# Patient Record
Sex: MALE | Race: WHITE | NOT HISPANIC OR LATINO | URBAN - METROPOLITAN AREA
[De-identification: names, ages, dates, MRNs, and addresses within clinical notes are randomized per-mention and may not be internally consistent; named-entity substitution may affect disease eponyms.]

---

## 2017-08-22 ENCOUNTER — OFFICE (OUTPATIENT)
Dept: URBAN - METROPOLITAN AREA CLINIC 14 | Facility: CLINIC | Age: 67
End: 2017-08-22

## 2017-08-22 VITALS
DIASTOLIC BLOOD PRESSURE: 72 MMHG | SYSTOLIC BLOOD PRESSURE: 116 MMHG | HEIGHT: 75 IN | HEART RATE: 100 BPM | WEIGHT: 204 LBS

## 2017-08-22 DIAGNOSIS — Z86.010 PERSONAL HISTORY OF COLONIC POLYPS: ICD-10-CM

## 2017-08-22 DIAGNOSIS — K21.9 GASTRO-ESOPHAGEAL REFLUX DISEASE WITHOUT ESOPHAGITIS: ICD-10-CM

## 2017-08-22 DIAGNOSIS — K57.30 DIVERTICULOSIS OF LARGE INTESTINE WITHOUT PERFORATION OR ABS: ICD-10-CM

## 2017-08-22 DIAGNOSIS — K22.70 BARRETT'S ESOPHAGUS WITHOUT DYSPLASIA: ICD-10-CM

## 2017-08-22 PROCEDURE — 99213 OFFICE O/P EST LOW 20 MIN: CPT

## 2017-08-22 RX ORDER — PANTOPRAZOLE SODIUM 40 MG/1
40 TABLET, DELAYED RELEASE ORAL
Qty: 90 | Refills: 3 | Status: ACTIVE
Start: 2015-09-11

## 2018-06-26 ENCOUNTER — OFFICE (OUTPATIENT)
Dept: URBAN - METROPOLITAN AREA CLINIC 14 | Facility: CLINIC | Age: 68
End: 2018-06-26

## 2018-06-26 VITALS
DIASTOLIC BLOOD PRESSURE: 72 MMHG | HEART RATE: 93 BPM | SYSTOLIC BLOOD PRESSURE: 108 MMHG | WEIGHT: 194 LBS | HEIGHT: 75 IN

## 2018-06-26 DIAGNOSIS — K22.70 BARRETT'S ESOPHAGUS WITHOUT DYSPLASIA: ICD-10-CM

## 2018-06-26 DIAGNOSIS — Z86.010 PERSONAL HISTORY OF COLONIC POLYPS: ICD-10-CM

## 2018-06-26 DIAGNOSIS — K21.9 GASTRO-ESOPHAGEAL REFLUX DISEASE WITHOUT ESOPHAGITIS: ICD-10-CM

## 2018-06-26 DIAGNOSIS — K57.30 DIVERTICULOSIS OF LARGE INTESTINE WITHOUT PERFORATION OR ABS: ICD-10-CM

## 2018-06-26 PROCEDURE — 99214 OFFICE O/P EST MOD 30 MIN: CPT

## 2018-06-26 RX ORDER — PANTOPRAZOLE SODIUM 40 MG/1
40 TABLET, DELAYED RELEASE ORAL
Qty: 90 | Refills: 3 | Status: ACTIVE
Start: 2015-09-11

## 2018-06-26 RX ORDER — SODIUM SULFATE, POTASSIUM SULFATE, MAGNESIUM SULFATE 17.5; 3.13; 1.6 G/ML; G/ML; G/ML
SOLUTION, CONCENTRATE ORAL
Status: COMPLETED
Start: 2018-06-26 | End: 2018-07-26

## 2018-07-26 ENCOUNTER — AMBULATORY SURGICAL CENTER (OUTPATIENT)
Dept: URBAN - METROPOLITAN AREA SURGERY 2 | Facility: SURGERY | Age: 68
End: 2018-07-26

## 2018-07-26 ENCOUNTER — AMBULATORY SURGICAL CENTER (OUTPATIENT)
Dept: URBAN - METROPOLITAN AREA SURGERY 2 | Facility: SURGERY | Age: 68
End: 2018-07-26
Payer: COMMERCIAL

## 2018-07-26 ENCOUNTER — OFFICE (OUTPATIENT)
Dept: URBAN - METROPOLITAN AREA PATHOLOGY 22 | Facility: PATHOLOGY | Age: 68
End: 2018-07-26
Payer: COMMERCIAL

## 2018-07-26 VITALS
RESPIRATION RATE: 16 BRPM | SYSTOLIC BLOOD PRESSURE: 108 MMHG | SYSTOLIC BLOOD PRESSURE: 93 MMHG | SYSTOLIC BLOOD PRESSURE: 108 MMHG | RESPIRATION RATE: 18 BRPM | OXYGEN SATURATION: 93 % | DIASTOLIC BLOOD PRESSURE: 64 MMHG | HEART RATE: 81 BPM | DIASTOLIC BLOOD PRESSURE: 58 MMHG | TEMPERATURE: 98.6 F | TEMPERATURE: 98.1 F | RESPIRATION RATE: 18 BRPM | HEIGHT: 75 IN | HEIGHT: 75 IN | SYSTOLIC BLOOD PRESSURE: 93 MMHG | DIASTOLIC BLOOD PRESSURE: 58 MMHG | DIASTOLIC BLOOD PRESSURE: 57 MMHG | SYSTOLIC BLOOD PRESSURE: 116 MMHG | HEART RATE: 87 BPM | HEART RATE: 78 BPM | HEART RATE: 85 BPM | OXYGEN SATURATION: 91 % | WEIGHT: 190 LBS | DIASTOLIC BLOOD PRESSURE: 49 MMHG | HEART RATE: 87 BPM | DIASTOLIC BLOOD PRESSURE: 64 MMHG | OXYGEN SATURATION: 100 % | HEART RATE: 81 BPM | TEMPERATURE: 98.1 F | WEIGHT: 190 LBS | RESPIRATION RATE: 16 BRPM | TEMPERATURE: 98.6 F | OXYGEN SATURATION: 100 % | OXYGEN SATURATION: 93 % | HEART RATE: 78 BPM | OXYGEN SATURATION: 91 % | DIASTOLIC BLOOD PRESSURE: 49 MMHG | SYSTOLIC BLOOD PRESSURE: 116 MMHG | HEART RATE: 85 BPM | DIASTOLIC BLOOD PRESSURE: 57 MMHG

## 2018-07-26 DIAGNOSIS — Z86.010 PERSONAL HISTORY OF COLONIC POLYPS: ICD-10-CM

## 2018-07-26 DIAGNOSIS — K31.7 POLYP OF STOMACH AND DUODENUM: ICD-10-CM

## 2018-07-26 DIAGNOSIS — K21.9 GASTRO-ESOPHAGEAL REFLUX DISEASE WITHOUT ESOPHAGITIS: ICD-10-CM

## 2018-07-26 DIAGNOSIS — R93.3 ABNORMAL FINDINGS ON DIAGNOSTIC IMAGING OF OTHER PARTS OF DI: ICD-10-CM

## 2018-07-26 DIAGNOSIS — K44.9 DIAPHRAGMATIC HERNIA WITHOUT OBSTRUCTION OR GANGRENE: ICD-10-CM

## 2018-07-26 DIAGNOSIS — K31.89 OTHER DISEASES OF STOMACH AND DUODENUM: ICD-10-CM

## 2018-07-26 DIAGNOSIS — K57.30 DIVERTICULOSIS OF LARGE INTESTINE WITHOUT PERFORATION OR ABS: ICD-10-CM

## 2018-07-26 PROCEDURE — 88305 TISSUE EXAM BY PATHOLOGIST: CPT

## 2018-07-26 PROCEDURE — 43239 EGD BIOPSY SINGLE/MULTIPLE: CPT | Mod: 51

## 2018-07-26 PROCEDURE — G8907 PT DOC NO EVENTS ON DISCHARG: HCPCS

## 2018-07-26 PROCEDURE — G8918 PT W/O PREOP ORDER IV AB PRO: HCPCS

## 2018-07-26 PROCEDURE — 88342 IMHCHEM/IMCYTCHM 1ST ANTB: CPT

## 2018-07-26 PROCEDURE — 88313 SPECIAL STAINS GROUP 2: CPT

## 2018-07-26 PROCEDURE — 45378 DIAGNOSTIC COLONOSCOPY: CPT

## 2018-07-26 PROCEDURE — G0105 COLORECTAL SCRN; HI RISK IND: HCPCS

## 2023-02-28 ENCOUNTER — OFFICE VISIT (OUTPATIENT)
Dept: FAMILY MEDICINE CLINIC | Facility: CLINIC | Age: 73
End: 2023-02-28
Payer: MEDICARE

## 2023-02-28 VITALS
HEIGHT: 76 IN | DIASTOLIC BLOOD PRESSURE: 69 MMHG | HEART RATE: 93 BPM | WEIGHT: 173 LBS | BODY MASS INDEX: 21.07 KG/M2 | OXYGEN SATURATION: 95 % | SYSTOLIC BLOOD PRESSURE: 116 MMHG | RESPIRATION RATE: 20 BRPM

## 2023-02-28 DIAGNOSIS — Z87.19 HISTORY OF BARRETT'S ESOPHAGUS: ICD-10-CM

## 2023-02-28 DIAGNOSIS — K21.00 GASTROESOPHAGEAL REFLUX DISEASE WITH ESOPHAGITIS, UNSPECIFIED WHETHER HEMORRHAGE: ICD-10-CM

## 2023-02-28 DIAGNOSIS — J84.112 IPF (IDIOPATHIC PULMONARY FIBROSIS): Primary | ICD-10-CM

## 2023-02-28 DIAGNOSIS — Z99.81 CHRONIC RESPIRATORY FAILURE WITH HYPOXIA, ON HOME O2 THERAPY: ICD-10-CM

## 2023-02-28 DIAGNOSIS — R05.3 CHRONIC COUGH: ICD-10-CM

## 2023-02-28 DIAGNOSIS — R06.02 SHORTNESS OF BREATH: ICD-10-CM

## 2023-02-28 DIAGNOSIS — J96.11 CHRONIC RESPIRATORY FAILURE WITH HYPOXIA, ON HOME O2 THERAPY: ICD-10-CM

## 2023-02-28 DIAGNOSIS — J96.11 CHRONIC RESPIRATORY FAILURE WITH HYPOXIA: ICD-10-CM

## 2023-02-28 PROBLEM — F10.10 ALCOHOL ABUSE: Status: ACTIVE | Noted: 2022-03-13

## 2023-02-28 PROCEDURE — 99203 OFFICE O/P NEW LOW 30 MIN: CPT | Performed by: NURSE PRACTITIONER

## 2023-02-28 RX ORDER — PANTOPRAZOLE SODIUM 20 MG/1
20 TABLET, DELAYED RELEASE ORAL DAILY
COMMUNITY

## 2023-02-28 RX ORDER — BENZONATATE 100 MG/1
100 CAPSULE ORAL 3 TIMES DAILY PRN
Qty: 30 CAPSULE | Refills: 0 | Status: SHIPPED | OUTPATIENT
Start: 2023-02-28

## 2023-02-28 RX ORDER — ESCITALOPRAM OXALATE 10 MG/1
10 TABLET ORAL DAILY
COMMUNITY

## 2023-02-28 NOTE — PROGRESS NOTES
"Chief Complaint  Shortness of Breath and Establish Care    Subjective    History of Present Illness      Patient presents to [facility] PRIMARY [facility]ARE for   History of Present Illness  Patient recently moved to [place]. He has diagnosis of pulmonary fibrosis. Wanting referral to Pulm. Is currently on home oxygen        Review of Systems    I have reviewed and agree with the HPI and ROS information as above.  Telma Garcia Tanna, APRN     Objective   Vital Signs:   /69   Pulse 93   Resp 20   Ht 193 cm (76\")   Wt 78.5 kg (173 lb)   SpO2 95%   BMI 21.06 kg/m²     BMI cannot be calculated due to outdated height or weight values.  Please input a current height/weight in Vitals and re-renter BMIFOLLOWUP in Note to pull in correct documentation based on BMI range.      Physical Exam  Constitutional:       Appearance: Normal appearance. He is well-developed.   HENT:      Head: Normocephalic and atraumatic.      Right Ear: External ear normal.      Left Ear: External ear normal.      Nose: Nose normal. No nasal tenderness or congestion.      Mouth/Throat:      Lips: Pink. No lesions.      Mouth: Mucous membranes are moist. No oral lesions.      Dentition: Normal dentition.      Pharynx: Oropharynx is clear. No pharyngeal swelling, oropharyngeal exudate or posterior oropharyngeal erythema.   Eyes:      General: Lids are normal. Vision grossly intact. No scleral icterus.        Right eye: No discharge.         Left eye: No discharge.      Extraocular Movements: Extraocular movements intact.      Conjunctiva/sclera: Conjunctivae normal.      Right eye: Right conjunctiva is not injected.      Left eye: Left conjunctiva is not injected.      Pupils: Pupils are equal, round, and reactive to light.   Cardiovascular:      Rate and Rhythm: Normal rate and regular rhythm.      Heart sounds: Normal heart sounds. No murmur heard.    No gallop.   Pulmonary:      Effort: Pulmonary effort is normal.      " Breath sounds: Decreased air movement present. Rales present. No wheezing or rhonchi.   Musculoskeletal:         General: No tenderness or deformity. Normal range of motion.      Cervical back: Full passive range of motion without pain, normal range of motion and neck supple.      Right lower leg: No edema.      Left lower leg: No edema.   Skin:     General: Skin is warm and dry.      Coloration: Skin is not jaundiced.      Findings: No rash.   Neurological:      Mental Status: He is alert and oriented to person, place, and time.      Sensory: Sensation is intact.      Motor: Motor function is intact.      Coordination: Coordination is intact.      Gait: Gait is intact.   Psychiatric:         Attention and Perception: Attention normal.         Mood and Affect: Mood and affect normal.         Behavior: Behavior is not hyperactive. Behavior is cooperative.         Thought Content: Thought content normal.         Judgment: Judgment normal.          JOSE RAUL-7:      PHQ-2 Depression Screening  Little interest or pleasure in doing things? 0-->not at all   Feeling down, depressed, or hopeless? 0-->not at all   PHQ-2 Total Score 0     PHQ-9 Depression Screening  Little interest or pleasure in doing things? 0-->not at all   Feeling down, depressed, or hopeless? 0-->not at all   Trouble falling or staying asleep, or sleeping too much?     Feeling tired or having little energy?     Poor appetite or overeating?     Feeling bad about yourself - or that you are a failure or have let yourself or your family down?     Trouble concentrating on things, such as reading the newspaper or watching television?     Moving or speaking so slowly that other people could have noticed? Or the opposite - being so fidgety or restless that you have been moving around a lot more than usual?     Thoughts that you would be better off dead, or of hurting yourself in some way?     PHQ-9 Total Score 0   If you checked off any problems, how difficult have  these problems made it for you to do your work, take care of things at home, or get along with other people?        Result Review  Data Reviewed:                   Assessment and Plan      Diagnoses and all orders for this visit:    1. IPF (idiopathic pulmonary fibrosis) (HCC) (Primary)  -     benzonatate (Tessalon Perles) 100 MG capsule; Take 1 capsule by mouth 3 (Three) Times a Day As Needed for Cough.  Dispense: 30 capsule; Refill: 0  -     Ambulatory Referral to Pulmonology    2. Chronic respiratory failure with hypoxia (HCC)  -     benzonatate (Tessalon Perles) 100 MG capsule; Take 1 capsule by mouth 3 (Three) Times a Day As Needed for Cough.  Dispense: 30 capsule; Refill: 0  -     Ambulatory Referral to Pulmonology    3. History of Lucas's esophagus  -     Ambulatory Referral to Gastroenterology    4. Chronic cough    5. Gastroesophageal reflux disease with esophagitis, unspecified whether hemorrhage    6. Chronic respiratory failure with hypoxia, on home O2 therapy ([facility])    7. Shortness of breath    Patient comes in today as a new patient requesting referral to pulmonology.  Patient has a history of idiopathic pulmonary fibrosis in which she has been following with a pulmonologist in [facility].  He moved to [place] about 6 months ago as he has family here and is needing a pulmonologist here to take over his care.  He is currently on 3 L of oxygen that he is supposed to be on constantly but of note he is not wearing it in the office.  His O2 sat was stable and I did question where he was and wife stated that it was in the car.  Wife is also requesting a refill on the Tessalon Perles in which the pulmonologist has given him previously so I will go ahead and refill this until he gets into pul.    Wife also states that patient is needing to establish a gastroenterologist here.  He has a history of Lucas's esophagus and wife states that it is probably time to get a recheck on that.  We will proceed with  this referral as well.  Patient is currently on a PPI.  We will continue the same until he sees them.    I did question the need for a Lexapro refill.  Wife denies needing a refill on this currently.    I question when the last Medicare wellness exam was.  Patient does feel like that it is probably been at least a year.  They we will get that scheduled when they leave today.        Follow Up   Return for Medicare Wellness.  Patient was given instructions and counseling regarding his condition or for health maintenance advice. Please see specific information pulled into the AVS if appropriate.

## 2023-03-01 ENCOUNTER — PATIENT ROUNDING (BHMG ONLY) (OUTPATIENT)
Dept: FAMILY MEDICINE CLINIC | Facility: CLINIC | Age: 73
End: 2023-03-01
Payer: MEDICARE

## 2023-03-01 NOTE — PROGRESS NOTES
March 1, 2023    Hello, may I speak with Roc Hawthorne?    My name is Sarah    Pt did not answer phone, left vm welcoming them to our practice and left our phone number for him to call back.    I am  with [facility]  [facility] PRIMARY [facility]  [address] [place]  [facility] [address]  [phone].    Before we get started may I verify your date of birth? 1950    I am calling to officially welcome you to our practice and ask about your recent visit. Is this a good time to talk? yes    Tell me about your visit with us. What things went well?         We're always looking for ways to make our patients' experiences even better. Do you have recommendations on ways we may improve?      Overall were you satisfied with your first visit to our practice?        I appreciate you taking the time to speak with me today. Is there anything else I can do for you?       Thank you, and have a great day.

## 2023-03-13 ENCOUNTER — OFFICE VISIT (OUTPATIENT)
Dept: FAMILY MEDICINE CLINIC | Facility: CLINIC | Age: 73
End: 2023-03-13
Payer: MEDICARE

## 2023-03-13 VITALS
WEIGHT: 169 LBS | HEART RATE: 96 BPM | HEIGHT: 76 IN | DIASTOLIC BLOOD PRESSURE: 69 MMHG | RESPIRATION RATE: 20 BRPM | BODY MASS INDEX: 20.58 KG/M2 | SYSTOLIC BLOOD PRESSURE: 138 MMHG

## 2023-03-13 DIAGNOSIS — M79.671 RIGHT FOOT PAIN: Primary | ICD-10-CM

## 2023-03-13 PROCEDURE — 99214 OFFICE O/P EST MOD 30 MIN: CPT | Performed by: NURSE PRACTITIONER

## 2023-03-13 NOTE — PROGRESS NOTES
"Chief Complaint  foot pain    Subjective    History of Present Illness      Patient presents to [facility] for   History of Present Illness  Pain on bottom of R foot x 2-3 weeks. Wanting referral to Podiatry. Weight bearing and painful        Review of Systems    I have reviewed and agree with the HPI and ROS information as above.  Telma Garcia ARIANNA Cisse     Objective   Vital Signs:   /69   Pulse 96   Resp 20   Ht 193 cm (76\")   Wt 76.7 kg (169 lb)   BMI 20.57 kg/m²     BMI is within normal parameters. No other follow-up for BMI required.      Physical Exam  Constitutional:       Appearance: Normal appearance. He is well-developed.   HENT:      Head: Normocephalic and atraumatic.      Right Ear: External ear normal.      Left Ear: External ear normal.      Nose: Nose normal. No nasal tenderness or congestion.      Mouth/Throat:      Lips: Pink. No lesions.      Mouth: Mucous membranes are moist. No oral lesions.      Dentition: Normal dentition.      Pharynx: Oropharynx is clear. No pharyngeal swelling, oropharyngeal exudate or posterior oropharyngeal erythema.   Eyes:      General: Lids are normal. Vision grossly intact. No scleral icterus.        Right eye: No discharge.         Left eye: No discharge.      Extraocular Movements: Extraocular movements intact.      Conjunctiva/sclera: Conjunctivae normal.      Right eye: Right conjunctiva is not injected.      Left eye: Left conjunctiva is not injected.      Pupils: Pupils are equal, round, and reactive to light.   Cardiovascular:      Rate and Rhythm: Normal rate and regular rhythm.      Heart sounds: Normal heart sounds. No murmur heard.    No gallop.   Pulmonary:      Effort: Pulmonary effort is normal.      Breath sounds: Normal breath sounds and air entry. No wheezing, rhonchi or rales.   Musculoskeletal:         General: No tenderness or deformity. Normal range of motion.      Cervical back: Full passive " range of motion without pain, normal range of motion and neck supple.      Right lower leg: No edema.      Left lower leg: No edema.   Skin:     General: Skin is warm and dry.      Coloration: Skin is not jaundiced.      Findings: No rash.      Comments: Bottom of right foot possible wart noted   Neurological:      Mental Status: He is alert and oriented to person, place, and time.      Sensory: Sensation is intact.      Motor: Motor function is intact.      Coordination: Coordination is intact.      Gait: Gait is intact.   Psychiatric:         Attention and Perception: Attention normal.         Mood and Affect: Mood and affect normal.         Behavior: Behavior is not hyperactive. Behavior is cooperative.         Thought Content: Thought content normal.         Judgment: Judgment normal.          JOSE RAUL-7:      PHQ-2 Depression Screening  Little interest or pleasure in doing things? 0-->not at all   Feeling down, depressed, or hopeless? 0-->not at all   PHQ-2 Total Score 0     PHQ-9 Depression Screening  Little interest or pleasure in doing things? 0-->not at all   Feeling down, depressed, or hopeless? 0-->not at all   Trouble falling or staying asleep, or sleeping too much?     Feeling tired or having little energy?     Poor appetite or overeating?     Feeling bad about yourself - or that you are a failure or have let yourself or your family down?     Trouble concentrating on things, such as reading the newspaper or watching television?     Moving or speaking so slowly that other people could have noticed? Or the opposite - being so fidgety or restless that you have been moving around a lot more than usual?     Thoughts that you would be better off dead, or of hurting yourself in some way?     PHQ-9 Total Score 0   If you checked off any problems, how difficult have these problems made it for you to do your work, take care of things at home, or get along with other people?        Result Review  Data Reviewed:                    Assessment and Plan      Diagnoses and all orders for this visit:    1. Right foot pain (Primary)  -     Ambulatory Referral to Podiatry    Comes in today requesting a referral to podiatry.  They tried to call make an appointment themselves but were told they would need the referral.  Patient complains of pain to the bottom of his right foot.  Wife states that there is a corn or something on the bottom of his foot.  I do see this area and with palpation it is very tender to patient.  We will proceed with referral.  I do not see any obvious foreign body.        Follow Up   Return if symptoms worsen or fail to improve.  Patient was given instructions and counseling regarding his condition or for health maintenance advice. Please see specific information pulled into the AVS if appropriate.

## 2023-03-15 PROBLEM — J84.112 IPF (IDIOPATHIC PULMONARY FIBROSIS): Chronic | Status: ACTIVE | Noted: 2020-01-23

## 2023-03-15 NOTE — PROGRESS NOTES
ARIANNA Howell  [facility]   Pulmonary and Critical Care  [address] [place] [address]  Phone: [phone]  Fax: [phone]           Chief Complaint  Idiopathic pulmonary fibrosis    Subjective    History of Present Illness     Roc Hawthorne presents to [facility] PULMONARY & CRITICAL CARE MEDICINE   History of Present Illness  Mr. Hawthorne is a 72 year old male patient who was referred by his new PCP for a history of IPF. He is a former patient of Dr. Zo Stevens at [facility] last seen in March of last year. He has known IPF biopsy proven. HRCT 2/2021 with minimal progression since 2019. This was when he was started on Ofev 100 mg BID (interolarnt to [facility]). Dr. Stevens did note she was tolerant of Ofev but had had issues with compliance. He has been off of it for over a year. He has had memory issues. He has chronic hypoxic respiratory failure on 2-3 L NC with exertion and sleep. He uses [facility] in [place] [facility]. He does not have a local DME. He also noted an Echo in Oct 2019 showed no PH. He has known history of Lucas's esophagus with GERD. Chronic cough noted to be multifactoral secondary to reflux, Etoh use, post nasal drip (unresponsive to Singulair) and PF. His cough is some better but wife states same. This is a dry cough. He has known small chronic right pleural effusion with VATS biopsy. Has not been amenable to drainage. He has ted HFpEF. He was noted to have memory decline and on Naltrexone due to alcohol abuse having completed 2 weeks of inpatient rehab in 2022. He has had relapses on the alcohol. He consumes about 10 beers a day per his wife. Since moving here and retiring this has worsened. He had a flutter valve that helped in the past but the dog ate it.  He has not been a candidate for lung transplant in the past secondary to alcohol abuse and lack of interest. He has used Tessalon perles in the past  "for his cough. He had a PFT in March 2022 noting FEV1 of 35%, ratio 75%, FVC 34% and he was unable to do the DLCO. He is a former smoker of .25 ppd x 19 years quitting in 1988. He was diagnosed in 2014 with IPF. He has shortness of breath with over exertion. He is able to climb the stairs to his bedroom. He is able to do his ADLs. He is unable to work in the yard. He denies fever, chills, night sweats. He denies hemoptysis or weight changes. He denies any swelling. He was diagnosed with sleep apnea in the past but did not wear a mask. He had an overnight study done and his nightly oxygen went up from 2L to 3L nightly.          Objective   Vital Signs:   /60   Pulse 100   Ht 193 cm (76\")   Wt 77.6 kg (171 lb)   SpO2 96% Comment: ra  BMI 20.81 kg/m²     Physical Exam  Vitals reviewed.   Constitutional:       Appearance: Normal appearance.   Cardiovascular:      Rate and Rhythm: Normal rate and regular rhythm.   Pulmonary:      Effort: Pulmonary effort is normal.      Breath sounds: Normal breath sounds.   Neurological:      General: No focal deficit present.      Mental Status: He is alert and oriented to person, place, and time.   Psychiatric:         Mood and Affect: Mood normal.         Behavior: Behavior normal.          Result Review :  The following data was reviewed by: ARIANNA Howell on 03/16/2023:    Data reviewed: Radiologic studies Report on CT from Feb 2021   My interpretation of imaging:  As in HPI  My interpretation of labs: none   CT Chest Without Contrast Diagnostic (02/09/2021 11:27 EST)      My interpretation of the PFT: none     No results found for this or any previous visit.            Assessment and Plan   Diagnoses and all orders for this visit:    1. IPF (idiopathic pulmonary fibrosis) (HCC) (Primary)    2. Chronic respiratory failure with hypoxia (HCC)    3. History of Lucas's esophagus    4. Alcohol abuse    5. Dependence on supplemental oxygen        He was here to " establish care locally. His wife does most of the history as with his memory issues he is a poor historian. She would like to see him back on Ofev. We will plan to bring him back in 4 weeks with a FVL and DLCO. We will readdress the Ofev when he returns. We discussed concerns about alcohol use and being on Ofev. He and his wife will discuss this. I have recommend he get affiliated with a local chapter of AA. He has been involved with them in the past. He consumes about 10 beers a day. We discussed about the risks of quitting cold turkey and the risk of DTs. I have concerns with his current alcohol use and beginning Ofev back which increases his risk of GI Bleed. If they wish to pursue Ofev when he returns we will draw labs and fill out paperwork. Order given for flutter valve. List of DME provided to help facilitate getting established with a local DME company.           Follow Up   No follow-ups on file.  Patient was given instructions and counseling regarding his condition or for health maintenance advice. Please see specific information pulled into the AVS if appropriate.     Nathalie Mar, APRN  3/16/2023  14:42 CDT

## 2023-03-16 ENCOUNTER — OFFICE VISIT (OUTPATIENT)
Dept: PULMONOLOGY | Facility: CLINIC | Age: 73
End: 2023-03-16
Payer: MEDICARE

## 2023-03-16 VITALS
HEIGHT: 76 IN | HEART RATE: 100 BPM | WEIGHT: 171 LBS | BODY MASS INDEX: 20.82 KG/M2 | SYSTOLIC BLOOD PRESSURE: 138 MMHG | DIASTOLIC BLOOD PRESSURE: 60 MMHG | OXYGEN SATURATION: 96 %

## 2023-03-16 DIAGNOSIS — F10.10 ALCOHOL ABUSE: ICD-10-CM

## 2023-03-16 DIAGNOSIS — J96.11 CHRONIC RESPIRATORY FAILURE WITH HYPOXIA: ICD-10-CM

## 2023-03-16 DIAGNOSIS — J84.112 IPF (IDIOPATHIC PULMONARY FIBROSIS): Primary | ICD-10-CM

## 2023-03-16 DIAGNOSIS — Z87.19 HISTORY OF BARRETT'S ESOPHAGUS: ICD-10-CM

## 2023-03-16 DIAGNOSIS — Z99.81 DEPENDENCE ON SUPPLEMENTAL OXYGEN: ICD-10-CM

## 2023-03-16 PROCEDURE — 1160F RVW MEDS BY RX/DR IN RCRD: CPT | Performed by: NURSE PRACTITIONER

## 2023-03-16 PROCEDURE — 1159F MED LIST DOCD IN RCRD: CPT | Performed by: NURSE PRACTITIONER

## 2023-03-16 PROCEDURE — 99213 OFFICE O/P EST LOW 20 MIN: CPT | Performed by: NURSE PRACTITIONER

## 2023-04-11 ENCOUNTER — OFFICE VISIT (OUTPATIENT)
Dept: GASTROENTEROLOGY | Facility: CLINIC | Age: 73
End: 2023-04-11
Payer: MEDICARE

## 2023-04-11 VITALS
HEIGHT: 76 IN | HEART RATE: 93 BPM | SYSTOLIC BLOOD PRESSURE: 130 MMHG | OXYGEN SATURATION: 100 % | BODY MASS INDEX: 20.58 KG/M2 | TEMPERATURE: 97.3 F | DIASTOLIC BLOOD PRESSURE: 68 MMHG | WEIGHT: 169 LBS

## 2023-04-11 DIAGNOSIS — K21.9 GASTROESOPHAGEAL REFLUX DISEASE, UNSPECIFIED WHETHER ESOPHAGITIS PRESENT: ICD-10-CM

## 2023-04-11 DIAGNOSIS — K22.70 BARRETT'S ESOPHAGUS DETERMINED BY BIOPSY: Primary | ICD-10-CM

## 2023-04-11 PROCEDURE — 99213 OFFICE O/P EST LOW 20 MIN: CPT | Performed by: NURSE PRACTITIONER

## 2023-04-11 PROCEDURE — 1159F MED LIST DOCD IN RCRD: CPT | Performed by: NURSE PRACTITIONER

## 2023-04-11 PROCEDURE — 1160F RVW MEDS BY RX/DR IN RCRD: CPT | Performed by: NURSE PRACTITIONER

## 2023-04-11 NOTE — PATIENT INSTRUCTIONS
Gastroesophageal Reflux Disease, Adult    Gastroesophageal reflux disease (GERD) happens when acid from your stomach flows up into the esophagus. When acid comes in contact with the esophagus, the acid causes soreness (inflammation) in the esophagus. Over time, GERD may create small holes (ulcers) in the lining of the esophagus.  CAUSES    Increased body weight. This puts pressure on the stomach, making acid rise from the stomach into the esophagus.  Smoking. This increases acid production in the stomach.  Drinking alcohol. This causes decreased pressure in the lower esophageal sphincter (valve or ring of muscle between the esophagus and stomach), allowing acid from the stomach into the esophagus.  Late evening meals and a full stomach. This increases pressure and acid production in the stomach.  A malformed lower esophageal sphincter.  Sometimes, no cause is found.  SYMPTOMS    Burning pain in the lower part of the mid-chest behind the breastbone and in the mid-stomach area. This may occur twice a week or more often.  Trouble swallowing.  Sore throat.  Dry cough.  Asthma-like symptoms including chest tightness, shortness of breath, or wheezing.  DIAGNOSIS    Your caregiver may be able to diagnose GERD based on your symptoms. In some cases, X-rays and other tests may be done to check for complications or to check the condition of your stomach and esophagus.  TREATMENT    Your caregiver may recommend over-the-counter or prescription medicines to help decrease acid production. Ask your caregiver before starting or adding any new medicines.    HOME CARE INSTRUCTIONS    Change the factors that you can control. Ask your caregiver for guidance concerning weight loss, quitting smoking, and alcohol consumption.  Avoid foods and drinks that make your symptoms worse, such as:  Caffeine or alcoholic drinks.  Chocolate.  Peppermint or mint flavorings.  Garlic and onions.  Spicy foods.  Citrus fruits, such as oranges, bienvenido, or  limes.  Tomato-based foods such as sauce, chili, salsa, and pizza.  Fried and fatty foods.  Avoid lying down for the 3 hours prior to your bedtime or prior to taking a nap.  Eat small, frequent meals instead of large meals.  Wear loose-fitting clothing. Do not wear anything tight around your waist that causes pressure on your stomach.  Raise the head of your bed 6 to 8 inches with wood blocks to help you sleep. Extra pillows will not help.  Only take over-the-counter or prescription medicines for pain, discomfort, or fever as directed by your caregiver.  Do not take aspirin, ibuprofen, or other nonsteroidal anti-inflammatory drugs (NSAIDs).  SEEK IMMEDIATE MEDICAL CARE IF:    You have pain in your arms, neck, jaw, teeth, or back.  Your pain increases or changes in intensity or duration.  You develop nausea, vomiting, or sweating (diaphoresis).  You develop shortness of breath, or you faint.  Your vomit is green, yellow, black, or looks like coffee grounds or blood.  Your stool is red, bloody, or black.  These symptoms could be signs of other problems, such as heart disease, gastric bleeding, or esophageal bleeding.  MAKE SURE YOU:    Understand these instructions.  Will watch your condition.  Will get help right away if you are not doing well or get worse.     This information is not intended to replace advice given to you by your health care provider. Make sure you discuss any questions you have with your health care provider.     Document Released: 09/27/2006 Document Revised: 01/08/2016 Document Reviewed: 04/13/2016  [employer] Interactive Patient Education ©2016 [employer]      Lucas's Esophagus    Lucas's esophagus occurs when the lining of the esophagus is damaged. The esophagus is the tube that carries food from the mouth to the stomach. With Lucas's esophagus, the lining of the esophagus gets replaced by material that is similar to the lining in the intestines. This process is called intestinal  metaplasia. A small number of people with Lucas's esophagus develop esophageal cancer.  CAUSES    The exact cause of Lucas's esophagus is unknown.  SYMPTOMS    Most people with Lucas's esophagus do not have symptoms. However, many patients also have gastroesophageal reflux disease (GERD). GERD can cause heartburn, trouble swallowing, and a dry cough.  DIAGNOSIS  Lucas's esophagus is diagnosed by an exam called upper gastrointestinal endoscopy. A thin, flexible tube (endoscope) is passed down the esophagus. The endoscope has a light and camera on the end. Your caregiver uses the endoscope to view the inside of the esophagus. A tissue sample may also be taken and examined under a microscope (biopsy). If cancer cells are found during the biopsy, this condition is called dysplasia.  TREATMENT    If you have no dysplasia or low-grade dysplasia, your caregiver may recommend no treatment or only taking medicines to treat GERD. Sometimes, taking acid-blocking drugs to treat GERD helps improve the tissue affected by Lucas's esophagus. Your caregiver may also recommend periodic esophageal exams.  If you have high-grade dysplasia, treatment may include removing the damaged parts of the esophagus. This can be done by heating, freezing, or surgically removing the tissue. In some cases, surgery may be done to remove most of the esophagus. The stomach is then attached to the remaining portion of the esophagus.  HOME CARE INSTRUCTIONS  Take acid-blocking drugs for GERD if recommended by your caregiver.  Keep all follow-up appointments as directed by your caregiver. You may need periodic esophageal exams.  SEEK IMMEDIATE MEDICAL CARE IF:  You have chest pain.  You have trouble swallowing.  You vomit blood or material that looks like coffee grounds.  Your stools are bright red or dark.  This information is not intended to replace advice given to you by your health care provider. Make sure you discuss any questions you have  with your health care provider.  Document Released: 03/09/2005 Document Revised: 06/18/2013 Document Reviewed: 02/26/2013  [employer] Interactive Patient Education ©2016 [employer]

## 2023-04-11 NOTE — PROGRESS NOTES
Chief Complaint   Patient presents with   • Endoscopy     Has garcía's had endo in [place] about 3 years       PCP: Telma Cisse APRN  REFER: Telma Cisse *    Subjective     HPI    Roc Hawthorne is a 72 y.o. male who presents with known history of  García's Esophagus  and GERD.  He wishes to establish care at [facility].    Heartburn and indigestion described as stable.   He is maintained on Protonix/Pantoprazole daily.  Last EGD from 3 years ago in [place] .  He does not have complaints of :N/V, no changes in bowels, no wt loss, no BRBPR.  No melena.  No abdominal pain.   No dysphagia.      Colonoscopy apx 3 years ago in [place] (no records to review)          Past Medical History:   Diagnosis Date   • Chronic idiopathic fibrosing alveolitis    • Dependence on supplemental oxygen 2023   • GERD (gastroesophageal reflux disease)    • Primary central sleep apnea    • Pulmonary fibrosis      Past Surgical History:   Procedure Laterality Date   • APPENDECTOMY     • BRONCHOSCOPY     • COLON RESECTION     • LUNG BIOPSY  2014     Outpatient Medications Marked as Taking for the 23 encounter (Office Visit) with Jamir Jimenez APRN   Medication Sig Dispense Refill   • benzonatate (Tessalon Perles) 100 MG capsule Take 1 capsule by mouth 3 (Three) Times a Day As Needed for Cough. 30 capsule 0   • escitalopram (LEXAPRO) 10 MG tablet Take 1 tablet by mouth Daily.     • pantoprazole (PROTONIX) 20 MG EC tablet Take 1 tablet by mouth Daily.       Allergies   Allergen Reactions   • Sulfa Antibiotics Rash     Social History     Socioeconomic History   • Marital status:    Tobacco Use   • Smoking status: Former     Packs/day: 0.50     Years: 19.00     Pack years: 9.50     Types: Cigarettes     Quit date: 1988     Years since quittin.3   • Smokeless tobacco: Never   Vaping Use   • Vaping Use: Never used   Substance and Sexual Activity   •  "Alcohol use: Yes     Alcohol/week: 30.0 standard drinks     Types: 30 Cans of beer per week     Comment: daily beere   • Drug use: Never   • Sexual activity: Not Currently     Partners: Female     Review of Systems   Constitutional: Negative for unexpected weight change.   Respiratory: Negative for shortness of breath.    Cardiovascular: Negative for chest pain.   Gastrointestinal: Negative for abdominal pain and anal bleeding.     Objective   Vitals:    04/11/23 1409   BP: 130/68   Pulse: 93   Temp: 97.3 °F (36.3 °C)   SpO2: 100%   Weight: 76.7 kg (169 lb)   Height: 193 cm (76\")     Physical Exam  Constitutional:       Appearance: Normal appearance. He is well-developed.   Eyes:      General: No scleral icterus.  Cardiovascular:      Rate and Rhythm: Regular rhythm.      Heart sounds: Normal heart sounds. No murmur heard.  Pulmonary:      Effort: Pulmonary effort is normal. No accessory muscle usage.      Breath sounds: Normal breath sounds.   Abdominal:      General: Bowel sounds are normal. There is no distension.      Palpations: Abdomen is soft. There is no mass.      Tenderness: There is no abdominal tenderness. There is no guarding or rebound.   Skin:     General: Skin is warm and dry.      Coloration: Skin is not jaundiced.   Neurological:      Mental Status: He is alert.   Psychiatric:         Behavior: Behavior is cooperative.       Imaging Results (Most Recent)     None        Body mass index is 20.57 kg/m².  Assessment & Plan   Diagnoses and all orders for this visit:    1. Lucas's esophagus determined by biopsy (Primary)    2. Gastroesophageal reflux disease, unspecified whether esophagitis present        * Surgery not found *    Take PPI 30 min prior to breakfast   Decrease caffeine, nicotine, etoh- all contribute to acid reflux  Do not eat 2-3 hours within lying down, elevated HOB 4-6 inches      Roc Hawthorne is tolerating PPI therapy without difficulty.  Symptoms are controlled with current PPI " medication and dose.  I recommend Roc Hawthorne continue  Protonix/Pantoprazole  at current dose and frequency.      Needs EGD, will wait for result of PFT testing prior to scheduling procedure in light of PF unsure if benefit outweigh risk of surveillance EGD.  Roc Hawthorne and wife verbalized understanding.      Jamir Jimenez, APRN  04/13/23      Gastroesophageal Reflux Disease, Adult    Gastroesophageal reflux disease (GERD) happens when acid from your stomach flows up into the esophagus. When acid comes in contact with the esophagus, the acid causes soreness (inflammation) in the esophagus. Over time, GERD may create small holes (ulcers) in the lining of the esophagus.  CAUSES    · Increased body weight. This puts pressure on the stomach, making acid rise from the stomach into the esophagus.  · Smoking. This increases acid production in the stomach.  · Drinking alcohol. This causes decreased pressure in the lower esophageal sphincter (valve or ring of muscle between the esophagus and stomach), allowing acid from the stomach into the esophagus.  · Late evening meals and a full stomach. This increases pressure and acid production in the stomach.  · A malformed lower esophageal sphincter.  Sometimes, no cause is found.  SYMPTOMS    · Burning pain in the lower part of the mid-chest behind the breastbone and in the mid-stomach area. This may occur twice a week or more often.  · Trouble swallowing.  · Sore throat.  · Dry cough.  · Asthma-like symptoms including chest tightness, shortness of breath, or wheezing.  DIAGNOSIS    Your caregiver may be able to diagnose GERD based on your symptoms. In some cases, X-rays and other tests may be done to check for complications or to check the condition of your stomach and esophagus.  TREATMENT    Your caregiver may recommend over-the-counter or prescription medicines to help decrease acid production. Ask your caregiver before starting or adding any new medicines.    HOME  CARE INSTRUCTIONS    · Change the factors that you can control. Ask your caregiver for guidance concerning weight loss, quitting smoking, and alcohol consumption.  · Avoid foods and drinks that make your symptoms worse, such as:  ¨ Caffeine or alcoholic drinks.  ¨ Chocolate.  ¨ Peppermint or mint flavorings.  ¨ Garlic and onions.  ¨ Spicy foods.  ¨ Citrus fruits, such as oranges, bienvenido, or limes.  ¨ Tomato-based foods such as sauce, chili, salsa, and pizza.  ¨ Fried and fatty foods.  · Avoid lying down for the 3 hours prior to your bedtime or prior to taking a nap.  · Eat small, frequent meals instead of large meals.  · Wear loose-fitting clothing. Do not wear anything tight around your waist that causes pressure on your stomach.  · Raise the head of your bed 6 to 8 inches with wood blocks to help you sleep. Extra pillows will not help.  · Only take over-the-counter or prescription medicines for pain, discomfort, or fever as directed by your caregiver.  · Do not take aspirin, ibuprofen, or other nonsteroidal anti-inflammatory drugs (NSAIDs).  SEEK IMMEDIATE MEDICAL CARE IF:    · You have pain in your arms, neck, jaw, teeth, or back.  · Your pain increases or changes in intensity or duration.  · You develop nausea, vomiting, or sweating (diaphoresis).  · You develop shortness of breath, or you faint.  · Your vomit is green, yellow, black, or looks like coffee grounds or blood.  · Your stool is red, bloody, or black.  These symptoms could be signs of other problems, such as heart disease, gastric bleeding, or esophageal bleeding.  MAKE SURE YOU:    · Understand these instructions.  · Will watch your condition.  · Will get help right away if you are not doing well or get worse.     This information is not intended to replace advice given to you by your health care provider. Make sure you discuss any questions you have with your health care provider.     Document Released: 09/27/2006 Document Revised: 01/08/2016  Document Reviewed: 04/13/2016  [employer] Interactive Patient Education ©2016 [employer]

## 2023-04-12 NOTE — PROGRESS NOTES
ARIANNA Howell  [facility]   Pulmonary and Critical Care  [address] [place] [address]  Phone: [phone]  Fax: [phone]           Chief Complaint  Follow-up (Patient is here for 4 week follow-up on idiopathic pulmonary fibrosis)    Subjective    History of Present Illness     Roc Hawthorne presents to [facility] PULMONARY & CRITICAL CARE MEDICINE   History of Present Illness  Mr. Hawthorne is a 72 year old male patient seen at last visit as a new referral for known IPF. He was started on Ofev 100 mg BID (interolarnt to [facility]) in 2021. He had issues with compliance and noted he had off of it for over a year. He has had memory issues. He has chronic hypoxic respiratory failure on 2-3 L NC with exertion and sleep. He has known history of Lucas's esophagus with GERD. Chronic cough noted to be multifactoral secondary to reflux, Etoh use, post nasal drip (unresponsive to Singulair) and PF. His cough has not changed and is a dry cough. He has known small chronic right pleural effusion with VATS biopsy. Has not been amenable to drainage. He has known HFpEF. He was noted to have memory decline and on Naltrexone due to alcohol abuse having completed 2 weeks of inpatient rehab in 2022. He has had relapses on the alcohol. He consumes about 10 beers a day per his wife. worse since moving/ retiring. He has not been a candidate for lung transplant in the past secondary to alcohol abuse and lack of interest. PFT in March 2022 noting FEV1 of 35%, ratio 75%, FVC 34% and he was unable to do the DLCO. PFT today shows FEV1 of 25% predicted. He is a former smoker. He has shortness of breath with over exertion.  He is able to do his ADLs.  He denies fever, chills, night sweats.  He denies hemoptysis . He has lost 3 lbs since last seen a month ago.  He denies any swelling.  Known untreated sleep apnea. He wears 3L nightly oxygen.  He is using the flutter valve and finds benefit.  "His new DME is [employer].                 Objective   Vital Signs:   /74 (BP Location: Left arm, Patient Position: Sitting, Cuff Size: Adult)   Pulse 92   Resp 14   Ht 182.9 cm (72\")   Wt 76.2 kg (168 lb)   SpO2 94%   BMI 22.78 kg/m²     Physical Exam  Vitals reviewed.   Constitutional:       Appearance: Normal appearance.   Cardiovascular:      Rate and Rhythm: Normal rate and regular rhythm.   Pulmonary:      Effort: Pulmonary effort is normal.      Breath sounds: Decreased breath sounds present.   Neurological:      General: No focal deficit present.      Mental Status: He is alert and oriented to person, place, and time.   Psychiatric:         Mood and Affect: Mood normal.         Behavior: Behavior normal.          Result Review :  The following data was reviewed by: ARIANNA Howell on 04/13/2023:    My interpretation of imaging:  No new   My interpretation of labs: No new     PFT Values        4/13/2023    15:30   Pre Drug PFT Results   FVC 26   FEV1 25   FEF 25-75% 24   FEV1/FVC 73   Other Tests PFT Results   DLCO 83   D/VAsb 114     My interpretation of the PFT: no new     Results for orders placed in visit on 04/13/23    Pulmonary Function Test    Narrative  Pulmonary Function Test  Performed by: Barbra Lee, RRT  Authorized by: Nathalie Mar APRN    Pre Drug % Predicted  FVC: 26%  FEV1: 25%  FEF 25-75%: 24%  FEV1/FVC: 73%  DLCO: 83%  D/VAsb: 114%    Interpretation  Spirometry  Spirometry shows very severe restriction.  Diffusion Capacity  The patient's diffusion capacity is normal.  Diffusion capacity is normal when corrected for alveolar volume.  Overall comments: Compared to March 2022 PFT from [place] his FEV1 has dropped from 35% to 25% predicted.          Assessment and Plan   Diagnoses and all orders for this visit:    1. IPF (idiopathic pulmonary fibrosis) (Primary)  -     Pulmonary Function Test    2. Chronic respiratory failure with hypoxia    3. Alcohol " abuse    4. History of Lucas's esophagus    Other orders  -     cetirizine (zyrTEC) 5 MG tablet; Take 1 tablet by mouth Daily.  Dispense: 30 tablet; Refill: 3  -     fluticasone (FLONASE) 50 MCG/ACT nasal spray; 2 sprays into the nostril(s) as directed by provider Daily.  Dispense: 16 g; Refill: 3      We discussed his PFT results today with he and his wife. He has dropped his FEV1 from 35% predicted to 25% predicted. We had a very david discussion about his alcohol use, weight loss, loss of appetite and need for Rehab. He tells me today he is ready to go. His wife has researched options in this area and will see about moving up his follow up with his PCP for assistance with referral to rehab for alcohol abuse. We discussed that I would not want to place him on Ofev at this time secondary to his alcohol abuse and the affects Ofev can have on the liver as well as the significant side effect of diarrhea raising his risk of GI bleed. He has a known history of Lucas's esophagus and has recently been seen by his GI MD for possible EGD and colonoscopy. Given his FEV1 of 25% this places him at cornelius risk for any anesthesia/ surgical or procedure. I will forward my note to GI. I have asked he return in 2 months and hopefully he will have been referred to rehab. We can consider Ofev and pulmonary rehab after that. He and his wife are agreeable to the plan. They will call if we need to make any adjustments to the follow up visit or with any questions or concerns in the meantime.     Follow Up   Return in about 3 months (around 7/13/2023).  Patient was given instructions and counseling regarding his condition or for health maintenance advice. Please see specific information pulled into the AVS if appropriate.     Nathalie Mar, APRN  4/13/2023  18:53 CDT

## 2023-04-13 ENCOUNTER — OFFICE VISIT (OUTPATIENT)
Dept: PULMONOLOGY | Facility: CLINIC | Age: 73
End: 2023-04-13
Payer: MEDICARE

## 2023-04-13 ENCOUNTER — PROCEDURE VISIT (OUTPATIENT)
Dept: PULMONOLOGY | Facility: CLINIC | Age: 73
End: 2023-04-13
Payer: MEDICARE

## 2023-04-13 VITALS
HEIGHT: 72 IN | WEIGHT: 168 LBS | RESPIRATION RATE: 14 BRPM | BODY MASS INDEX: 22.75 KG/M2 | DIASTOLIC BLOOD PRESSURE: 74 MMHG | HEART RATE: 92 BPM | OXYGEN SATURATION: 94 % | SYSTOLIC BLOOD PRESSURE: 122 MMHG

## 2023-04-13 DIAGNOSIS — Z87.19 HISTORY OF BARRETT'S ESOPHAGUS: ICD-10-CM

## 2023-04-13 DIAGNOSIS — F10.10 ALCOHOL ABUSE: ICD-10-CM

## 2023-04-13 DIAGNOSIS — J84.112 IPF (IDIOPATHIC PULMONARY FIBROSIS): Primary | Chronic | ICD-10-CM

## 2023-04-13 DIAGNOSIS — J84.112 IPF (IDIOPATHIC PULMONARY FIBROSIS): Primary | ICD-10-CM

## 2023-04-13 DIAGNOSIS — J96.11 CHRONIC RESPIRATORY FAILURE WITH HYPOXIA: ICD-10-CM

## 2023-04-13 PROBLEM — Z99.81 DEPENDENCE ON SUPPLEMENTAL OXYGEN: Status: ACTIVE | Noted: 2023-04-13

## 2023-04-13 PROCEDURE — 94375 RESPIRATORY FLOW VOLUME LOOP: CPT | Performed by: NURSE PRACTITIONER

## 2023-04-13 PROCEDURE — 94729 DIFFUSING CAPACITY: CPT | Performed by: NURSE PRACTITIONER

## 2023-04-13 PROCEDURE — 99214 OFFICE O/P EST MOD 30 MIN: CPT | Performed by: NURSE PRACTITIONER

## 2023-04-13 RX ORDER — FLUTICASONE PROPIONATE 50 MCG
2 SPRAY, SUSPENSION (ML) NASAL DAILY
Qty: 16 G | Refills: 3 | Status: SHIPPED | OUTPATIENT
Start: 2023-04-13

## 2023-04-13 RX ORDER — CETIRIZINE HYDROCHLORIDE 5 MG/1
5 TABLET ORAL DAILY
Qty: 30 TABLET | Refills: 3 | Status: SHIPPED | OUTPATIENT
Start: 2023-04-13

## 2023-04-13 NOTE — LETTER
April 13, 2023       No Recipients    Patient: Roc Hawthorne   YOB: 1950   Date of Visit: 4/13/2023     Dear Dr. Murdock Recipients:    Thank you for referring Roc Hawthorne to me for evaluation. Below are the relevant portions of my assessment and plan of care.    If you have questions, please do not hesitate to call me. I look forward to following Roc along with you.         Sincerely,        ARIANNA Howell        CC:   No Recipients      Progress Notes:   ARIANNA Howell  [facility]   Pulmonary and Critical Care  [address] [place] [place] [address]  Phone: [phone]  Fax: [phone]           Chief Complaint  Follow-up (Patient is here for 4 week follow-up on idiopathic pulmonary fibrosis)    Subjective     History of Present Illness     Roc Hawthorne presents to [facility] PULMONARY & CRITICAL CARE MEDICINE   History of Present Illness  Mr. Hawthorne is a 72 year old male patient seen at last visit as a new referral for known IPF. He was started on Ofev 100 mg BID ([facility]arnt to [facility]) in 2021. He had issues with compliance and noted he had off of it for over a year. He has had memory issues. He has chronic hypoxic respiratory failure on 2-3 L NC with exertion and sleep. He has known history of Lucas's esophagus with GERD. Chronic cough noted to be multifactoral secondary to reflux, Etoh use, post nasal drip (unresponsive to Singulair) and PF. His cough has not changed and is a dry cough. He has known small chronic right pleural effusion with VATS biopsy. Has not been amenable to drainage. He has known HFpEF. He was noted to have memory decline and on Naltrexone due to alcohol abuse having completed 2 weeks of inpatient rehab in 2022. He has had relapses on the alcohol. He consumes about 10 beers a day per his wife. worse since moving/ retiring. He has not been a candidate for lung transplant in the past secondary to alcohol abuse and lack  "of interest. PFT in March 2022 noting FEV1 of 35%, ratio 75%, FVC 34% and he was unable to do the DLCO. PFT today shows FEV1 of 25% predicted. He is a former smoker. He has shortness of breath with over exertion.  He is able to do his ADLs.  He denies fever, chills, night sweats.  He denies hemoptysis . He has lost 3 lbs since last seen a month ago.  He denies any swelling.  Known untreated sleep apnea. He wears 3L nightly oxygen.  He is using the flutter valve and finds benefit. His new DME is [employer].                 Objective    Vital Signs:   /74 (BP Location: Left arm, Patient Position: Sitting, Cuff Size: Adult)   Pulse 92   Resp 14   Ht 182.9 cm (72\")   Wt 76.2 kg (168 lb)   SpO2 94%   BMI 22.78 kg/m²     Physical Exam  Vitals reviewed.   Constitutional:       Appearance: Normal appearance.   Cardiovascular:      Rate and Rhythm: Normal rate and regular rhythm.   Pulmonary:      Effort: Pulmonary effort is normal.      Breath sounds: Decreased breath sounds present.   Neurological:      General: No focal deficit present.      Mental Status: He is alert and oriented to person, place, and time.   Psychiatric:         Mood and Affect: Mood normal.         Behavior: Behavior normal.          Result Review :  The following data was reviewed by: ARIANNA Howell on 04/13/2023:    My interpretation of imaging:  No new   My interpretation of labs: No new     PFT Values          4/13/2023    15:30   Pre Drug PFT Results   FVC 26   FEV1 25   FEF 25-75% 24   FEV1/FVC 73   Other Tests PFT Results   DLCO 83   D/VAsb 114     My interpretation of the PFT: no new     Results for orders placed in visit on 04/13/23    Pulmonary Function Test    Narrative  Pulmonary Function Test  Performed by: Barbra Lee, RRT  Authorized by: Nathalie Mar APRN    Pre Drug % Predicted  FVC: 26%  FEV1: 25%  FEF 25-75%: 24%  FEV1/FVC: 73%  DLCO: 83%  D/VAsb: 114%    Interpretation  Spirometry  Spirometry " shows very severe restriction.  Diffusion Capacity  The patient's diffusion capacity is normal.  Diffusion capacity is normal when corrected for alveolar volume.  Overall comments: Compared to March 2022 PFT from [place] his FEV1 has dropped from 35% to 25% predicted.          Assessment and Plan   Diagnoses and all orders for this visit:    1. IPF (idiopathic pulmonary fibrosis) (Primary)  -     Pulmonary Function Test    2. Chronic respiratory failure with hypoxia    3. Alcohol abuse    4. History of Lucas's esophagus    Other orders  -     cetirizine (zyrTEC) 5 MG tablet; Take 1 tablet by mouth Daily.  Dispense: 30 tablet; Refill: 3  -     fluticasone (FLONASE) 50 MCG/ACT nasal spray; 2 sprays into the nostril(s) as directed by provider Daily.  Dispense: 16 g; Refill: 3      We discussed his PFT results today with he and his wife. He has dropped his FEV1 from 35% predicted to 25% predicted. We had a very david discussion about his alcohol use, weight loss, loss of appetite and need for Rehab. He tells me today he is ready to go. His wife has researched options in this area and will see about moving up his follow up with his PCP for assistance with referral to rehab for alcohol abuse. We discussed that I would not want to place him on Ofev at this time secondary to his alcohol abuse and the affects Ofev can have on the liver as well as the significant side effect of diarrhea raising his risk of GI bleed. He has a known history of Lucas's esophagus and has recently been seen by his GI MD for possible EGD and colonoscopy. Given his FEV1 of 25% this places him at cornelius risk for any anesthesia/ surgical or procedure. I will forward my note to GI. I have asked he return in 2 months and hopefully he will have been referred to rehab. We can consider Ofev and pulmonary rehab after that. He and his wife are agreeable to the plan. They will call if we need to make any adjustments to the follow up visit or with any  questions or concerns in the meantime.     Follow Up   Return in about 3 months (around 7/13/2023).  Patient was given instructions and counseling regarding his condition or for health maintenance advice. Please see specific information pulled into the AVS if appropriate.     Nathalie Mar, APRN  4/13/2023  18:53 CDT    Patient had a negative COVID screening prior to PFT.  Performed PFT. See scanned results for additional information.

## 2023-04-13 NOTE — PROCEDURES
Pulmonary Function Test  Performed by: Barbra Lee, RRT  Authorized by: Nathalie Mar APRN      Pre Drug % Predicted    FVC: 26%   FEV1: 25%   FEF 25-75%: 24%   FEV1/FVC: 73%   DLCO: 83%   D/VAsb: 114%    Interpretation   Spirometry   Spirometry shows very severe restriction.   Diffusion Capacity  The patient's diffusion capacity is normal.  Diffusion capacity is normal when corrected for alveolar volume.   Overall comments: Compared to March 2022 PFT from [place] his FEV1 has dropped from 35% to 25% predicted.

## 2023-04-14 ENCOUNTER — TELEPHONE (OUTPATIENT)
Dept: GASTROENTEROLOGY | Facility: CLINIC | Age: 73
End: 2023-04-14
Payer: MEDICARE

## 2023-04-14 NOTE — TELEPHONE ENCOUNTER
Roc Hawthorne came into office to est care - no complaints    History Lucas's Esophagus, history polyps    Below is pulmonary note for you to review  I included their thoughts    Didn't know if you would want to discussed with Roc Hawthorne screening colonoscopy/EGD    Office Visit with Nathalie Mar APRN (04/13/2023)    FEV1 of 25% this places him at cornelius risk for any anesthesia/ surgical or procedure

## 2023-05-11 ENCOUNTER — OFFICE VISIT (OUTPATIENT)
Dept: FAMILY MEDICINE CLINIC | Facility: CLINIC | Age: 73
End: 2023-05-11
Payer: MEDICARE

## 2023-05-11 ENCOUNTER — LAB (OUTPATIENT)
Dept: LAB | Facility: HOSPITAL | Age: 73
End: 2023-05-11
Payer: MEDICARE

## 2023-05-11 VITALS — WEIGHT: 166 LBS | BODY MASS INDEX: 22.48 KG/M2 | RESPIRATION RATE: 20 BRPM | HEIGHT: 72 IN

## 2023-05-11 DIAGNOSIS — N40.0 BENIGN PROSTATIC HYPERPLASIA, UNSPECIFIED WHETHER LOWER URINARY TRACT SYMPTOMS PRESENT: ICD-10-CM

## 2023-05-11 DIAGNOSIS — R30.0 DYSURIA: ICD-10-CM

## 2023-05-11 DIAGNOSIS — F10.10 ALCOHOL ABUSE: ICD-10-CM

## 2023-05-11 DIAGNOSIS — J84.112 IPF (IDIOPATHIC PULMONARY FIBROSIS): Chronic | ICD-10-CM

## 2023-05-11 DIAGNOSIS — R30.0 DYSURIA: Primary | ICD-10-CM

## 2023-05-11 LAB
BILIRUB UR QL STRIP: NEGATIVE
CLARITY UR: CLEAR
COLOR UR: YELLOW
GLUCOSE UR STRIP-MCNC: NEGATIVE MG/DL
HGB UR QL STRIP.AUTO: NEGATIVE
KETONES UR QL STRIP: NEGATIVE
LEUKOCYTE ESTERASE UR QL STRIP.AUTO: NEGATIVE
NITRITE UR QL STRIP: NEGATIVE
PH UR STRIP.AUTO: 6.5 [PH] (ref 5–8)
PROT UR QL STRIP: NEGATIVE
SP GR UR STRIP: <=1.005 (ref 1–1.03)
UROBILINOGEN UR QL STRIP: NORMAL

## 2023-05-11 PROCEDURE — 1159F MED LIST DOCD IN RCRD: CPT | Performed by: NURSE PRACTITIONER

## 2023-05-11 PROCEDURE — 1160F RVW MEDS BY RX/DR IN RCRD: CPT | Performed by: NURSE PRACTITIONER

## 2023-05-11 PROCEDURE — 81003 URINALYSIS AUTO W/O SCOPE: CPT

## 2023-05-11 PROCEDURE — 99214 OFFICE O/P EST MOD 30 MIN: CPT | Performed by: NURSE PRACTITIONER

## 2023-05-11 RX ORDER — TAMSULOSIN HYDROCHLORIDE 0.4 MG/1
1 CAPSULE ORAL DAILY
Qty: 30 CAPSULE | Refills: 1 | Status: SHIPPED | OUTPATIENT
Start: 2023-05-11

## 2023-05-11 RX ORDER — DEXLANSOPRAZOLE 30 MG/1
30 CAPSULE, DELAYED RELEASE ORAL DAILY
COMMUNITY

## 2023-05-11 NOTE — PROGRESS NOTES
"Chief Complaint  frequent urination     Subjective    History of Present Illness      Patient presents to [facility] for   History of Present Illness  Patient c/o frequent uriantion for a whole        Review of Systems    I have reviewed and agree with the HPI and ROS information as above.  Telma Garcia ARIANNA Cisse     Objective   Vital Signs:   Resp 20   Ht 182.9 cm (72\")   Wt 75.3 kg (166 lb)   BMI 22.51 kg/m²     BMI is within normal parameters. No other follow-up for BMI required.      Physical Exam  Constitutional:       Appearance: Normal appearance. He is well-developed.   HENT:      Head: Normocephalic and atraumatic.      Right Ear: External ear normal.      Left Ear: External ear normal.      Nose: Nose normal. No nasal tenderness or congestion.      Mouth/Throat:      Lips: Pink. No lesions.      Mouth: Mucous membranes are moist. No oral lesions.      Dentition: Normal dentition.      Pharynx: Oropharynx is clear. No pharyngeal swelling, oropharyngeal exudate or posterior oropharyngeal erythema.   Eyes:      General: Lids are normal. Vision grossly intact. No scleral icterus.        Right eye: No discharge.         Left eye: No discharge.      Extraocular Movements: Extraocular movements intact.      Conjunctiva/sclera: Conjunctivae normal.      Right eye: Right conjunctiva is not injected.      Left eye: Left conjunctiva is not injected.      Pupils: Pupils are equal, round, and reactive to light.   Cardiovascular:      Rate and Rhythm: Normal rate and regular rhythm.      Heart sounds: Normal heart sounds. No murmur heard.    No gallop.   Pulmonary:      Effort: Pulmonary effort is normal.      Breath sounds: Normal breath sounds and air entry. No wheezing, rhonchi or rales.   Musculoskeletal:         General: No tenderness or deformity. Normal range of motion.      Cervical back: Full passive range of motion without pain, normal range of motion and neck " supple.      Right lower leg: No edema.      Left lower leg: No edema.   Skin:     General: Skin is warm and dry.      Coloration: Skin is not jaundiced.      Findings: No rash.   Neurological:      Mental Status: He is alert and oriented to person, place, and time.      Sensory: Sensation is intact.      Motor: Motor function is intact.      Coordination: Coordination is intact.      Gait: Gait is intact.   Psychiatric:         Attention and Perception: Attention normal.         Mood and Affect: Mood and affect normal.         Behavior: Behavior is not hyperactive. Behavior is cooperative.         Thought Content: Thought content normal.         Judgment: Judgment normal.          JOSE RAUL-7:      PHQ-2 Depression Screening  Little interest or pleasure in doing things? 0-->not at all   Feeling down, depressed, or hopeless? 0-->not at all   PHQ-2 Total Score 0     PHQ-9 Depression Screening  Little interest or pleasure in doing things? 0-->not at all   Feeling down, depressed, or hopeless? 0-->not at all   Trouble falling or staying asleep, or sleeping too much?     Feeling tired or having little energy?     Poor appetite or overeating?     Feeling bad about yourself - or that you are a failure or have let yourself or your family down?     Trouble concentrating on things, such as reading the newspaper or watching television?     Moving or speaking so slowly that other people could have noticed? Or the opposite - being so fidgety or restless that you have been moving around a lot more than usual?     Thoughts that you would be better off dead, or of hurting yourself in some way?     PHQ-9 Total Score 0   If you checked off any problems, how difficult have these problems made it for you to do your work, take care of things at home, or get along with other people?        Result Review  Data Reviewed:                   Assessment and Plan      Diagnoses and all orders for this visit:    1. Dysuria (Primary)  -     Urinalysis  With Culture If Indicated -; Future    2. Benign prostatic hyperplasia, unspecified whether lower urinary tract symptoms present  -     tamsulosin (FLOMAX) 0.4 MG capsule 24 hr capsule; Take 1 capsule by mouth Daily.  Dispense: 30 capsule; Refill: 1    3. IPF (idiopathic pulmonary fibrosis)    4. Alcohol abuse    Comes in today with complaints of urinary frequency.  When I questioned how long this has been going on the wife and patient both state that is been going on for months and may be even a year.  They deny any other symptoms in relation to this.  The wife feels like that is equally happening during the day as it is at night.  Patient denies any pain associated with it.    I did question if patient recalls ever having a history of an abnormal PSA.  He denies this.  I discussed needing this checked and he wishes to wait to have his labs done until he is here in a week or 2 for his wellness exam.  I agree with this.    Urinalysis done today was completely normal.    I discussed case with Dr. Perez.  He agrees that he feels like that this is BPH related and recommend trialing Flomax until he comes back in for his Medicare wellness appointment.  I did discuss a referral potential in the future with continuing or ongoing symptoms to a urologist.    Patient does bring up the fact that the patient is an alcoholic.  He unfortunately continues to refuse help in this manner.  It has been discussed and previously recommended for him to have inpatient rehab but he continues to refuse.  I did have a lengthy discussion with him today on this as well as this is not helping with any of his chronic health issues either.  He does voice understanding and does understand what he is doing to himself.  He declines wanting any counseling as well.  He declines any further help from us.        Follow Up   Return for Next scheduled follow up.  Patient was given instructions and counseling regarding his condition or for health  maintenance advice. Please see specific information pulled into the AVS if appropriate.

## 2023-08-25 ENCOUNTER — TELEPHONE (OUTPATIENT)
Dept: FAMILY MEDICINE CLINIC | Facility: CLINIC | Age: 73
End: 2023-08-25
Payer: MEDICARE

## 2023-08-25 NOTE — TELEPHONE ENCOUNTER
Called to schedule a Medicare Annual Wellness exam. No answer. Left a voicemail and mailed a letter.    Hub may read ans schedule. Transfer to office staff if needed.

## 2023-09-03 ENCOUNTER — HOSPITAL ENCOUNTER (INPATIENT)
Age: 73
LOS: 5 days | Discharge: NURSING, REHAB OR LONG-TERM CARE | End: 2023-09-08
Attending: STUDENT IN AN ORGANIZED HEALTH CARE EDUCATION/TRAINING PROGRAM
Payer: MEDICARE

## 2023-09-03 DIAGNOSIS — E87.8 HYPOCHLOREMIA: ICD-10-CM

## 2023-09-03 DIAGNOSIS — R45.851 SUICIDAL IDEATION: ICD-10-CM

## 2023-09-03 DIAGNOSIS — E87.1 HYPONATREMIA: Primary | ICD-10-CM

## 2023-09-03 DIAGNOSIS — F10.90 ALCOHOL USE DISORDER: ICD-10-CM

## 2023-09-03 PROBLEM — F10.10 ALCOHOL ABUSE: Status: ACTIVE | Noted: 2023-09-03

## 2023-09-03 PROBLEM — J84.112 IDIOPATHIC PULMONARY FIBROSIS (HCC): Status: ACTIVE | Noted: 2023-09-03

## 2023-09-03 PROBLEM — J84.10 PULMONARY FIBROSIS (HCC): Status: ACTIVE | Noted: 2023-09-03

## 2023-09-03 PROBLEM — N40.0 BPH (BENIGN PROSTATIC HYPERPLASIA): Status: ACTIVE | Noted: 2023-09-03

## 2023-09-03 LAB
ALBUMIN SERPL-MCNC: 3.6 G/DL (ref 3.5–5.2)
ALP SERPL-CCNC: 113 U/L (ref 40–130)
ALT SERPL-CCNC: 25 U/L (ref 5–41)
AMPHET UR QL SCN: NEGATIVE
ANION GAP SERPL CALCULATED.3IONS-SCNC: 13 MMOL/L (ref 7–19)
AST SERPL-CCNC: 34 U/L (ref 5–40)
BACTERIA URNS QL MICRO: NEGATIVE /HPF
BARBITURATES UR QL SCN: NEGATIVE
BASOPHILS # BLD: 0.1 K/UL (ref 0–0.2)
BASOPHILS NFR BLD: 0.6 % (ref 0–1)
BENZODIAZ UR QL SCN: NEGATIVE
BILIRUB SERPL-MCNC: 1.1 MG/DL (ref 0.2–1.2)
BILIRUB UR QL STRIP: NEGATIVE
BUN SERPL-MCNC: 6 MG/DL (ref 8–23)
BUPRENORPHINE URINE: NEGATIVE
CALCIUM SERPL-MCNC: 9.4 MG/DL (ref 8.8–10.2)
CANNABINOIDS UR QL SCN: NEGATIVE
CHLORIDE SERPL-SCNC: 79 MMOL/L (ref 98–111)
CLARITY UR: CLEAR
CO2 SERPL-SCNC: 28 MMOL/L (ref 22–29)
COCAINE UR QL SCN: NEGATIVE
COLOR UR: YELLOW
CREAT SERPL-MCNC: 0.5 MG/DL (ref 0.5–1.2)
CRYSTALS URNS MICRO: ABNORMAL /HPF
DRUG SCREEN COMMENT UR-IMP: NORMAL
EOSINOPHIL # BLD: 0.2 K/UL (ref 0–0.6)
EOSINOPHIL NFR BLD: 1.7 % (ref 0–5)
EPI CELLS #/AREA URNS AUTO: 2 /HPF (ref 0–5)
ERYTHROCYTE [DISTWIDTH] IN BLOOD BY AUTOMATED COUNT: 11.2 % (ref 11.5–14.5)
ETHANOLAMINE SERPL-MCNC: <10 MG/DL (ref 0–0.08)
GLUCOSE SERPL-MCNC: 113 MG/DL (ref 74–109)
GLUCOSE UR STRIP.AUTO-MCNC: NEGATIVE MG/DL
HCT VFR BLD AUTO: 45.6 % (ref 42–52)
HGB BLD-MCNC: 17 G/DL (ref 14–18)
HGB UR STRIP.AUTO-MCNC: ABNORMAL MG/L
HYALINE CASTS #/AREA URNS AUTO: 2 /HPF (ref 0–8)
IMM GRANULOCYTES # BLD: 0 K/UL
KETONES UR STRIP.AUTO-MCNC: 15 MG/DL
LEUKOCYTE ESTERASE UR QL STRIP.AUTO: ABNORMAL
LIPASE SERPL-CCNC: 39 U/L (ref 13–60)
LYMPHOCYTES # BLD: 0.7 K/UL (ref 1.1–4.5)
LYMPHOCYTES NFR BLD: 7.1 % (ref 20–40)
MCH RBC QN AUTO: 34.8 PG (ref 27–31)
MCHC RBC AUTO-ENTMCNC: 37.3 G/DL (ref 33–37)
MCV RBC AUTO: 93.4 FL (ref 80–94)
METHADONE UR QL SCN: NEGATIVE
METHAMPHETAMINE, URINE: NEGATIVE
MONOCYTES # BLD: 1.3 K/UL (ref 0–0.9)
MONOCYTES NFR BLD: 13.3 % (ref 0–10)
NEUTROPHILS # BLD: 7.4 K/UL (ref 1.5–7.5)
NEUTS SEG NFR BLD: 76.9 % (ref 50–65)
NITRITE UR QL STRIP.AUTO: NEGATIVE
OPIATES UR QL SCN: NEGATIVE
OSMOLALITY SERPL CALC.SUM OF ELEC: 252 MOSM/KG (ref 275–300)
OSMOLALITY URINE: 226 MOSM/KG (ref 250–1200)
OXYCODONE UR QL SCN: NEGATIVE
PCP UR QL SCN: NEGATIVE
PH UR STRIP.AUTO: 6 [PH] (ref 5–8)
PLATELET # BLD AUTO: 318 K/UL (ref 130–400)
PMV BLD AUTO: 9.3 FL (ref 9.4–12.4)
POTASSIUM SERPL-SCNC: 4.1 MMOL/L (ref 3.5–5)
PROPOXYPH UR QL SCN: NEGATIVE
PROT SERPL-MCNC: 6.7 G/DL (ref 6.6–8.7)
PROT UR STRIP.AUTO-MCNC: ABNORMAL MG/DL
RBC # BLD AUTO: 4.88 M/UL (ref 4.7–6.1)
RBC #/AREA URNS AUTO: 372 /HPF (ref 0–4)
SARS-COV-2 RDRP RESP QL NAA+PROBE: NOT DETECTED
SODIUM SERPL-SCNC: 120 MMOL/L (ref 136–145)
SODIUM SERPL-SCNC: 120 MMOL/L (ref 136–145)
SODIUM SERPL-SCNC: 123 MMOL/L (ref 136–145)
SODIUM UR-SCNC: <20 MMOL/L
SP GR UR STRIP.AUTO: 1.01 (ref 1–1.03)
TRICYCLIC, URINE: NEGATIVE
TSH SERPL DL<=0.005 MIU/L-ACNC: 3.17 UIU/ML (ref 0.35–5.5)
UROBILINOGEN UR STRIP.AUTO-MCNC: 1 E.U./DL
WBC # BLD AUTO: 9.6 K/UL (ref 4.8–10.8)
WBC #/AREA URNS AUTO: 5 /HPF (ref 0–5)

## 2023-09-03 PROCEDURE — 84443 ASSAY THYROID STIM HORMONE: CPT

## 2023-09-03 PROCEDURE — 81001 URINALYSIS AUTO W/SCOPE: CPT

## 2023-09-03 PROCEDURE — 84300 ASSAY OF URINE SODIUM: CPT

## 2023-09-03 PROCEDURE — 83930 ASSAY OF BLOOD OSMOLALITY: CPT

## 2023-09-03 PROCEDURE — 6360000002 HC RX W HCPCS: Performed by: NURSE PRACTITIONER

## 2023-09-03 PROCEDURE — 85025 COMPLETE CBC W/AUTO DIFF WBC: CPT

## 2023-09-03 PROCEDURE — 80053 COMPREHEN METABOLIC PANEL: CPT

## 2023-09-03 PROCEDURE — 83935 ASSAY OF URINE OSMOLALITY: CPT

## 2023-09-03 PROCEDURE — 83690 ASSAY OF LIPASE: CPT

## 2023-09-03 PROCEDURE — 2700000000 HC OXYGEN THERAPY PER DAY

## 2023-09-03 PROCEDURE — 94760 N-INVAS EAR/PLS OXIMETRY 1: CPT

## 2023-09-03 PROCEDURE — 82077 ASSAY SPEC XCP UR&BREATH IA: CPT

## 2023-09-03 PROCEDURE — 87635 SARS-COV-2 COVID-19 AMP PRB: CPT

## 2023-09-03 PROCEDURE — 36415 COLL VENOUS BLD VENIPUNCTURE: CPT

## 2023-09-03 PROCEDURE — 6370000000 HC RX 637 (ALT 250 FOR IP): Performed by: NURSE PRACTITIONER

## 2023-09-03 PROCEDURE — 80306 DRUG TEST PRSMV INSTRMNT: CPT

## 2023-09-03 PROCEDURE — 99285 EMERGENCY DEPT VISIT HI MDM: CPT

## 2023-09-03 PROCEDURE — 1210000000 HC MED SURG R&B

## 2023-09-03 PROCEDURE — 2580000003 HC RX 258: Performed by: STUDENT IN AN ORGANIZED HEALTH CARE EDUCATION/TRAINING PROGRAM

## 2023-09-03 PROCEDURE — 84295 ASSAY OF SERUM SODIUM: CPT

## 2023-09-03 RX ORDER — TAMSULOSIN HYDROCHLORIDE 0.4 MG/1
0.4 CAPSULE ORAL DAILY
Status: ON HOLD | COMMUNITY
End: 2023-09-08 | Stop reason: SDUPTHER

## 2023-09-03 RX ORDER — SODIUM CHLORIDE 0.9 % (FLUSH) 0.9 %
5-40 SYRINGE (ML) INJECTION PRN
Status: DISCONTINUED | OUTPATIENT
Start: 2023-09-03 | End: 2023-09-08 | Stop reason: HOSPADM

## 2023-09-03 RX ORDER — LIDOCAINE HYDROCHLORIDE 20 MG/ML
JELLY TOPICAL
Status: DISPENSED
Start: 2023-09-03 | End: 2023-09-04

## 2023-09-03 RX ORDER — ACETAMINOPHEN 325 MG/1
650 TABLET ORAL EVERY 6 HOURS PRN
Status: DISCONTINUED | OUTPATIENT
Start: 2023-09-03 | End: 2023-09-08 | Stop reason: HOSPADM

## 2023-09-03 RX ORDER — GAUZE BANDAGE 2" X 2"
100 BANDAGE TOPICAL DAILY
Status: DISCONTINUED | OUTPATIENT
Start: 2023-09-03 | End: 2023-09-08 | Stop reason: HOSPADM

## 2023-09-03 RX ORDER — SODIUM CHLORIDE 0.9 % (FLUSH) 0.9 %
5-40 SYRINGE (ML) INJECTION EVERY 12 HOURS SCHEDULED
Status: DISCONTINUED | OUTPATIENT
Start: 2023-09-03 | End: 2023-09-08 | Stop reason: HOSPADM

## 2023-09-03 RX ORDER — SODIUM CHLORIDE 9 MG/ML
INJECTION, SOLUTION INTRAVENOUS PRN
Status: DISCONTINUED | OUTPATIENT
Start: 2023-09-03 | End: 2023-09-08 | Stop reason: HOSPADM

## 2023-09-03 RX ORDER — ACETAMINOPHEN 650 MG/1
650 SUPPOSITORY RECTAL EVERY 6 HOURS PRN
Status: DISCONTINUED | OUTPATIENT
Start: 2023-09-03 | End: 2023-09-08 | Stop reason: HOSPADM

## 2023-09-03 RX ORDER — DEXLANSOPRAZOLE 60 MG/1
60 CAPSULE, DELAYED RELEASE ORAL DAILY
Status: ON HOLD | COMMUNITY
End: 2023-09-08 | Stop reason: SDUPTHER

## 2023-09-03 RX ORDER — PANTOPRAZOLE SODIUM 40 MG/1
40 TABLET, DELAYED RELEASE ORAL
Status: DISCONTINUED | OUTPATIENT
Start: 2023-09-04 | End: 2023-09-08 | Stop reason: HOSPADM

## 2023-09-03 RX ORDER — ENOXAPARIN SODIUM 100 MG/ML
40 INJECTION SUBCUTANEOUS DAILY
Status: DISCONTINUED | OUTPATIENT
Start: 2023-09-03 | End: 2023-09-08 | Stop reason: HOSPADM

## 2023-09-03 RX ORDER — ESCITALOPRAM OXALATE 20 MG/1
20 TABLET ORAL DAILY
Status: ON HOLD | COMMUNITY
End: 2023-09-08 | Stop reason: SDUPTHER

## 2023-09-03 RX ORDER — MAGNESIUM SULFATE IN WATER 40 MG/ML
2000 INJECTION, SOLUTION INTRAVENOUS PRN
Status: DISCONTINUED | OUTPATIENT
Start: 2023-09-03 | End: 2023-09-08 | Stop reason: HOSPADM

## 2023-09-03 RX ORDER — POLYETHYLENE GLYCOL 3350 17 G/17G
17 POWDER, FOR SOLUTION ORAL DAILY PRN
Status: DISCONTINUED | OUTPATIENT
Start: 2023-09-03 | End: 2023-09-08 | Stop reason: HOSPADM

## 2023-09-03 RX ORDER — ESCITALOPRAM OXALATE 10 MG/1
20 TABLET ORAL DAILY
Status: DISCONTINUED | OUTPATIENT
Start: 2023-09-03 | End: 2023-09-08 | Stop reason: HOSPADM

## 2023-09-03 RX ORDER — POTASSIUM CHLORIDE 7.45 MG/ML
10 INJECTION INTRAVENOUS PRN
Status: DISCONTINUED | OUTPATIENT
Start: 2023-09-03 | End: 2023-09-08 | Stop reason: HOSPADM

## 2023-09-03 RX ORDER — 0.9 % SODIUM CHLORIDE 0.9 %
1000 INTRAVENOUS SOLUTION INTRAVENOUS ONCE
Status: DISCONTINUED | OUTPATIENT
Start: 2023-09-03 | End: 2023-09-03

## 2023-09-03 RX ADMIN — ENOXAPARIN SODIUM 40 MG: 100 INJECTION SUBCUTANEOUS at 17:40

## 2023-09-03 RX ADMIN — SODIUM CHLORIDE 1000 ML: 9 INJECTION, SOLUTION INTRAVENOUS at 12:37

## 2023-09-03 RX ADMIN — Medication 100 MG: at 17:40

## 2023-09-03 SDOH — ECONOMIC STABILITY: HOUSING INSECURITY
IN THE LAST 12 MONTHS, WAS THERE A TIME WHEN YOU DID NOT HAVE A STEADY PLACE TO SLEEP OR SLEPT IN A SHELTER (INCLUDING NOW)?: NO

## 2023-09-03 SDOH — ECONOMIC STABILITY: HOUSING INSECURITY: IN THE LAST 12 MONTHS, HOW MANY PLACES HAVE YOU LIVED?: 1

## 2023-09-03 SDOH — HEALTH STABILITY: PHYSICAL HEALTH: ON AVERAGE, HOW MANY DAYS PER WEEK DO YOU ENGAGE IN MODERATE TO STRENUOUS EXERCISE (LIKE A BRISK WALK)?: 0 DAYS

## 2023-09-03 SDOH — ECONOMIC STABILITY: INCOME INSECURITY: IN THE LAST 12 MONTHS, WAS THERE A TIME WHEN YOU WERE NOT ABLE TO PAY THE MORTGAGE OR RENT ON TIME?: NO

## 2023-09-03 SDOH — ECONOMIC STABILITY: TRANSPORTATION INSECURITY
IN THE PAST 12 MONTHS, HAS LACK OF TRANSPORTATION KEPT YOU FROM MEETINGS, WORK, OR FROM GETTING THINGS NEEDED FOR DAILY LIVING?: NO

## 2023-09-03 SDOH — ECONOMIC STABILITY: INCOME INSECURITY: HOW HARD IS IT FOR YOU TO PAY FOR THE VERY BASICS LIKE FOOD, HOUSING, MEDICAL CARE, AND HEATING?: NOT HARD AT ALL

## 2023-09-03 SDOH — HEALTH STABILITY: PHYSICAL HEALTH: ON AVERAGE, HOW MANY MINUTES DO YOU ENGAGE IN EXERCISE AT THIS LEVEL?: 0 MIN

## 2023-09-03 SDOH — ECONOMIC STABILITY: TRANSPORTATION INSECURITY
IN THE PAST 12 MONTHS, HAS THE LACK OF TRANSPORTATION KEPT YOU FROM MEDICAL APPOINTMENTS OR FROM GETTING MEDICATIONS?: NO

## 2023-09-03 ASSESSMENT — ENCOUNTER SYMPTOMS
SORE THROAT: 0
EYE REDNESS: 0
NAUSEA: 0
SHORTNESS OF BREATH: 0
BLOOD IN STOOL: 0
DIARRHEA: 0
COUGH: 0
EYE PAIN: 0
ABDOMINAL PAIN: 0
VOMITING: 0
CHEST TIGHTNESS: 0

## 2023-09-03 NOTE — H&P
[address] [facility], [address] [facility]    DEPARTMENT OF HOSPITALIST MEDICINE        HISTORY & PHYSICAL:          REASON FOR ADMISSION:  Chief Complaint   Patient presents with    Alcohol Problem     \"I need help with my drinking\", last drink around 0945 today        HISTORY OF PRESENT ILLNESS:  Billie Renae is an 81 y.o. male. He recently moved here from [place]. He has a PMH of idiopathic pulm fibrosis , BPH and alcohol abuse. He tells me he drinks approx 30 beer a day for the last 17 years. He states he has never tried to get help before. When questioned what prompted his desire to stop he states he needs to for his family. He has a daughter and some grandchildren. Pt became tearful talking about this. He has not verbalized suicidal ideation but his spouse reported it and showed the nurse some text messages. Pt poor historian. In ED found to have significant hypnatremia. In addition he has been on dexilent and an SSRI which could have contributed. Na 120, potassium 4.1 glucose 113, wbc 9.6, hgb 17, hct 45.6. Pt admitted to hospitalist.        PAST MEDICAL HISTORY:  No past medical history on file. PAST SURGICAL HISTORY:  No past surgical history on file. SOCIAL HISTORY:  Social History     Socioeconomic History    Marital status:         FAMILY HISTORY:  No family history on file. ALLERGIES:  Allergies   Allergen Reactions    Sulfa Antibiotics         PRIOR TO ADMISSION MEDS:  Not in a hospital admission.      REVIEW OF SYSTEMS:  Constitutional:  No fevers, chills, nausea, vomiting, + tiredness & fatigue   Head:  No head injury, facial trauma   Eyes:  No acute visual changes, exudate, trauma   Ears:  No acute hearing loss, earaches   Nose: No nasal discharge, epistaxis   Neck: No new hoarseness, voice change, or new masses   Lungs:   No hemoptysis, pleurisy   Heart:  No chest pressure with exertion, palpitations,    Abdomen:   No new masses, no bright red blood per rectum

## 2023-09-03 NOTE — ED NOTES
Pt denies SI/HI/AVH at this time but wife reports that he has made comments over the last few days about wanting to die and has a text message from yesterday confirmingKarina Blackwell at bedside     [place], [place]  09/03/23 [id number]

## 2023-09-03 NOTE — ED PROVIDER NOTES
[facility] 3 BERNARDA/VAS/MED  eMERGENCY dEPARTMENT eNCOUnter      Pt Name: Jocelyn Artis  MRN: [id number]  [address] [facility] 1950  Date of evaluation: 9/3/2023  Provider: Karely Hopkins MD    CHIEF COMPLAINT       Chief Complaint   Patient presents with    Alcohol Problem     \"I need help with my drinking\", last drink around 0945 today         HISTORY OF PRESENT ILLNESS   (Location/Symptom, Timing/Onset,Context/Setting, Quality, Duration, Modifying Factors, Severity)  Note limiting factors. HPI    Jocelyn Artis is a 81 y.o. male with PMH of idiopathic pulmonary fibrosis on 2 to 3 L of oxygen at baseline, and history of alcohol use disorder who presents to the emergency department with CC of generalized weakness and desire to stop using alcohol. Patient reports that he typically drinks 15-30 beers daily, last drink this morning at 9:45 AM 1 hour prior to arrival.  He reports he has been drinking for several decades, has previously been outpatient treatment and been unsuccessful, and comes to the ED today for assistance with quitting alcohol. His wife at bedside states that he has seemed generally weak, has stopped eating and has only been drinking alcohol instead. She reports he seems very fatigued with difficulty concentrating and memory problems. No reported trauma. No recent fevers, chills, or other infectious symptoms. He denies drug use. Patient told his wife that he wanted to die, but has no specific plan. No HI or AVH. NursingNotes were reviewed. REVIEW OF SYSTEMS    (2-9 systems for level 4, 10 or more for level 5)     Review of Systems   Constitutional:  Positive for appetite change and fatigue. Negative for chills and fever. HENT:  Negative for congestion and sore throat. Eyes:  Negative for pain and redness. Respiratory:  Negative for cough, chest tightness and shortness of breath. Cardiovascular:  Negative for chest pain and leg swelling.    Gastrointestinal:  Negative for abdominal pain, blood in stool, diarrhea, nausea and vomiting. Genitourinary:  Negative for decreased urine volume, dysuria and frequency. Musculoskeletal:  Negative for neck pain and neck stiffness. Skin:  Negative for rash and wound. Neurological:  Negative for dizziness, seizures, light-headedness and headaches. Psychiatric/Behavioral:  Positive for behavioral problems, confusion, decreased concentration and suicidal ideas. Negative for hallucinations. The patient is nervous/anxious. PAST MEDICALHISTORY     Past Medical History:   Diagnosis Date    GERD (gastroesophageal reflux disease)     Pneumonia     Psychiatric problem          SURGICAL HISTORY       Past Surgical History:   Procedure Laterality Date    ABDOMEN SURGERY      COLONOSCOPY           CURRENT MEDICATIONS     Current Discharge Medication List        CONTINUE these medications which have NOT CHANGED    Details   dexlansoprazole (DEXILANT) 60 MG CPDR delayed release capsule Take 1 capsule by mouth daily      escitalopram (LEXAPRO) 20 MG tablet Take 1 tablet by mouth daily      tamsulosin (FLOMAX) 0.4 MG capsule Take 1 capsule by mouth daily             ALLERGIES     Sulfa antibiotics    FAMILY HISTORY       Family History   Problem Relation Age of Onset    Cancer Mother     Heart Disease Father           SOCIAL HISTORY       Social History     Socioeconomic History    Marital status:      Spouse name:     Number of children: 1    Years of education: 14    Highest education level: None   Occupational History    Occupation: retired   Tobacco Use    Smoking status: Former     Packs/day: 0.25     Years: 2.00     Pack years: 0.50     Types: Cigarettes     Start date:      Quit date:      Years since quittin.7    Smokeless tobacco: Never   Substance and Sexual Activity    Alcohol use:  Yes     Alcohol/week: 70.0 standard drinks     Types: 70 Cans of beer per week    Drug use: Never    Sexual activity: Not Currently     Partners: Female

## 2023-09-03 NOTE — PLAN OF CARE
Problem: Discharge Planning  Goal: Discharge to home or other facility with appropriate resources  Outcome: Not Progressing  Flowsheets  Taken 9/3/2023 1450  Discharge to home or other facility with appropriate resources:   Identify barriers to discharge with patient and caregiver   Identify discharge learning needs (meds, wound care, etc)   Refer to discharge planning if patient needs post-hospital services based on physician order or complex needs related to functional status, cognitive ability or social support system   Arrange for needed discharge resources and transportation as appropriate  Taken 9/3/2023 1433  Discharge to home or other facility with appropriate resources:   Identify barriers to discharge with patient and caregiver   Arrange for needed discharge resources and transportation as appropriate   Identify discharge learning needs (meds, wound care, etc)     Problem: Safety - Adult  Goal: Free from fall injury  Outcome: Not Progressing

## 2023-09-03 NOTE — PROGRESS NOTES
4 Eyes Skin Assessment    Rony Diaz is being assessed upon: Admission    I agree that I, Minoo Jacobsen RN, along with Marcie Marshall RN (either 2 RN's or 1 LPN and 1 RN) have performed a thorough Head to Toe Skin Assessment on the patient. ALL assessment sites listed below have been assessed. Areas assessed by both nurses:     [x]   Head, Face, and Ears   [x]   Shoulders, Back, and Chest  [x]   Arms, Elbows, and Hands   [x]   Coccyx, Sacrum, and Ischium  [x]   Legs, Feet, and Heels    Does the Patient have Skin Breakdown?  No    Drew Prevention initiated: Yes  Wound Care Orders initiated: NA    [facility] nurse consulted for Pressure Injury (Stage 3,4, Unstageable, DTI, NWPT, and Complex wounds) and New or Established Ostomies: NA        Primary Nurse eSignature: Minoo Jacobsen RN on 9/3/2023 at 2:20 PM      Co-Signer eSignature: Electronically signed by Girish Flores RN on 9/3/23 at 3:27 PM CDT

## 2023-09-04 PROBLEM — R33.9 URINARY RETENTION: Status: ACTIVE | Noted: 2023-09-04

## 2023-09-04 PROBLEM — R33.8 BENIGN PROSTATIC HYPERPLASIA WITH URINARY RETENTION: Status: ACTIVE | Noted: 2023-09-03

## 2023-09-04 PROBLEM — Z46.6 ENCOUNTER FOR FOLEY CATHETER REPLACEMENT: Status: ACTIVE | Noted: 2023-09-04

## 2023-09-04 PROBLEM — N40.1 BENIGN PROSTATIC HYPERPLASIA WITH URINARY RETENTION: Status: ACTIVE | Noted: 2023-09-03

## 2023-09-04 LAB
ALBUMIN SERPL-MCNC: 3.7 G/DL (ref 3.5–5.2)
ALP SERPL-CCNC: 114 U/L (ref 40–130)
ALT SERPL-CCNC: 22 U/L (ref 5–41)
ANION GAP SERPL CALCULATED.3IONS-SCNC: 11 MMOL/L (ref 7–19)
AST SERPL-CCNC: 27 U/L (ref 5–40)
BASOPHILS # BLD: 0.1 K/UL (ref 0–0.2)
BASOPHILS NFR BLD: 0.5 % (ref 0–1)
BILIRUB SERPL-MCNC: 0.8 MG/DL (ref 0.2–1.2)
BUN SERPL-MCNC: 7 MG/DL (ref 8–23)
CALCIUM SERPL-MCNC: 9 MG/DL (ref 8.8–10.2)
CHLORIDE SERPL-SCNC: 83 MMOL/L (ref 98–111)
CO2 SERPL-SCNC: 27 MMOL/L (ref 22–29)
CREAT SERPL-MCNC: 0.5 MG/DL (ref 0.5–1.2)
EOSINOPHIL # BLD: 0.1 K/UL (ref 0–0.6)
EOSINOPHIL NFR BLD: 1.1 % (ref 0–5)
ERYTHROCYTE [DISTWIDTH] IN BLOOD BY AUTOMATED COUNT: 11.3 % (ref 11.5–14.5)
GLUCOSE SERPL-MCNC: 105 MG/DL (ref 74–109)
HCT VFR BLD AUTO: 44 % (ref 42–52)
HGB BLD-MCNC: 16.3 G/DL (ref 14–18)
IMM GRANULOCYTES # BLD: 0 K/UL
LYMPHOCYTES # BLD: 0.6 K/UL (ref 1.1–4.5)
LYMPHOCYTES NFR BLD: 6.3 % (ref 20–40)
MCH RBC QN AUTO: 34.8 PG (ref 27–31)
MCHC RBC AUTO-ENTMCNC: 37 G/DL (ref 33–37)
MCV RBC AUTO: 94 FL (ref 80–94)
MONOCYTES # BLD: 1.2 K/UL (ref 0–0.9)
MONOCYTES NFR BLD: 12.8 % (ref 0–10)
NEUTROPHILS # BLD: 7.4 K/UL (ref 1.5–7.5)
NEUTS SEG NFR BLD: 79 % (ref 50–65)
PLATELET # BLD AUTO: 274 K/UL (ref 130–400)
PMV BLD AUTO: 8.9 FL (ref 9.4–12.4)
POTASSIUM SERPL-SCNC: 3.6 MMOL/L (ref 3.5–5)
PROT SERPL-MCNC: 6.1 G/DL (ref 6.6–8.7)
RBC # BLD AUTO: 4.68 M/UL (ref 4.7–6.1)
REASON FOR REJECTION: NORMAL
REJECTED TEST: NORMAL
SODIUM SERPL-SCNC: 120 MMOL/L (ref 136–145)
SODIUM SERPL-SCNC: 121 MMOL/L (ref 136–145)
SODIUM SERPL-SCNC: 121 MMOL/L (ref 136–145)
WBC # BLD AUTO: 9.4 K/UL (ref 4.8–10.8)

## 2023-09-04 PROCEDURE — 2700000000 HC OXYGEN THERAPY PER DAY

## 2023-09-04 PROCEDURE — 84295 ASSAY OF SERUM SODIUM: CPT

## 2023-09-04 PROCEDURE — 6360000002 HC RX W HCPCS: Performed by: NURSE PRACTITIONER

## 2023-09-04 PROCEDURE — 2580000003 HC RX 258: Performed by: NURSE PRACTITIONER

## 2023-09-04 PROCEDURE — 51702 INSERT TEMP BLADDER CATH: CPT

## 2023-09-04 PROCEDURE — 51798 US URINE CAPACITY MEASURE: CPT

## 2023-09-04 PROCEDURE — 99222 1ST HOSP IP/OBS MODERATE 55: CPT | Performed by: UROLOGY

## 2023-09-04 PROCEDURE — 36415 COLL VENOUS BLD VENIPUNCTURE: CPT

## 2023-09-04 PROCEDURE — 93005 ELECTROCARDIOGRAM TRACING: CPT

## 2023-09-04 PROCEDURE — 94760 N-INVAS EAR/PLS OXIMETRY 1: CPT

## 2023-09-04 PROCEDURE — 51702 INSERT TEMP BLADDER CATH: CPT | Performed by: UROLOGY

## 2023-09-04 PROCEDURE — 1210000000 HC MED SURG R&B

## 2023-09-04 PROCEDURE — 6370000000 HC RX 637 (ALT 250 FOR IP): Performed by: NURSE PRACTITIONER

## 2023-09-04 PROCEDURE — 6360000002 HC RX W HCPCS

## 2023-09-04 PROCEDURE — 85025 COMPLETE CBC W/AUTO DIFF WBC: CPT

## 2023-09-04 PROCEDURE — 80053 COMPREHEN METABOLIC PANEL: CPT

## 2023-09-04 PROCEDURE — 6370000000 HC RX 637 (ALT 250 FOR IP)

## 2023-09-04 RX ORDER — MORPHINE SULFATE 2 MG/ML
2 INJECTION, SOLUTION INTRAMUSCULAR; INTRAVENOUS ONCE
Status: COMPLETED | OUTPATIENT
Start: 2023-09-04 | End: 2023-09-04

## 2023-09-04 RX ORDER — LIDOCAINE HYDROCHLORIDE 20 MG/ML
JELLY TOPICAL PRN
Status: DISCONTINUED | OUTPATIENT
Start: 2023-09-04 | End: 2023-09-08 | Stop reason: HOSPADM

## 2023-09-04 RX ORDER — LORAZEPAM 2 MG/ML
3 INJECTION INTRAMUSCULAR
Status: DISCONTINUED | OUTPATIENT
Start: 2023-09-04 | End: 2023-09-08 | Stop reason: HOSPADM

## 2023-09-04 RX ORDER — LORAZEPAM 2 MG/ML
2 INJECTION INTRAMUSCULAR
Status: DISCONTINUED | OUTPATIENT
Start: 2023-09-04 | End: 2023-09-08 | Stop reason: HOSPADM

## 2023-09-04 RX ORDER — SODIUM CHLORIDE 0.9 % (FLUSH) 0.9 %
5-40 SYRINGE (ML) INJECTION EVERY 12 HOURS SCHEDULED
Status: DISCONTINUED | OUTPATIENT
Start: 2023-09-04 | End: 2023-09-08 | Stop reason: HOSPADM

## 2023-09-04 RX ORDER — LORAZEPAM 2 MG/ML
4 INJECTION INTRAMUSCULAR
Status: DISCONTINUED | OUTPATIENT
Start: 2023-09-04 | End: 2023-09-08 | Stop reason: HOSPADM

## 2023-09-04 RX ORDER — LORAZEPAM 2 MG/ML
1 INJECTION INTRAMUSCULAR
Status: DISCONTINUED | OUTPATIENT
Start: 2023-09-04 | End: 2023-09-08 | Stop reason: HOSPADM

## 2023-09-04 RX ORDER — LORAZEPAM 2 MG/1
4 TABLET ORAL
Status: DISCONTINUED | OUTPATIENT
Start: 2023-09-04 | End: 2023-09-08 | Stop reason: HOSPADM

## 2023-09-04 RX ORDER — TAMSULOSIN HYDROCHLORIDE 0.4 MG/1
0.4 CAPSULE ORAL DAILY
Status: DISCONTINUED | OUTPATIENT
Start: 2023-09-04 | End: 2023-09-08 | Stop reason: HOSPADM

## 2023-09-04 RX ORDER — SODIUM CHLORIDE 9 MG/ML
INJECTION, SOLUTION INTRAVENOUS PRN
Status: DISCONTINUED | OUTPATIENT
Start: 2023-09-04 | End: 2023-09-08 | Stop reason: HOSPADM

## 2023-09-04 RX ORDER — SODIUM CHLORIDE 0.9 % (FLUSH) 0.9 %
5-40 SYRINGE (ML) INJECTION PRN
Status: DISCONTINUED | OUTPATIENT
Start: 2023-09-04 | End: 2023-09-08 | Stop reason: HOSPADM

## 2023-09-04 RX ORDER — LORAZEPAM 2 MG/1
2 TABLET ORAL
Status: DISCONTINUED | OUTPATIENT
Start: 2023-09-04 | End: 2023-09-08 | Stop reason: HOSPADM

## 2023-09-04 RX ORDER — LORAZEPAM 1 MG/1
1 TABLET ORAL
Status: DISCONTINUED | OUTPATIENT
Start: 2023-09-04 | End: 2023-09-08 | Stop reason: HOSPADM

## 2023-09-04 RX ADMIN — MORPHINE SULFATE 2 MG: 2 INJECTION, SOLUTION INTRAMUSCULAR; INTRAVENOUS at 12:49

## 2023-09-04 RX ADMIN — SODIUM CHLORIDE, PRESERVATIVE FREE 10 ML: 5 INJECTION INTRAVENOUS at 08:30

## 2023-09-04 RX ADMIN — LORAZEPAM 1 MG: 2 INJECTION INTRAMUSCULAR; INTRAVENOUS at 17:29

## 2023-09-04 RX ADMIN — Medication 100 MG: at 08:30

## 2023-09-04 RX ADMIN — LORAZEPAM 1 MG: 2 INJECTION INTRAMUSCULAR; INTRAVENOUS at 14:31

## 2023-09-04 RX ADMIN — ENOXAPARIN SODIUM 40 MG: 100 INJECTION SUBCUTANEOUS at 08:30

## 2023-09-04 RX ADMIN — Medication 30 G: at 08:30

## 2023-09-04 RX ADMIN — TAMSULOSIN HYDROCHLORIDE 0.4 MG: 0.4 CAPSULE ORAL at 08:30

## 2023-09-04 RX ADMIN — PANTOPRAZOLE SODIUM 40 MG: 40 TABLET, DELAYED RELEASE ORAL at 05:58

## 2023-09-04 ASSESSMENT — PAIN DESCRIPTION - LOCATION: LOCATION: GROIN

## 2023-09-04 ASSESSMENT — PAIN SCALES - GENERAL: PAINLEVEL_OUTOF10: 8

## 2023-09-04 ASSESSMENT — PAIN DESCRIPTION - DESCRIPTORS: DESCRIPTORS: ACHING

## 2023-09-04 ASSESSMENT — ENCOUNTER SYMPTOMS
EYES NEGATIVE: 1
RESPIRATORY NEGATIVE: 1
ABDOMINAL DISTENTION: 1

## 2023-09-04 NOTE — CONSULTS
[facility]  Psychiatry Consult    Reason for Consult: Concern   Suicidal, alcohol abuse    The primary source(s) of information include(s):  patient    The patient is a 81 y.o. male with no reported previous psychiatric history, who has been admitted to medical services secondary to generalized weakness and desire to stop drinking alcohol. During the initial evaluation in emergency department, patient's blood alcohol level was less than 10, his UDS was negative, sodium level was 120. Patient denied suicidal ideations in the emergency department, however, patient's spouse stated that patient previously made suicidal statements. Patient has been seen in his room with presence of one-to-one sitter. Patient was oriented in personal information and current situation only, stated that he knows that he is in the hospital, however, was thinking that he is in the hospital in [place], [place]. Patient reported a long history of drinking alcohol, stated that he was drinking alcohol \"almost every day\" for the last 17 years and was drinking \"20-24 beers a day\". He reported that last time he was drinking alcohol last night, however, patient was admitted to the hospital yesterday in the morning. Patient endorses history of withdrawal seizures from alcohol \"many years ago\". He denies any history of DTs when he stopped drinking alcohol. During the interview patient reported withdrawal symptoms from alcohol-general muscle weakness, feeling of fatigue, tiredness, body shakes, hands tremor. In regards to affective symptomatology patient reported feeling of depression, anxiety, decreased appetite, decreased quality of sleep. He denies feeling of hopelessness and helplessness. Patient denies current active suicidal and homicidal ideations, denies any plans. Also, he denies auditory and visual hallucinations.     Patient reported that he was compliant with prescribed medications at home, however, he did not remember names of medications. When I mentioned that he has been prescribed Lexapro, patient did not know indication for being prescribed this medication, stated \"my doctor said I needed\". I discussed with patient and recommended to consider transfer to rehab facility for alcohol use disorder and provide him with referral to rehab facility, however, patient declined this option, stated that he would like to return back home and stay with his wife. PSYCHIATRIC HISTORY:  Diagnoses: Denies  Suicide attempts/gestures: Denies   Prior hospitalizations: Denies   Medication trials: Lexapro  Mental health contact: Primary care provider manages patient's psychotropic medications  Head trauma: Denies    Allergies:  Sulfa antibiotics    Mental Status  Appearance: Poorly groomed, wearing hospital attire. Made intermittent eye contact. Behavior: Anxious, partially cooperative with interview. Mild psychomotor retardation appreciated. Gait was not evaluated, as patient was lying down on the bed. Speech: Decreased in tone, normal in volume and quality. Mood: \"Tired\"   Affect: Mood congruent. Range is mildly restricted  Thought Process: Mostly circumstantial, sometimes tangential  Thought Content: Patient does not have any current active suicidal and homicidal ideations. No overt delusions or paranoia appreciated. Perceptions: Seems patient does not have any auditory or visual hallucinations at present time. Patient did not appear to be responding to internal stimuli. Executive Functions: Appear impaired. Concentration: Decreased  Reasoning: Appears impaired based on interaction from interview   Orientation: To personal information current situation  Alert. Language: Intact. Fund of information: Intact.    Memory: recent and remote appear mildly impaired  Impulsivity: Limited  Neurovegitative: Decreased appetite, decreased sleep  Insight: Limited  Judgment: Limited    Assessment    DSM 5

## 2023-09-04 NOTE — PLAN OF CARE
Problem: Discharge Planning  Goal: Discharge to home or other facility with appropriate resources  9/4/2023 1003 by Liss Bingham RN  Outcome: Progressing  Flowsheets (Taken 9/4/2023 2251)  Discharge to home or other facility with appropriate resources: Identify barriers to discharge with patient and caregiver  9/4/2023 0215 by Emilia Calderon  Outcome: Progressing     Problem: Safety - Adult  Goal: Free from fall injury  9/4/2023 1003 by Liss Bingham RN  Outcome: Progressing  9/4/2023 0215 by Emilia Calderon  Outcome: Progressing     Problem: ABCDS Injury Assessment  Goal: Absence of physical injury  9/4/2023 1003 by Liss Bingham RN  Outcome: Progressing  9/4/2023 0215 by Emilia Calderon  Outcome: Progressing     Problem: Skin/Tissue Integrity  Goal: Absence of new skin breakdown  Description: 1. Monitor for areas of redness and/or skin breakdown  2. Assess vascular access sites hourly  3. Every 4-6 hours minimum:  Change oxygen saturation probe site  4. Every 4-6 hours:  If on nasal continuous positive airway pressure, respiratory therapy assess nares and determine need for appliance change or resting period. 9/4/2023 1003 by Liss Bingham RN  Outcome: Progressing  9/4/2023 0215 by Emilia Calderon  Outcome: Progressing     Problem: Self Harm/Suicidality  Goal: Will have no self-injury during hospital stay  Description: INTERVENTIONS:  1. Ensure constant observer at bedside with Q15M safety checks  2. Maintain a safe environment  3. Secure patient belongings  4. Ensure family/visitors adhere to safety recommendations  5. Ensure safety tray has been added to patient's diet order  6.   Every shift and PRN: Re-assess suicidal risk via Frequent Screener    9/4/2023 1003 by Liss Bingham RN  Outcome: Progressing  9/4/2023 0215 by Emilia Calderon  Outcome: Progressing

## 2023-09-04 NOTE — CARE COORDINATION
Sw spoke to Pt and at bedside Pt spouse Chelo about IP/OP rehab options. Pt spouse states Pt would like to go to rehab. Sw provided Pt choice list of local facilities in the Pt area. Sw informed Pt and Pt spouse they would have to call and initiate with the phone call assessment. [facility] who takes Pt Medicare. No other questions or concerns at this time.

## 2023-09-04 NOTE — PROGRESS NOTES
Pt has had difficult voiding all noc. He has been voiding unmeasurable small amounts of urine with hematuria.

## 2023-09-04 NOTE — PROGRESS NOTES
[facility] Hospitalists    Progress Note    Patient:  Jojo Brownlee  YOB: 1950  Date of Service: 9/4/2023  MRN: [id number]   Acct: [de-identified]   Primary Care Physician: No primary care provider on file. Advance Directive: Full Code  Admit Date: 9/3/2023       Hospital Day: 1    Portions of this note have been copied forward, however, updated to reflect the most current clinical status of this patient. CHIEF COMPLAINT: alcohol problem    CUMULATIVE HOSPITAL COURSE:     This patient is a 79-year-old male with PMH IPF, BPH, and current alcohol abuse (approx 30 beers/day x17 years) who presents to [facility] ED for assistance with management of alcohol withdrawal.  Additionally, patient had made passive statements and text messages to spouse concerning SI. In ER, patient was found to be hyponatremic with sodium of 120. He was admitted to hospitalist with psych consult for further evaluation and management. Hyponatremia  Sodium on admission 120. Improved slightly to 123 however is currently 120 again. Urea packet given x1. Will consider adding lasix and sodium chloride tablets if sodium does not improve after urea administration. 1200ml fluid restriction continues. Continue q6h sodium. Urine and serum osmol low. Presentation consistent with beer potomania and/or low solute diet. Will continue to hold PPI and Lexapro as this could be contributing as well. Urinary Retention  [place] catheter was placed while in ER. This was discontinued on 9/3 at approximately 1900. Patient has only urinated small amounts since catheter was discontinued. Bladder scan revealed >600mL retained urine. Several attempts were made to replace [place] catheter, however RN staff unable to do so. Urology was consulted on 9/4 and was able to place a 12 [place] successfully. Recommending voiding trial in a few days when clinically appropriate. Flomax continues and PSA pending.     SI  Psych evaluated the patient and recommended to DC monitor    Alcohol abuse  -CIWA   -Seizure precautions  -fall precautions  -Thiamine/Folic Acid/Multivitamin daily  -I&O    Benign prostatic hyperplasia with urinary retention  -Urology following  -F/c placed 9/4  -Continue flomax  -PSA pending    SI  -Cleared by psych    DVT Prophylaxis: Lovenox     Discharge planning:  TBD     Advance Directive: Full Code    Diet: ADULT DIET; Regular; 4 carb choices (60 gm/meal); 1200 ml  ADULT ORAL NUTRITION SUPPLEMENT; Breakfast, Lunch, Dinner; Standard High Calorie/High Protein Oral Supplement     Consults Made:   IP CONSULT TO PSYCHIATRY  IP CONSULT TO SOCIAL WORK  IP CONSULT TO UROLOGY    Further Orders per Clinical course/attending. EMR Dragon/Transcription disclaimer:   Much of this encounter note is an electronic transcription/translation of spoken language to printed text.  The electronic translation of spoken language may permit erroneous, or at times, nonsensical words or phrases to be inadvertently transcribed; although attempts have made to review the note for such errors, some may still exist.

## 2023-09-04 NOTE — PLAN OF CARE
Problem: Discharge Planning  Goal: Discharge to home or other facility with appropriate resources  9/4/2023 0215 by Manual Gain  Outcome: Progressing  9/3/2023 1539 by Mars Andujar RN  Outcome: Not Progressing  Flowsheets  Taken 9/3/2023 1450  Discharge to home or other facility with appropriate resources:   Identify barriers to discharge with patient and caregiver   Identify discharge learning needs (meds, wound care, etc)   Refer to discharge planning if patient needs post-hospital services based on physician order or complex needs related to functional status, cognitive ability or social support system   Arrange for needed discharge resources and transportation as appropriate  Taken 9/3/2023 1433  Discharge to home or other facility with appropriate resources:   Identify barriers to discharge with patient and caregiver   Arrange for needed discharge resources and transportation as appropriate   Identify discharge learning needs (meds, wound care, etc)     Problem: Safety - Adult  Goal: Free from fall injury  9/4/2023 0215 by OSWALDO Castrejon  Outcome: Progressing  9/3/2023 1539 by Mars Andujar RN  Outcome: Not Progressing     Problem: ABCDS Injury Assessment  Goal: Absence of physical injury  9/4/2023 0215 by OSWALDO Castrejon  Outcome: Progressing  9/3/2023 1539 by Mars Andujar RN  Outcome: Progressing     Problem: Skin/Tissue Integrity  Goal: Absence of new skin breakdown  Description: 1. Monitor for areas of redness and/or skin breakdown  2. Assess vascular access sites hourly  3. Every 4-6 hours minimum:  Change oxygen saturation probe site  4. Every 4-6 hours:  If on nasal continuous positive airway pressure, respiratory therapy assess nares and determine need for appliance change or resting period.   9/4/2023 0215 by Manual Gain  Outcome: Progressing  9/3/2023 1539 by Mars Andujar RN  Outcome: Progressing     Problem: Self Harm/Suicidality  Goal: Will have no self-injury during hospital

## 2023-09-05 ENCOUNTER — APPOINTMENT (OUTPATIENT)
Dept: GENERAL RADIOLOGY | Age: 73
End: 2023-09-05
Payer: MEDICARE

## 2023-09-05 PROBLEM — E44.0 MODERATE MALNUTRITION (HCC): Status: ACTIVE | Noted: 2023-09-05

## 2023-09-05 LAB
ALBUMIN SERPL-MCNC: 3.4 G/DL (ref 3.5–5.2)
ALLENS TEST: ABNORMAL
ALP SERPL-CCNC: 114 U/L (ref 40–130)
ALT SERPL-CCNC: 21 U/L (ref 5–41)
ANION GAP SERPL CALCULATED.3IONS-SCNC: 8 MMOL/L (ref 7–19)
AST SERPL-CCNC: 23 U/L (ref 5–40)
BASE EXCESS ARTERIAL: 7.9 MMOL/L (ref -2–2)
BASOPHILS # BLD: 0.1 K/UL (ref 0–0.2)
BASOPHILS NFR BLD: 0.8 % (ref 0–1)
BILIRUB SERPL-MCNC: 0.5 MG/DL (ref 0.2–1.2)
BUN SERPL-MCNC: 15 MG/DL (ref 8–23)
CALCIUM SERPL-MCNC: 9.3 MG/DL (ref 8.8–10.2)
CARBOXYHEMOGLOBIN ARTERIAL: 1.6 % (ref 0–5)
CHLORIDE SERPL-SCNC: 88 MMOL/L (ref 98–111)
CO2 SERPL-SCNC: 31 MMOL/L (ref 22–29)
CREAT SERPL-MCNC: 0.6 MG/DL (ref 0.5–1.2)
EOSINOPHIL # BLD: 0.2 K/UL (ref 0–0.6)
EOSINOPHIL NFR BLD: 2.5 % (ref 0–5)
ERYTHROCYTE [DISTWIDTH] IN BLOOD BY AUTOMATED COUNT: 11.3 % (ref 11.5–14.5)
GLUCOSE SERPL-MCNC: 108 MG/DL (ref 74–109)
HCO3 ARTERIAL: 32.8 MMOL/L (ref 22–26)
HCT VFR BLD AUTO: 43 % (ref 42–52)
HEMOGLOBIN, ART, EXTENDED: 15.7 G/DL (ref 14–18)
HGB BLD-MCNC: 15.3 G/DL (ref 14–18)
IMM GRANULOCYTES # BLD: 0 K/UL
LYMPHOCYTES # BLD: 0.8 K/UL (ref 1.1–4.5)
LYMPHOCYTES NFR BLD: 10.3 % (ref 20–40)
MCH RBC QN AUTO: 34.6 PG (ref 27–31)
MCHC RBC AUTO-ENTMCNC: 35.6 G/DL (ref 33–37)
MCV RBC AUTO: 97.3 FL (ref 80–94)
METHEMOGLOBIN ARTERIAL: 1.4 %
MONOCYTES # BLD: 1 K/UL (ref 0–0.9)
MONOCYTES NFR BLD: 13.8 % (ref 0–10)
NEUTROPHILS # BLD: 5.4 K/UL (ref 1.5–7.5)
NEUTS SEG NFR BLD: 72.2 % (ref 50–65)
O2 CONTENT ARTERIAL: 21.5 ML/DL
O2 DELIVERY DEVICE: ABNORMAL
O2 SAT, ARTERIAL: 96.6 %
O2 THERAPY: ABNORMAL
OXYGEN FLOW: 3
PCO2 ARTERIAL: 45 MMHG (ref 35–45)
PH ARTERIAL: 7.47 (ref 7.35–7.45)
PLATELET # BLD AUTO: 240 K/UL (ref 130–400)
PMV BLD AUTO: 9.2 FL (ref 9.4–12.4)
PO2 ARTERIAL: 145 MMHG (ref 80–100)
POTASSIUM BLD-SCNC: 3.2 MMOL/L
POTASSIUM SERPL-SCNC: 3.9 MMOL/L (ref 3.5–5)
PROT SERPL-MCNC: 5.8 G/DL (ref 6.6–8.7)
RBC # BLD AUTO: 4.42 M/UL (ref 4.7–6.1)
SAMPLE SOURCE: ABNORMAL
SODIUM SERPL-SCNC: 124 MMOL/L (ref 136–145)
SODIUM SERPL-SCNC: 126 MMOL/L (ref 136–145)
SODIUM SERPL-SCNC: 127 MMOL/L (ref 136–145)
SODIUM SERPL-SCNC: 129 MMOL/L (ref 136–145)
SODIUM SERPL-SCNC: 130 MMOL/L (ref 136–145)
SPONT RATE(BPM): 16
WBC # BLD AUTO: 7.5 K/UL (ref 4.8–10.8)

## 2023-09-05 PROCEDURE — 2580000003 HC RX 258

## 2023-09-05 PROCEDURE — 71045 X-RAY EXAM CHEST 1 VIEW: CPT

## 2023-09-05 PROCEDURE — 82803 BLOOD GASES ANY COMBINATION: CPT

## 2023-09-05 PROCEDURE — 6370000000 HC RX 637 (ALT 250 FOR IP): Performed by: NURSE PRACTITIONER

## 2023-09-05 PROCEDURE — 84295 ASSAY OF SERUM SODIUM: CPT

## 2023-09-05 PROCEDURE — 92522 EVALUATE SPEECH PRODUCTION: CPT

## 2023-09-05 PROCEDURE — 6360000002 HC RX W HCPCS: Performed by: NURSE PRACTITIONER

## 2023-09-05 PROCEDURE — 94760 N-INVAS EAR/PLS OXIMETRY 1: CPT

## 2023-09-05 PROCEDURE — 80053 COMPREHEN METABOLIC PANEL: CPT

## 2023-09-05 PROCEDURE — 2700000000 HC OXYGEN THERAPY PER DAY

## 2023-09-05 PROCEDURE — 36600 WITHDRAWAL OF ARTERIAL BLOOD: CPT

## 2023-09-05 PROCEDURE — 85025 COMPLETE CBC W/AUTO DIFF WBC: CPT

## 2023-09-05 PROCEDURE — 1210000000 HC MED SURG R&B

## 2023-09-05 PROCEDURE — 99231 SBSQ HOSP IP/OBS SF/LOW 25: CPT | Performed by: UROLOGY

## 2023-09-05 PROCEDURE — 92610 EVALUATE SWALLOWING FUNCTION: CPT

## 2023-09-05 PROCEDURE — 2580000003 HC RX 258: Performed by: NURSE PRACTITIONER

## 2023-09-05 PROCEDURE — 36415 COLL VENOUS BLD VENIPUNCTURE: CPT

## 2023-09-05 RX ADMIN — TAMSULOSIN HYDROCHLORIDE 0.4 MG: 0.4 CAPSULE ORAL at 09:10

## 2023-09-05 RX ADMIN — Medication 100 MG: at 09:10

## 2023-09-05 RX ADMIN — SODIUM CHLORIDE, PRESERVATIVE FREE 10 ML: 5 INJECTION INTRAVENOUS at 21:11

## 2023-09-05 RX ADMIN — ENOXAPARIN SODIUM 40 MG: 100 INJECTION SUBCUTANEOUS at 09:10

## 2023-09-05 RX ADMIN — SODIUM CHLORIDE, PRESERVATIVE FREE 10 ML: 5 INJECTION INTRAVENOUS at 21:10

## 2023-09-05 RX ADMIN — SODIUM CHLORIDE, PRESERVATIVE FREE 10 ML: 5 INJECTION INTRAVENOUS at 21:12

## 2023-09-05 NOTE — PROGRESS NOTES
Comprehensive Nutrition Assessment    Type and Reason for Visit:  Initial, Positive Nutrition Screen    Nutrition Recommendations/Plan:   Modify current diet order. Continue current ONS     Malnutrition Assessment:  Malnutrition Status: Moderate malnutrition (09/05/23 1022)    Context:  Acute Illness     Findings of the 6 clinical characteristics of malnutrition:  Energy Intake:  No significant decrease in energy intake  Weight Loss:  1% to 2% over 1 week     Body Fat Loss:  Mild body fat loss Buccal region   Muscle Mass Loss:  No significant muscle mass loss    Fluid Accumulation:  No significant fluid accumulation     Strength:  Not Performed    Nutrition Assessment:    +NS for decreased weight and po intake. Modifying current diet to Regular. No hx of DM, glucose labs WNL. Nutrition Related Findings:    thin appearing Wound Type: None       Current Nutrition Intake & Therapies:    Average Meal Intake: %  Average Supplements Intake: Unable to assess  ADULT ORAL NUTRITION SUPPLEMENT; Breakfast, Lunch, Dinner; Standard High Calorie/High Protein Oral Supplement  ADULT DIET; Regular; 1500 ml    Anthropometric Measures:  Height: 5' 10\" (177.8 cm)  Ideal Body Weight (IBW): 166 lbs (75 kg)    Admission Body Weight: 148 lb (67.1 kg) (stated)  Current Body Weight: 145 lb 2 oz (65.8 kg), 87.4 % IBW.  Weight Source: Bed Scale  Current BMI (kg/m2): 20.8  BMI Categories: Underweight (BMI less than 22) age over 72    Estimated Daily Nutrient Needs:  Energy Requirements Based On: Kcal/kg  Weight Used for Energy Requirements: Current  Energy (kcal/day): 7714-4271 kcals (25-30 kcals/kg)  Weight Used for Protein Requirements: Current  Protein (g/day):   g     Fluid (ml/day): 4783-1068 ml    Nutrition Diagnosis:   Inadequate oral intake, Altered nutrition-related lab values related to psychological cause or life stress, cardiac dysfunction as evidenced by weight loss, lab values    Nutrition Interventions:

## 2023-09-05 NOTE — PLAN OF CARE
Problem: Discharge Planning  Goal: Discharge to home or other facility with appropriate resources  Outcome: Progressing  Flowsheets (Taken 9/4/2023 2000)  Discharge to home or other facility with appropriate resources: Identify barriers to discharge with patient and caregiver     Problem: Safety - Adult  Goal: Free from fall injury  Outcome: Progressing     Problem: ABCDS Injury Assessment  Goal: Absence of physical injury  Outcome: Progressing     Problem: Skin/Tissue Integrity  Goal: Absence of new skin breakdown  Description: 1. Monitor for areas of redness and/or skin breakdown  2. Assess vascular access sites hourly  3. Every 4-6 hours minimum:  Change oxygen saturation probe site  4. Every 4-6 hours:  If on nasal continuous positive airway pressure, respiratory therapy assess nares and determine need for appliance change or resting period. Outcome: Progressing     Problem: Self Harm/Suicidality  Goal: Will have no self-injury during hospital stay  Description: INTERVENTIONS:  1. Ensure constant observer at bedside with Q15M safety checks  2. Maintain a safe environment  3. Secure patient belongings  4. Ensure family/visitors adhere to safety recommendations  5. Ensure safety tray has been added to patient's diet order  6.   Every shift and PRN: Re-assess suicidal risk via Frequent Screener    Outcome: Progressing

## 2023-09-05 NOTE — PROGRESS NOTES
Facility/Department: [facility]/VAS/MED   CLINICAL BEDSIDE SWALLOW EVALUATION  INITIAL EVALUATION OF SPEECH PRODUCTION    NAME: Mariella Damon  : 1950  MRN: [id number]    ADMISSION DATE: 9/3/2023  ADMITTING DIAGNOSIS: has Idiopathic pulmonary fibrosis ([address]); Alcohol abuse; Benign prostatic hyperplasia with urinary retention; Hyponatremia; Encounter for Morataya catheter replacement; Urinary retention; and Moderate malnutrition ([address]) on their problem list.    Date of Eval: 2023  Evaluating Therapist: PAULA Nam    Clinical Impression  Speech assessment completed on this date. Dysarthria is present as characterized by decreased vocal intensity, hoarseness, and decreased lingual and labial ROM, strength, and coordination. Clinical Bedside Swallow Evaluation completed on this date. Oral prep reveals decreased rotary jaw movement observed with ice chips and regular solids. Prolonged mastication observed. Oral transit timing ranges from 1-2 seconds with puree consistencies and moderately (honey) thick liquids. Oral transit timing is suspected to be 1 second or faster with mildly (nectar) thick liquids, ice chips, and thin liquids. Oral transit timing ranges from 1-3 seconds with regular solids. Minimum residue noted with regular solids and cleared with cued dry swallows. Additional dry swallows were effective in clearing residue. Multiple swallows observed with mildly (nectar) thick liquids, thin liquids, and regular solids. Laryngeal movement observed to be consistently sluggish and mild-moderately decreased for swallow airway protection. Even so, no overt s/s of aspiration/penetration observed with ice chips, puree consistencies, regular solids and moderately (honey) thick liquids via cup and spoon. Immediate cough response noted with small sip of thin liquids. Immediate cough response noted with small sip of mildly (nectar) thick liquids. Coughing episode lasted approximately 2-3 minutes. Weak coughs noted. the Right    Treatment/Goals  LTG  Goal 1: Patient will tolerate least restrictive diet with minimal overt s/s of aspiration/penetration during hospitalization. STG  Goal 1: Patient will tolerate soft and bite sized consistencies with moderately (honey) thick liquids with minimal overt s/s of aspiration/penetration during hospitalization. Goal 2: Patient will demonstrate awareness of general aspiration precautions. Goal 3: Patient will participate in swallowing reassessments to ensure safest diet consistencies. Goal 4: Patient will allow to assist with daily oral hygiene protocol. Goal 5: Patient will complete breath support, oral motor, lingual, and pharyngeal exercises with provision of mod cues/prompts. Goal 6: Cognitive-Linguistic Evaluation. Goal 7: Patient will utilize tools/strategies to promote increased clarity of speech at word, phrase, and sentence level with provision of mod cues/prompts. Goal 8: Diet upgrade. General  Chart Reviewed: Yes  Behavior/Cognition: Confused; Cooperative  Temperature Spikes Noted: No  Respiratory Status: O2 via nasual cannula  O2 Device: Nasal cannula  Liters of Oxygen: 3 L  Communication Observation: Dysarthria  Follows Directions: Simple  Dentition: Adequate  Patient Positioning: Upright in bed  Baseline Vocal Quality: Weak  Volitional Cough: Weak  Volitional Swallow: Delayed  Prior Dysphagia History: Denies hx dysphagia. States he is on a regular diet with thin liquids. Reports he takes his medications whole with thin liquids. Education  Patient Education Response: Verbalizes understanding    Communication Observation  Speech assessment completed on this date. Dysarthria is present as characterized by decreased vocal intensity, hoarseness, and decreased lingual and labial ROM, strength, and coordination. Even so, clinician ranks speech intelligibility approximately 80-90% intelligible with context known and background noise present.     Oral Mechanism

## 2023-09-05 NOTE — PROGRESS NOTES
[facility]    Progress Note    Patient:  Dominique Quezada  YOB: 1950  Date of Service: 9/5/2023  MRN: [id number]   Acct: [de-identified]   Primary Care Physician: No primary care provider on file. Advance Directive: Full Code  Admit Date: 9/3/2023       Hospital Day: 2    Portions of this note have been copied forward, however, updated to reflect the most current clinical status of this patient. CHIEF COMPLAINT: alcohol problem    CUMULATIVE HOSPITAL COURSE:     This patient is a 79-year-old male with PMH IPF, BPH, and current alcohol abuse (approx 30 beers/day x17 years) who [facility] for assistance with management of alcohol withdrawal.  Additionally, patient had made passive statements and text messages to spouse concerning SI. In ER, patient was found to be hyponatremic with sodium of 120. He was admitted to hospitalist with psych consult for further evaluation and management. Hyponatremia  Sodium on admission 120. Improving appropriately and is currently 126. Urea packet given x1 on 9/4. 1500ml fluid restriction continues. Continue q6h sodium. Urine and serum osmol low. Presentation consistent with beer potomania and/or low solute diet. Will continue to hold PPI and Lexapro as this could be contributing as well. Urinary Retention  Morataya catheter was placed while in ER. This was discontinued on 9/3 at approximately 1900. Patient had only urinated small amounts after catheter was discontinued. Bladder scan revealed >600mL retained urine. Several attempts were made to replace Morataya catheter, however RN staff unable to do so. Urology was consulted on 9/4 and was able to place a 12 [place] successfully. Recommending voiding trial in a few days when clinically appropriate. Flomax continues. SI  Psych evaluated the patient and recommended to DC one-on-one sitter, no recommendations with adjustment of psychotropic medications at this time.   Recommended referral to rehab Dragon/Transcription disclaimer:   Much of this encounter note is an electronic transcription/translation of spoken language to printed text.  The electronic translation of spoken language may permit erroneous, or at times, nonsensical words or phrases to be inadvertently transcribed; although attempts have made to review the note for such errors, some may still exist.

## 2023-09-05 NOTE — PLAN OF CARE
Nutrition Problem #1: Inadequate oral intake, Altered nutrition-related lab values  Intervention: Food and/or Nutrient Delivery: Modify Current Diet, Continue Oral Nutrition Supplement  Nutritional

## 2023-09-06 LAB
ALBUMIN SERPL-MCNC: 3 G/DL (ref 3.5–5.2)
ALP SERPL-CCNC: 99 U/L (ref 40–130)
ALT SERPL-CCNC: 22 U/L (ref 5–41)
ANION GAP SERPL CALCULATED.3IONS-SCNC: 8 MMOL/L (ref 7–19)
AST SERPL-CCNC: 24 U/L (ref 5–40)
BASOPHILS # BLD: 0.1 K/UL (ref 0–0.2)
BASOPHILS NFR BLD: 0.8 % (ref 0–1)
BILIRUB SERPL-MCNC: 0.5 MG/DL (ref 0.2–1.2)
BUN SERPL-MCNC: 8 MG/DL (ref 8–23)
CALCIUM SERPL-MCNC: 8.9 MG/DL (ref 8.8–10.2)
CHLORIDE SERPL-SCNC: 90 MMOL/L (ref 98–111)
CO2 SERPL-SCNC: 30 MMOL/L (ref 22–29)
CREAT SERPL-MCNC: 0.5 MG/DL (ref 0.5–1.2)
EOSINOPHIL # BLD: 0.4 K/UL (ref 0–0.6)
EOSINOPHIL NFR BLD: 4.9 % (ref 0–5)
ERYTHROCYTE [DISTWIDTH] IN BLOOD BY AUTOMATED COUNT: 11.4 % (ref 11.5–14.5)
GLUCOSE SERPL-MCNC: 99 MG/DL (ref 74–109)
HCT VFR BLD AUTO: 43.2 % (ref 42–52)
HGB BLD-MCNC: 15.4 G/DL (ref 14–18)
IMM GRANULOCYTES # BLD: 0 K/UL
LYMPHOCYTES # BLD: 0.7 K/UL (ref 1.1–4.5)
LYMPHOCYTES NFR BLD: 8.8 % (ref 20–40)
MAGNESIUM SERPL-MCNC: 1.4 MG/DL (ref 1.6–2.4)
MCH RBC QN AUTO: 35.2 PG (ref 27–31)
MCHC RBC AUTO-ENTMCNC: 35.6 G/DL (ref 33–37)
MCV RBC AUTO: 98.6 FL (ref 80–94)
MONOCYTES # BLD: 1 K/UL (ref 0–0.9)
MONOCYTES NFR BLD: 13.7 % (ref 0–10)
NEUTROPHILS # BLD: 5.4 K/UL (ref 1.5–7.5)
NEUTS SEG NFR BLD: 71.5 % (ref 50–65)
PLATELET # BLD AUTO: 222 K/UL (ref 130–400)
PMV BLD AUTO: 9.5 FL (ref 9.4–12.4)
POTASSIUM SERPL-SCNC: 3.5 MMOL/L (ref 3.5–5)
PROT SERPL-MCNC: 6 G/DL (ref 6.6–8.7)
RBC # BLD AUTO: 4.38 M/UL (ref 4.7–6.1)
SODIUM SERPL-SCNC: 128 MMOL/L (ref 136–145)
SODIUM SERPL-SCNC: 129 MMOL/L (ref 136–145)
WBC # BLD AUTO: 7.5 K/UL (ref 4.8–10.8)

## 2023-09-06 PROCEDURE — 97530 THERAPEUTIC ACTIVITIES: CPT

## 2023-09-06 PROCEDURE — 36415 COLL VENOUS BLD VENIPUNCTURE: CPT

## 2023-09-06 PROCEDURE — 92526 ORAL FUNCTION THERAPY: CPT

## 2023-09-06 PROCEDURE — 92523 SPEECH SOUND LANG COMPREHEN: CPT

## 2023-09-06 PROCEDURE — 97535 SELF CARE MNGMENT TRAINING: CPT

## 2023-09-06 PROCEDURE — 83735 ASSAY OF MAGNESIUM: CPT

## 2023-09-06 PROCEDURE — 97162 PT EVAL MOD COMPLEX 30 MIN: CPT

## 2023-09-06 PROCEDURE — 85025 COMPLETE CBC W/AUTO DIFF WBC: CPT

## 2023-09-06 PROCEDURE — 6370000000 HC RX 637 (ALT 250 FOR IP)

## 2023-09-06 PROCEDURE — 6370000000 HC RX 637 (ALT 250 FOR IP): Performed by: NURSE PRACTITIONER

## 2023-09-06 PROCEDURE — 97166 OT EVAL MOD COMPLEX 45 MIN: CPT

## 2023-09-06 PROCEDURE — 94760 N-INVAS EAR/PLS OXIMETRY 1: CPT

## 2023-09-06 PROCEDURE — 84153 ASSAY OF PSA TOTAL: CPT

## 2023-09-06 PROCEDURE — 2700000000 HC OXYGEN THERAPY PER DAY

## 2023-09-06 PROCEDURE — 1210000000 HC MED SURG R&B

## 2023-09-06 PROCEDURE — 6360000002 HC RX W HCPCS: Performed by: NURSE PRACTITIONER

## 2023-09-06 PROCEDURE — 97116 GAIT TRAINING THERAPY: CPT

## 2023-09-06 PROCEDURE — 92507 TX SP LANG VOICE COMM INDIV: CPT

## 2023-09-06 PROCEDURE — 99231 SBSQ HOSP IP/OBS SF/LOW 25: CPT | Performed by: NURSE PRACTITIONER

## 2023-09-06 PROCEDURE — 84295 ASSAY OF SERUM SODIUM: CPT

## 2023-09-06 PROCEDURE — 80053 COMPREHEN METABOLIC PANEL: CPT

## 2023-09-06 RX ADMIN — TAMSULOSIN HYDROCHLORIDE 0.4 MG: 0.4 CAPSULE ORAL at 09:02

## 2023-09-06 RX ADMIN — Medication 15 G: at 11:37

## 2023-09-06 RX ADMIN — MAGNESIUM SULFATE HEPTAHYDRATE 2000 MG: 2 INJECTION, SOLUTION INTRAVENOUS at 09:05

## 2023-09-06 RX ADMIN — Medication 100 MG: at 09:02

## 2023-09-06 RX ADMIN — ENOXAPARIN SODIUM 40 MG: 100 INJECTION SUBCUTANEOUS at 09:02

## 2023-09-06 NOTE — CARE COORDINATION
Case Management Assessment  Initial Evaluation    Date/Time of Evaluation: 9/6/2023 3:15 PM  Assessment Completed by: EMILY Santos    If patient is discharged prior to next notation, then this note serves as note for discharge by case management. Patient Name: Kaleb Bear                   YOB: 1950  Diagnosis: Suicidal ideation [R45.851]  Hypochloremia [E87.8]  Hyponatremia [E87.1]  Alcohol use disorder [F10.90]                   Date / Time: 9/3/2023 10:19 AM    Patient Admission Status: Inpatient   Readmission Risk (Low < 19, Mod (19-27), High > 27): Readmission Risk Score: 11.3    Current PCP: No primary care provider on file. PCP verified by CM? No (Pt agreeable to seeing a [facility] PCP)    Chart Reviewed: Yes      History Provided by: Patient, Spouse  Patient Orientation: Alert and Oriented, Person, Place    Patient Cognition: Alert (seemed forgetful at times)    Hospitalization in the last 30 days (Readmission):  No    If yes, Readmission Assessment in CM Navigator will be completed. Advance Directives:      Code Status: Full Code   Patient's Primary Decision Maker is: Legal Next of Kin      Discharge Planning:    Patient lives with: Spouse/Significant Other Type of Home: House  Primary Care Giver: Self  Patient Support Systems include: Spouse/Significant Other, Children, Family Members   Current Financial resources: Medicare  Current community resources: None  Current services prior to admission: Oxygen Therapy            Current DME:              Type of Home Care services:  None    ADLS  Prior functional level: Independent in ADLs/IADLs  Current functional level: Assistance with the following:, Bathing, Cooking, Housework, Shopping    PT AM-PAC:   /24  OT AM-PAC:   /24    Family can provide assistance at DC: Yes (not available 24\7)  Would you like Case Management to discuss the discharge plan with any other family members/significant others, and if so, who?  Yes (ok to speak with Representative Agree with the Discharge Plan?  Yes    Geovanna Zhong  Case Management Department  Ph: [phone] Fax: [phone]  Electronically signed by EMILY Zhong on 9/6/2023 at 3:21 PM

## 2023-09-06 NOTE — PROGRESS NOTES
Facility/Department: [facility]/VAS/MED  Dysphagia Treatment Note and Speech Language Cognitive Evaluation    NAME: Juanis Bravo  : 1950  MRN: [id number]    Patient Diagnosis(es):   Patient Active Problem List    Diagnosis Date Noted    Moderate malnutrition ([address]) 2023    Encounter for Morataya catheter replacement 2023    Urinary retention 2023    Idiopathic pulmonary fibrosis ([address]) 2023    Alcohol abuse 2023    Benign prostatic hyperplasia with urinary retention 2023    Hyponatremia 2023     Allergies: Allergies   Allergen Reactions    Sulfa Antibiotics Rash         Current Diet Level:    Dysphagia soft and moderately honey thick liquids       HISTORY OF PRESENT ILLNESS:  Per APRN Notes: Juanis Bravo is an 81 y.o. male. He recently moved here from [place]. He has a PMH of idiopathic pulm fibrosis , BPH and alcohol abuse. He tells me he drinks approx 30 beer a day for the last 17 years. He states he has never tried to get help before. When questioned what prompted his desire to stop he states he needs to for his family. He has a daughter and some grandchildren. Pt became tearful talking about this. He has not verbalized suicidal ideation but his spouse reported it and showed the nurse some text messages. Pt poor historian. In ED found to have significant hypnatremia. In addition he has been on dexilent and an SSRI which could have contributed. Na 120, potassium 4.1 glucose 113, wbc 9.6, hgb 17, hct 45.6. Pt admitted to hospitalist.          Pain:  Pain Assessment  Pain Assessment: None - Denies Pain  Pain Level: 8  Pain Location: Groin  Pain Descriptors: Aching    Diet Tolerance:    Patient tolerating current diet level without   signs/symptoms of penetration/aspiration. General  Chart Reviewed: Yes  Behavior/Cognition: Confused; Cooperative  Temperature Spikes Noted: No  Respiratory Status: O2 via nasual cannula  O2 Device: Nasal cannula  Liters of Oxygen:

## 2023-09-06 NOTE — PROGRESS NOTES
[facility]    Progress Note    Patient:  Yves Silverio  YOB: 1950  Date of Service: 9/6/2023  MRN: [id number]   Acct: [de-identified]   Primary Care Physician: No primary care provider on file. Advance Directive: Full Code  Admit Date: 9/3/2023       Hospital Day: 3    Portions of this note have been copied forward, however, updated to reflect the most current clinical status of this patient. CHIEF COMPLAINT: alcohol problem    CUMULATIVE HOSPITAL COURSE:     This patient is a 82-year-old male with PMH IPF, BPH, and current alcohol abuse (approx 30 beers/day x17 years) who presents to [facility] ED for assistance with management of alcohol withdrawal.  Additionally, patient had made passive statements and text messages to spouse concerning SI. In ER, patient was found to be hyponatremic with sodium of 120. He was admitted to hospitalist with psych consult for further evaluation and management. Hyponatremia  Sodium on admission 120. Improving appropriately and is currently 129. Urea packet given x1 on 9/4 and again on 9/6. 1500ml fluid restriction continues. Continue q6h sodium. Urine and serum osmol low. Presentation consistent with beer potomania and/or low solute diet. Will continue to hold PPI and Lexapro as this could be contributing as well. He remains tachycardic. Will continue to monitor and consider possible rehydration as he may now be dry. Urinary Retention  Morataya catheter was placed while in ER. This was discontinued on 9/3 at approximately 1900. Patient had only urinated small amounts after catheter was discontinued. Bladder scan revealed >600mL retained urine. Several attempts were made to replace Morataya catheter, however RN staff unable to do so. Urology was consulted on 9/4 and was able to place a 12 [place] successfully. Recommending voiding trial in a few days when clinically appropriate. Flomax continues.     SI/CIWA  Psych evaluated the patient and recommended to DC

## 2023-09-06 NOTE — PROGRESS NOTES
Occupational Therapy Initial Assessment  Date: 2023   Patient Name: Bari Muñoz  MRN: [id number]     : 1950    Date of Service: 2023    Discharge Recommendations:  Patient would benefit from continued therapy after discharge       Assessment   Assessment: Evaluation completed and tx initiated. The patient is not safe to perform standing or ambulatory ADL on his own and would benefit from further therapy to upgrade functional status. Treatment Diagnosis: Hyponatremia  REQUIRES OT FOLLOW-UP: Yes  Activity Tolerance  Activity Tolerance Comments: States he just doesn't feel well           Patient Diagnosis(es): The primary encounter diagnosis was Hyponatremia. Diagnoses of Suicidal ideation, Alcohol use disorder, and Hypochloremia were also pertinent to this visit. has a past medical history of GERD (gastroesophageal reflux disease), Pneumonia, and Psychiatric problem. has a past surgical history that includes Abdomen surgery and Colonoscopy. Treatment Diagnosis: Hyponatremia      Restrictions  Restrictions/Precautions  Restrictions/Precautions: Fall Risk, Seizure  Required Braces or Orthoses?: No    Subjective   General  Chart Reviewed: Yes  Patient assessed for rehabilitation services?: Yes  Family / Caregiver Present: No  Pre Treatment Pain Screening  Pain at present: 0  Scale Used: Numeric Score  Intervention List: Patient able to continue with treatment  Comments / Details: no pain with activity  Oxygen Therapy  O2 Device: Nasal cannula    Social/Functional History  Social/Functional History  Lives With: Spouse  Type of Home: House  Has the patient had two or more falls in the past year or any fall with injury in the past year?: Yes  ADL Assistance: Independent  Ambulation Assistance: Independent  Transfer Assistance: Independent  Additional Comments: pt reports indep prior. Decreased cognition, asking the same question.        Objective   Hearing: Within functional limits signed by Domonique Brothers OT/L on 9/6/2023 at 1:53 PM.

## 2023-09-06 NOTE — PROGRESS NOTES
Physical Therapy  Facility/Department: [facility]/CHIN/MED  Physical Therapy Initial Assessment    Name: Ross Winn  : 1950  MRN: [id number]  Date of Service: 2023    Discharge Recommendations:  Continue to assess pending progress, 24 hour supervision or assist, Patient would benefit from continued therapy after discharge          Patient Diagnosis(es): The primary encounter diagnosis was Hyponatremia. Diagnoses of Suicidal ideation, Alcohol use disorder, and Hypochloremia were also pertinent to this visit. Past Medical History:  has a past medical history of GERD (gastroesophageal reflux disease), Pneumonia, and Psychiatric problem. Past Surgical History:  has a past surgical history that includes Abdomen surgery and Colonoscopy. Assessment   Body Structures, Functions, Activity Limitations Requiring Skilled Therapeutic Intervention: Decreased functional mobility ; Decreased endurance;Decreased cognition;Decreased safe awareness;Decreased strength;Decreased balance;Decreased coordination  Assessment: Pt. will benefit from cont. PT to decrease impairments. Pt. a fall risk and should not attempt mobility on his own at this time. Pt. needs 24 hr care due to his deconditiong and decreased cognitive state. He wants to get into an alcohol rehab program. He was able to amb. a very short distance with 1A and gait belt. Pt. encouraged to get up to the chair. Will work on progressive mobility.   Treatment Diagnosis: impaired gait and mobility  Therapy Prognosis: Good  Decision Making: Medium Complexity  Barriers to Learning: cognition  Requires PT Follow-Up: Yes  Activity Tolerance  Activity Tolerance: Treatment limited secondary to decreased cognition;Patient limited by fatigue     Plan   Physcial Therapy Plan  General Plan: 6-7 times per week  Therapy Duration: 2 Weeks  Current Treatment Recommendations: Strengthening, Balance training, Functional mobility training, Transfer training, Gait training, Endurance Education  Patient Education  Education Given To: Patient  Education Provided: Role of Therapy;Plan of Care;Transfer Training  Education Provided Comments: use of call light, staff A  Education Method: Verbal  Barriers to Learning: Cognition  Education Outcome: Verbalized understanding;Demonstrated understanding;Continued education needed      Therapy Time   Individual Concurrent Group Co-treatment   Time In           Time Out           Minutes                   Don Fenton PT     Electronically signed by Don Fenton PT on 9/6/2023 at 1:28 PM

## 2023-09-07 LAB
ALBUMIN SERPL-MCNC: 3.2 G/DL (ref 3.5–5.2)
ALP SERPL-CCNC: 96 U/L (ref 40–130)
ALT SERPL-CCNC: 22 U/L (ref 5–41)
ANION GAP SERPL CALCULATED.3IONS-SCNC: 11 MMOL/L (ref 7–19)
AST SERPL-CCNC: 23 U/L (ref 5–40)
BASOPHILS # BLD: 0.1 K/UL (ref 0–0.2)
BASOPHILS NFR BLD: 1.2 % (ref 0–1)
BILIRUB SERPL-MCNC: 0.5 MG/DL (ref 0.2–1.2)
BUN SERPL-MCNC: 12 MG/DL (ref 8–23)
CALCIUM SERPL-MCNC: 8.9 MG/DL (ref 8.8–10.2)
CHLORIDE SERPL-SCNC: 91 MMOL/L (ref 98–111)
CO2 SERPL-SCNC: 29 MMOL/L (ref 22–29)
CREAT SERPL-MCNC: 0.4 MG/DL (ref 0.5–1.2)
EKG P AXIS: 26 DEGREES
EKG P AXIS: 47 DEGREES
EKG P-R INTERVAL: 146 MS
EKG P-R INTERVAL: 152 MS
EKG Q-T INTERVAL: 334 MS
EKG Q-T INTERVAL: 344 MS
EKG QRS DURATION: 78 MS
EKG QRS DURATION: 84 MS
EKG QTC CALCULATION (BAZETT): 414 MS
EKG QTC CALCULATION (BAZETT): 449 MS
EKG T AXIS: -2 DEGREES
EKG T AXIS: -6 DEGREES
EOSINOPHIL # BLD: 0.4 K/UL (ref 0–0.6)
EOSINOPHIL NFR BLD: 5.1 % (ref 0–5)
ERYTHROCYTE [DISTWIDTH] IN BLOOD BY AUTOMATED COUNT: 11.4 % (ref 11.5–14.5)
GLUCOSE SERPL-MCNC: 104 MG/DL (ref 74–109)
HCT VFR BLD AUTO: 42.1 % (ref 42–52)
HGB BLD-MCNC: 15.2 G/DL (ref 14–18)
IMM GRANULOCYTES # BLD: 0 K/UL
INR PPP: 1.02 (ref 0.88–1.18)
LYMPHOCYTES # BLD: 0.9 K/UL (ref 1.1–4.5)
LYMPHOCYTES NFR BLD: 10.9 % (ref 20–40)
MCH RBC QN AUTO: 34.9 PG (ref 27–31)
MCHC RBC AUTO-ENTMCNC: 36.1 G/DL (ref 33–37)
MCV RBC AUTO: 96.8 FL (ref 80–94)
MONOCYTES # BLD: 1 K/UL (ref 0–0.9)
MONOCYTES NFR BLD: 13.3 % (ref 0–10)
NEUTROPHILS # BLD: 5.4 K/UL (ref 1.5–7.5)
NEUTS SEG NFR BLD: 69.2 % (ref 50–65)
PLATELET # BLD AUTO: 258 K/UL (ref 130–400)
PMV BLD AUTO: 9.5 FL (ref 9.4–12.4)
POTASSIUM SERPL-SCNC: 4 MMOL/L (ref 3.5–5)
PROT SERPL-MCNC: 5.7 G/DL (ref 6.6–8.7)
PROTHROMBIN TIME: 13.1 SEC (ref 12–14.6)
RBC # BLD AUTO: 4.35 M/UL (ref 4.7–6.1)
SODIUM SERPL-SCNC: 131 MMOL/L (ref 136–145)
WBC # BLD AUTO: 7.8 K/UL (ref 4.8–10.8)

## 2023-09-07 PROCEDURE — 6370000000 HC RX 637 (ALT 250 FOR IP): Performed by: NURSE PRACTITIONER

## 2023-09-07 PROCEDURE — 2580000003 HC RX 258

## 2023-09-07 PROCEDURE — 51702 INSERT TEMP BLADDER CATH: CPT

## 2023-09-07 PROCEDURE — 92507 TX SP LANG VOICE COMM INDIV: CPT

## 2023-09-07 PROCEDURE — 97530 THERAPEUTIC ACTIVITIES: CPT

## 2023-09-07 PROCEDURE — 92526 ORAL FUNCTION THERAPY: CPT

## 2023-09-07 PROCEDURE — 80053 COMPREHEN METABOLIC PANEL: CPT

## 2023-09-07 PROCEDURE — 36415 COLL VENOUS BLD VENIPUNCTURE: CPT

## 2023-09-07 PROCEDURE — 97116 GAIT TRAINING THERAPY: CPT

## 2023-09-07 PROCEDURE — 85610 PROTHROMBIN TIME: CPT

## 2023-09-07 PROCEDURE — 6370000000 HC RX 637 (ALT 250 FOR IP)

## 2023-09-07 PROCEDURE — 1210000000 HC MED SURG R&B

## 2023-09-07 PROCEDURE — 6360000002 HC RX W HCPCS: Performed by: NURSE PRACTITIONER

## 2023-09-07 PROCEDURE — 94760 N-INVAS EAR/PLS OXIMETRY 1: CPT

## 2023-09-07 PROCEDURE — 85025 COMPLETE CBC W/AUTO DIFF WBC: CPT

## 2023-09-07 PROCEDURE — 2700000000 HC OXYGEN THERAPY PER DAY

## 2023-09-07 PROCEDURE — 99231 SBSQ HOSP IP/OBS SF/LOW 25: CPT | Performed by: NURSE PRACTITIONER

## 2023-09-07 RX ADMIN — TAMSULOSIN HYDROCHLORIDE 0.4 MG: 0.4 CAPSULE ORAL at 08:59

## 2023-09-07 RX ADMIN — SODIUM CHLORIDE, PRESERVATIVE FREE 10 ML: 5 INJECTION INTRAVENOUS at 20:33

## 2023-09-07 RX ADMIN — SODIUM CHLORIDE, PRESERVATIVE FREE 10 ML: 5 INJECTION INTRAVENOUS at 09:00

## 2023-09-07 RX ADMIN — Medication 100 MG: at 08:59

## 2023-09-07 RX ADMIN — LORAZEPAM 2 MG: 2 TABLET ORAL at 20:32

## 2023-09-07 RX ADMIN — Medication 15 G: at 08:59

## 2023-09-07 RX ADMIN — ENOXAPARIN SODIUM 40 MG: 100 INJECTION SUBCUTANEOUS at 08:59

## 2023-09-07 NOTE — PROGRESS NOTES
[facility]    [facility] Note    Patient:  Charles Estrada  YOB: 1950  Date of Service: 9/7/2023  MRN: [id number]   Acct: [de-identified]   Primary Care Physician: No primary care provider on file. Advance Directive: Full Code  Admit Date: 9/3/2023       Hospital Day: 4    Portions of this note have been copied forward, however, updated to reflect the most current clinical status of this patient. CHIEF COMPLAINT: alcohol problem    CUMULATIVE HOSPITAL COURSE:     This patient is a 61-year-old male with PMH IPF, BPH, and current alcohol abuse (approx 30 beers/day x17 years) who presents to [facility] ED for assistance with management of alcohol withdrawal.  Additionally, patient had made passive statements and text messages to spouse concerning SI. In ER, patient was found to be hyponatremic with sodium of 120. He was admitted to hospitalist with psych consult for further evaluation and management. Hyponatremia  Sodium on admission 120. Improving appropriately and is currently 131. Urea packet given x1 on 9/4, 9/6, and 9/7. 1500ml fluid restriction continues. Continue q6h sodium. Urine and serum osmol low. Presentation consistent with beer potomania and/or low solute diet. Will continue to hold PPI and Lexapro as this could be contributing as well. Urinary Retention  Morataya catheter was placed while in ER. This was discontinued on 9/3 at approximately 1900. Patient had only urinated small amounts after catheter was discontinued. Bladder scan revealed >600mL retained urine. Several attempts were made to replace Morataya catheter, however RN staff unable to do so. Urology was consulted on 9/4 and was able to place a 12 [place] successfully. Recommending voiding trial in a few days when clinically appropriate. Flomax continues. SI/CIWA  Psych evaluated the patient and recommended to DC one-on-one sitter, no recommendations with adjustment of psychotropic medications at this time.   Recommended Adriano Gearing in the last 72 hours. Invalid input(s): Brandon Paredes      A1C: No results for input(s): LABA1C in the last 72 hours. ABG:  Recent Labs     09/05/23  1130   PHART 7.470*   LXY3VBK 45.0   PO2ART 145.0*   CIP1QAE 32.8*   BEART 7.9*   HGBAE 15.7   E6DYXOIG 96.6   CARBOXHGBART 1.6         Rad:   No results found. Culture Results:    No results for input(s): CXSURG in the last 720 hours. Blood Culture Recent: No results for input(s): BC in the last 720 hours. No results for input(s): BC, BLOODCULT2, ORG in the last 72 hours. Assessment/Plan:   Hyponatremia secondary to low solute intake/beer potomania  -Na improving appropriately, currently 131  -Serum and urine osmol low  -Q6h sodium  -1500ml fluid restriction  -Urea x1 given 9/4, 9/6, 9/7  -Hold PPI/Lexapro  -Strict I/O's    Shortness of breath- chemical pneumonitis vs aspiration pneumonia  -CXR shows right midlung opacities, infectious versus inflammatory  -Speech eval: Recommending honey thick liquids due to aspirating with thin liquids  -Monitor for leukocytosis, fever, chills, etc  -Supplemental O2, currently at baseline 3L    Idiopathic pulmonary fibrosis (HCC)  -Continue to monitor  -Baseline 3L NC    Alcohol abuse  -CIWA   -Seizure precautions  -fall precautions  -Thiamine/Folic Acid/Multivitamin daily  - provided list of local rehabs to patient and wife    Benign prostatic hyperplasia with urinary retention  -Urology following  -F/c placed 9/4  -Voiding trial when clinically appropriate  -Continue flomax    SI  -Cleared by psych    DVT Prophylaxis: Lovenox     Discharge planning:  TBD pending SNF placement, referrals sent to [facility]    Advance Directive: Full Code    Diet: ADULT ORAL NUTRITION SUPPLEMENT; Breakfast, Lunch, Dinner; Standard High Calorie/High Protein Oral Supplement  ADULT DIET; Dysphagia - Soft and Bite Sized;  Moderately Thick (Honey); 1500 ml     Consults Made:   IP CONSULT TO PSYCHIATRY  IP CONSULT TO

## 2023-09-07 NOTE — PROGRESS NOTES
Facility/Department: [facility] 3 [facility]ERNAR[facility]/VAS/MED  Dysphagia Daily Treatment Note  Speech Therapy Daily Treatment Note    NAME: Jh Garcia  : 1950  MRN: [id number]    Patient Diagnosis(es):   Patient Active Problem List    Diagnosis Date Noted    Moderate malnutrition ([address]) 2023    Encounter for Morataya catheter replacement 2023    Urinary retention 2023    Idiopathic pulmonary fibrosis ([address]) 2023    Alcohol abuse 2023    Benign prostatic hyperplasia with urinary retention 2023    Hyponatremia 2023     Clinical Impression    Patient participated in speech therapy services this AM. Dysarthria is still present as characterized by decreased vocal intensity, hoarseness, and decreased lingual and labial ROM, strength, and coordination. Patient completed oral motor exercises given modeling and verbal cues. Patient did require redirection during these tasks, as he would frequently get distracted. Did educate patient on speech intelligibility strategies for dysarthria. Patient verbalizes understanding; however, question carryover. Patient reports he does not like honey thick liquids; however, he still continues to exhibit outward signs of laryngeal penetration/aspiration at bedside with thin liquids and mildly (nectar) thick liquids. Swallow reassessment completed on this date. Oral prep reveals decreased rotary jaw movement observed with ice chips and regular solids. Prolonged mastication observed. Oral transit timing ranges from 1-2 seconds with puree consistencies and moderately (honey) thick liquids. Oral transit timing is suspected to be 1 second or faster with mildly (nectar) thick liquids, ice chips, and thin liquids. Oral transit timing ranges from 1-3 seconds with regular solids. Minimum residue noted with regular solids and cleared with cued dry swallows. Additional dry swallows were effective in clearing residue. Audible swallows noted with ALL consistencies.  Laryngeal

## 2023-09-07 NOTE — PROGRESS NOTES
Physical Therapy     09/07/23 1400   Restrictions/Precautions   Restrictions/Precautions Fall Risk;Seizure   Required Braces or Orthoses? No   Subjective   Subjective Pt. willing to work with therapy. Initially stated he needed to go to the bathroom. Subjective   Pain no c/o pain   Bed mobility   Supine to Sit Stand by assistance   Scooting Stand by assistance   Bed Mobility Comments pt able to perform task with use of bed functions. Transfers   Sit to Stand Contact guard assistance   Stand to Sit Contact guard assistance   Bed to Chair Contact guard assistance  (HHA)   Ambulation   Device Hand-Held Assist   Assistance Minimal assistance   Quality of Gait unsteady   Gait Deviations [facility]   Distance 10' 25'   Short Term Goals   Time Frame for Short Term Goals 2 wks   Short Term Goal 1 supine to sit indep   Short Term Goal 2 sit to stand indep   Short Term Goal 3 amb. 200' with RW indep   Short Term Goal 4 bed to chair indep   Activity Tolerance   Activity Tolerance Treatment limited secondary to decreased cognition   Assessment   Assessment pt able to AMB up 25' with HHA but unsteady and frequently reaching out to steady self on nearby objects. Able to improve bed mobility to SBA with HOB elevated. Required increased time secondary to cognition with pt repeating/perseverating on need to urinate despite attached lieberman. Left in chair with alarm on and nurse notified. PT Plan of Care   Thursday X   Safety Devices   Type of Devices Left in chair;Gait belt; Chair alarm in place;Call light within reach;Nurse notified     Electronically signed by Darrel Perez PTA on 9/7/2023 at 2:30 PM

## 2023-09-07 NOTE — PLAN OF CARE
Problem: Discharge Planning  Goal: Discharge to home or other facility with appropriate resources  Outcome: Progressing     Problem: Safety - Adult  Goal: Free from fall injury  Outcome: Progressing     Problem: ABCDS Injury Assessment  Goal: Absence of physical injury  Outcome: Progressing     Problem: Skin/Tissue Integrity  Goal: Absence of new skin breakdown  Description: 1. Monitor for areas of redness and/or skin breakdown  2. Assess vascular access sites hourly  3. Every 4-6 hours minimum:  Change oxygen saturation probe site  4. Every 4-6 hours:  If on nasal continuous positive airway pressure, respiratory therapy assess nares and determine need for appliance change or resting period. Outcome: Progressing     Problem: Self Harm/Suicidality  Goal: Will have no self-injury during hospital stay  Description: INTERVENTIONS:  1. Ensure constant observer at bedside with Q15M safety checks  2. Maintain a safe environment  3. Secure patient belongings  4. Ensure family/visitors adhere to safety recommendations  5. Ensure safety tray has been added to patient's diet order  6.   Every shift and PRN: Re-assess suicidal risk via Frequent Screener    Outcome: Progressing     Problem: Nutrition Deficit:  Goal: Optimize nutritional status  Outcome: Progressing   Electronically signed by Clementina Barnhart LPN on 6/4/7160 at 9:11 PM

## 2023-09-08 ENCOUNTER — APPOINTMENT (OUTPATIENT)
Dept: GENERAL RADIOLOGY | Age: 73
End: 2023-09-08
Payer: MEDICARE

## 2023-09-08 VITALS
HEIGHT: 70 IN | RESPIRATION RATE: 18 BRPM | HEART RATE: 98 BPM | DIASTOLIC BLOOD PRESSURE: 57 MMHG | TEMPERATURE: 97.9 F | WEIGHT: 145.13 LBS | OXYGEN SATURATION: 95 % | SYSTOLIC BLOOD PRESSURE: 105 MMHG | BODY MASS INDEX: 20.78 KG/M2

## 2023-09-08 LAB
ALBUMIN SERPL-MCNC: 3.5 G/DL (ref 3.5–5.2)
ALP SERPL-CCNC: 105 U/L (ref 40–130)
ALT SERPL-CCNC: 22 U/L (ref 5–41)
ANION GAP SERPL CALCULATED.3IONS-SCNC: 11 MMOL/L (ref 7–19)
AST SERPL-CCNC: 23 U/L (ref 5–40)
BASOPHILS # BLD: 0 K/UL (ref 0–0.2)
BASOPHILS NFR BLD: 0.5 % (ref 0–1)
BILIRUB SERPL-MCNC: 0.5 MG/DL (ref 0.2–1.2)
BUN SERPL-MCNC: 19 MG/DL (ref 8–23)
CALCIUM SERPL-MCNC: 9.6 MG/DL (ref 8.8–10.2)
CHLORIDE SERPL-SCNC: 91 MMOL/L (ref 98–111)
CO2 SERPL-SCNC: 30 MMOL/L (ref 22–29)
CREAT SERPL-MCNC: 0.5 MG/DL (ref 0.5–1.2)
EOSINOPHIL # BLD: 0.4 K/UL (ref 0–0.6)
EOSINOPHIL NFR BLD: 5.2 % (ref 0–5)
ERYTHROCYTE [DISTWIDTH] IN BLOOD BY AUTOMATED COUNT: 11.5 % (ref 11.5–14.5)
GLUCOSE SERPL-MCNC: 106 MG/DL (ref 74–109)
HCT VFR BLD AUTO: 44.8 % (ref 42–52)
HGB BLD-MCNC: 15.8 G/DL (ref 14–18)
IMM GRANULOCYTES # BLD: 0 K/UL
LYMPHOCYTES # BLD: 0.7 K/UL (ref 1.1–4.5)
LYMPHOCYTES NFR BLD: 9.3 % (ref 20–40)
MCH RBC QN AUTO: 34.8 PG (ref 27–31)
MCHC RBC AUTO-ENTMCNC: 35.3 G/DL (ref 33–37)
MCV RBC AUTO: 98.7 FL (ref 80–94)
MONOCYTES # BLD: 0.9 K/UL (ref 0–0.9)
MONOCYTES NFR BLD: 12.8 % (ref 0–10)
NEUTROPHILS # BLD: 5.3 K/UL (ref 1.5–7.5)
NEUTS SEG NFR BLD: 71.8 % (ref 50–65)
PLATELET # BLD AUTO: 281 K/UL (ref 130–400)
PMV BLD AUTO: 9.4 FL (ref 9.4–12.4)
POTASSIUM SERPL-SCNC: 4.5 MMOL/L (ref 3.5–5)
PROT SERPL-MCNC: 6 G/DL (ref 6.6–8.7)
PSA SERPL-MCNC: 4.38 NG/ML (ref 0–4)
RBC # BLD AUTO: 4.54 M/UL (ref 4.7–6.1)
SODIUM SERPL-SCNC: 132 MMOL/L (ref 136–145)
WBC # BLD AUTO: 7.3 K/UL (ref 4.8–10.8)

## 2023-09-08 PROCEDURE — 2580000003 HC RX 258: Performed by: NURSE PRACTITIONER

## 2023-09-08 PROCEDURE — 85025 COMPLETE CBC W/AUTO DIFF WBC: CPT

## 2023-09-08 PROCEDURE — 74018 RADEX ABDOMEN 1 VIEW: CPT

## 2023-09-08 PROCEDURE — 6360000002 HC RX W HCPCS: Performed by: NURSE PRACTITIONER

## 2023-09-08 PROCEDURE — 97530 THERAPEUTIC ACTIVITIES: CPT

## 2023-09-08 PROCEDURE — 94760 N-INVAS EAR/PLS OXIMETRY 1: CPT

## 2023-09-08 PROCEDURE — 80053 COMPREHEN METABOLIC PANEL: CPT

## 2023-09-08 PROCEDURE — 2700000000 HC OXYGEN THERAPY PER DAY

## 2023-09-08 PROCEDURE — 2580000003 HC RX 258

## 2023-09-08 PROCEDURE — 99232 SBSQ HOSP IP/OBS MODERATE 35: CPT | Performed by: UROLOGY

## 2023-09-08 PROCEDURE — 92526 ORAL FUNCTION THERAPY: CPT

## 2023-09-08 PROCEDURE — 36415 COLL VENOUS BLD VENIPUNCTURE: CPT

## 2023-09-08 PROCEDURE — 97116 GAIT TRAINING THERAPY: CPT

## 2023-09-08 PROCEDURE — 0T9B70Z DRAINAGE OF BLADDER WITH DRAINAGE DEVICE, VIA NATURAL OR ARTIFICIAL OPENING: ICD-10-PCS | Performed by: UROLOGY

## 2023-09-08 PROCEDURE — 97129 THER IVNTJ 1ST 15 MIN: CPT

## 2023-09-08 PROCEDURE — 6370000000 HC RX 637 (ALT 250 FOR IP)

## 2023-09-08 PROCEDURE — 6370000000 HC RX 637 (ALT 250 FOR IP): Performed by: NURSE PRACTITIONER

## 2023-09-08 RX ORDER — POLYETHYLENE GLYCOL 3350 17 G/17G
17 POWDER, FOR SOLUTION ORAL DAILY PRN
Qty: 527 G | Refills: 0 | DISCHARGE
Start: 2023-09-08 | End: 2023-09-08 | Stop reason: SDUPTHER

## 2023-09-08 RX ORDER — THIAMINE MONONITRATE (VIT B1) 100 MG
100 TABLET ORAL DAILY
Refills: 0 | DISCHARGE
Start: 2023-09-09 | End: 2023-09-08 | Stop reason: SDUPTHER

## 2023-09-08 RX ORDER — THIAMINE MONONITRATE (VIT B1) 100 MG
100 TABLET ORAL DAILY
Qty: 30 TABLET | Refills: 0 | DISCHARGE
Start: 2023-09-09

## 2023-09-08 RX ORDER — METOPROLOL SUCCINATE 25 MG/1
25 TABLET, EXTENDED RELEASE ORAL DAILY
Qty: 30 TABLET | Refills: 0 | DISCHARGE
Start: 2023-09-09 | End: 2023-09-08 | Stop reason: SDUPTHER

## 2023-09-08 RX ORDER — DEXLANSOPRAZOLE 60 MG/1
60 CAPSULE, DELAYED RELEASE ORAL DAILY
Qty: 30 CAPSULE | Refills: 0 | DISCHARGE
Start: 2023-09-08 | End: 2023-09-08 | Stop reason: HOSPADM

## 2023-09-08 RX ORDER — POLYETHYLENE GLYCOL 3350 17 G/17G
17 POWDER, FOR SOLUTION ORAL DAILY PRN
Qty: 527 G | Refills: 0 | DISCHARGE
Start: 2023-09-08 | End: 2023-10-08

## 2023-09-08 RX ORDER — ESCITALOPRAM OXALATE 20 MG/1
20 TABLET ORAL DAILY
Qty: 30 TABLET | Refills: 0 | DISCHARGE
Start: 2023-09-08 | End: 2023-09-08 | Stop reason: HOSPADM

## 2023-09-08 RX ORDER — MULTIVIT-MIN/IRON FUM/FOLIC AC 7.5 MG-4
1 TABLET ORAL DAILY
Qty: 30 TABLET | Refills: 0 | DISCHARGE
Start: 2023-09-08

## 2023-09-08 RX ORDER — METOPROLOL SUCCINATE 25 MG/1
25 TABLET, EXTENDED RELEASE ORAL DAILY
Status: DISCONTINUED | OUTPATIENT
Start: 2023-09-08 | End: 2023-09-08 | Stop reason: HOSPADM

## 2023-09-08 RX ORDER — MULTIVIT-MIN/IRON FUM/FOLIC AC 7.5 MG-4
1 TABLET ORAL DAILY
Qty: 30 TABLET | Refills: 0 | DISCHARGE
Start: 2023-09-08 | End: 2023-09-08 | Stop reason: SDUPTHER

## 2023-09-08 RX ORDER — METOPROLOL SUCCINATE 25 MG/1
25 TABLET, EXTENDED RELEASE ORAL DAILY
Qty: 30 TABLET | Refills: 0 | DISCHARGE
Start: 2023-09-09

## 2023-09-08 RX ORDER — TAMSULOSIN HYDROCHLORIDE 0.4 MG/1
0.4 CAPSULE ORAL DAILY
Qty: 30 CAPSULE | Refills: 0 | DISCHARGE
Start: 2023-09-08

## 2023-09-08 RX ADMIN — Medication 100 MG: at 08:54

## 2023-09-08 RX ADMIN — SODIUM CHLORIDE, PRESERVATIVE FREE 10 ML: 5 INJECTION INTRAVENOUS at 08:54

## 2023-09-08 RX ADMIN — Medication 15 G: at 08:55

## 2023-09-08 RX ADMIN — METOPROLOL SUCCINATE 25 MG: 25 TABLET, EXTENDED RELEASE ORAL at 09:26

## 2023-09-08 RX ADMIN — ENOXAPARIN SODIUM 40 MG: 100 INJECTION SUBCUTANEOUS at 08:54

## 2023-09-08 RX ADMIN — TAMSULOSIN HYDROCHLORIDE 0.4 MG: 0.4 CAPSULE ORAL at 08:54

## 2023-09-08 RX ADMIN — POLYETHYLENE GLYCOL 3350 17 G: 17 POWDER, FOR SOLUTION ORAL at 09:26

## 2023-09-08 RX ADMIN — SODIUM CHLORIDE, PRESERVATIVE FREE 10 ML: 5 INJECTION INTRAVENOUS at 08:55

## 2023-09-08 NOTE — CARE COORDINATION
Important Message from [facility] letter given to pt by KATE Page. All questions answered, pt voiced understanding. Copy of IMM placed in pt's soft chart.       09/08/23 1244   IMM Letter   IMM Letter given to Patient/Family/Significant other/Guardian/POA/by: Manish Nance   IMM Letter date given: 09/08/23   IMM Letter time given: 3838

## 2023-09-08 NOTE — PLAN OF CARE
Problem: Discharge Planning  Goal: Discharge to home or other facility with appropriate resources  Outcome: Progressing  Flowsheets (Taken 9/8/2023 1126)  Discharge to home or other facility with appropriate resources: Identify barriers to discharge with patient and caregiver     Problem: Safety - Adult  Goal: Free from fall injury  Outcome: Progressing     Problem: ABCDS Injury Assessment  Goal: Absence of physical injury  Outcome: Progressing     Problem: Skin/Tissue Integrity  Goal: Absence of new skin breakdown  Description: 1. Monitor for areas of redness and/or skin breakdown  2. Assess vascular access sites hourly  3. Every 4-6 hours minimum:  Change oxygen saturation probe site  4. Every 4-6 hours:  If on nasal continuous positive airway pressure, respiratory therapy assess nares and determine need for appliance change or resting period. Outcome: Progressing     Problem: Self Harm/Suicidality  Goal: Will have no self-injury during hospital stay  Description: INTERVENTIONS:  1. Ensure constant observer at bedside with Q15M safety checks  2. Maintain a safe environment  3. Secure patient belongings  4. Ensure family/visitors adhere to safety recommendations  5. Ensure safety tray has been added to patient's diet order  6.   Every shift and PRN: Re-assess suicidal risk via Frequent Screener    Outcome: Progressing  Flowsheets (Taken 9/8/2023 1126)  Will have no self-injury during hospital stay: Maintain a safe environment     Problem: Nutrition Deficit:  Goal: Optimize nutritional status  Outcome: Progressing

## 2023-09-08 NOTE — PROGRESS NOTES
s/s of aspiration/penetration during hospitalization. Goal 2: Patient will demonstrate awareness of general aspiration precautions. Goal 3: Patient will participate in swallowing reassessments to ensure safest diet consistencies. Goal 4: Patient will allow to assist with daily oral hygiene protocol. Goal 5: Patient will complete breath support, oral motor, lingual, and pharyngeal exercises with provision of mod cues/prompts. Goal 6: Cognitive-Linguistic Evaluation. Goal 7: Patient will utilize tools/strategies to promote increased clarity of speech at word, phrase, and sentence level with provision of mod cues/prompts. Goal 8: Diet upgrade. Goal 9: Patient will complete recall (short term, long term, immediate) tasks with 90% accuracy and minimal cues. Goal 10: Patient will complete problem solving/safety awareness/executive functioning tasks with 90% accuracy and minimal cues. General  Chart Reviewed: Yes  Behavior/Cognition: Confused; Cooperative  Temperature Spikes Noted: No     Speech Therapy     Patient participated in speech therapy services this AM. Patient completed tasks within the following domains: recall, problem solving, and safety awareness. Patient did benefit from semantic cues during tx. Do note decreased recall skills and safety awareness. Did observe AM meal tray. Patient ate approximately 75% of meal. Poor intake of thickened liquids noted. Patient continues to report many complaints. Patient required to recall speech intelligibility strategies for dysarthria (over-articulation, slow rate of speech, and increased loudness). Strategies were reviewed on 9/7/23. Patient is unable to recall any strategies at this time. Did review strategies with patient. Patient required to recall swallowing compensatory strategies. Strategies were reviewed on 9/7/23. Patient is able to recall to \"go slow while eating\". Did review many additional swallowing compensatory strategies with patient. Patient verbalizes understanding. Poor safety awareness noted, as patient continuously reports he is able to get up and go to the bathroom independently. Problem solving task completed through call light education. Patient is unable to locate the call light on his remote or in his bed. Did show patient where both buttons were. Patient verbalizes understanding. Patient is able to elicit 1 x reason to utilize the call light. Moderate semantic cues required to elicit an additional 2 reasons to use the light. Patient verbalizes understanding; however, question carryover. Swallowing:   Swallow reassessment completed on this date. Pt upright in bed. No family present during evaluation. Patient observed finishing AM meal.    Consistencies Administered: ice chips, puree consistencies, regular solids, mildly (nectar) thick liquids via cup, moderately (honey) thick liquids via cup and spoon, and thin liquids via consecutive sips side of cup. Oral Prep: Decreased rotary jaw movement observed with ice chips and regular solids. Prolonged mastication observed. Oral Transit: Timing ranges from 1-2 seconds with puree consistencies and moderately (honey) thick liquids. Oral transit timing is suspected to be 1 second or faster with mildly (nectar) thick liquids, ice chips, and thin liquids. Oral transit timing ranges from 1-3 seconds with regular solids. Minimum residue noted with regular solids and cleared with cued dry swallows. Additional dry swallows were effective in clearing residue. Audible swallows noted with ALL consistencies. Laryngeal movement: consistently sluggish and mild-moderately decreased for swallow airway protection with ice chips, puree consistencies, regular solids, mildly (nectar) thick liquids, moderately (honey) thick liquids via cup and spoon, and thin liquids via consecutive sips side of cup.   S/S of aspiration: Even so, no overt s/s of aspiration/penetration observed with ice chips, puree

## 2023-09-08 NOTE — PROGRESS NOTES
Physical Therapy     09/08/23 0900   Restrictions/Precautions   Restrictions/Precautions Fall Risk;Seizure   Subjective   Subjective Pt. willing to work with therapy. Initially stated he needed to go to the bathroom. Subjective   Pain no c/o pain   Bed mobility   Supine to Sit Stand by assistance   Bed Mobility Comments pt able to perform with use of bed functions and rail. Transfers   Sit to Stand Stand by assistance   Stand to Sit Stand by assistance   Bed to Chair Contact guard assistance   Ambulation   Device Hand-Held Assist   Quality of Gait unsteady   Gait Deviations [facility]   Distance 10' 10' 25'   Comments pt with no true LOB but cued to correct significant forward flex posture and reached out to steady self frequently on gilberto by objects even with HHA. Required assistance in bathroom for clean up to include gown change and hygiene management   Short Term Goals   Time Frame for Short Term Goals 2 wks   Short Term Goal 1 supine to sit indep   Short Term Goal 2 sit to stand indep   Short Term Goal 3 amb. 200' with RW indep   Short Term Goal 4 bed to chair indep   Activity Tolerance   Activity Tolerance Treatment limited secondary to decreased cognition   Assessment   Assessment pt made multiple transfers from EOB to bathroom and back with HHA due to c/o needing a BM. Nurse notified of pt straining and c/o pain during task with slight increase of skin break down noted during assistance with clean up. Able to stand unsupported at sink to wash hands with no LOB for 20-30 seconds. Pt left in chair with alarm and all basic needs in reach. PT Plan of Care   Friday X   Safety Devices   Type of Devices Left in chair;Nurse notified; Chair alarm in place;Call light within reach     Electronically signed by Adi Jackson PTA on 9/8/2023 at 9:25 AM

## 2023-09-08 NOTE — PROGRESS NOTES
03:10 PM    UROBILINOGEN 1.0 09/03/2023 03:10 PM    BILIRUBINUR Negative 09/03/2023 03:10 PM    BLOODU LARGE 09/03/2023 03:10 PM    GLUCOSEU Negative 09/03/2023 03:10 PM       ASSESSMENT AND PLAN    Patient Active Problem List   Diagnosis    Idiopathic pulmonary fibrosis ([address])    Alcohol abuse    Benign prostatic hyperplasia with urinary retention    Hyponatremia    Encounter for Morataya catheter replacement    Urinary retention    Moderate malnutrition ([address])     1. BPH with urinary retention. Continue tamsulosin. Nonsuspicious but enlarged prostate on LATRELL. Possible voiding trial in the morning. 2.  Encounter for placement Morataya catheter. Temporary indwelling Morataya catheter placed without difficulty by Dr. Jake Romero. Leave Morataya catheter for now. Would recommend voiding trial prior to DC to rehab in case catheter needs to be replaced.        RASHAWN MURILLO, BRENDEN - CNP  9/7/2023 7:14 PM

## 2023-09-08 NOTE — DISCHARGE SUMMARY
[facility]    Discharge Summary      Billie Renae  :  1950  MRN:  [id number]    Admit date:  9/3/2023  Discharge date:    2023    Discharging Physician:  Dr. Armando Mcghee Directive: Full Code    Consults:  urology    Primary Care Physician:  No primary care provider on file. Discharge Diagnoses:  Principal Problem:    Hyponatremia  Active Problems:    Idiopathic pulmonary fibrosis (HCC)    Alcohol abuse    Benign prostatic hyperplasia with urinary retention    Encounter for Lieberman catheter replacement    Urinary retention    Moderate malnutrition ([address])  Resolved Problems:    * No resolved hospital problems. *      Portions of this note have been copied forward, however, changed to reflect the most current clinical status of this patient. Hospital Course: This patient is a 74-year-old male with PMH IPF, BPH, and current alcohol abuse (approx 30 beers/day x17 years) who presents to [facility] ED for assistance with management of alcohol withdrawal.  Additionally, patient had made passive statements and text messages to spouse concerning SI. In ER, patient was found to be hyponatremic with sodium of 120. He was admitted to hospitalist with psych consult for further evaluation and management. Hyponatremia  Presentation consistent with beer potomania and/or low solute diet. Sodium on admission 120. He was placed 1500ml fluid restriction and given urea packets with appropriate improvement and sodium currently 132. He will need repeat bloodwork drawn early next week at SNF to check electrolytes including sodium, potassium, and magnesium. Would recommend to continue fluid restriction on discharge and can give urea packets or sodium chloride tablets if sodium remains low. Will continue to hold PPI and Lexapro on discharge as this could be contributing to his hyponatremia. Urinary Retention  He experienced urinary retention requiring lieberman placement per Urology.  This was removed on day of

## 2023-09-08 NOTE — PLAN OF CARE
Problem: Discharge Planning  Goal: Discharge to home or other facility with appropriate resources  Recent Flowsheet Documentation  Taken 9/7/2023 2119 by Reinaldo Willingham LPN  Discharge to home or other facility with appropriate resources: Identify barriers to discharge with patient and caregiver  9/7/2023 1259 by Byron Waller LPN  Outcome: Progressing  Flowsheets (Taken 9/7/2023 0859)  Discharge to home or other facility with appropriate resources:   Identify barriers to discharge with patient and caregiver   Arrange for needed discharge resources and transportation as appropriate   Refer to discharge planning if patient needs post-hospital services based on physician order or complex needs related to functional status, cognitive ability or social support system   Identify discharge learning needs (meds, wound care, etc)     Problem: Safety - Adult  Goal: Free from fall injury  Recent Flowsheet Documentation  Taken 9/7/2023 2126 by Reinaldo Willingham LPN  Free From Fall Injury: Instruct family/caregiver on patient safety  9/7/2023 1259 by Byron Waller LPN  Outcome: Progressing     Problem: ABCDS Injury Assessment  Goal: Absence of physical injury  9/7/2023 1259 by Byron Waller LPN  Outcome: Progressing     Problem: Skin/Tissue Integrity  Goal: Absence of new skin breakdown  Description: 1. Monitor for areas of redness and/or skin breakdown  2. Assess vascular access sites hourly  3. Every 4-6 hours minimum:  Change oxygen saturation probe site  4. Every 4-6 hours:  If on nasal continuous positive airway pressure, respiratory therapy assess nares and determine need for appliance change or resting period. 9/7/2023 1259 by Byron Waller LPN  Outcome: Progressing     Problem: Self Harm/Suicidality  Goal: Will have no self-injury during hospital stay  Description: INTERVENTIONS:  1. Ensure constant observer at bedside with Q15M safety checks  2. Maintain a safe environment  3.   Secure patient belongings  4. Ensure family/visitors adhere to safety recommendations  5. Ensure safety tray has been added to patient's diet order  6.   Every shift and PRN: Re-assess suicidal risk via Frequent Screener    9/7/2023 1259 by Bala Ward LPN  Outcome: Progressing  Flowsheets (Taken 9/7/2023 0811)  Will have no self-injury during hospital stay:   Maintain a safe environment   Ensure family/visitors adhere to safety recommendations

## 2023-09-09 NOTE — PROCEDURES
[facility] [address], [address] [place] [address]                                 PROCEDURE NOTE    PATIENT NAME: Javed Yates                         :        1950  MED REC NO:   [id number]                              ROOM:       [facility]  ACCOUNT NO:   [de-identified]                           ADMIT DATE: 2023  PROVIDER:     Jenaro Lacy MD    DATE OF PROCEDURE:  2023    PREPROCEDURE DIAGNOSIS:  Urinary retention. POSTPROCEDURE DIAGNOSIS:  Urinary retention. PROCEDURE PERFORMED:  Insertion of Morataya catheter. SURGEON:  Zoltan Lacy MD    COMPLICATIONS:  None. CATHETER PLACED:  A 16-Jordanian Morataya catheter. ESTIMATED BLOOD LOSS:  0.    CLINICAL HISTORY:  This is a 68-year-old white male with multiple  medical problems including alcohol abuse and suicidal ideation who was  admitted with hyponatremia. While in-house, the patient required Morataya  catheter placement. The patient was given a voiding trial on the day of  discharge but was unable to void. Attempts to place a catheter by the  nursing staff were met with difficulty and as such, Urology was asked to  re-insert Morataya catheter. PROCEDURE FINDINGS:  The patient had a normal external male genitalia. Circumcised. Insertion of the catheter was done with minimal  difficulty. A 16-Jordanian Morataya catheter was inserted and left to gravity  drainage. With the insertion of the catheter, approximately 500 mL of  urine returned. Urine appeared to be mcneil yellow. PROCEDURE:  The patient was positioned in the supine position. His  groin area was prepped and draped in the usual sterile manner to expose  the penis. Then, 10 mL of K-Y was inserted transurethrally. Next, a #16-Jordanian Morataya catheter was inserted with minimal difficulty  into the bladder. Approximately, 10 mL was used to inflate the Morataya  balloon. The catheter was left to gravity drainage.     The patient

## 2023-09-25 ENCOUNTER — OFFICE VISIT (OUTPATIENT)
Dept: UROLOGY | Age: 73
End: 2023-09-25
Payer: MEDICARE

## 2023-09-25 VITALS — TEMPERATURE: 97.7 F | WEIGHT: 143.4 LBS | BODY MASS INDEX: 17.83 KG/M2 | HEIGHT: 75 IN

## 2023-09-25 DIAGNOSIS — R33.8 BENIGN PROSTATIC HYPERPLASIA WITH URINARY RETENTION: ICD-10-CM

## 2023-09-25 DIAGNOSIS — R33.9 URINARY RETENTION: Primary | ICD-10-CM

## 2023-09-25 DIAGNOSIS — N40.1 BENIGN PROSTATIC HYPERPLASIA WITH URINARY RETENTION: ICD-10-CM

## 2023-09-25 DIAGNOSIS — Z46.6 ENCOUNTER FOR FOLEY CATHETER REPLACEMENT: ICD-10-CM

## 2023-09-25 PROCEDURE — 1036F TOBACCO NON-USER: CPT | Performed by: NURSE PRACTITIONER

## 2023-09-25 PROCEDURE — 1111F DSCHRG MED/CURRENT MED MERGE: CPT | Performed by: NURSE PRACTITIONER

## 2023-09-25 PROCEDURE — 3017F COLORECTAL CA SCREEN DOC REV: CPT | Performed by: NURSE PRACTITIONER

## 2023-09-25 PROCEDURE — 1123F ACP DISCUSS/DSCN MKR DOCD: CPT | Performed by: NURSE PRACTITIONER

## 2023-09-25 PROCEDURE — 99214 OFFICE O/P EST MOD 30 MIN: CPT | Performed by: NURSE PRACTITIONER

## 2023-09-25 PROCEDURE — G8419 CALC BMI OUT NRM PARAM NOF/U: HCPCS | Performed by: NURSE PRACTITIONER

## 2023-09-25 PROCEDURE — G8427 DOCREV CUR MEDS BY ELIG CLIN: HCPCS | Performed by: NURSE PRACTITIONER

## 2023-09-25 ASSESSMENT — ENCOUNTER SYMPTOMS
VOMITING: 0
NAUSEA: 0
ABDOMINAL PAIN: 0
COUGH: 1
ABDOMINAL DISTENTION: 0

## 2023-09-25 NOTE — PROGRESS NOTES
Tristin Huntley is a 81 y.o., male, Established patient who presents today   Chief Complaint   Patient presents with    Follow-up     I am here today for a 2 week follow up. I do still have a cath. Patient was originally seen in the hospital setting at the beginning of the month after admission for hyponatremia and suicidal ideation and alcoholism. He had been experiencing an increase in lower urinary tract symptoms a few months prior to admission. He has been placed on Flomax by his primary care provider, but had never followed up. During his time in the emergency room, he was noted to have an elevated postvoid residual of around 600 mL and after several attempts from the nurses and placing the catheter were unsuccessful, urology was consulted for catheter placement. Voiding trial was initiated prior to his discharge to the rehab facility. Patient had initially been able to void several times, but eventually was unable to empty his bladder, so catheter had to be replaced at discharge. Apparently, there was some difficulty replacing the catheter at that time as well requiring Dr. Nicole Fernández to place the catheter at bedside. Prior LATRELL by Dr. Quentin Pandya revealed enlarged but nonsuspicious prostate. His wife accompanies him to the appointment today and assists in collecting information. She reports that he was evaluated by urology in the past for bladder wall thickening noted on CT imaging. He did not undergo cystoscopic evaluation at that time. This was about 8 years ago. In the hospital, we had discussed further evaluation for possible prostate intervention, but she raises concerns about his ability to undergo anesthesia secondary to his IPF. He is maintained on oxygen currently. REVIEW OF SYSTEMS:  Review of Systems   Constitutional:  Negative for chills and fever. Respiratory:  Positive for cough. Gastrointestinal:  Negative for abdominal distention, abdominal pain, nausea and vomiting.

## 2023-09-26 ENCOUNTER — TELEPHONE (OUTPATIENT)
Dept: UROLOGY | Age: 73
End: 2023-09-26

## 2023-09-26 NOTE — TELEPHONE ENCOUNTER
Venus Eaton with [facility] ([facility] requesting return call from nurse in regards to patient appt tomorrow morning and voiding trial. No answer at office or in Teams .       Please return her call as soon as possible [phone]      Thank you

## 2023-09-27 ENCOUNTER — OFFICE VISIT (OUTPATIENT)
Dept: UROLOGY | Age: 73
End: 2023-09-27
Payer: MEDICARE

## 2023-09-27 ENCOUNTER — TELEPHONE (OUTPATIENT)
Dept: PULMONOLOGY | Facility: CLINIC | Age: 73
End: 2023-09-27
Payer: MEDICARE

## 2023-09-27 VITALS — TEMPERATURE: 98.2 F | HEIGHT: 75 IN | WEIGHT: 143 LBS | BODY MASS INDEX: 17.78 KG/M2

## 2023-09-27 DIAGNOSIS — R33.9 URINARY RETENTION: Primary | ICD-10-CM

## 2023-09-27 DIAGNOSIS — R33.8 BENIGN PROSTATIC HYPERPLASIA WITH URINARY RETENTION: ICD-10-CM

## 2023-09-27 DIAGNOSIS — N40.1 BENIGN PROSTATIC HYPERPLASIA WITH URINARY RETENTION: ICD-10-CM

## 2023-09-27 LAB — POST VOID RESIDUAL (PVR): 166 ML

## 2023-09-27 PROCEDURE — G8427 DOCREV CUR MEDS BY ELIG CLIN: HCPCS | Performed by: NURSE PRACTITIONER

## 2023-09-27 PROCEDURE — 51798 US URINE CAPACITY MEASURE: CPT | Performed by: NURSE PRACTITIONER

## 2023-09-27 PROCEDURE — 1123F ACP DISCUSS/DSCN MKR DOCD: CPT | Performed by: NURSE PRACTITIONER

## 2023-09-27 PROCEDURE — 1036F TOBACCO NON-USER: CPT | Performed by: NURSE PRACTITIONER

## 2023-09-27 PROCEDURE — 99213 OFFICE O/P EST LOW 20 MIN: CPT | Performed by: NURSE PRACTITIONER

## 2023-09-27 PROCEDURE — 1111F DSCHRG MED/CURRENT MED MERGE: CPT | Performed by: NURSE PRACTITIONER

## 2023-09-27 PROCEDURE — G8419 CALC BMI OUT NRM PARAM NOF/U: HCPCS | Performed by: NURSE PRACTITIONER

## 2023-09-27 PROCEDURE — 3017F COLORECTAL CA SCREEN DOC REV: CPT | Performed by: NURSE PRACTITIONER

## 2023-09-27 RX ORDER — TAMSULOSIN HYDROCHLORIDE 0.4 MG/1
0.8 CAPSULE ORAL DAILY
Qty: 180 CAPSULE | Refills: 3 | Status: SHIPPED | OUTPATIENT
Start: 2023-09-27

## 2023-09-27 ASSESSMENT — ENCOUNTER SYMPTOMS
NAUSEA: 0
COUGH: 1
VOMITING: 0
ABDOMINAL PAIN: 0
ABDOMINAL DISTENTION: 0

## 2023-09-27 NOTE — TELEPHONE ENCOUNTER
Spoke to the patient's wife and she states he has been in [facility] for Rehab. Wife states that she will check with  and call back to schedule the office visit and DLCO.  Will fax note to Urology group stating patient needs an appointment prior to getting an assessment.

## 2023-09-27 NOTE — TELEPHONE ENCOUNTER
----- Message from ARIANNA Howell sent at 9/27/2023  8:29 AM CDT -----  She has IPF with FEV1 of just 25% but normal diffusion capacity. She is also on oxygen so she will be higher risk for surgery. I would have her reschedule. It looks like she canceled the July visit due to family emergency out of town. She probably just forgot to call to reschedule. I would reschedule with office FVL and DLCO.   ----- Message -----  From: Gregorio Juarez CMA  Sent: 9/26/2023   8:49 AM CDT  To: ARIANNA Howell    This patient has not been seen since April and does not have a return appointment.

## 2023-09-27 NOTE — TELEPHONE ENCOUNTER
I contacted the patient directly. He stated he is in the hospital at this time. I will cancel his appt today.

## 2023-09-27 NOTE — TELEPHONE ENCOUNTER
Nathalie Mar, Gregorio Willingham, OSMANI  She has IPF with FEV1 of just 25% but normal diffusion capacity. She is also on oxygen so she will be higher risk for surgery. I would have her reschedule. It looks like she canceled the July visit due to family emergency out of town. She probably just forgot to call to reschedule. I would reschedule with office FVL and DLCO.    Left message for patient to call back.

## 2023-10-04 ENCOUNTER — TELEPHONE (OUTPATIENT)
Dept: UROLOGY | Age: 73
End: 2023-10-04

## 2023-10-04 NOTE — TELEPHONE ENCOUNTER
I called the [facility] pulmonary department and had to leave a voicemail. I stated I was calling about a patient we shared and was wanting to see if that patient had been scheduled for an appointment with them as we are needing a surgical clearance. Just wanting to get an update. I left our office number, my name, and my direct extension for a call back.

## 2023-10-07 ENCOUNTER — LAB REQUISITION (OUTPATIENT)
Dept: LAB | Facility: HOSPITAL | Age: 73
End: 2023-10-07
Payer: MEDICARE

## 2023-10-07 LAB
ANION GAP SERPL CALCULATED.3IONS-SCNC: 8 MMOL/L (ref 5–15)
BUN SERPL-MCNC: 8 MG/DL (ref 8–23)
BUN/CREAT SERPL: 18.2 (ref 7–25)
CALCIUM SPEC-SCNC: 9.3 MG/DL (ref 8.6–10.5)
CHLORIDE SERPL-SCNC: 94 MMOL/L (ref 98–107)
CO2 SERPL-SCNC: 28 MMOL/L (ref 22–29)
CREAT SERPL-MCNC: 0.44 MG/DL (ref 0.76–1.27)
EGFRCR SERPLBLD CKD-EPI 2021: 114 ML/MIN/1.73
GLUCOSE SERPL-MCNC: 116 MG/DL (ref 65–99)
POTASSIUM SERPL-SCNC: 4.2 MMOL/L (ref 3.5–5.2)
SODIUM SERPL-SCNC: 130 MMOL/L (ref 136–145)

## 2023-10-07 PROCEDURE — 80048 BASIC METABOLIC PNL TOTAL CA: CPT | Performed by: NURSE PRACTITIONER

## 2023-10-09 ENCOUNTER — LAB REQUISITION (OUTPATIENT)
Dept: LAB | Facility: HOSPITAL | Age: 73
End: 2023-10-09
Payer: MEDICARE

## 2023-10-09 LAB
ANION GAP SERPL CALCULATED.3IONS-SCNC: 8 MMOL/L (ref 5–15)
BUN SERPL-MCNC: 10 MG/DL (ref 8–23)
BUN/CREAT SERPL: 16.7 (ref 7–25)
CALCIUM SPEC-SCNC: 9 MG/DL (ref 8.6–10.5)
CHLORIDE SERPL-SCNC: 95 MMOL/L (ref 98–107)
CO2 SERPL-SCNC: 30 MMOL/L (ref 22–29)
CREAT SERPL-MCNC: 0.6 MG/DL (ref 0.76–1.27)
EGFRCR SERPLBLD CKD-EPI 2021: 103.8 ML/MIN/1.73
GLUCOSE SERPL-MCNC: 89 MG/DL (ref 65–99)
MAGNESIUM SERPL-MCNC: 1.8 MG/DL (ref 1.6–2.4)
POTASSIUM SERPL-SCNC: 4.2 MMOL/L (ref 3.5–5.2)
SODIUM SERPL-SCNC: 133 MMOL/L (ref 136–145)

## 2023-10-09 PROCEDURE — 80048 BASIC METABOLIC PNL TOTAL CA: CPT | Performed by: NURSE PRACTITIONER

## 2023-10-09 PROCEDURE — 36415 COLL VENOUS BLD VENIPUNCTURE: CPT | Performed by: NURSE PRACTITIONER

## 2023-10-09 PROCEDURE — 83735 ASSAY OF MAGNESIUM: CPT | Performed by: NURSE PRACTITIONER

## 2023-10-16 NOTE — PROGRESS NOTES
ARIANNA Howell  [facility]   Pulmonary and Critical Care  [address]  [place] [address]  Phone: [phone]  Fax: [phone]           Chief Complaint  IPF (idiopathic pulmonary fibrosis)    Subjective    History of Present Illness     Roc Hawthorne presents to [facility] PULMONARY & CRITICAL CARE MEDICINE   History of Present Illness  Mr. Hawthorne is a 72 year old male patient with known IPF. He was started on Ofev 100 mg BID (interolarnt to [facility]) in 2021. He had issues with compliance in the past. He has known memory issues. He has chronic hypoxic respiratory failure on 2-3 L NC with exertion and sleep. He has known history of Lucas's esophagus with GERD. Chronic cough noted to be multifactoral secondary to reflux, Etoh use, post nasal drip (unresponsive to Singulair) and PF. He has known small chronic right pleural effusion with VATS biopsy. Has not been amenable to drainage. He has known HFpEF. He has known alcohol abuse with rehab in 2022.  Visit he noted a relapse consuming about 10 beers a day per his wife.  We discussed at that time that I would not want to place him on Ofev given his alcohol abuse and the effects of Ofev can have on the liver as well as the significant side effect of diarrhea raising his risk for GI bleed.  He also has had known Lucas's esophagus. At last visit his wife was going to see about moving his appointment up with his primary care physician for assistance with referral to rehab for alcohol abuse.  Today he reports he had continued to drink. Again as noted at last visit he has not been a candidate for lung transplant in the past secondary to alcohol abuse and lack of interest.  At last visit it was noted his FEV1 dropped from 35% predicted to 25% predicted. He denies fever, chills, night sweats. Hhe denies hemoptysis. His weight dropped with his alcohol use. His wife states he is eating better and gaining a little weight.  "He has known untreated sleep apnea.  He uses a flutter valve  but has not been able to at rehab. He has been getting nebulizer treatments at [facility] and this offers not much help.  His breathing he feels has gotten worse. His wife states he is doing well with PT. He had covid 10 days ago. He was treated with an antiviral. He was coughing more and a higher temp one day. He generally just did not feel well. His wife states he has been alcohol free with the hospitalization and rehab.     Given his FEV1 of 25% this places him at cornelius risk for any anesthesia/ surgical or procedure. I will forward my note to GI. I have asked he return in 2 months and hopefully he will have been referred to rehab. We can consider Ofev and pulmonary rehab after that. He and his wife are agreeable to the plan. They will call if we need to make any adjustments to the follow up visit or with any questions or concerns in the meantime.          Objective   Vital Signs:   /70   Pulse 93   Ht 182.9 cm (72\")   Wt 64.4 kg (142 lb)   SpO2 98% Comment: 3L continuous  BMI 19.26 kg/m²     Physical Exam  Vitals reviewed.   Constitutional:       Appearance: Normal appearance. He is underweight.      Interventions: Nasal cannula in place.   Cardiovascular:      Rate and Rhythm: Normal rate and regular rhythm.   Pulmonary:      Effort: Pulmonary effort is normal.      Breath sounds: Decreased breath sounds present.   Neurological:      General: No focal deficit present.      Mental Status: He is alert and oriented to person, place, and time.   Psychiatric:         Mood and Affect: Mood normal.         Behavior: Behavior normal.          Result Review :  The following data was reviewed by: ARIANNA Howell on 10/19/2023:    My interpretation of imaging: No new  My interpretation of labs: No new    PFT Values          4/13/2023    15:30   Pre Drug PFT Results   FVC 26   FEV1 25   FEF 25-75% 24   FEV1/FVC 73   Other Tests PFT Results "   DLCO 83   D/VAsb 114     My interpretation of the PFT : No new    Results for orders placed in visit on 04/13/23    Pulmonary Function Test    Narrative  Pulmonary Function Test  Performed by: Barbra Lee, RRT  Authorized by: Nathalie Mar APRN    Pre Drug % Predicted  FVC: 26%  FEV1: 25%  FEF 25-75%: 24%  FEV1/FVC: 73%  DLCO: 83%  D/VAsb: 114%    Interpretation  Spirometry  Spirometry shows very severe restriction.  Diffusion Capacity  The patient's diffusion capacity is normal.  Diffusion capacity is normal when corrected for alveolar volume.  Overall comments: Compared to March 2022 PFT from [place] his FEV1 has dropped from 35% to 25% predicted.          Assessment and Plan   Diagnoses and all orders for this visit:    1. IPF (idiopathic pulmonary fibrosis) (Primary)    2. Chronic respiratory failure with hypoxia  Overview:  2 to 3 L nasal cannula  DME [facility]      3. Dependence on supplemental oxygen    4. Alcohol abuse    5. History of Lucas's esophagus    6. Need for vaccination        He is currently in [facility] following a hospitalization for hyponatremia and alcoholism. He has not been able to drink in the last 6 weeks. His wife states he is going to be discharge sometime in the next few weeks. She is hoping he sees the psychiatrist first. We discussed again that I am reluctant to place him on Ofev given his alcohol use and side effects of weight loss and diarrhea. We discussed that statistically either the alcoholism or the pulmonary fibrosis one increases his mortality. He already has had a significant decrease in his overall lung function from the last PFT in April with a FEV1 of 25% down from 35%.  He will continue his nebulizer treatment he is receiving at rehab. I have provided below his risk assessment for possible cytoscope. He is high risk. Continue supplemental oxygen. Follow up in 6 weeks.       Follow Up   Return in about 6 weeks (around 11/30/2023).  Patient was given  "instructions and counseling regarding his condition or for health maintenance advice. Please see specific information pulled into the AVS if appropriate.     ARIANNA Howell  10/19/2023  15:38 CDT      Pre-procedure Pulmonary Evaluation for Surgery    Requesting Physician:  Nuvia Mccurdy APRN   Fax Number:     Date & Type of Procedure: Cystoscopy     Patient Name: Roc Hawthorne  : 1950     Today your patient was assessed by one of our providers and screened for potential complications from anesthesia.  The following tests were performed and interpreted by the provider and a risk assessment has been provided below:    CXR: No current imaging    ABG: No current ABG    PFTs: 2023 with an FEV1 of 25% predicted and normal diffusion capacity    Your patient is considered:  high risk secondary to very severe restriction on PFT, chronic respiratory failure on 2 to 3 L of oxygen and untreated sleep apnea     If you determine after the assessment that the patient will require the above stated procedure then below are the recommendations to optimize their pulmonary status and decrease risk of potential complications from anesthesia.    Pre-procedure recommendations: Chest x-ray and ABG.  Use minimal sedation and extubate as soon as possible.  Mobilize as soon as possible.    Post-procedure recommendations: Use minimal sedation and extubate as soon as possible.  Mobilize as soon as possible.  Incentive spirometry use.  Continue to monitor end-tidal CO2 and O2 saturations.      Evaluation performed by: ARIANNA Howell  Date: 10/19/23    *If any additional information is needed please call. Our practice does not provide pulmonary \"clearance\", but the information above should be sufficient.     "

## 2023-10-19 ENCOUNTER — OFFICE VISIT (OUTPATIENT)
Dept: PULMONOLOGY | Facility: CLINIC | Age: 73
End: 2023-10-19
Payer: MEDICARE

## 2023-10-19 VITALS
HEART RATE: 93 BPM | HEIGHT: 72 IN | BODY MASS INDEX: 19.23 KG/M2 | DIASTOLIC BLOOD PRESSURE: 70 MMHG | WEIGHT: 142 LBS | SYSTOLIC BLOOD PRESSURE: 110 MMHG | OXYGEN SATURATION: 98 %

## 2023-10-19 DIAGNOSIS — Z87.19 HISTORY OF BARRETT'S ESOPHAGUS: ICD-10-CM

## 2023-10-19 DIAGNOSIS — J84.112 IPF (IDIOPATHIC PULMONARY FIBROSIS): Primary | Chronic | ICD-10-CM

## 2023-10-19 DIAGNOSIS — Z99.81 DEPENDENCE ON SUPPLEMENTAL OXYGEN: ICD-10-CM

## 2023-10-19 DIAGNOSIS — Z23 NEED FOR VACCINATION: ICD-10-CM

## 2023-10-19 DIAGNOSIS — J96.11 CHRONIC RESPIRATORY FAILURE WITH HYPOXIA: ICD-10-CM

## 2023-10-19 DIAGNOSIS — F10.10 ALCOHOL ABUSE: ICD-10-CM

## 2023-10-19 PROCEDURE — 1160F RVW MEDS BY RX/DR IN RCRD: CPT | Performed by: NURSE PRACTITIONER

## 2023-10-19 PROCEDURE — 99214 OFFICE O/P EST MOD 30 MIN: CPT | Performed by: NURSE PRACTITIONER

## 2023-10-19 PROCEDURE — 1159F MED LIST DOCD IN RCRD: CPT | Performed by: NURSE PRACTITIONER

## 2023-10-19 RX ORDER — MULTIPLE VITAMINS W/ MINERALS TAB 9MG-400MCG
1 TAB ORAL DAILY
COMMUNITY
Start: 2023-09-08

## 2023-10-19 RX ORDER — METOPROLOL SUCCINATE 25 MG/1
1 TABLET, EXTENDED RELEASE ORAL DAILY
COMMUNITY
Start: 2023-09-09

## 2023-10-19 RX ORDER — THIAMINE MONONITRATE (VIT B1) 100 MG
1 TABLET ORAL DAILY
COMMUNITY
Start: 2023-09-09

## 2023-10-19 NOTE — LETTER
October 19, 2023       No Recipients    Patient: Roc Hawthorne   YOB: 1950   Date of Visit: 10/19/2023     Dear Dr. Murdock Recipients:    Thank you for referring Roc Hawthorne to me for evaluation. Below are the relevant portions of my assessment and plan of care.    If you have questions, please do not hesitate to call me. I look forward to following Roc along with you.         Sincerely,        ARIANNA Howell        CC:   No Recipients      Progress Notes:   ARIANNA Howell  [facility]   Pulmonary and Critical Care  [address] [place] [address]  Phone: [phone]  Fax: [phone]           Chief Complaint  IPF (idiopathic pulmonary fibrosis)    Subjective   History of Present Illness     Roc Hawthorne presents to [facility] PULMONARY & CRITICAL CARE MEDICINE   History of Present Illness  Mr. Hawthorne is a 72 year old male patient with known IPF. He was started on Ofev 100 mg BID (interolarnt to [facility]) in 2021. He had issues with compliance in the past. He has known memory issues. He has chronic hypoxic respiratory failure on 2-3 L NC with exertion and sleep. He has known history of Lucas's esophagus with GERD. Chronic cough noted to be multifactoral secondary to reflux, Etoh use, post nasal drip (unresponsive to Singulair) and PF. He has known small chronic right pleural effusion with VATS biopsy. Has not been amenable to drainage. He has known HFpEF. He has known alcohol abuse with rehab in 2022.  Visit he noted a relapse consuming about 10 beers a day per his wife.  We discussed at that time that I would not want to place him on Ofev given his alcohol abuse and the effects of Ofev can have on the liver as well as the significant side effect of diarrhea raising his risk for GI bleed.  He also has had known Lucas's esophagus. At last visit his wife was going to see about moving his appointment up with his primary care physician for  "assistance with referral to rehab for alcohol abuse.  Today he reports he had continued to drink. Again as noted at last visit he has not been a candidate for lung transplant in the past secondary to alcohol abuse and lack of interest.  At last visit it was noted his FEV1 dropped from 35% predicted to 25% predicted. He denies fever, chills, night sweats. Hhe denies hemoptysis. His weight dropped with his alcohol use. His wife states he is eating better and gaining a little weight. He has known untreated sleep apnea.  He uses a flutter valve  but has not been able to at rehab. He has been getting nebulizer treatments at [facility] and this offers not much help.  His breathing he feels has gotten worse. His wife states he is doing well with PT. He had covid 10 days ago. He was treated with an antiviral. He was coughing more and a higher temp one day. He generally just did not feel well. His wife states he has been alcohol free with the hospitalization and rehab.     Given his FEV1 of 25% this places him at cornelius risk for any anesthesia/ surgical or procedure. I will forward my note to GI. I have asked he return in 2 months and hopefully he will have been referred to rehab. We can consider Ofev and pulmonary rehab after that. He and his wife are agreeable to the plan. They will call if we need to make any adjustments to the follow up visit or with any questions or concerns in the meantime.          Objective  Vital Signs:   /70   Pulse 93   Ht 182.9 cm (72\")   Wt 64.4 kg (142 lb)   SpO2 98% Comment: 3L continuous  BMI 19.26 kg/m²     Physical Exam  Vitals reviewed.   Constitutional:       Appearance: Normal appearance. He is underweight.      Interventions: Nasal cannula in place.   Cardiovascular:      Rate and Rhythm: Normal rate and regular rhythm.   Pulmonary:      Effort: Pulmonary effort is normal.      Breath sounds: Decreased breath sounds present.   Neurological:      General: No focal deficit " present.      Mental Status: He is alert and oriented to person, place, and time.   Psychiatric:         Mood and Affect: Mood normal.         Behavior: Behavior normal.          Result Review:  The following data was reviewed by: ARIANNA Howell on 10/19/2023:    My interpretation of imaging: No new  My interpretation of labs: No new    PFT Values          4/13/2023    15:30   Pre Drug PFT Results   FVC 26   FEV1 25   FEF 25-75% 24   FEV1/FVC 73   Other Tests PFT Results   DLCO 83   D/VAsb 114     My interpretation of the PFT : No new    Results for orders placed in visit on 04/13/23    Pulmonary Function Test    Narrative  Pulmonary Function Test  Performed by: Barbra Lee, RRT  Authorized by: Nathalie Mar APRN    Pre Drug % Predicted  FVC: 26%  FEV1: 25%  FEF 25-75%: 24%  FEV1/FVC: 73%  DLCO: 83%  D/VAsb: 114%    Interpretation  Spirometry  Spirometry shows very severe restriction.  Diffusion Capacity  The patient's diffusion capacity is normal.  Diffusion capacity is normal when corrected for alveolar volume.  Overall comments: Compared to March 2022 PFT from [place] his FEV1 has dropped from 35% to 25% predicted.          Assessment and Plan   Diagnoses and all orders for this visit:    1. IPF (idiopathic pulmonary fibrosis) (Primary)    2. Chronic respiratory failure with hypoxia  Overview:  2 to 3 L nasal cannula  DME [facility]      3. Dependence on supplemental oxygen    4. Alcohol abuse    5. History of Lucas's esophagus    6. Need for vaccination        He is currently in [facility] following a hospitalization for hyponatremia and alcoholism. He has not been able to drink in the last 6 weeks. His wife states he is going to be discharge sometime in the next few weeks. She is hoping he sees the psychiatrist first. We discussed again that I am reluctant to place him on Ofev given his alcohol use and side effects of weight loss and diarrhea. We discussed that statistically either  the alcoholism or the pulmonary fibrosis one increases his mortality. He already has had a significant decrease in his overall lung function from the last PFT in April with a FEV1 of 25% down from 35%.  He will continue his nebulizer treatment he is receiving at rehab. I have provided below his risk assessment for possible cytoscope. He is high risk. Continue supplemental oxygen. Follow up in 6 weeks.       Follow Up   Return in about 6 weeks (around 2023).  Patient was given instructions and counseling regarding his condition or for health maintenance advice. Please see specific information pulled into the AVS if appropriate.     ARIANNA Howell  10/19/2023  15:38 CDT      Pre-procedure Pulmonary Evaluation for Surgery    Requesting Physician:  Nuvia Mccurdy APRN   Fax Number:     Date & Type of Procedure: Cystoscopy     Patient Name: Roc Hawthorne  : 1950     Today your patient was assessed by one of our providers and screened for potential complications from anesthesia.  The following tests were performed and interpreted by the provider and a risk assessment has been provided below:    CXR: No current imaging    ABG: No current ABG    PFTs: 2023 with an FEV1 of 25% predicted and normal diffusion capacity    Your patient is considered:  high risk secondary to very severe restriction on PFT, chronic respiratory failure on 2 to 3 L of oxygen and untreated sleep apnea     If you determine after the assessment that the patient will require the above stated procedure then below are the recommendations to optimize their pulmonary status and decrease risk of potential complications from anesthesia.    Pre-procedure recommendations: Chest x-ray and ABG.  Use minimal sedation and extubate as soon as possible.  Mobilize as soon as possible.    Post-procedure recommendations: Use minimal sedation and extubate as soon as possible.  Mobilize as soon as possible.  Incentive spirometry use.   "Continue to monitor end-tidal CO2 and O2 saturations.      Evaluation performed by: Nathalie Mar, APRN  Date: 10/19/23    *If any additional information is needed please call. Our practice does not provide pulmonary \"clearance\", but the information above should be sufficient.     "

## 2023-10-23 ENCOUNTER — TELEPHONE (OUTPATIENT)
Dept: UROLOGY | Age: 73
End: 2023-10-23

## 2023-10-23 NOTE — TELEPHONE ENCOUNTER
I left a voicemail with patient to let him know we did get a clearance from [facility] for his cysto/TRUS in the OR. I left our number for him to call back and schedule that.

## 2023-10-24 NOTE — TELEPHONE ENCOUNTER
I attempted to call the patient again today. I did not leave another voicemail, as I had already left one this week. I know the clearance we got for surgery is only good for so long, so I'm trying to get the patient scheduled in a timely manner.

## 2023-10-30 ENCOUNTER — OFFICE VISIT (OUTPATIENT)
Dept: UROLOGY | Age: 73
End: 2023-10-30
Payer: MEDICARE

## 2023-10-30 VITALS — TEMPERATURE: 97.4 F | HEIGHT: 76 IN | BODY MASS INDEX: 17.41 KG/M2

## 2023-10-30 DIAGNOSIS — R33.9 URINARY RETENTION: Primary | ICD-10-CM

## 2023-10-30 DIAGNOSIS — R33.8 BENIGN PROSTATIC HYPERPLASIA WITH URINARY RETENTION: ICD-10-CM

## 2023-10-30 DIAGNOSIS — N40.1 BENIGN PROSTATIC HYPERPLASIA WITH URINARY RETENTION: ICD-10-CM

## 2023-10-30 LAB
BACTERIA URINE, POC: ABNORMAL
BILIRUBIN URINE: 0 MG/DL
BLOOD, URINE: POSITIVE
CASTS URINE, POC: 0
CLARITY: ABNORMAL
COLOR: YELLOW
CRYSTALS URINE, POC: 0
EPI CELLS URINE, POC: 0
GLUCOSE URINE: ABNORMAL
KETONES, URINE: NEGATIVE
LEUKOCYTE EST, POC: ABNORMAL
NITRITE, URINE: POSITIVE
PH UA: 7 (ref 4.5–8)
POST VOID RESIDUAL (PVR): 0 ML
PROTEIN UA: NEGATIVE
RBC URINE, POC: 2
SPECIFIC GRAVITY UA: 1.01 (ref 1–1.03)
UROBILINOGEN, URINE: NORMAL
WBC URINE, POC: 57
YEAST URINE, POC: 0

## 2023-10-30 PROCEDURE — 51798 US URINE CAPACITY MEASURE: CPT | Performed by: NURSE PRACTITIONER

## 2023-10-30 PROCEDURE — G8427 DOCREV CUR MEDS BY ELIG CLIN: HCPCS | Performed by: NURSE PRACTITIONER

## 2023-10-30 PROCEDURE — 99213 OFFICE O/P EST LOW 20 MIN: CPT | Performed by: NURSE PRACTITIONER

## 2023-10-30 PROCEDURE — 3017F COLORECTAL CA SCREEN DOC REV: CPT | Performed by: NURSE PRACTITIONER

## 2023-10-30 PROCEDURE — 81001 URINALYSIS AUTO W/SCOPE: CPT | Performed by: NURSE PRACTITIONER

## 2023-10-30 PROCEDURE — G8484 FLU IMMUNIZE NO ADMIN: HCPCS | Performed by: NURSE PRACTITIONER

## 2023-10-30 PROCEDURE — 1123F ACP DISCUSS/DSCN MKR DOCD: CPT | Performed by: NURSE PRACTITIONER

## 2023-10-30 PROCEDURE — G8419 CALC BMI OUT NRM PARAM NOF/U: HCPCS | Performed by: NURSE PRACTITIONER

## 2023-10-30 PROCEDURE — 1036F TOBACCO NON-USER: CPT | Performed by: NURSE PRACTITIONER

## 2023-10-30 ASSESSMENT — ENCOUNTER SYMPTOMS
VOMITING: 0
NAUSEA: 0
ABDOMINAL PAIN: 0
BACK PAIN: 0
ABDOMINAL DISTENTION: 0

## 2023-10-30 NOTE — PROGRESS NOTES
[facility] - PODIATRY    Today's Date: 11/03/2023     Patient Name: Roc Hawthorne  MRN: [id number]  CSN: [id number]  PCP: Telma Cisse APRN  Referring Provider: Telma Cisse *    SUBJECTIVE     Chief Complaint   Patient presents with    Follow-up     Telma Cisse 07/07/2023 - Right foot pain feet still needing some work. Callus on bottom of right foot has been hurting- pt pain 3/10 at worst, enough to notice      HPI: Roc Hawthorne, a 73 y.o.male, comes to clinic as a(n) new patient complaining of toenail/callus issues. Patient has h/o chronic idiopathic fibrosing alveolitis, dependence on O2, GERD, sleep apnea, pulmonary fibrosis, generalized weakness . Patient is non-diabetic.  Denies significant numbness and tingling in his feet.  Denies open wounds or sores.  Relates that his toenails are long, thick, and discolored.  He also has a couple painful calluses to the plantar aspect of each foot.  Relates tenderness on palpation of calluses and when standing.  Due to respiratory issues, he requires O2 and utilizes a wheelchair on a frequent basis.  Admits pain at 3/10 level and described as aching, nagging, and sharp. Denies previous treatment. Denies any constitutional symptoms. No other pedal complaints at this time.    Past Medical History:   Diagnosis Date    Chronic idiopathic fibrosing alveolitis 2014    Dependence on supplemental oxygen 4/13/2023    GERD (gastroesophageal reflux disease) 2015    Primary central sleep apnea 2014    Pulmonary fibrosis      Past Surgical History:   Procedure Laterality Date    APPENDECTOMY      BRONCHOSCOPY  2014    COLON RESECTION      LUNG BIOPSY  2014     Family History   Problem Relation Age of Onset    Colon polyps Neg Hx     Colon cancer Neg Hx     Esophageal cancer Neg Hx      Social History     Socioeconomic History    Marital status:    Tobacco Use    Smoking status: Former     Packs/day: 0.50     Years: 19.00      Additional pack years: 0.00     Total pack years: 9.50     Types: Cigarettes     Quit date: 1988     Years since quittin.8     Passive exposure: Never    Smokeless tobacco: Never   Vaping Use    Vaping Use: Never used   Substance and Sexual Activity    Alcohol use: Yes     Alcohol/week: 30.0 standard drinks of alcohol     Types: 30 Cans of beer per week     Comment: daily beere    Drug use: Never    Sexual activity: Not Currently     Partners: Female     Allergies   Allergen Reactions    Sulfa Antibiotics Rash     Current Outpatient Medications   Medication Sig Dispense Refill    dexlansoprazole (DEXILANT) 30 MG capsule Take 1 capsule by mouth Daily.      metoprolol succinate XL (TOPROL-XL) 25 MG 24 hr tablet Take 1 tablet by mouth Daily.      multivitamin with minerals tablet tablet Take 1 tablet by mouth Daily.      tamsulosin (FLOMAX) 0.4 MG capsule 24 hr capsule Take 1 capsule by mouth Daily. 30 capsule 1    thiamine (VITAMIN B-1) 100 MG tablet Take 1 tablet by mouth Daily.       No current facility-administered medications for this visit.     Review of Systems   Constitutional:  Negative for chills and fever.   HENT:  Negative for congestion.    Respiratory:          O2   Cardiovascular:  Negative for chest pain and leg swelling.   Gastrointestinal:  Negative for constipation, diarrhea, nausea and vomiting.   Musculoskeletal:  Positive for gait problem.        Foot pain   Skin:  Negative for wound.   Neurological:  Positive for weakness. Negative for numbness.       OBJECTIVE     Vitals:    23 1531   Pulse: 92   SpO2: 97%       PHYSICAL EXAM  GEN:   Accompanied by wife.     Foot/Ankle Exam    GENERAL  Appearance:  appears stated age and chronically ill  Orientation:  AAOx3  Affect:  appropriate  Assistance:  wheelchair  Right shoe gear: casual shoe  Left shoe gear: casual shoe    VASCULAR     Right Foot Vascularity   Dorsalis pedis:  2+  Posterior tibial:  2+  Skin temperature:  warm  Edema  grading:  None  CFT:  3  Pedal hair growth:  Present  Varicosities:  mild varicosities     Left Foot Vascularity   Dorsalis pedis:  2+  Posterior tibial:  2+  Skin temperature:  warm  Edema grading:  None  CFT:  3  Pedal hair growth:  Present  Varicosities:  mild varicosities     NEUROLOGIC     Right Foot Neurologic   Normal sensation    Light touch sensation: normal  Vibratory sensation: normal  Hot/Cold sensation: normal     Left Foot Neurologic   Normal sensation    Light touch sensation: normal  Vibratory sensation: normal  Hot/Cold sensation:  normal    MUSCULOSKELETAL     Right Foot Musculoskeletal   Ecchymosis:  none  Tenderness:  callous right foot and toenail problem    Arch:  Normal  Hammertoe:  Second toe, third toe, fourth toe and fifth toe  Hallux valgus: Yes       Left Foot Musculoskeletal   Ecchymosis:  none  Tenderness:  callous left foot and toenail problem  Arch:  Normal  Hammertoe:  Second toe, third toe, fourth toe and fifth toe  Hallux valgus: Yes      MUSCLE STRENGTH     Right Foot Muscle Strength   Foot dorsiflexion:  4+  Foot plantar flexion:  4+  Foot inversion:  4+  Foot eversion:  4+     Left Foot Muscle Strength   Foot dorsiflexion:  4+  Foot plantar flexion:  4+  Foot inversion:  4+  Foot eversion:  4+    RANGE OF MOTION     Right Foot Range of Motion   Foot and ankle ROM within normal limits       Left Foot Range of Motion   Foot and ankle ROM within normal limits      DERMATOLOGIC      Right Foot Dermatologic   Skin  Positive for corn.   Nails  1.  Positive for elongated, abnormal thickness and subungual debris.  2.  Positive for elongated, abnormal thickness and subungual debris.  3.  Positive for elongated, abnormal thickness and subungual debris.  4.  Positive for elongated, abnormal thickness and subungual debris.  5.  Positive for elongated, abnormal thickness and subungual debris.     Left Foot Dermatologic   Skin  Positive for corn.   Nails  1.  Positive for elongated, abnormal  thickness and subungual debris.  2.  Positive for elongated, abnormal thickness and subungual debris.  3.  Positive for elongated, abnormal thickness and subungual debris.  4.  Positive for elongated, abnormally thick and subungual debris.  5.  Positive for elongated, abnormally thick and subungual debris.    Image:       RADIOLOGY/NUCLEAR:  No results found.    LABORATORY/CULTURE RESULTS:      PATHOLOGY RESULTS:       ASSESSMENT/PLAN     Diagnoses and all orders for this visit:    1. Thickened nails (Primary)    2. Foot callus    3. Foot pain, bilateral    4. Physical debility    5. Uses wheelchair      Comprehensive lower extremity examination and evaluation was performed.  Discussed findings and treatment plan including risks, benefits, and treatment options with patient in detail. Patient agreed with treatment plan.  After verbal consent obtained, nail(s) x10 debrided of length and thickness with nail nipper without incidence  After verbal consent obtained, calluses x2 pared utilizing dermal curette and/or scalpel without incidence  Patient may maintain nails and calluses at home utilizing emery board or pumice stone between visits as needed   Moisturize plantar foot lesions on regular basis.  An After Visit Summary was printed and given to the patient at discharge, including (if requested) any available informative/educational handouts regarding diagnosis, treatment, or medications. All questions were answered to patient/family satisfaction. Should symptoms fail to improve or worsen they agree to call or return to clinic or to go to the Emergency Department. Discussed the importance of following up with any needed screening tests/labs/specialist appointments and any requested follow-up recommended by me today. Importance of maintaining follow-up discussed and patient accepts that missed appointments can delay diagnosis and potentially lead to worsening of conditions.  Return in about 3 months (around 2/3/2024)  for Follow-up with Podiatry APRN, Schedule [facility]., or sooner if acute issues arise.        This document has been electronically signed by Remy Hemphill DPM on November 3, 2023 17:17 CDT

## 2023-10-30 NOTE — PROGRESS NOTES
Noah Ramirez is a 72 y.o., male, Established patient who presents today   Chief Complaint   Patient presents with    Follow-up     I am here today for a follow up. Patient was originally seen in the hospital setting at the beginning of the month after admission for hyponatremia and suicidal ideation and alcoholism. He had been experiencing an increase in lower urinary tract symptoms a few months prior to admission. He has been placed on Flomax by his primary care provider, but had never followed up. During his time in the emergency room, he was noted to have an elevated postvoid residual of around 600 mL and after several attempts from the nurses and placing the catheter were unsuccessful, urology was consulted for catheter placement. Patient was also having some difficulty with some blood in his urethra, but did not appear that it was necessarily coming from his bladder. Seemed as if it was coming from a more distal location, like his prostate. Patient was discharged with [place] catheter. Voiding trial at the rehab facility was successful, but at patient's follow-up visit, postvoid residual remains elevated. Flomax was then increased to 0.8 mg and patient is currently emptying his bladder well. Prior LATRELL by Dr. Wadell Lombard revealed enlarged but nonsuspicious prostate. His wife accompanies him to the appointment today and assists in collecting information. She reports that he was evaluated by urology in the past for bladder wall thickening noted on CT imaging. He did not undergo cystoscopic evaluation at that time. This was about 8 years ago. In the hospital, we had discussed further evaluation for possible prostate intervention, but she raises concerns about his ability to undergo anesthesia secondary to his IPF. He is maintained on oxygen currently.   We did reach out to his pulmonologist who sent us a clearance letter stating that patient could undergo anesthesia if necessary, but was considered

## 2023-11-02 ENCOUNTER — TELEPHONE (OUTPATIENT)
Dept: PODIATRY | Facility: CLINIC | Age: 73
End: 2023-11-02
Payer: MEDICARE

## 2023-11-03 ENCOUNTER — OFFICE VISIT (OUTPATIENT)
Dept: PODIATRY | Facility: CLINIC | Age: 73
End: 2023-11-03
Payer: MEDICARE

## 2023-11-03 VITALS — OXYGEN SATURATION: 97 % | HEART RATE: 92 BPM | BODY MASS INDEX: 19.23 KG/M2 | HEIGHT: 72 IN | WEIGHT: 142 LBS

## 2023-11-03 DIAGNOSIS — M79.671 FOOT PAIN, BILATERAL: ICD-10-CM

## 2023-11-03 DIAGNOSIS — L60.2 THICKENED NAILS: Primary | ICD-10-CM

## 2023-11-03 DIAGNOSIS — R53.81 PHYSICAL DEBILITY: ICD-10-CM

## 2023-11-03 DIAGNOSIS — M79.672 FOOT PAIN, BILATERAL: ICD-10-CM

## 2023-11-03 DIAGNOSIS — Z99.3 USES WHEELCHAIR: ICD-10-CM

## 2023-11-03 DIAGNOSIS — L84 FOOT CALLUS: ICD-10-CM

## 2023-12-07 NOTE — PROGRESS NOTES
" ARIANNA Howell  [facility]   Pulmonary and Critical Care  [address]  [place] [address]  Phone: [phone]  Fax: [phone]           Chief Complaint  IPF (idiopathic pulmonary fibrosis)    Subjective    History of Present Illness     Roc Hawthorne presents to [facility] PULMONARY & CRITICAL CARE MEDICINE   History of Present Illness  Mr. Hawthorne is a 73 year old male patient with known IPF. He was started on Ofev 100 mg BID (interolarnt to [facility]) in 2021. He had issues with compliance in the past. He has known memory issues. He has chronic hypoxic respiratory failure on 2-3 L NC continuous and with sleep. He has been taking it off to ambulate. He has known history of Lucas's esophagus with GERD. Chronic cough noted to be multifactoral secondary to reflux, Etoh use, post nasal drip (unresponsive to Singulair) and PF.Not a lung transplant candidate secondary to alcohol abuse. He has known small chronic right pleural effusion with VATS biopsy. Has not been amenable to drainage. He has known HFpEF. He has known alcohol abuse with rehab in 2022.  He has not had any alcohol since discharge from [facility] 3-4 weeks ago.  His memory is clearing per wife. He is not driving right now. FEV1 of 25% predicted. He denies fever, chills, night sweats. He denies hemoptysis. His weight is up since last seen. He has known untreated sleep apnea.  He has not used the flutter valve. His breathing he feels is worse. His cough is dry and daily.  He has a lot of sinus drainage.               Objective   Vital Signs:   BP (!) 82/60   Pulse 91   Ht 182.9 cm (72\")   Wt 71.2 kg (157 lb)   SpO2 93% Comment: 2.5L  BMI 21.29 kg/m²     Physical Exam  Vitals reviewed.   Constitutional:       Appearance: Normal appearance.      Interventions: Nasal cannula in place.   HENT:      Head: Normocephalic and atraumatic.   Cardiovascular:      Rate and Rhythm: Normal rate and regular rhythm. "   Pulmonary:      Effort: Pulmonary effort is normal.      Breath sounds: Decreased breath sounds and rhonchi present.   Neurological:      General: No focal deficit present.      Mental Status: He is alert and oriented to person, place, and time.   Psychiatric:         Mood and Affect: Mood normal.         Behavior: Behavior normal.          Result Review :  The following data was reviewed by: ARIANNA Howell on 12/08/2023:     My interpretation of imaging:  no new   My interpretation of labs: no new     PFT Values          4/13/2023    15:30   Pre Drug PFT Results   FVC 26   FEV1 25   FEF 25-75% 24   FEV1/FVC 73   Other Tests PFT Results   DLCO 83   D/VAsb 114     My interpretation of the PFT : no new     Results for orders placed in visit on 04/13/23    Pulmonary Function Test    Narrative  Pulmonary Function Test  Performed by: Barbra Lee, RRT  Authorized by: Nathalie Mar APRN    Pre Drug % Predicted  FVC: 26%  FEV1: 25%  FEF 25-75%: 24%  FEV1/FVC: 73%  DLCO: 83%  D/VAsb: 114%    Interpretation  Spirometry  Spirometry shows very severe restriction.  Diffusion Capacity  The patient's diffusion capacity is normal.  Diffusion capacity is normal when corrected for alveolar volume.  Overall comments: Compared to March 2022 PFT from [facility] his FEV1 has dropped from 35% to 25% predicted.        Assessment and Plan   Diagnoses and all orders for this visit:    1. Need for vaccination (Primary)    2. IPF (idiopathic pulmonary fibrosis)  Comments:  FEV1 25% predicted    3. Chronic respiratory failure with hypoxia  Overview:  2 to 3 L nasal cannula  DME [facility]      4. Dependence on supplemental oxygen    5. Alcohol abuse    6. History of Lucas's esophagus    7. Post-nasal drip    Other orders  -     RSVPreF3 Vac Recomb Adjuvanted (AREXVY) 120 MCG/0.5ML reconstituted suspension injection; Inject 0.5 mL into the appropriate muscle as directed by prescriber 1 (One) Time for 1 dose.   Dispense: 0.5 mL; Refill: 0  -     saline (AYR) gel nasal gel; Apply 1 application  topically to the appropriate area as directed As Needed (nasal dryness).  Dispense: 14.1 g; Refill: 3  -     fluticasone (FLONASE) 50 MCG/ACT nasal spray; 2 sprays into the nostril(s) as directed by provider Daily.  Dispense: 16 g; Refill: 3  -     budesonide-formoterol (SYMBICORT) 80-4.5 MCG/ACT inhaler; Inhale 2 puffs 2 (Two) Times a Day.  Dispense: 10.2 g; Refill: 3        He is commended from abstaining from alcohol use since discharge from [facility].  His weight is gradually improving as he is up 15 pounds since he last saw me in October.  We discussed that I would still like to hold off on Ofev at this time given is only been a few weeks abstaining from alcohol and his weight is just recently trending up.  We again discussed that the risk of Ofev are weight loss and significant diarrhea.  He will continue his supplemental oxygen.  The wife is working with possibly using [facility] locally as they have been using DME in [place].  She states with the start of the new year she should be able to change now.  He has nasal dryness from the oxygen a prescription is provided for AYR gel.  He is advised that given his oxygen supplementation he should avoid using Vaseline.  He is provided Flonase for his constant nasal drip.  He saw benefit from nebulizer use while at [facility].  They did not discharge him with any inhalers.  He is provided low-dose Symbicort to see if this makes any difference.  He is advised to rinse his mouth out after each use and a demonstration is provided.  Is provided a prescription for RSV vaccination.  He is advised to get the flu shot in a few weeks.  Have asked him to return in 2 months.  Systolic blood pressure in the 80s.  Wife states that he has been running this low.  They are going to recheck it when he returns home.  If this continues to persist being lower he is encouraged to contact his PCP.  He and wife  are encouraged to present to the emergency room if his BP gets any worse or his breathing should worsen.      Follow Up   Return in about 2 months (around 2/8/2024).  Patient was given instructions and counseling regarding his condition or for health maintenance advice. Please see specific information pulled into the AVS if appropriate.     Nathalie Mar, ARIANNA  12/8/2023  17:25 CST

## 2023-12-08 ENCOUNTER — OFFICE VISIT (OUTPATIENT)
Dept: PULMONOLOGY | Facility: CLINIC | Age: 73
End: 2023-12-08
Payer: MEDICARE

## 2023-12-08 VITALS
OXYGEN SATURATION: 93 % | BODY MASS INDEX: 21.26 KG/M2 | HEIGHT: 72 IN | SYSTOLIC BLOOD PRESSURE: 82 MMHG | HEART RATE: 91 BPM | WEIGHT: 157 LBS | DIASTOLIC BLOOD PRESSURE: 60 MMHG

## 2023-12-08 DIAGNOSIS — Z23 NEED FOR VACCINATION: Primary | ICD-10-CM

## 2023-12-08 DIAGNOSIS — Z87.19 HISTORY OF BARRETT'S ESOPHAGUS: ICD-10-CM

## 2023-12-08 DIAGNOSIS — J84.112 IPF (IDIOPATHIC PULMONARY FIBROSIS): Chronic | ICD-10-CM

## 2023-12-08 DIAGNOSIS — Z99.81 DEPENDENCE ON SUPPLEMENTAL OXYGEN: ICD-10-CM

## 2023-12-08 DIAGNOSIS — J96.11 CHRONIC RESPIRATORY FAILURE WITH HYPOXIA: ICD-10-CM

## 2023-12-08 DIAGNOSIS — F10.10 ALCOHOL ABUSE: ICD-10-CM

## 2023-12-08 DIAGNOSIS — R09.82 POST-NASAL DRIP: ICD-10-CM

## 2023-12-08 RX ORDER — BENZONATATE 200 MG/1
200 CAPSULE ORAL 3 TIMES DAILY PRN
COMMUNITY
Start: 2023-11-14

## 2023-12-08 RX ORDER — SODIUM CHLORIDE/ALOE VERA
1 GEL (GRAM) NASAL AS NEEDED
Qty: 14.1 G | Refills: 3 | Status: SHIPPED | OUTPATIENT
Start: 2023-12-08

## 2023-12-08 RX ORDER — BUDESONIDE AND FORMOTEROL FUMARATE DIHYDRATE 80; 4.5 UG/1; UG/1
2 AEROSOL RESPIRATORY (INHALATION) 2 TIMES DAILY
Qty: 10.2 G | Refills: 3 | Status: SHIPPED | OUTPATIENT
Start: 2023-12-08

## 2023-12-08 RX ORDER — PANTOPRAZOLE SODIUM 40 MG/1
40 TABLET, DELAYED RELEASE ORAL DAILY
COMMUNITY
Start: 2023-11-14

## 2023-12-08 RX ORDER — MIRTAZAPINE 7.5 MG/1
7.5 TABLET, FILM COATED ORAL NIGHTLY
COMMUNITY
Start: 2023-11-14

## 2023-12-08 RX ORDER — SODIUM CHLORIDE 1 G/1
1 TABLET ORAL EVERY 12 HOURS SCHEDULED
COMMUNITY
Start: 2023-11-14

## 2023-12-08 RX ORDER — FLUTICASONE PROPIONATE 50 MCG
2 SPRAY, SUSPENSION (ML) NASAL DAILY
Qty: 16 G | Refills: 3 | Status: SHIPPED | OUTPATIENT
Start: 2023-12-08

## 2023-12-08 NOTE — Clinical Note
They currently continue to get her oxygen supplies and DME from [facility].  Wife has been discussing about being able to change it the new year to a local DME company and they have been considering [facility].  She is reaching out to [facility] on Monday and then got a call us back with what they possibly need from us.  Likely just a new order.

## 2023-12-08 NOTE — PATIENT INSTRUCTIONS
Begin Symbicort 2 puffs twice daily, rinse mouth after use  Begin Flonase nasal spray   Use AYR gel as needed for nasal dryness   Script given for RSV vaccination  Hand out provided on [facility]  Call on Monday in regards to changing his oxygen to [facility]

## 2023-12-21 DIAGNOSIS — J84.112 IPF (IDIOPATHIC PULMONARY FIBROSIS): Primary | ICD-10-CM

## 2023-12-21 DIAGNOSIS — J96.11 CHRONIC RESPIRATORY FAILURE WITH HYPOXIA: ICD-10-CM

## 2023-12-29 ENCOUNTER — APPOINTMENT (OUTPATIENT)
Dept: CT IMAGING | Age: 73
DRG: 189 | End: 2023-12-29
Payer: MEDICARE

## 2023-12-29 ENCOUNTER — APPOINTMENT (OUTPATIENT)
Dept: GENERAL RADIOLOGY | Age: 73
DRG: 189 | End: 2023-12-29
Payer: MEDICARE

## 2023-12-29 ENCOUNTER — HOSPITAL ENCOUNTER (INPATIENT)
Age: 73
LOS: 13 days | Discharge: NURSING, REHAB OR LONG-TERM CARE | DRG: 189 | End: 2024-01-11
Attending: HOSPITALIST | Admitting: HOSPITALIST
Payer: MEDICARE

## 2023-12-29 DIAGNOSIS — F10.10 ALCOHOL ABUSE: ICD-10-CM

## 2023-12-29 DIAGNOSIS — R06.89 DYSPNEA AND RESPIRATORY ABNORMALITIES: Primary | ICD-10-CM

## 2023-12-29 DIAGNOSIS — J84.112 IDIOPATHIC PULMONARY FIBROSIS (HCC): ICD-10-CM

## 2023-12-29 DIAGNOSIS — R33.9 URINARY RETENTION: ICD-10-CM

## 2023-12-29 DIAGNOSIS — R06.00 DYSPNEA AND RESPIRATORY ABNORMALITIES: Primary | ICD-10-CM

## 2023-12-29 PROBLEM — J96.21 ACUTE ON CHRONIC HYPOXIC RESPIRATORY FAILURE (HCC): Status: ACTIVE | Noted: 2023-12-29

## 2023-12-29 PROBLEM — R06.82 TACHYPNEA: Status: ACTIVE | Noted: 2023-12-29

## 2023-12-29 PROBLEM — R06.03 ACUTE RESPIRATORY DISTRESS: Status: ACTIVE | Noted: 2023-12-29

## 2023-12-29 LAB
ABO/RH: NORMAL
ALLENS TEST: ABNORMAL
ANTIBODY SCREEN: NORMAL
APTT PPP: 29 SEC (ref 26–36.2)
BASE EXCESS ARTERIAL: 3.6 MMOL/L (ref -2–2)
BASOPHILS # BLD: 0.1 K/UL (ref 0–0.2)
BASOPHILS NFR BLD: 0.5 % (ref 0–1)
CARBOXYHEMOGLOBIN ARTERIAL: 2.6 % (ref 0–5)
EOSINOPHIL # BLD: 0 K/UL (ref 0–0.6)
EOSINOPHIL NFR BLD: 0.1 % (ref 0–5)
ERYTHROCYTE [DISTWIDTH] IN BLOOD BY AUTOMATED COUNT: 11.8 % (ref 11.5–14.5)
HCO3 ARTERIAL: 26.5 MMOL/L (ref 22–26)
HCT VFR BLD AUTO: 42.3 % (ref 42–52)
HEMOGLOBIN, ART, EXTENDED: 14.2 G/DL (ref 14–18)
HGB BLD-MCNC: 14 G/DL (ref 14–18)
IMM GRANULOCYTES # BLD: 0 K/UL
INR PPP: 1.19 (ref 0.88–1.18)
LACTATE BLDV-SCNC: 1.1 MMOL/L (ref 0.5–1.9)
LACTATE BLDV-SCNC: 1.9 MMOL/L (ref 0.5–1.9)
LYMPHOCYTES # BLD: 0.7 K/UL (ref 1.1–4.5)
LYMPHOCYTES NFR BLD: 6.8 % (ref 20–40)
MCH RBC QN AUTO: 31.3 PG (ref 27–31)
MCHC RBC AUTO-ENTMCNC: 33.1 G/DL (ref 33–37)
MCV RBC AUTO: 94.6 FL (ref 80–94)
METHEMOGLOBIN ARTERIAL: 1.2 %
MONOCYTES # BLD: 1.1 K/UL (ref 0–0.9)
MONOCYTES NFR BLD: 9.7 % (ref 0–10)
NEUTROPHILS # BLD: 8.9 K/UL (ref 1.5–7.5)
NEUTS SEG NFR BLD: 82.5 % (ref 50–65)
O2 CONTENT ARTERIAL: 18.5 ML/DL
O2 DELIVERY DEVICE: ABNORMAL
O2 SAT, ARTERIAL: 92.9 %
O2 THERAPY: ABNORMAL
OXYGEN FLOW: 2
PCO2 ARTERIAL: 34 MMHG (ref 35–45)
PH ARTERIAL: 7.5 (ref 7.35–7.45)
PLATELET # BLD AUTO: 235 K/UL (ref 130–400)
PMV BLD AUTO: 9.9 FL (ref 9.4–12.4)
PO2 ARTERIAL: 64 MMHG (ref 80–100)
POTASSIUM BLD-SCNC: 3.7 MMOL/L
PROTHROMBIN TIME: 14.8 SEC (ref 12–14.6)
RBC # BLD AUTO: 4.47 M/UL (ref 4.7–6.1)
REASON FOR REJECTION: NORMAL
REJECTED TEST: NORMAL
SAMPLE SOURCE: ABNORMAL
SARS-COV-2 RDRP RESP QL NAA+PROBE: NOT DETECTED
WBC # BLD AUTO: 10.8 K/UL (ref 4.8–10.8)

## 2023-12-29 PROCEDURE — 36415 COLL VENOUS BLD VENIPUNCTURE: CPT

## 2023-12-29 PROCEDURE — 74177 CT ABD & PELVIS W/CONTRAST: CPT

## 2023-12-29 PROCEDURE — 83605 ASSAY OF LACTIC ACID: CPT

## 2023-12-29 PROCEDURE — 71045 X-RAY EXAM CHEST 1 VIEW: CPT

## 2023-12-29 PROCEDURE — 87040 BLOOD CULTURE FOR BACTERIA: CPT

## 2023-12-29 PROCEDURE — 2580000003 HC RX 258: Performed by: HOSPITALIST

## 2023-12-29 PROCEDURE — 85610 PROTHROMBIN TIME: CPT

## 2023-12-29 PROCEDURE — 6360000004 HC RX CONTRAST MEDICATION: Performed by: HOSPITALIST

## 2023-12-29 PROCEDURE — 93005 ELECTROCARDIOGRAM TRACING: CPT

## 2023-12-29 PROCEDURE — 87635 SARS-COV-2 COVID-19 AMP PRB: CPT

## 2023-12-29 PROCEDURE — 6370000000 HC RX 637 (ALT 250 FOR IP): Performed by: HOSPITALIST

## 2023-12-29 PROCEDURE — 82803 BLOOD GASES ANY COMBINATION: CPT

## 2023-12-29 PROCEDURE — 2000000000 HC ICU R&B

## 2023-12-29 PROCEDURE — 86901 BLOOD TYPING SEROLOGIC RH(D): CPT

## 2023-12-29 PROCEDURE — 2500000003 HC RX 250 WO HCPCS: Performed by: HOSPITALIST

## 2023-12-29 PROCEDURE — 71275 CT ANGIOGRAPHY CHEST: CPT

## 2023-12-29 PROCEDURE — 99285 EMERGENCY DEPT VISIT HI MDM: CPT

## 2023-12-29 PROCEDURE — 85730 THROMBOPLASTIN TIME PARTIAL: CPT

## 2023-12-29 PROCEDURE — 86900 BLOOD TYPING SEROLOGIC ABO: CPT

## 2023-12-29 PROCEDURE — 36600 WITHDRAWAL OF ARTERIAL BLOOD: CPT

## 2023-12-29 PROCEDURE — 85025 COMPLETE CBC W/AUTO DIFF WBC: CPT

## 2023-12-29 PROCEDURE — 86850 RBC ANTIBODY SCREEN: CPT

## 2023-12-29 RX ORDER — ACETAMINOPHEN 650 MG/1
650 SUPPOSITORY RECTAL EVERY 4 HOURS PRN
Status: DISCONTINUED | OUTPATIENT
Start: 2023-12-29 | End: 2024-01-11 | Stop reason: HOSPADM

## 2023-12-29 RX ORDER — MECOBALAMIN 5000 MCG
5 TABLET,DISINTEGRATING ORAL NIGHTLY PRN
Status: DISCONTINUED | OUTPATIENT
Start: 2023-12-29 | End: 2024-01-11 | Stop reason: HOSPADM

## 2023-12-29 RX ORDER — AMOXICILLIN AND CLAVULANATE POTASSIUM 875; 125 MG/1; MG/1
1 TABLET, FILM COATED ORAL 2 TIMES DAILY
Status: COMPLETED | OUTPATIENT
Start: 2023-12-29 | End: 2023-12-30

## 2023-12-29 RX ORDER — BUDESONIDE 0.5 MG/2ML
0.5 INHALANT ORAL EVERY 12 HOURS
Status: DISCONTINUED | OUTPATIENT
Start: 2023-12-29 | End: 2024-01-11 | Stop reason: HOSPADM

## 2023-12-29 RX ORDER — DEXMEDETOMIDINE HYDROCHLORIDE 4 UG/ML
.1-1.5 INJECTION, SOLUTION INTRAVENOUS CONTINUOUS
Status: DISCONTINUED | OUTPATIENT
Start: 2023-12-29 | End: 2023-12-30

## 2023-12-29 RX ORDER — ACETAMINOPHEN 325 MG/1
650 TABLET ORAL EVERY 4 HOURS PRN
Status: DISCONTINUED | OUTPATIENT
Start: 2023-12-29 | End: 2024-01-11 | Stop reason: HOSPADM

## 2023-12-29 RX ORDER — DOXYCYCLINE HYCLATE 100 MG/1
100 CAPSULE ORAL 2 TIMES DAILY
Status: COMPLETED | OUTPATIENT
Start: 2023-12-29 | End: 2023-12-30

## 2023-12-29 RX ORDER — GAUZE BANDAGE 2" X 2"
100 BANDAGE TOPICAL DAILY
Status: DISCONTINUED | OUTPATIENT
Start: 2023-12-30 | End: 2024-01-11 | Stop reason: HOSPADM

## 2023-12-29 RX ORDER — ONDANSETRON 4 MG/1
4 TABLET, ORALLY DISINTEGRATING ORAL EVERY 8 HOURS PRN
Status: DISCONTINUED | OUTPATIENT
Start: 2023-12-29 | End: 2024-01-11 | Stop reason: HOSPADM

## 2023-12-29 RX ORDER — M-VIT,TX,IRON,MINS/CALC/FOLIC 27MG-0.4MG
1 TABLET ORAL DAILY
Status: DISCONTINUED | OUTPATIENT
Start: 2023-12-30 | End: 2024-01-11 | Stop reason: HOSPADM

## 2023-12-29 RX ORDER — POLYETHYLENE GLYCOL 3350 17 G/17G
17 POWDER, FOR SOLUTION ORAL DAILY PRN
Status: DISCONTINUED | OUTPATIENT
Start: 2023-12-29 | End: 2024-01-05

## 2023-12-29 RX ORDER — ONDANSETRON 2 MG/ML
4 INJECTION INTRAMUSCULAR; INTRAVENOUS EVERY 6 HOURS PRN
Status: DISCONTINUED | OUTPATIENT
Start: 2023-12-29 | End: 2024-01-11 | Stop reason: HOSPADM

## 2023-12-29 RX ORDER — MAGNESIUM SULFATE IN WATER 40 MG/ML
2000 INJECTION, SOLUTION INTRAVENOUS PRN
Status: DISCONTINUED | OUTPATIENT
Start: 2023-12-29 | End: 2024-01-11 | Stop reason: HOSPADM

## 2023-12-29 RX ORDER — TAMSULOSIN HYDROCHLORIDE 0.4 MG/1
0.8 CAPSULE ORAL DAILY
Status: DISCONTINUED | OUTPATIENT
Start: 2023-12-30 | End: 2024-01-11 | Stop reason: HOSPADM

## 2023-12-29 RX ORDER — POTASSIUM CHLORIDE 29.8 MG/ML
20 INJECTION INTRAVENOUS PRN
Status: DISCONTINUED | OUTPATIENT
Start: 2023-12-29 | End: 2023-12-29

## 2023-12-29 RX ORDER — POTASSIUM CHLORIDE 7.45 MG/ML
10 INJECTION INTRAVENOUS PRN
Status: DISCONTINUED | OUTPATIENT
Start: 2023-12-29 | End: 2024-01-11 | Stop reason: HOSPADM

## 2023-12-29 RX ORDER — FOLIC ACID 1 MG/1
1 TABLET ORAL DAILY
Status: DISCONTINUED | OUTPATIENT
Start: 2023-12-30 | End: 2024-01-11 | Stop reason: HOSPADM

## 2023-12-29 RX ORDER — ALBUTEROL SULFATE 2.5 MG/3ML
2.5 SOLUTION RESPIRATORY (INHALATION) EVERY 4 HOURS PRN
Status: DISCONTINUED | OUTPATIENT
Start: 2023-12-29 | End: 2024-01-11 | Stop reason: HOSPADM

## 2023-12-29 RX ORDER — CALCIUM CARBONATE 500 MG/1
500 TABLET, CHEWABLE ORAL 3 TIMES DAILY PRN
Status: DISCONTINUED | OUTPATIENT
Start: 2023-12-29 | End: 2024-01-11 | Stop reason: HOSPADM

## 2023-12-29 RX ORDER — ACETYLCYSTEINE 200 MG/ML
600 SOLUTION ORAL; RESPIRATORY (INHALATION) 2 TIMES DAILY PRN
Status: DISCONTINUED | OUTPATIENT
Start: 2023-12-29 | End: 2024-01-05

## 2023-12-29 RX ORDER — METOPROLOL SUCCINATE 25 MG/1
25 TABLET, EXTENDED RELEASE ORAL DAILY
Status: DISCONTINUED | OUTPATIENT
Start: 2023-12-30 | End: 2023-12-31

## 2023-12-29 RX ORDER — SODIUM CHLORIDE 0.9 % (FLUSH) 0.9 %
5-40 SYRINGE (ML) INJECTION PRN
Status: DISCONTINUED | OUTPATIENT
Start: 2023-12-29 | End: 2024-01-11 | Stop reason: HOSPADM

## 2023-12-29 RX ORDER — SODIUM CHLORIDE 9 MG/ML
INJECTION, SOLUTION INTRAVENOUS PRN
Status: DISCONTINUED | OUTPATIENT
Start: 2023-12-29 | End: 2024-01-11 | Stop reason: HOSPADM

## 2023-12-29 RX ORDER — ENOXAPARIN SODIUM 100 MG/ML
40 INJECTION SUBCUTANEOUS DAILY
Status: DISCONTINUED | OUTPATIENT
Start: 2023-12-30 | End: 2023-12-30

## 2023-12-29 RX ORDER — SODIUM CHLORIDE 0.9 % (FLUSH) 0.9 %
5-40 SYRINGE (ML) INJECTION EVERY 12 HOURS SCHEDULED
Status: DISCONTINUED | OUTPATIENT
Start: 2023-12-29 | End: 2023-12-30

## 2023-12-29 RX ORDER — ARFORMOTEROL TARTRATE 15 UG/2ML
15 SOLUTION RESPIRATORY (INHALATION)
Status: DISCONTINUED | OUTPATIENT
Start: 2023-12-29 | End: 2024-01-11 | Stop reason: HOSPADM

## 2023-12-29 RX ADMIN — SODIUM CHLORIDE, PRESERVATIVE FREE 10 ML: 5 INJECTION INTRAVENOUS at 22:59

## 2023-12-29 RX ADMIN — DOXYCYCLINE HYCLATE 100 MG: 100 CAPSULE ORAL at 22:15

## 2023-12-29 RX ADMIN — IOPAMIDOL 70 ML: 755 INJECTION, SOLUTION INTRAVENOUS at 19:55

## 2023-12-29 RX ADMIN — DEXMEDETOMIDINE HYDROCHLORIDE 0.2 MCG/KG/HR: 400 INJECTION, SOLUTION INTRAVENOUS at 22:33

## 2023-12-29 RX ADMIN — ACETAMINOPHEN 650 MG: 325 TABLET ORAL at 22:15

## 2023-12-29 RX ADMIN — AMOXICILLIN AND CLAVULANATE POTASSIUM 1 TABLET: 875; 125 TABLET, FILM COATED ORAL at 22:58

## 2023-12-29 ASSESSMENT — LIFESTYLE VARIABLES
HOW MANY STANDARD DRINKS CONTAINING ALCOHOL DO YOU HAVE ON A TYPICAL DAY: PATIENT DOES NOT DRINK
HOW OFTEN DO YOU HAVE A DRINK CONTAINING ALCOHOL: NEVER

## 2023-12-29 NOTE — ED PROVIDER NOTES
[facility] EMERGENCY DEPT  eMERGENCYdEPARTMENT eNCOUnter      Pt Name: Eze Rosa  MRN: [id number]  Birthdate 1950  Date of evaluation: 12/29/2023  Provider:ADRIÁN Patel    CHIEF COMPLAINT       Chief Complaint   Patient presents with    Shortness of Breath     Fever illness x 1 week pt has hx of Pulmonary Fibrosis         HISTORY OF PRESENT ILLNESS  (Location/Symptom, Timing/Onset, Context/Setting, Quality, Duration, Modifying Factors, Severity.)   zEe Rosa is a 73 y.o. male who presents to the emergency department with complaints of SOA wheezing dry cough. 1 week dx pulmonary fibrosis recent covid and RSV dx. Fever chills on doxy and amoxicillin. 3L baseline now bumped up to 5L sees pulmonology at [facility]. Admission September of this year.     [facility]    Nursing Notes were reviewed and I agree.    REVIEW OF SYSTEMS    (2-9 systems for level 4, 10 or more for level 5)     Review of Systems   Constitutional:  Negative for activity change, appetite change, chills and fever.   HENT:  Negative for congestion and dental problem.    Eyes:  Negative for photophobia, discharge and itching.   Respiratory:  Positive for cough and shortness of breath. Negative for apnea.    Cardiovascular:  Negative for chest pain.   Musculoskeletal:  Negative for arthralgias, back pain, gait problem, myalgias and neck pain.   Skin:  Negative for color change, pallor and rash.   Neurological:  Negative for dizziness, seizures and syncope.   Psychiatric/Behavioral:  Negative for agitation. The patient is not nervous/anxious.         Except as noted above the remainder of the review of systems was reviewed and negative.       PAST MEDICAL HISTORY     Past Medical History:   Diagnosis Date    GERD (gastroesophageal reflux disease)     Pneumonia     Psychiatric problem          SURGICAL HISTORY       Past Surgical History:   Procedure Laterality Date    ABDOMEN SURGERY      COLONOSCOPY           CURRENT MEDICATIONS       Previous Medications

## 2023-12-30 PROBLEM — R93.5 ABNORMAL CT OF THE ABDOMEN: Status: RESOLVED | Noted: 2023-12-30 | Resolved: 2023-12-30

## 2023-12-30 PROBLEM — R93.5 ABNORMAL CT OF THE ABDOMEN: Status: ACTIVE | Noted: 2023-12-30

## 2023-12-30 PROBLEM — B33.8 RSV INFECTION: Status: ACTIVE | Noted: 2023-12-30

## 2023-12-30 PROBLEM — R10.9 ABDOMINAL PAIN: Status: ACTIVE | Noted: 2023-12-30

## 2023-12-30 LAB
ALBUMIN SERPL-MCNC: 3.1 G/DL (ref 3.5–5.2)
ALP SERPL-CCNC: 73 U/L (ref 40–130)
ALT SERPL-CCNC: 14 U/L (ref 5–41)
ANION GAP SERPL CALCULATED.3IONS-SCNC: 9 MMOL/L (ref 7–19)
AST SERPL-CCNC: 17 U/L (ref 5–40)
BASOPHILS # BLD: 0.1 K/UL (ref 0–0.2)
BASOPHILS NFR BLD: 0.7 % (ref 0–1)
BILIRUB SERPL-MCNC: 0.9 MG/DL (ref 0.2–1.2)
BUN SERPL-MCNC: 20 MG/DL (ref 8–23)
CALCIUM SERPL-MCNC: 9 MG/DL (ref 8.8–10.2)
CHLORIDE SERPL-SCNC: 94 MMOL/L (ref 98–111)
CO2 SERPL-SCNC: 27 MMOL/L (ref 22–29)
CREAT SERPL-MCNC: 0.7 MG/DL (ref 0.5–1.2)
EOSINOPHIL # BLD: 0 K/UL (ref 0–0.6)
EOSINOPHIL NFR BLD: 0.3 % (ref 0–5)
ERYTHROCYTE [DISTWIDTH] IN BLOOD BY AUTOMATED COUNT: 11.7 % (ref 11.5–14.5)
ETHANOLAMINE SERPL-MCNC: <10 MG/DL (ref 0–0.08)
GLUCOSE SERPL-MCNC: 137 MG/DL (ref 74–109)
HCT VFR BLD AUTO: 38.7 % (ref 42–52)
HGB BLD-MCNC: 13 G/DL (ref 14–18)
IMM GRANULOCYTES # BLD: 0.1 K/UL
LYMPHOCYTES # BLD: 0.9 K/UL (ref 1.1–4.5)
LYMPHOCYTES NFR BLD: 7.6 % (ref 20–40)
MAGNESIUM SERPL-MCNC: 1.8 MG/DL (ref 1.6–2.4)
MCH RBC QN AUTO: 32.2 PG (ref 27–31)
MCHC RBC AUTO-ENTMCNC: 33.6 G/DL (ref 33–37)
MCV RBC AUTO: 95.8 FL (ref 80–94)
MONOCYTES # BLD: 1.2 K/UL (ref 0–0.9)
MONOCYTES NFR BLD: 9.8 % (ref 0–10)
NEUTROPHILS # BLD: 9.5 K/UL (ref 1.5–7.5)
NEUTS SEG NFR BLD: 81.2 % (ref 50–65)
PHOSPHATE SERPL-MCNC: 4.2 MG/DL (ref 2.5–4.5)
PLATELET # BLD AUTO: 202 K/UL (ref 130–400)
PMV BLD AUTO: 10.3 FL (ref 9.4–12.4)
POTASSIUM SERPL-SCNC: 4.2 MMOL/L (ref 3.5–5)
PROCALCITONIN: 0.08 NG/ML (ref 0–0.09)
PROT SERPL-MCNC: 6.2 G/DL (ref 6.6–8.7)
RBC # BLD AUTO: 4.04 M/UL (ref 4.7–6.1)
SODIUM SERPL-SCNC: 130 MMOL/L (ref 136–145)
TROPONIN, HIGH SENSITIVITY: 31 NG/L (ref 0–22)
TROPONIN, HIGH SENSITIVITY: 40 NG/L (ref 0–22)
TSH SERPL DL<=0.005 MIU/L-ACNC: 1.4 UIU/ML (ref 0.35–5.5)
WBC # BLD AUTO: 11.7 K/UL (ref 4.8–10.8)

## 2023-12-30 PROCEDURE — 94150 VITAL CAPACITY TEST: CPT

## 2023-12-30 PROCEDURE — 83735 ASSAY OF MAGNESIUM: CPT

## 2023-12-30 PROCEDURE — 84100 ASSAY OF PHOSPHORUS: CPT

## 2023-12-30 PROCEDURE — 2500000003 HC RX 250 WO HCPCS: Performed by: INTERNAL MEDICINE

## 2023-12-30 PROCEDURE — 80053 COMPREHEN METABOLIC PANEL: CPT

## 2023-12-30 PROCEDURE — 51798 US URINE CAPACITY MEASURE: CPT

## 2023-12-30 PROCEDURE — 84484 ASSAY OF TROPONIN QUANT: CPT

## 2023-12-30 PROCEDURE — 6370000000 HC RX 637 (ALT 250 FOR IP): Performed by: HOSPITALIST

## 2023-12-30 PROCEDURE — 2700000000 HC OXYGEN THERAPY PER DAY

## 2023-12-30 PROCEDURE — 2500000003 HC RX 250 WO HCPCS: Performed by: HOSPITALIST

## 2023-12-30 PROCEDURE — 2580000003 HC RX 258: Performed by: HOSPITALIST

## 2023-12-30 PROCEDURE — 94640 AIRWAY INHALATION TREATMENT: CPT

## 2023-12-30 PROCEDURE — 84443 ASSAY THYROID STIM HORMONE: CPT

## 2023-12-30 PROCEDURE — 6360000002 HC RX W HCPCS: Performed by: INTERNAL MEDICINE

## 2023-12-30 PROCEDURE — 2140000000 HC CCU INTERMEDIATE R&B

## 2023-12-30 PROCEDURE — 6360000002 HC RX W HCPCS: Performed by: HOSPITALIST

## 2023-12-30 PROCEDURE — 82077 ASSAY SPEC XCP UR&BREATH IA: CPT

## 2023-12-30 PROCEDURE — 94760 N-INVAS EAR/PLS OXIMETRY 1: CPT

## 2023-12-30 PROCEDURE — 51702 INSERT TEMP BLADDER CATH: CPT

## 2023-12-30 PROCEDURE — 36415 COLL VENOUS BLD VENIPUNCTURE: CPT

## 2023-12-30 PROCEDURE — 85025 COMPLETE CBC W/AUTO DIFF WBC: CPT

## 2023-12-30 PROCEDURE — 84145 PROCALCITONIN (PCT): CPT

## 2023-12-30 RX ORDER — METOPROLOL TARTRATE 1 MG/ML
5 INJECTION, SOLUTION INTRAVENOUS EVERY 6 HOURS PRN
Status: DISCONTINUED | OUTPATIENT
Start: 2023-12-30 | End: 2023-12-30

## 2023-12-30 RX ORDER — ENOXAPARIN SODIUM 100 MG/ML
1 INJECTION SUBCUTANEOUS 2 TIMES DAILY
Status: DISCONTINUED | OUTPATIENT
Start: 2023-12-30 | End: 2024-01-02

## 2023-12-30 RX ORDER — ENOXAPARIN SODIUM 100 MG/ML
1 INJECTION SUBCUTANEOUS 2 TIMES DAILY
Status: DISCONTINUED | OUTPATIENT
Start: 2023-12-30 | End: 2023-12-30

## 2023-12-30 RX ORDER — DIGOXIN 0.25 MG/ML
250 INJECTION INTRAMUSCULAR; INTRAVENOUS ONCE
Status: COMPLETED | OUTPATIENT
Start: 2023-12-30 | End: 2023-12-30

## 2023-12-30 RX ORDER — DIGOXIN 0.25 MG/ML
125 INJECTION INTRAMUSCULAR; INTRAVENOUS DAILY
Status: DISCONTINUED | OUTPATIENT
Start: 2023-12-31 | End: 2024-01-03

## 2023-12-30 RX ADMIN — AMOXICILLIN AND CLAVULANATE POTASSIUM 1 TABLET: 875; 125 TABLET, FILM COATED ORAL at 21:16

## 2023-12-30 RX ADMIN — FOLIC ACID 1 MG: 1 TABLET ORAL at 09:01

## 2023-12-30 RX ADMIN — SODIUM CHLORIDE, PRESERVATIVE FREE 10 ML: 5 INJECTION INTRAVENOUS at 21:18

## 2023-12-30 RX ADMIN — Medication 1 TABLET: at 09:00

## 2023-12-30 RX ADMIN — ARFORMOTEROL TARTRATE 15 MCG: 15 SOLUTION RESPIRATORY (INHALATION) at 06:35

## 2023-12-30 RX ADMIN — METOPROLOL SUCCINATE 25 MG: 25 TABLET, EXTENDED RELEASE ORAL at 08:59

## 2023-12-30 RX ADMIN — ARFORMOTEROL TARTRATE 15 MCG: 15 SOLUTION RESPIRATORY (INHALATION) at 20:42

## 2023-12-30 RX ADMIN — DIGOXIN 250 MCG: 0.25 INJECTION INTRAMUSCULAR; INTRAVENOUS at 11:44

## 2023-12-30 RX ADMIN — BUDESONIDE INHALATION 500 MCG: 0.5 SUSPENSION RESPIRATORY (INHALATION) at 06:35

## 2023-12-30 RX ADMIN — DOXYCYCLINE HYCLATE 100 MG: 100 CAPSULE ORAL at 09:00

## 2023-12-30 RX ADMIN — ENOXAPARIN SODIUM 70 MG: 100 INJECTION SUBCUTANEOUS at 21:16

## 2023-12-30 RX ADMIN — AMOXICILLIN AND CLAVULANATE POTASSIUM 1 TABLET: 875; 125 TABLET, FILM COATED ORAL at 09:01

## 2023-12-30 RX ADMIN — METOPROLOL TARTRATE 5 MG: 5 INJECTION INTRAVENOUS at 12:22

## 2023-12-30 RX ADMIN — DEXMEDETOMIDINE HYDROCHLORIDE 0.8 MCG/KG/HR: 400 INJECTION, SOLUTION INTRAVENOUS at 02:01

## 2023-12-30 RX ADMIN — TAMSULOSIN HYDROCHLORIDE 0.8 MG: 0.4 CAPSULE ORAL at 09:02

## 2023-12-30 RX ADMIN — ACETAMINOPHEN 650 MG: 325 TABLET ORAL at 21:16

## 2023-12-30 RX ADMIN — SODIUM CHLORIDE, PRESERVATIVE FREE 10 ML: 5 INJECTION INTRAVENOUS at 09:02

## 2023-12-30 RX ADMIN — Medication 100 MG: at 09:01

## 2023-12-30 RX ADMIN — DOXYCYCLINE HYCLATE 100 MG: 100 CAPSULE ORAL at 21:16

## 2023-12-30 RX ADMIN — ENOXAPARIN SODIUM 40 MG: 100 INJECTION SUBCUTANEOUS at 09:02

## 2023-12-30 RX ADMIN — BUDESONIDE INHALATION 500 MCG: 0.5 SUSPENSION RESPIRATORY (INHALATION) at 20:42

## 2023-12-30 NOTE — PLAN OF CARE
Problem: Discharge Planning  Goal: Discharge to home or other facility with appropriate resources  Outcome: Progressing  Flowsheets (Taken 12/29/2023 2866)  Discharge to home or other facility with appropriate resources: Identify barriers to discharge with patient and caregiver     Problem: Safety - Adult  Goal: Free from fall injury  Outcome: Progressing     Problem: Skin/Tissue Integrity  Goal: Absence of new skin breakdown  Description: 1.  Monitor for areas of redness and/or skin breakdown  2.  Assess vascular access sites hourly  3.  Every 4-6 hours minimum:  Change oxygen saturation probe site  4.  Every 4-6 hours:  If on nasal continuous positive airway pressure, respiratory therapy assess nares and determine need for appliance change or resting period.  Outcome: Progressing     Problem: Chronic Conditions and Co-morbidities  Goal: Patient's chronic conditions and co-morbidity symptoms are monitored and maintained or improved  Outcome: Progressing

## 2023-12-30 NOTE — CONSULTS
[facility]     Consult Note    Patient ID: Eze Rosa  73 y.o.  male  YOB: 1950    Admitting Diagnosis: Dyspnea and respiratory abnormalities [R06.00, R06.89]  Acute on chronic hypoxic respiratory failure (HCC) [J96.21]      Chief Complaint:  Chief Complaint   Patient presents with    Shortness of Breath     Fever illness x 1 week pt has hx of Pulmonary Fibrosis         History of Present Illness (HPI):   Mr. Rosa is a 73 y.o. male with heavy alcohol use, pulmonary fibrosis on 2L NC at home, recent covid and RSV diagnosis presented to [facility] with complaint of SOB, fever chills and wheezing dry cough for about a week per report.  Patient was admitted for worsening shortness of breath last night and when found to have pain on palpation of his abdomen and CT abdomen was ordered demonstrating a Spigelian hernia with small bowel obstruction and thus a surgical consultation was requested.  All of this information was taken from patient's chart.  The patient is not able to provide much history and does not appear to be oriented only asking \"what has happened\" \"what has happened\" repetitively.   After I return back to the intensive care unit I was able to speak with patient's wife as she had just arrived whom was able to give me significantly amount more information.  Much of it correlated with the above but in addition she had said he has been drinking at least 15 beers a day for the last 5 years over the last couple of weeks he has become more difficult and more unmanageable and highly irritable with this worsening over the last 2 days where he is become more tremulous weak unable to stand even to a point where he was not able to drink as he does on a daily basis along with significant anorexia.  She does state that he has been having loose bowel movements through this.  And 2 days ago on 1 occasion he did complain of right lower quadrant discomfort though he has

## 2023-12-30 NOTE — H&P
[facility]      Hospitalist - History & Physical      PCP: Ross Masterson MD    Date of Admission: 12/29/2023    Date of Service: 12/29/2023    Chief Complaint: Shortness of breath    History Of Present Illness:   The patient is a 73 y.o. male with pulmonary fibrosis, BPH, alcoholism, continuous supplemental oxygen 3 LNC, complaining of shortness of breath.  Patient was recently seen at [facility] on 12/20 due to shortness of breath.  Found to have COVID, RSV with possible pneumonia.  Was offered admission however patient maintained on his baseline home oxygen and wished to be discharged.  He was discharged home with Augmentin and doxycycline X10 days.  Patient presents to [facility] ER today due to worsening dyspnea.  Patient states that he has had dyspnea on minimal exertion, frequent dry cough, low-grade fever, ongoing for the last week.  Upon examination severe diffuse abdominal tenderness with minimal palpation.  He denies nausea, vomiting, chest pain, and lower extremity edema.    Patient states that he has not had any alcohol for the past 4 months.  Prior history of alcoholism did drink approximately 30 beers a day for 17 years.  Currently upon assessment patient is very tremulous bilateral upper extremities.  Patient and spouse adamantly state no recent alcohol use.  Workup in ER CTA no pulmonary emboli, chronic emphysematous/fibrotic change, stable small right effusion and bibasilar atelectasis, CT abdomen pelvis spigelian hernia on left containing a small segment of mid small bowel which appears obstructed at this level with decompressed bowel distal to it findings consistent with bowel obstruction, lactic acid 1.9 with repeat 1.1.ABG pH 7.5 pCO2 34 pO2 64 HCO3 26.5 currently maintained on 3 LNC.  Patient is to be admitted to the hospital service with consultation to general surgery.  Past Medical History:        Diagnosis Date    GERD (gastroesophageal reflux disease)     Pneumonia

## 2023-12-30 NOTE — PLAN OF CARE
SUBJECTIVE:    ED PA:  Patient is normally on 3 LPM via N, now needing up to 5LPM ACUTE ON CHRONIC HYPOXEMIC RESP FAILURE  Had had COVID recently but has since cleared that, however HAS RSV INFECTION currently  Hx Pulm Fibrosis - has been refused transplant    Heavy alcohol abuse of 20-30 beers per day reported      OBJECTIVE:    /87   Pulse 97   Temp 98 °F (36.7 °C)   Resp (!) 37   SpO2 92%       ASSESSMENTS & PLANS:    Acute on Chronic Hypoxemic Respiratory Failure likely related to RSV infection: up from 3LPM to 5LPM  Respiratory Failure - Breathing at 45 bpm  Tobacco Abuse in remission: quit in 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  Admit to ICU  Droplet Precuations  Albuterol NEB PRN  Mucomyst NEB PRN  Arformoterol and Budesonide NEBS BID in place of home symbicort  tele    Elevated DDimer: Age corrected normal  CTA ordered by ED PA is pending, it is being delayed as the CMP has not yet resulted    Suspecting Withdrawal from surreptitious alcohol usage  Hx Alcohol Abuse: states 3 months clean  Add on Alcohol level or redraw if needed  Precedex GGT  Continue thiamine and multivitamin  Add folic acid daily    Severely tender abdomen:  Hx Appy, and Ostomy which was since reversed  Intermittent obstipation  CTA Abdomen & Pelvis to be done with the already ordered CTA chest  Chronic Medical Problems:  Continue home regimen as indicated  Any puffers will be subbed to nebulizers as able, and any oral  antihyperglycemics to an insuling regimen with POCT scheduled nad PRN as well as providion of an antihypoglycemic orders set for safety    Supportive and Prophylactic Txx:  DVT PPx: lovenox SQ - PPx dosing planned  GI (PUD) PPx: not indicated  PT: as per age with admitted status as well as per critical illness      Case d/w ED PA & Hospitliast NP in detail  Chart reviewed   Orders entered by me with CPOE  Case d/w NP in detail  Patient seen and examined by me in tandem with NP      UPDATED:      Incarcerated but NOT strangulated Left

## 2023-12-31 ENCOUNTER — APPOINTMENT (OUTPATIENT)
Dept: CT IMAGING | Age: 73
DRG: 189 | End: 2023-12-31
Payer: MEDICARE

## 2023-12-31 LAB
ANION GAP SERPL CALCULATED.3IONS-SCNC: 10 MMOL/L (ref 7–19)
BASOPHILS # BLD: 0.1 K/UL (ref 0–0.2)
BASOPHILS NFR BLD: 0.4 % (ref 0–1)
BUN SERPL-MCNC: 18 MG/DL (ref 8–23)
CALCIUM SERPL-MCNC: 8.6 MG/DL (ref 8.8–10.2)
CHLORIDE SERPL-SCNC: 95 MMOL/L (ref 98–111)
CO2 SERPL-SCNC: 25 MMOL/L (ref 22–29)
CREAT SERPL-MCNC: 0.6 MG/DL (ref 0.5–1.2)
EOSINOPHIL # BLD: 0.2 K/UL (ref 0–0.6)
EOSINOPHIL NFR BLD: 1.9 % (ref 0–5)
ERYTHROCYTE [DISTWIDTH] IN BLOOD BY AUTOMATED COUNT: 11.7 % (ref 11.5–14.5)
GLUCOSE SERPL-MCNC: 132 MG/DL (ref 74–109)
HCT VFR BLD AUTO: 39.3 % (ref 42–52)
HGB BLD-MCNC: 13.1 G/DL (ref 14–18)
IMM GRANULOCYTES # BLD: 0 K/UL
LYMPHOCYTES # BLD: 0.8 K/UL (ref 1.1–4.5)
LYMPHOCYTES NFR BLD: 6.5 % (ref 20–40)
MAGNESIUM SERPL-MCNC: 1.9 MG/DL (ref 1.6–2.4)
MCH RBC QN AUTO: 32.1 PG (ref 27–31)
MCHC RBC AUTO-ENTMCNC: 33.3 G/DL (ref 33–37)
MCV RBC AUTO: 96.3 FL (ref 80–94)
MONOCYTES # BLD: 0.8 K/UL (ref 0–0.9)
MONOCYTES NFR BLD: 6.3 % (ref 0–10)
NEUTROPHILS # BLD: 10 K/UL (ref 1.5–7.5)
NEUTS SEG NFR BLD: 84.6 % (ref 50–65)
PLATELET # BLD AUTO: 196 K/UL (ref 130–400)
PMV BLD AUTO: 10.3 FL (ref 9.4–12.4)
POTASSIUM SERPL-SCNC: 3.8 MMOL/L (ref 3.5–5)
RBC # BLD AUTO: 4.08 M/UL (ref 4.7–6.1)
SODIUM SERPL-SCNC: 130 MMOL/L (ref 136–145)
TROPONIN, HIGH SENSITIVITY: 26 NG/L (ref 0–22)
WBC # BLD AUTO: 11.9 K/UL (ref 4.8–10.8)

## 2023-12-31 PROCEDURE — 6370000000 HC RX 637 (ALT 250 FOR IP): Performed by: INTERNAL MEDICINE

## 2023-12-31 PROCEDURE — 83735 ASSAY OF MAGNESIUM: CPT

## 2023-12-31 PROCEDURE — 93306 TTE W/DOPPLER COMPLETE: CPT

## 2023-12-31 PROCEDURE — 70450 CT HEAD/BRAIN W/O DYE: CPT

## 2023-12-31 PROCEDURE — 94640 AIRWAY INHALATION TREATMENT: CPT

## 2023-12-31 PROCEDURE — 94760 N-INVAS EAR/PLS OXIMETRY 1: CPT

## 2023-12-31 PROCEDURE — 2700000000 HC OXYGEN THERAPY PER DAY

## 2023-12-31 PROCEDURE — 80048 BASIC METABOLIC PNL TOTAL CA: CPT

## 2023-12-31 PROCEDURE — 85025 COMPLETE CBC W/AUTO DIFF WBC: CPT

## 2023-12-31 PROCEDURE — 6360000002 HC RX W HCPCS: Performed by: INTERNAL MEDICINE

## 2023-12-31 PROCEDURE — 2500000003 HC RX 250 WO HCPCS: Performed by: HOSPITALIST

## 2023-12-31 PROCEDURE — 99221 1ST HOSP IP/OBS SF/LOW 40: CPT | Performed by: SURGERY

## 2023-12-31 PROCEDURE — 6370000000 HC RX 637 (ALT 250 FOR IP): Performed by: HOSPITALIST

## 2023-12-31 PROCEDURE — 36415 COLL VENOUS BLD VENIPUNCTURE: CPT

## 2023-12-31 PROCEDURE — 6360000002 HC RX W HCPCS: Performed by: HOSPITALIST

## 2023-12-31 PROCEDURE — 84484 ASSAY OF TROPONIN QUANT: CPT

## 2023-12-31 PROCEDURE — 2580000003 HC RX 258: Performed by: HOSPITALIST

## 2023-12-31 PROCEDURE — 2140000000 HC CCU INTERMEDIATE R&B

## 2023-12-31 RX ORDER — SODIUM CHLORIDE 9 MG/ML
INJECTION, SOLUTION INTRAVENOUS ONCE
Status: COMPLETED | OUTPATIENT
Start: 2023-12-31 | End: 2023-12-31

## 2023-12-31 RX ORDER — METOPROLOL SUCCINATE 25 MG/1
25 TABLET, EXTENDED RELEASE ORAL DAILY
Status: DISCONTINUED | OUTPATIENT
Start: 2023-12-31 | End: 2024-01-03

## 2023-12-31 RX ADMIN — Medication 100 MG: at 09:46

## 2023-12-31 RX ADMIN — Medication 1 MG/MIN: at 01:59

## 2023-12-31 RX ADMIN — TAMSULOSIN HYDROCHLORIDE 0.8 MG: 0.4 CAPSULE ORAL at 09:46

## 2023-12-31 RX ADMIN — Medication 1 TABLET: at 09:48

## 2023-12-31 RX ADMIN — FOLIC ACID 1 MG: 1 TABLET ORAL at 09:46

## 2023-12-31 RX ADMIN — METOPROLOL SUCCINATE 25 MG: 25 TABLET, EXTENDED RELEASE ORAL at 14:30

## 2023-12-31 RX ADMIN — DIGOXIN 125 MCG: 250 INJECTION, SOLUTION INTRAMUSCULAR; INTRAVENOUS; PARENTERAL at 09:47

## 2023-12-31 RX ADMIN — ENOXAPARIN SODIUM 70 MG: 100 INJECTION SUBCUTANEOUS at 09:43

## 2023-12-31 RX ADMIN — ACETAMINOPHEN 650 MG: 325 TABLET ORAL at 09:48

## 2023-12-31 RX ADMIN — BUDESONIDE INHALATION 500 MCG: 0.5 SUSPENSION RESPIRATORY (INHALATION) at 18:16

## 2023-12-31 RX ADMIN — SODIUM CHLORIDE, PRESERVATIVE FREE 10 ML: 5 INJECTION INTRAVENOUS at 09:49

## 2023-12-31 RX ADMIN — SODIUM CHLORIDE: 9 INJECTION, SOLUTION INTRAVENOUS at 01:56

## 2023-12-31 RX ADMIN — ARFORMOTEROL TARTRATE 15 MCG: 15 SOLUTION RESPIRATORY (INHALATION) at 18:16

## 2023-12-31 RX ADMIN — ENOXAPARIN SODIUM 70 MG: 100 INJECTION SUBCUTANEOUS at 20:52

## 2023-12-31 RX ADMIN — DRONEDARONE 400 MG: 400 TABLET, FILM COATED ORAL at 09:46

## 2023-12-31 RX ADMIN — ARFORMOTEROL TARTRATE 15 MCG: 15 SOLUTION RESPIRATORY (INHALATION) at 06:19

## 2023-12-31 RX ADMIN — BUDESONIDE INHALATION 500 MCG: 0.5 SUSPENSION RESPIRATORY (INHALATION) at 06:19

## 2023-12-31 ASSESSMENT — PAIN - FUNCTIONAL ASSESSMENT: PAIN_FUNCTIONAL_ASSESSMENT: ACTIVITIES ARE NOT PREVENTED

## 2023-12-31 ASSESSMENT — PAIN SCALES - GENERAL
PAINLEVEL_OUTOF10: 0
PAINLEVEL_OUTOF10: 4
PAINLEVEL_OUTOF10: 6
PAINLEVEL_OUTOF10: 0

## 2023-12-31 ASSESSMENT — PAIN DESCRIPTION - LOCATION: LOCATION: ABDOMEN

## 2023-12-31 ASSESSMENT — PAIN DESCRIPTION - ONSET: ONSET: GRADUAL

## 2023-12-31 ASSESSMENT — PAIN DESCRIPTION - FREQUENCY: FREQUENCY: CONTINUOUS

## 2023-12-31 ASSESSMENT — PAIN DESCRIPTION - ORIENTATION: ORIENTATION: MID

## 2023-12-31 ASSESSMENT — PAIN DESCRIPTION - DIRECTION: RADIATING_TOWARDS: GENERALIZED

## 2023-12-31 ASSESSMENT — PAIN DESCRIPTION - DESCRIPTORS: DESCRIPTORS: ACHING

## 2023-12-31 ASSESSMENT — PAIN DESCRIPTION - PAIN TYPE: TYPE: CHRONIC PAIN

## 2023-12-31 NOTE — PLAN OF CARE
SUBJECTIVE:    Pt with a fib Hx  BP /67-84 with -160       OBJECTIVE:    /64   Pulse 98   Temp 99.8 °F (37.7 °C) (Oral)   Resp 18   Ht 1.93 m (6' 4\")   Wt 70.6 kg (155 lb 11.2 oz)   SpO2 94%   BMI 18.95 kg/m²       ASSESSMENTS & PLANS:    A Fib versus Flutter, Paroxysmal NOT Permanent:  Amiodarone bolus followed by GGT for 6h tonight  Multaq 400mg BID from AM  Tele

## 2023-12-31 NOTE — PLAN OF CARE
Problem: Discharge Planning  Goal: Discharge to home or other facility with appropriate resources  Outcome: Progressing     Problem: Safety - Adult  Goal: Free from fall injury  Outcome: Progressing     Problem: Skin/Tissue Integrity  Goal: Absence of new skin breakdown  Description: 1.  Monitor for areas of redness and/or skin breakdown  2.  Assess vascular access sites hourly  3.  Every 4-6 hours minimum:  Change oxygen saturation probe site  4.  Every 4-6 hours:  If on nasal continuous positive airway pressure, respiratory therapy assess nares and determine need for appliance change or resting period.  Outcome: Progressing     Problem: Chronic Conditions and Co-morbidities  Goal: Patient's chronic conditions and co-morbidity symptoms are monitored and maintained or improved  Outcome: Progressing     Problem: Anxiety  Goal: Will report anxiety at manageable levels  Description: INTERVENTIONS:  1. Administer medication as ordered  2. Teach and rehearse alternative coping skills  3. Provide emotional support with 1:1 interaction with staff  Outcome: Progressing     Problem: Confusion  Goal: Confusion, delirium, dementia, or psychosis is improved or at baseline  Description: INTERVENTIONS:  1. Assess for possible contributors to thought disturbance, including medications, impaired vision or hearing, underlying metabolic abnormalities, dehydration, psychiatric diagnoses, and notify attending LIP  2. Chemult high risk fall precautions, as indicated  3. Provide frequent short contacts to provide reality reorientation, refocusing and direction  4. Decrease environmental stimuli, including noise as appropriate  5. Monitor and intervene to maintain adequate nutrition, hydration, elimination, sleep and activity  6. If unable to ensure safety without constant attention obtain sitter and review sitter guidelines with assigned personnel  7. Initiate Psychosocial CNS and Spiritual Care consult, as indicated  Outcome:

## 2024-01-01 LAB
ANION GAP SERPL CALCULATED.3IONS-SCNC: 9 MMOL/L (ref 7–19)
BASOPHILS # BLD: 0.1 K/UL (ref 0–0.2)
BASOPHILS NFR BLD: 0.5 % (ref 0–1)
BUN SERPL-MCNC: 13 MG/DL (ref 8–23)
CALCIUM SERPL-MCNC: 8.4 MG/DL (ref 8.8–10.2)
CHLORIDE SERPL-SCNC: 96 MMOL/L (ref 98–111)
CO2 SERPL-SCNC: 25 MMOL/L (ref 22–29)
CREAT SERPL-MCNC: 0.5 MG/DL (ref 0.5–1.2)
EOSINOPHIL # BLD: 0 K/UL (ref 0–0.6)
EOSINOPHIL NFR BLD: 0.4 % (ref 0–5)
ERYTHROCYTE [DISTWIDTH] IN BLOOD BY AUTOMATED COUNT: 11.4 % (ref 11.5–14.5)
GLUCOSE SERPL-MCNC: 107 MG/DL (ref 74–109)
HCT VFR BLD AUTO: 39.8 % (ref 42–52)
HGB BLD-MCNC: 13.4 G/DL (ref 14–18)
IMM GRANULOCYTES # BLD: 0 K/UL
LYMPHOCYTES # BLD: 0.8 K/UL (ref 1.1–4.5)
LYMPHOCYTES NFR BLD: 8 % (ref 20–40)
MAGNESIUM SERPL-MCNC: 1.7 MG/DL (ref 1.6–2.4)
MCH RBC QN AUTO: 32.4 PG (ref 27–31)
MCHC RBC AUTO-ENTMCNC: 33.7 G/DL (ref 33–37)
MCV RBC AUTO: 96.4 FL (ref 80–94)
MONOCYTES # BLD: 0.6 K/UL (ref 0–0.9)
MONOCYTES NFR BLD: 6.6 % (ref 0–10)
NEUTROPHILS # BLD: 8 K/UL (ref 1.5–7.5)
NEUTS SEG NFR BLD: 84.1 % (ref 50–65)
PLATELET # BLD AUTO: 189 K/UL (ref 130–400)
PMV BLD AUTO: 10.2 FL (ref 9.4–12.4)
POTASSIUM SERPL-SCNC: 3.9 MMOL/L (ref 3.5–5)
RBC # BLD AUTO: 4.13 M/UL (ref 4.7–6.1)
SODIUM SERPL-SCNC: 130 MMOL/L (ref 136–145)
WBC # BLD AUTO: 9.5 K/UL (ref 4.8–10.8)

## 2024-01-01 PROCEDURE — 6370000000 HC RX 637 (ALT 250 FOR IP): Performed by: INTERNAL MEDICINE

## 2024-01-01 PROCEDURE — 6360000002 HC RX W HCPCS: Performed by: HOSPITALIST

## 2024-01-01 PROCEDURE — 83735 ASSAY OF MAGNESIUM: CPT

## 2024-01-01 PROCEDURE — 97162 PT EVAL MOD COMPLEX 30 MIN: CPT

## 2024-01-01 PROCEDURE — 85025 COMPLETE CBC W/AUTO DIFF WBC: CPT

## 2024-01-01 PROCEDURE — 94640 AIRWAY INHALATION TREATMENT: CPT

## 2024-01-01 PROCEDURE — 6370000000 HC RX 637 (ALT 250 FOR IP): Performed by: HOSPITALIST

## 2024-01-01 PROCEDURE — 2500000003 HC RX 250 WO HCPCS: Performed by: INTERNAL MEDICINE

## 2024-01-01 PROCEDURE — 6360000002 HC RX W HCPCS: Performed by: INTERNAL MEDICINE

## 2024-01-01 PROCEDURE — 94760 N-INVAS EAR/PLS OXIMETRY 1: CPT

## 2024-01-01 PROCEDURE — 2700000000 HC OXYGEN THERAPY PER DAY

## 2024-01-01 PROCEDURE — 2580000003 HC RX 258: Performed by: HOSPITALIST

## 2024-01-01 PROCEDURE — 36415 COLL VENOUS BLD VENIPUNCTURE: CPT

## 2024-01-01 PROCEDURE — 97530 THERAPEUTIC ACTIVITIES: CPT

## 2024-01-01 PROCEDURE — 2140000000 HC CCU INTERMEDIATE R&B

## 2024-01-01 PROCEDURE — 80048 BASIC METABOLIC PNL TOTAL CA: CPT

## 2024-01-01 RX ADMIN — FOLIC ACID 1 MG: 1 TABLET ORAL at 09:34

## 2024-01-01 RX ADMIN — ENOXAPARIN SODIUM 70 MG: 100 INJECTION SUBCUTANEOUS at 09:34

## 2024-01-01 RX ADMIN — ENOXAPARIN SODIUM 70 MG: 100 INJECTION SUBCUTANEOUS at 21:49

## 2024-01-01 RX ADMIN — TAMSULOSIN HYDROCHLORIDE 0.8 MG: 0.4 CAPSULE ORAL at 09:34

## 2024-01-01 RX ADMIN — BUDESONIDE INHALATION 500 MCG: 0.5 SUSPENSION RESPIRATORY (INHALATION) at 18:02

## 2024-01-01 RX ADMIN — Medication 0.5 MG/MIN: at 21:46

## 2024-01-01 RX ADMIN — ARFORMOTEROL TARTRATE 15 MCG: 15 SOLUTION RESPIRATORY (INHALATION) at 18:02

## 2024-01-01 RX ADMIN — Medication 100 MG: at 09:34

## 2024-01-01 RX ADMIN — Medication 1 MG/MIN: at 11:37

## 2024-01-01 RX ADMIN — METOPROLOL SUCCINATE 25 MG: 25 TABLET, EXTENDED RELEASE ORAL at 09:34

## 2024-01-01 RX ADMIN — SODIUM CHLORIDE, PRESERVATIVE FREE 10 ML: 5 INJECTION INTRAVENOUS at 09:34

## 2024-01-01 RX ADMIN — Medication 0.5 MG/MIN: at 18:04

## 2024-01-01 RX ADMIN — ARFORMOTEROL TARTRATE 15 MCG: 15 SOLUTION RESPIRATORY (INHALATION) at 10:13

## 2024-01-01 RX ADMIN — Medication 1 TABLET: at 09:34

## 2024-01-01 RX ADMIN — ACETAMINOPHEN 650 MG: 325 TABLET ORAL at 12:05

## 2024-01-01 RX ADMIN — DIGOXIN 125 MCG: 250 INJECTION, SOLUTION INTRAMUSCULAR; INTRAVENOUS; PARENTERAL at 09:34

## 2024-01-01 RX ADMIN — BUDESONIDE INHALATION 500 MCG: 0.5 SUSPENSION RESPIRATORY (INHALATION) at 10:13

## 2024-01-01 ASSESSMENT — PAIN SCALES - GENERAL
PAINLEVEL_OUTOF10: 5
PAINLEVEL_OUTOF10: 8

## 2024-01-01 ASSESSMENT — PAIN DESCRIPTION - ORIENTATION: ORIENTATION: DISTAL

## 2024-01-01 ASSESSMENT — PAIN DESCRIPTION - DESCRIPTORS: DESCRIPTORS: ACHING

## 2024-01-01 ASSESSMENT — PAIN - FUNCTIONAL ASSESSMENT: PAIN_FUNCTIONAL_ASSESSMENT: PREVENTS OR INTERFERES SOME ACTIVE ACTIVITIES AND ADLS

## 2024-01-01 ASSESSMENT — PAIN DESCRIPTION - LOCATION: LOCATION: GENERALIZED

## 2024-01-02 LAB
ANION GAP SERPL CALCULATED.3IONS-SCNC: 9 MMOL/L (ref 7–19)
BASOPHILS # BLD: 0 K/UL (ref 0–0.2)
BASOPHILS NFR BLD: 0.4 % (ref 0–1)
BUN SERPL-MCNC: 11 MG/DL (ref 8–23)
CALCIUM SERPL-MCNC: 8.5 MG/DL (ref 8.8–10.2)
CHLORIDE SERPL-SCNC: 96 MMOL/L (ref 98–111)
CO2 SERPL-SCNC: 26 MMOL/L (ref 22–29)
CREAT SERPL-MCNC: 0.4 MG/DL (ref 0.5–1.2)
DIGOXIN SERPL-MCNC: 0.9 NG/ML (ref 0.6–1.2)
EKG P AXIS: -1 DEGREES
EKG P-R INTERVAL: 128 MS
EKG Q-T INTERVAL: 318 MS
EKG QRS DURATION: 76 MS
EKG QTC CALCULATION (BAZETT): 400 MS
EKG T AXIS: 8 DEGREES
EOSINOPHIL # BLD: 0.2 K/UL (ref 0–0.6)
EOSINOPHIL NFR BLD: 1.9 % (ref 0–5)
ERYTHROCYTE [DISTWIDTH] IN BLOOD BY AUTOMATED COUNT: 11.3 % (ref 11.5–14.5)
GLUCOSE BLD-MCNC: 144 MG/DL (ref 70–99)
GLUCOSE BLD-MCNC: 155 MG/DL (ref 70–99)
GLUCOSE SERPL-MCNC: 119 MG/DL (ref 74–109)
HCT VFR BLD AUTO: 35.8 % (ref 42–52)
HGB BLD-MCNC: 12.3 G/DL (ref 14–18)
IMM GRANULOCYTES # BLD: 0.1 K/UL
LYMPHOCYTES # BLD: 0.8 K/UL (ref 1.1–4.5)
LYMPHOCYTES NFR BLD: 7.5 % (ref 20–40)
MAGNESIUM SERPL-MCNC: 1.8 MG/DL (ref 1.6–2.4)
MCH RBC QN AUTO: 32.4 PG (ref 27–31)
MCHC RBC AUTO-ENTMCNC: 34.4 G/DL (ref 33–37)
MCV RBC AUTO: 94.2 FL (ref 80–94)
MONOCYTES # BLD: 0.7 K/UL (ref 0–0.9)
MONOCYTES NFR BLD: 6.4 % (ref 0–10)
NEUTROPHILS # BLD: 9.1 K/UL (ref 1.5–7.5)
NEUTS SEG NFR BLD: 83.3 % (ref 50–65)
PERFORMED ON: ABNORMAL
PERFORMED ON: ABNORMAL
PLATELET # BLD AUTO: 178 K/UL (ref 130–400)
PMV BLD AUTO: 10.4 FL (ref 9.4–12.4)
POTASSIUM SERPL-SCNC: 3.4 MMOL/L (ref 3.5–5)
RBC # BLD AUTO: 3.8 M/UL (ref 4.7–6.1)
SODIUM SERPL-SCNC: 131 MMOL/L (ref 136–145)
WBC # BLD AUTO: 11 K/UL (ref 4.8–10.8)

## 2024-01-02 PROCEDURE — 6370000000 HC RX 637 (ALT 250 FOR IP): Performed by: INTERNAL MEDICINE

## 2024-01-02 PROCEDURE — 82962 GLUCOSE BLOOD TEST: CPT

## 2024-01-02 PROCEDURE — 94640 AIRWAY INHALATION TREATMENT: CPT

## 2024-01-02 PROCEDURE — 83735 ASSAY OF MAGNESIUM: CPT

## 2024-01-02 PROCEDURE — 80162 ASSAY OF DIGOXIN TOTAL: CPT

## 2024-01-02 PROCEDURE — 2140000000 HC CCU INTERMEDIATE R&B

## 2024-01-02 PROCEDURE — 36415 COLL VENOUS BLD VENIPUNCTURE: CPT

## 2024-01-02 PROCEDURE — 85025 COMPLETE CBC W/AUTO DIFF WBC: CPT

## 2024-01-02 PROCEDURE — 94760 N-INVAS EAR/PLS OXIMETRY 1: CPT

## 2024-01-02 PROCEDURE — 80048 BASIC METABOLIC PNL TOTAL CA: CPT

## 2024-01-02 PROCEDURE — 2580000003 HC RX 258: Performed by: HOSPITALIST

## 2024-01-02 PROCEDURE — 2580000003 HC RX 258: Performed by: INTERNAL MEDICINE

## 2024-01-02 PROCEDURE — 6370000000 HC RX 637 (ALT 250 FOR IP): Performed by: HOSPITALIST

## 2024-01-02 PROCEDURE — 2700000000 HC OXYGEN THERAPY PER DAY

## 2024-01-02 PROCEDURE — 6360000002 HC RX W HCPCS: Performed by: INTERNAL MEDICINE

## 2024-01-02 PROCEDURE — 6360000002 HC RX W HCPCS: Performed by: HOSPITALIST

## 2024-01-02 PROCEDURE — 94150 VITAL CAPACITY TEST: CPT

## 2024-01-02 RX ORDER — POTASSIUM CHLORIDE 20 MEQ/1
20 TABLET, EXTENDED RELEASE ORAL 2 TIMES DAILY
Status: DISCONTINUED | OUTPATIENT
Start: 2024-01-02 | End: 2024-01-03

## 2024-01-02 RX ORDER — ENOXAPARIN SODIUM 100 MG/ML
1 INJECTION SUBCUTANEOUS 2 TIMES DAILY
Status: DISCONTINUED | OUTPATIENT
Start: 2024-01-02 | End: 2024-01-10

## 2024-01-02 RX ADMIN — Medication 0.5 MG/MIN: at 13:22

## 2024-01-02 RX ADMIN — ARFORMOTEROL TARTRATE 15 MCG: 15 SOLUTION RESPIRATORY (INHALATION) at 06:10

## 2024-01-02 RX ADMIN — BUDESONIDE INHALATION 500 MCG: 0.5 SUSPENSION RESPIRATORY (INHALATION) at 06:11

## 2024-01-02 RX ADMIN — ARFORMOTEROL TARTRATE 15 MCG: 15 SOLUTION RESPIRATORY (INHALATION) at 18:06

## 2024-01-02 RX ADMIN — SODIUM CHLORIDE: 9 INJECTION, SOLUTION INTRAVENOUS at 07:50

## 2024-01-02 RX ADMIN — TAMSULOSIN HYDROCHLORIDE 0.8 MG: 0.4 CAPSULE ORAL at 07:59

## 2024-01-02 RX ADMIN — ENOXAPARIN SODIUM 70 MG: 100 INJECTION SUBCUTANEOUS at 08:00

## 2024-01-02 RX ADMIN — ENOXAPARIN SODIUM 80 MG: 100 INJECTION SUBCUTANEOUS at 20:51

## 2024-01-02 RX ADMIN — POTASSIUM CHLORIDE 20 MEQ: 1500 TABLET, EXTENDED RELEASE ORAL at 20:51

## 2024-01-02 RX ADMIN — DIGOXIN 125 MCG: 250 INJECTION, SOLUTION INTRAMUSCULAR; INTRAVENOUS; PARENTERAL at 07:59

## 2024-01-02 RX ADMIN — FOLIC ACID 1 MG: 1 TABLET ORAL at 07:59

## 2024-01-02 RX ADMIN — POTASSIUM CHLORIDE 20 MEQ: 1500 TABLET, EXTENDED RELEASE ORAL at 11:11

## 2024-01-02 RX ADMIN — METOPROLOL SUCCINATE 25 MG: 25 TABLET, EXTENDED RELEASE ORAL at 07:59

## 2024-01-02 RX ADMIN — BUDESONIDE INHALATION 500 MCG: 0.5 SUSPENSION RESPIRATORY (INHALATION) at 18:06

## 2024-01-02 RX ADMIN — Medication 1 TABLET: at 08:01

## 2024-01-02 RX ADMIN — Medication 100 MG: at 08:00

## 2024-01-02 ASSESSMENT — PAIN SCALES - GENERAL: PAINLEVEL_OUTOF10: 3

## 2024-01-02 ASSESSMENT — PAIN DESCRIPTION - LOCATION: LOCATION: GENERALIZED

## 2024-01-03 LAB
ANION GAP SERPL CALCULATED.3IONS-SCNC: 11 MMOL/L (ref 7–19)
BACTERIA BLD CULT ORG #2: NORMAL
BACTERIA BLD CULT: NORMAL
BUN SERPL-MCNC: 8 MG/DL (ref 8–23)
CALCIUM SERPL-MCNC: 8.5 MG/DL (ref 8.8–10.2)
CHLORIDE SERPL-SCNC: 93 MMOL/L (ref 98–111)
CO2 SERPL-SCNC: 25 MMOL/L (ref 22–29)
CREAT SERPL-MCNC: 0.5 MG/DL (ref 0.5–1.2)
GLUCOSE SERPL-MCNC: 147 MG/DL (ref 74–109)
MAGNESIUM SERPL-MCNC: 1.7 MG/DL (ref 1.6–2.4)
POTASSIUM SERPL-SCNC: 3.4 MMOL/L (ref 3.5–5)
SODIUM SERPL-SCNC: 129 MMOL/L (ref 136–145)

## 2024-01-03 PROCEDURE — 94760 N-INVAS EAR/PLS OXIMETRY 1: CPT

## 2024-01-03 PROCEDURE — 80048 BASIC METABOLIC PNL TOTAL CA: CPT

## 2024-01-03 PROCEDURE — 2500000003 HC RX 250 WO HCPCS: Performed by: INTERNAL MEDICINE

## 2024-01-03 PROCEDURE — 97165 OT EVAL LOW COMPLEX 30 MIN: CPT

## 2024-01-03 PROCEDURE — 6370000000 HC RX 637 (ALT 250 FOR IP): Performed by: INTERNAL MEDICINE

## 2024-01-03 PROCEDURE — 51798 US URINE CAPACITY MEASURE: CPT

## 2024-01-03 PROCEDURE — 99222 1ST HOSP IP/OBS MODERATE 55: CPT | Performed by: UROLOGY

## 2024-01-03 PROCEDURE — 94640 AIRWAY INHALATION TREATMENT: CPT

## 2024-01-03 PROCEDURE — 93005 ELECTROCARDIOGRAM TRACING: CPT

## 2024-01-03 PROCEDURE — 99222 1ST HOSP IP/OBS MODERATE 55: CPT | Performed by: INTERNAL MEDICINE

## 2024-01-03 PROCEDURE — 6360000002 HC RX W HCPCS: Performed by: HOSPITALIST

## 2024-01-03 PROCEDURE — 51702 INSERT TEMP BLADDER CATH: CPT | Performed by: UROLOGY

## 2024-01-03 PROCEDURE — 2700000000 HC OXYGEN THERAPY PER DAY

## 2024-01-03 PROCEDURE — 97530 THERAPEUTIC ACTIVITIES: CPT

## 2024-01-03 PROCEDURE — 36415 COLL VENOUS BLD VENIPUNCTURE: CPT

## 2024-01-03 PROCEDURE — 83735 ASSAY OF MAGNESIUM: CPT

## 2024-01-03 PROCEDURE — 6370000000 HC RX 637 (ALT 250 FOR IP): Performed by: HOSPITALIST

## 2024-01-03 PROCEDURE — 6370000000 HC RX 637 (ALT 250 FOR IP): Performed by: UROLOGY

## 2024-01-03 PROCEDURE — 6360000002 HC RX W HCPCS: Performed by: INTERNAL MEDICINE

## 2024-01-03 PROCEDURE — 2140000000 HC CCU INTERMEDIATE R&B

## 2024-01-03 RX ORDER — POTASSIUM CHLORIDE 20 MEQ/1
20 TABLET, EXTENDED RELEASE ORAL 3 TIMES DAILY
Status: COMPLETED | OUTPATIENT
Start: 2024-01-03 | End: 2024-01-05

## 2024-01-03 RX ORDER — LIDOCAINE HYDROCHLORIDE 20 MG/ML
JELLY TOPICAL PRN
Status: DISCONTINUED | OUTPATIENT
Start: 2024-01-03 | End: 2024-01-11 | Stop reason: HOSPADM

## 2024-01-03 RX ORDER — FINASTERIDE 5 MG/1
5 TABLET, FILM COATED ORAL DAILY
Status: DISCONTINUED | OUTPATIENT
Start: 2024-01-03 | End: 2024-01-11 | Stop reason: HOSPADM

## 2024-01-03 RX ADMIN — FINASTERIDE 5 MG: 5 TABLET, FILM COATED ORAL at 21:14

## 2024-01-03 RX ADMIN — POTASSIUM CHLORIDE 20 MEQ: 1500 TABLET, EXTENDED RELEASE ORAL at 09:04

## 2024-01-03 RX ADMIN — POTASSIUM CHLORIDE 20 MEQ: 1500 TABLET, EXTENDED RELEASE ORAL at 21:14

## 2024-01-03 RX ADMIN — DIGOXIN 125 MCG: 250 INJECTION, SOLUTION INTRAMUSCULAR; INTRAVENOUS; PARENTERAL at 09:24

## 2024-01-03 RX ADMIN — METOPROLOL SUCCINATE 25 MG: 25 TABLET, EXTENDED RELEASE ORAL at 09:04

## 2024-01-03 RX ADMIN — Medication 1 TABLET: at 09:04

## 2024-01-03 RX ADMIN — Medication 100 MG: at 09:04

## 2024-01-03 RX ADMIN — ENOXAPARIN SODIUM 80 MG: 100 INJECTION SUBCUTANEOUS at 21:14

## 2024-01-03 RX ADMIN — ARFORMOTEROL TARTRATE 15 MCG: 15 SOLUTION RESPIRATORY (INHALATION) at 06:11

## 2024-01-03 RX ADMIN — TAMSULOSIN HYDROCHLORIDE 0.8 MG: 0.4 CAPSULE ORAL at 09:04

## 2024-01-03 RX ADMIN — LIDOCAINE HYDROCHLORIDE: 20 JELLY TOPICAL at 18:27

## 2024-01-03 RX ADMIN — METOPROLOL TARTRATE 25 MG: 25 TABLET, FILM COATED ORAL at 21:14

## 2024-01-03 RX ADMIN — ARFORMOTEROL TARTRATE 15 MCG: 15 SOLUTION RESPIRATORY (INHALATION) at 18:43

## 2024-01-03 RX ADMIN — Medication 0.5 MG/MIN: at 04:30

## 2024-01-03 RX ADMIN — ENOXAPARIN SODIUM 80 MG: 100 INJECTION SUBCUTANEOUS at 09:04

## 2024-01-03 RX ADMIN — BUDESONIDE INHALATION 500 MCG: 0.5 SUSPENSION RESPIRATORY (INHALATION) at 06:12

## 2024-01-03 RX ADMIN — FOLIC ACID 1 MG: 1 TABLET ORAL at 09:04

## 2024-01-03 RX ADMIN — POTASSIUM CHLORIDE 20 MEQ: 1500 TABLET, EXTENDED RELEASE ORAL at 17:32

## 2024-01-03 RX ADMIN — BUDESONIDE INHALATION 500 MCG: 0.5 SUSPENSION RESPIRATORY (INHALATION) at 18:43

## 2024-01-03 ASSESSMENT — PAIN DESCRIPTION - LOCATION: LOCATION: GENERALIZED

## 2024-01-03 ASSESSMENT — PAIN SCALES - GENERAL: PAINLEVEL_OUTOF10: 3

## 2024-01-04 PROCEDURE — 6370000000 HC RX 637 (ALT 250 FOR IP): Performed by: UROLOGY

## 2024-01-04 PROCEDURE — 99232 SBSQ HOSP IP/OBS MODERATE 35: CPT | Performed by: UROLOGY

## 2024-01-04 PROCEDURE — 6370000000 HC RX 637 (ALT 250 FOR IP): Performed by: INTERNAL MEDICINE

## 2024-01-04 PROCEDURE — 94640 AIRWAY INHALATION TREATMENT: CPT

## 2024-01-04 PROCEDURE — 94760 N-INVAS EAR/PLS OXIMETRY 1: CPT

## 2024-01-04 PROCEDURE — 6370000000 HC RX 637 (ALT 250 FOR IP): Performed by: HOSPITALIST

## 2024-01-04 PROCEDURE — 2580000003 HC RX 258: Performed by: INTERNAL MEDICINE

## 2024-01-04 PROCEDURE — 6360000002 HC RX W HCPCS: Performed by: INTERNAL MEDICINE

## 2024-01-04 PROCEDURE — 6360000002 HC RX W HCPCS: Performed by: HOSPITALIST

## 2024-01-04 PROCEDURE — 2700000000 HC OXYGEN THERAPY PER DAY

## 2024-01-04 PROCEDURE — 2140000000 HC CCU INTERMEDIATE R&B

## 2024-01-04 RX ORDER — 0.9 % SODIUM CHLORIDE 0.9 %
1000 INTRAVENOUS SOLUTION INTRAVENOUS ONCE
Status: COMPLETED | OUTPATIENT
Start: 2024-01-04 | End: 2024-01-04

## 2024-01-04 RX ADMIN — ARFORMOTEROL TARTRATE 15 MCG: 15 SOLUTION RESPIRATORY (INHALATION) at 19:03

## 2024-01-04 RX ADMIN — ENOXAPARIN SODIUM 80 MG: 100 INJECTION SUBCUTANEOUS at 10:12

## 2024-01-04 RX ADMIN — FOLIC ACID 1 MG: 1 TABLET ORAL at 10:12

## 2024-01-04 RX ADMIN — BUDESONIDE INHALATION 500 MCG: 0.5 SUSPENSION RESPIRATORY (INHALATION) at 19:03

## 2024-01-04 RX ADMIN — POTASSIUM CHLORIDE 20 MEQ: 1500 TABLET, EXTENDED RELEASE ORAL at 17:28

## 2024-01-04 RX ADMIN — Medication 1 TABLET: at 10:15

## 2024-01-04 RX ADMIN — Medication 100 MG: at 10:15

## 2024-01-04 RX ADMIN — METOPROLOL TARTRATE 25 MG: 25 TABLET, FILM COATED ORAL at 10:12

## 2024-01-04 RX ADMIN — POTASSIUM CHLORIDE 20 MEQ: 1500 TABLET, EXTENDED RELEASE ORAL at 21:03

## 2024-01-04 RX ADMIN — FINASTERIDE 5 MG: 5 TABLET, FILM COATED ORAL at 10:15

## 2024-01-04 RX ADMIN — ARFORMOTEROL TARTRATE 15 MCG: 15 SOLUTION RESPIRATORY (INHALATION) at 06:13

## 2024-01-04 RX ADMIN — ENOXAPARIN SODIUM 80 MG: 100 INJECTION SUBCUTANEOUS at 21:02

## 2024-01-04 RX ADMIN — POTASSIUM CHLORIDE 20 MEQ: 1500 TABLET, EXTENDED RELEASE ORAL at 10:12

## 2024-01-04 RX ADMIN — METOPROLOL TARTRATE 25 MG: 25 TABLET, FILM COATED ORAL at 21:03

## 2024-01-04 RX ADMIN — SODIUM CHLORIDE 1000 ML: 9 INJECTION, SOLUTION INTRAVENOUS at 10:05

## 2024-01-04 RX ADMIN — TAMSULOSIN HYDROCHLORIDE 0.8 MG: 0.4 CAPSULE ORAL at 10:12

## 2024-01-04 RX ADMIN — BUDESONIDE INHALATION 500 MCG: 0.5 SUSPENSION RESPIRATORY (INHALATION) at 06:13

## 2024-01-04 NOTE — CONSULTS
Consult Note            Date:1/3/2024        Patient Name:Eze Rosa     YOB: 1950     Age:73 y.o.    Inpatient consult to Urology  Consult performed by: Vincent Rincon MD  Consult ordered by: Isadora Junior MD  Reason for consult: BPH urinary retention unable to place Morataya catheter          Chief Complaint     Chief Complaint   Patient presents with    Shortness of Breath     Fever illness x 1 week pt has hx of Pulmonary Fibrosis          History Obtained From   patient, electronic medical record    History of Present Illness   Patient is not a reliable historian but he reports she can void fine per discussion with the nurse and review of the medical record patient was seen in October 2023 for increasing frequency and incomplete emptying over the last few months prior to this.  He was placed on Flomax by his primary care doctor.  He came to the emergency department and was noted to have a residual 600 mL attempt by the nurses were unsuccessful in placing catheter.  Dr. Christine was consulted and he was able to easily place a 16 [place] Morataya catheter.  He was finally discharged to rehab facility and a voiding trial that time was successful and he was on Flomax 0.8 mg.  He had a another episode just 1 month prior to this back in September I saw him for that episode that time LATRELL revealed prostate 70 g.  Most recent PSA was done on 9/6/2023 was 4.38.  He was to come in for further evaluation after 2 episodes urinary tension and presented to the emergency department now on 1229 with hypoxic respiratory failure he does have a history of pulmonary fibrosis.  He was positive for RSV and COVID history of his voiding he also had a CT scan of the head and pelvis with IV contrast done on 12/29/2023 showed a distended bladder.  There was some lateral small bladder diverticulum on both right and left side.  Prostate was enlarged.  He had a Morataya catheter placed on this admission but this was discontinued

## 2024-01-04 NOTE — PLAN OF CARE
Problem: Discharge Planning  Goal: Discharge to home or other facility with appropriate resources  Outcome: Progressing     Problem: Safety - Adult  Goal: Free from fall injury  Outcome: Progressing     Problem: Skin/Tissue Integrity  Goal: Absence of new skin breakdown  Description: 1.  Monitor for areas of redness and/or skin breakdown  2.  Assess vascular access sites hourly  3.  Every 4-6 hours minimum:  Change oxygen saturation probe site  4.  Every 4-6 hours:  If on nasal continuous positive airway pressure, respiratory therapy assess nares and determine need for appliance change or resting period.  Outcome: Progressing     Problem: Chronic Conditions and Co-morbidities  Goal: Patient's chronic conditions and co-morbidity symptoms are monitored and maintained or improved  Outcome: Progressing     Problem: Anxiety  Goal: Will report anxiety at manageable levels  Description: INTERVENTIONS:  1. Administer medication as ordered  2. Teach and rehearse alternative coping skills  3. Provide emotional support with 1:1 interaction with staff  Outcome: Progressing     Problem: Confusion  Goal: Confusion, delirium, dementia, or psychosis is improved or at baseline  Description: INTERVENTIONS:  1. Assess for possible contributors to thought disturbance, including medications, impaired vision or hearing, underlying metabolic abnormalities, dehydration, psychiatric diagnoses, and notify attending LIP  2. [place] high risk fall precautions, as indicated  3. Provide frequent short contacts to provide reality reorientation, refocusing and direction  4. Decrease environmental stimuli, including noise as appropriate  5. Monitor and intervene to maintain adequate nutrition, hydration, elimination, sleep and activity  6. If unable to ensure safety without constant attention obtain sitter and review sitter guidelines with assigned personnel  7. Initiate Psychosocial CNS and Spiritual Care consult, as indicated  Outcome:

## 2024-01-05 PROCEDURE — 2140000000 HC CCU INTERMEDIATE R&B

## 2024-01-05 PROCEDURE — 6360000002 HC RX W HCPCS: Performed by: INTERNAL MEDICINE

## 2024-01-05 PROCEDURE — 2580000003 HC RX 258: Performed by: HOSPITALIST

## 2024-01-05 PROCEDURE — 6370000000 HC RX 637 (ALT 250 FOR IP): Performed by: UROLOGY

## 2024-01-05 PROCEDURE — 94640 AIRWAY INHALATION TREATMENT: CPT

## 2024-01-05 PROCEDURE — 2700000000 HC OXYGEN THERAPY PER DAY

## 2024-01-05 PROCEDURE — 6370000000 HC RX 637 (ALT 250 FOR IP): Performed by: HOSPITALIST

## 2024-01-05 PROCEDURE — 99231 SBSQ HOSP IP/OBS SF/LOW 25: CPT | Performed by: UROLOGY

## 2024-01-05 PROCEDURE — 94760 N-INVAS EAR/PLS OXIMETRY 1: CPT

## 2024-01-05 PROCEDURE — 6370000000 HC RX 637 (ALT 250 FOR IP): Performed by: INTERNAL MEDICINE

## 2024-01-05 PROCEDURE — 6360000002 HC RX W HCPCS: Performed by: HOSPITALIST

## 2024-01-05 RX ORDER — POLYETHYLENE GLYCOL 3350 17 G/17G
17 POWDER, FOR SOLUTION ORAL 2 TIMES DAILY
Status: DISCONTINUED | OUTPATIENT
Start: 2024-01-05 | End: 2024-01-11 | Stop reason: HOSPADM

## 2024-01-05 RX ADMIN — Medication 100 MG: at 09:16

## 2024-01-05 RX ADMIN — FOLIC ACID 1 MG: 1 TABLET ORAL at 09:16

## 2024-01-05 RX ADMIN — BUDESONIDE INHALATION 500 MCG: 0.5 SUSPENSION RESPIRATORY (INHALATION) at 07:03

## 2024-01-05 RX ADMIN — ENOXAPARIN SODIUM 80 MG: 100 INJECTION SUBCUTANEOUS at 09:16

## 2024-01-05 RX ADMIN — METOPROLOL TARTRATE 25 MG: 25 TABLET, FILM COATED ORAL at 21:22

## 2024-01-05 RX ADMIN — TAMSULOSIN HYDROCHLORIDE 0.8 MG: 0.4 CAPSULE ORAL at 09:16

## 2024-01-05 RX ADMIN — ARFORMOTEROL TARTRATE 15 MCG: 15 SOLUTION RESPIRATORY (INHALATION) at 18:48

## 2024-01-05 RX ADMIN — METOPROLOL TARTRATE 25 MG: 25 TABLET, FILM COATED ORAL at 09:16

## 2024-01-05 RX ADMIN — POLYETHYLENE GLYCOL 3350 17 G: 17 POWDER, FOR SOLUTION ORAL at 12:55

## 2024-01-05 RX ADMIN — ARFORMOTEROL TARTRATE 15 MCG: 15 SOLUTION RESPIRATORY (INHALATION) at 07:03

## 2024-01-05 RX ADMIN — POLYETHYLENE GLYCOL 3350 17 G: 17 POWDER, FOR SOLUTION ORAL at 21:22

## 2024-01-05 RX ADMIN — SODIUM CHLORIDE, PRESERVATIVE FREE 10 ML: 5 INJECTION INTRAVENOUS at 09:16

## 2024-01-05 RX ADMIN — POTASSIUM CHLORIDE 20 MEQ: 1500 TABLET, EXTENDED RELEASE ORAL at 09:16

## 2024-01-05 RX ADMIN — ENOXAPARIN SODIUM 80 MG: 100 INJECTION SUBCUTANEOUS at 21:22

## 2024-01-05 RX ADMIN — SODIUM CHLORIDE, PRESERVATIVE FREE 10 ML: 5 INJECTION INTRAVENOUS at 21:22

## 2024-01-05 RX ADMIN — FINASTERIDE 5 MG: 5 TABLET, FILM COATED ORAL at 09:16

## 2024-01-05 RX ADMIN — Medication 1 TABLET: at 09:16

## 2024-01-05 RX ADMIN — BUDESONIDE INHALATION 500 MCG: 0.5 SUSPENSION RESPIRATORY (INHALATION) at 18:48

## 2024-01-05 NOTE — PLAN OF CARE
Problem: Discharge Planning  Goal: Discharge to home or other facility with appropriate resources  Outcome: Progressing     Problem: Safety - Adult  Goal: Free from fall injury  Outcome: Progressing     Problem: Skin/Tissue Integrity  Goal: Absence of new skin breakdown  Description: 1.  Monitor for areas of redness and/or skin breakdown  2.  Assess vascular access sites hourly  3.  Every 4-6 hours minimum:  Change oxygen saturation probe site  4.  Every 4-6 hours:  If on nasal continuous positive airway pressure, respiratory therapy assess nares and determine need for appliance change or resting period.  Outcome: Progressing     Problem: Chronic Conditions and Co-morbidities  Goal: Patient's chronic conditions and co-morbidity symptoms are monitored and maintained or improved  Outcome: Progressing     Problem: Anxiety  Goal: Will report anxiety at manageable levels  Description: INTERVENTIONS:  1. Administer medication as ordered  2. Teach and rehearse alternative coping skills  3. Provide emotional support with 1:1 interaction with staff  Outcome: Progressing     Problem: Confusion  Goal: Confusion, delirium, dementia, or psychosis is improved or at baseline  Description: INTERVENTIONS:  1. Assess for possible contributors to thought disturbance, including medications, impaired vision or hearing, underlying metabolic abnormalities, dehydration, psychiatric diagnoses, and notify attending LIP  2. [place] high risk fall precautions, as indicated  3. Provide frequent short contacts to provide reality reorientation, refocusing and direction  4. Decrease environmental stimuli, including noise as appropriate  5. Monitor and intervene to maintain adequate nutrition, hydration, elimination, sleep and activity  6. If unable to ensure safety without constant attention obtain sitter and review sitter guidelines with assigned personnel  7. Initiate Psychosocial CNS and Spiritual Care consult, as indicated  Outcome:

## 2024-01-05 NOTE — CARE COORDINATION
Spoke with pt at bedside about discharge planning after reviewing PT/OT evals.  Pt would like to discuss with his spouse about possible SNF.  Choice list left at bedside for when spouse comes to visit.    This CM did call ph # on chart for spouse and did get a vm but did not have name on vm.  Will try again at a later time. Electronically signed by Mayuri Crocker RN on 1/5/2024 at 2:51 PM

## 2024-01-06 PROCEDURE — 94640 AIRWAY INHALATION TREATMENT: CPT

## 2024-01-06 PROCEDURE — 6360000002 HC RX W HCPCS: Performed by: HOSPITALIST

## 2024-01-06 PROCEDURE — 2580000003 HC RX 258: Performed by: HOSPITALIST

## 2024-01-06 PROCEDURE — 6370000000 HC RX 637 (ALT 250 FOR IP): Performed by: INTERNAL MEDICINE

## 2024-01-06 PROCEDURE — 6370000000 HC RX 637 (ALT 250 FOR IP): Performed by: HOSPITALIST

## 2024-01-06 PROCEDURE — 6360000002 HC RX W HCPCS: Performed by: INTERNAL MEDICINE

## 2024-01-06 PROCEDURE — 2700000000 HC OXYGEN THERAPY PER DAY

## 2024-01-06 PROCEDURE — 94760 N-INVAS EAR/PLS OXIMETRY 1: CPT

## 2024-01-06 PROCEDURE — 99231 SBSQ HOSP IP/OBS SF/LOW 25: CPT | Performed by: UROLOGY

## 2024-01-06 PROCEDURE — 2140000000 HC CCU INTERMEDIATE R&B

## 2024-01-06 PROCEDURE — 6370000000 HC RX 637 (ALT 250 FOR IP): Performed by: UROLOGY

## 2024-01-06 RX ADMIN — METOPROLOL TARTRATE 25 MG: 25 TABLET, FILM COATED ORAL at 08:14

## 2024-01-06 RX ADMIN — TAMSULOSIN HYDROCHLORIDE 0.8 MG: 0.4 CAPSULE ORAL at 08:13

## 2024-01-06 RX ADMIN — Medication 100 MG: at 08:14

## 2024-01-06 RX ADMIN — POLYETHYLENE GLYCOL 3350 17 G: 17 POWDER, FOR SOLUTION ORAL at 08:13

## 2024-01-06 RX ADMIN — BUDESONIDE INHALATION 500 MCG: 0.5 SUSPENSION RESPIRATORY (INHALATION) at 06:45

## 2024-01-06 RX ADMIN — ARFORMOTEROL TARTRATE 15 MCG: 15 SOLUTION RESPIRATORY (INHALATION) at 06:45

## 2024-01-06 RX ADMIN — Medication 1 TABLET: at 08:14

## 2024-01-06 RX ADMIN — FINASTERIDE 5 MG: 5 TABLET, FILM COATED ORAL at 08:14

## 2024-01-06 RX ADMIN — BUDESONIDE INHALATION 500 MCG: 0.5 SUSPENSION RESPIRATORY (INHALATION) at 16:59

## 2024-01-06 RX ADMIN — Medication 5 MG: at 21:57

## 2024-01-06 RX ADMIN — FOLIC ACID 1 MG: 1 TABLET ORAL at 08:14

## 2024-01-06 RX ADMIN — ARFORMOTEROL TARTRATE 15 MCG: 15 SOLUTION RESPIRATORY (INHALATION) at 16:59

## 2024-01-06 RX ADMIN — METOPROLOL TARTRATE 25 MG: 25 TABLET, FILM COATED ORAL at 21:55

## 2024-01-06 RX ADMIN — ENOXAPARIN SODIUM 80 MG: 100 INJECTION SUBCUTANEOUS at 21:56

## 2024-01-06 RX ADMIN — SODIUM CHLORIDE, PRESERVATIVE FREE 10 ML: 5 INJECTION INTRAVENOUS at 21:56

## 2024-01-06 RX ADMIN — ACETAMINOPHEN 650 MG: 325 TABLET ORAL at 21:51

## 2024-01-06 RX ADMIN — ENOXAPARIN SODIUM 80 MG: 100 INJECTION SUBCUTANEOUS at 08:13

## 2024-01-06 ASSESSMENT — PAIN DESCRIPTION - DESCRIPTORS: DESCRIPTORS: DISCOMFORT

## 2024-01-06 ASSESSMENT — PAIN DESCRIPTION - LOCATION: LOCATION: GENERALIZED

## 2024-01-06 ASSESSMENT — PAIN SCALES - GENERAL: PAINLEVEL_OUTOF10: 3

## 2024-01-07 PROCEDURE — 6360000002 HC RX W HCPCS: Performed by: HOSPITALIST

## 2024-01-07 PROCEDURE — 6370000000 HC RX 637 (ALT 250 FOR IP): Performed by: UROLOGY

## 2024-01-07 PROCEDURE — 97530 THERAPEUTIC ACTIVITIES: CPT

## 2024-01-07 PROCEDURE — 2700000000 HC OXYGEN THERAPY PER DAY

## 2024-01-07 PROCEDURE — 6360000002 HC RX W HCPCS: Performed by: INTERNAL MEDICINE

## 2024-01-07 PROCEDURE — 2140000000 HC CCU INTERMEDIATE R&B

## 2024-01-07 PROCEDURE — 6370000000 HC RX 637 (ALT 250 FOR IP): Performed by: HOSPITALIST

## 2024-01-07 PROCEDURE — 94760 N-INVAS EAR/PLS OXIMETRY 1: CPT

## 2024-01-07 PROCEDURE — 6370000000 HC RX 637 (ALT 250 FOR IP): Performed by: INTERNAL MEDICINE

## 2024-01-07 PROCEDURE — 94640 AIRWAY INHALATION TREATMENT: CPT

## 2024-01-07 PROCEDURE — 99232 SBSQ HOSP IP/OBS MODERATE 35: CPT | Performed by: UROLOGY

## 2024-01-07 RX ADMIN — ARFORMOTEROL TARTRATE 15 MCG: 15 SOLUTION RESPIRATORY (INHALATION) at 18:46

## 2024-01-07 RX ADMIN — FINASTERIDE 5 MG: 5 TABLET, FILM COATED ORAL at 08:16

## 2024-01-07 RX ADMIN — BUDESONIDE INHALATION 500 MCG: 0.5 SUSPENSION RESPIRATORY (INHALATION) at 18:46

## 2024-01-07 RX ADMIN — TAMSULOSIN HYDROCHLORIDE 0.8 MG: 0.4 CAPSULE ORAL at 08:16

## 2024-01-07 RX ADMIN — ENOXAPARIN SODIUM 80 MG: 100 INJECTION SUBCUTANEOUS at 08:16

## 2024-01-07 RX ADMIN — POLYETHYLENE GLYCOL 3350 17 G: 17 POWDER, FOR SOLUTION ORAL at 08:16

## 2024-01-07 RX ADMIN — POLYETHYLENE GLYCOL 3350 17 G: 17 POWDER, FOR SOLUTION ORAL at 21:01

## 2024-01-07 RX ADMIN — METOPROLOL TARTRATE 25 MG: 25 TABLET, FILM COATED ORAL at 21:01

## 2024-01-07 RX ADMIN — ENOXAPARIN SODIUM 80 MG: 100 INJECTION SUBCUTANEOUS at 21:02

## 2024-01-07 RX ADMIN — Medication 100 MG: at 08:16

## 2024-01-07 RX ADMIN — Medication 1 TABLET: at 08:16

## 2024-01-07 RX ADMIN — BUDESONIDE INHALATION 500 MCG: 0.5 SUSPENSION RESPIRATORY (INHALATION) at 06:25

## 2024-01-07 RX ADMIN — ARFORMOTEROL TARTRATE 15 MCG: 15 SOLUTION RESPIRATORY (INHALATION) at 06:25

## 2024-01-07 RX ADMIN — FOLIC ACID 1 MG: 1 TABLET ORAL at 08:16

## 2024-01-07 RX ADMIN — METOPROLOL TARTRATE 25 MG: 25 TABLET, FILM COATED ORAL at 08:16

## 2024-01-08 PROBLEM — Z51.5 PALLIATIVE CARE PATIENT: Status: ACTIVE | Noted: 2024-01-08

## 2024-01-08 PROBLEM — R06.00 DYSPNEA AND RESPIRATORY ABNORMALITIES: Status: ACTIVE | Noted: 2024-01-08

## 2024-01-08 PROBLEM — R06.89 DYSPNEA AND RESPIRATORY ABNORMALITIES: Status: ACTIVE | Noted: 2024-01-08

## 2024-01-08 PROCEDURE — 2580000003 HC RX 258: Performed by: HOSPITALIST

## 2024-01-08 PROCEDURE — 6370000000 HC RX 637 (ALT 250 FOR IP): Performed by: INTERNAL MEDICINE

## 2024-01-08 PROCEDURE — 99222 1ST HOSP IP/OBS MODERATE 55: CPT

## 2024-01-08 PROCEDURE — 97116 GAIT TRAINING THERAPY: CPT

## 2024-01-08 PROCEDURE — 99231 SBSQ HOSP IP/OBS SF/LOW 25: CPT | Performed by: NURSE PRACTITIONER

## 2024-01-08 PROCEDURE — 6360000002 HC RX W HCPCS: Performed by: HOSPITALIST

## 2024-01-08 PROCEDURE — 2700000000 HC OXYGEN THERAPY PER DAY

## 2024-01-08 PROCEDURE — 6360000002 HC RX W HCPCS: Performed by: INTERNAL MEDICINE

## 2024-01-08 PROCEDURE — 94640 AIRWAY INHALATION TREATMENT: CPT

## 2024-01-08 PROCEDURE — 6370000000 HC RX 637 (ALT 250 FOR IP): Performed by: UROLOGY

## 2024-01-08 PROCEDURE — 94760 N-INVAS EAR/PLS OXIMETRY 1: CPT

## 2024-01-08 PROCEDURE — 97110 THERAPEUTIC EXERCISES: CPT

## 2024-01-08 PROCEDURE — 6370000000 HC RX 637 (ALT 250 FOR IP): Performed by: HOSPITALIST

## 2024-01-08 PROCEDURE — 2140000000 HC CCU INTERMEDIATE R&B

## 2024-01-08 RX ADMIN — METOPROLOL TARTRATE 25 MG: 25 TABLET, FILM COATED ORAL at 08:44

## 2024-01-08 RX ADMIN — BUDESONIDE INHALATION 500 MCG: 0.5 SUSPENSION RESPIRATORY (INHALATION) at 18:50

## 2024-01-08 RX ADMIN — Medication 1 TABLET: at 08:44

## 2024-01-08 RX ADMIN — ARFORMOTEROL TARTRATE 15 MCG: 15 SOLUTION RESPIRATORY (INHALATION) at 18:50

## 2024-01-08 RX ADMIN — Medication 100 MG: at 08:45

## 2024-01-08 RX ADMIN — FOLIC ACID 1 MG: 1 TABLET ORAL at 08:44

## 2024-01-08 RX ADMIN — ENOXAPARIN SODIUM 80 MG: 100 INJECTION SUBCUTANEOUS at 19:37

## 2024-01-08 RX ADMIN — FINASTERIDE 5 MG: 5 TABLET, FILM COATED ORAL at 08:45

## 2024-01-08 RX ADMIN — TAMSULOSIN HYDROCHLORIDE 0.8 MG: 0.4 CAPSULE ORAL at 08:44

## 2024-01-08 RX ADMIN — ENOXAPARIN SODIUM 80 MG: 100 INJECTION SUBCUTANEOUS at 08:44

## 2024-01-08 RX ADMIN — BUDESONIDE INHALATION 500 MCG: 0.5 SUSPENSION RESPIRATORY (INHALATION) at 06:37

## 2024-01-08 RX ADMIN — METOPROLOL TARTRATE 25 MG: 25 TABLET, FILM COATED ORAL at 19:37

## 2024-01-08 RX ADMIN — ARFORMOTEROL TARTRATE 15 MCG: 15 SOLUTION RESPIRATORY (INHALATION) at 06:37

## 2024-01-08 RX ADMIN — SODIUM CHLORIDE, PRESERVATIVE FREE 10 ML: 5 INJECTION INTRAVENOUS at 08:45

## 2024-01-08 NOTE — CARE COORDINATION
Referral sent to [facility] per spouse request.  Awaiting acceptance/denial.  Sandeep PH: [phone] F:[phone]  Electronically signed by Mayuri Crocker RN on 1/8/2024 at 10:59 AM    Pt has been accepted to [facility] pending C Diff R/O.  No precert required.  Sandeep   PH: [phone] F:[phone]

## 2024-01-08 NOTE — CONSULTS
Palliative Care Consult Note    1/8/2024 8:26 AM  Subjective:  Admit Date: 12/29/2023  PCP: Ross Masterson MD    Date of Service: 1/8/2024    Reason for Consultation:  Goals of Care, Code Status, Family Support     History Obtained From: EMR/Patient and their Family    History Of Present Illness:   The patient is a 73 y.o. male with PMH pulmonary fibrosis, BPH, alcoholism, chronic respiratory failure with NC O2 3L continuous, and other comorbidities who presented to [facility] ED 12/29/2023 complaining of worsening SOB.  He was recently evaluated at [facility] 12/20/2023 with similar complaints and found to be positive for COVID, RSV, with possible pneumonia.  He was offered admission at that time however he was tolerating baseline home O2 and wished to be discharged home.  He was given regimen of Augmentin and doxycycline x 10 days.  He presented on day of admission with worsening symptoms, frequent dry cough, and low-grade fever.  His abdomen was noted to be extremely tender to palpation.  Patient/spouse declined alcohol use for the last 4 months.  Imaging workup revealed concern of bowel obstruction.  CTA chest with no evidence of PE.  He was admitted under hospitalist services with acute on chronic hypoxemic respiratory failure to ICU with general surgery evaluation.  Patient did not demonstrate having a surgical abdomen following admission and was having bowel movements.  He did however appear to be having alcohol withdrawal symptoms despite patient/family denying any recent use.  He was downgraded from ICU to PCU and developed A-fib with RVR with elevated troponin.  He was initiated on digoxin along with metoprolol.  Cardiology was consulted.  Echo showing normal EF 55 to 60% and no severe cardiac structural abnormalities.  Ongoing medical management recommended.  Urology was asked to evaluate due to ongoing retention 1/3/2024 and coudé Morataya catheter was placed at bedside per Dr. Rincon with addition of finasteride to

## 2024-01-09 LAB
EKG P AXIS: 27 DEGREES
EKG P-R INTERVAL: 146 MS
EKG Q-T INTERVAL: 364 MS
EKG QRS DURATION: 78 MS
EKG QTC CALCULATION (BAZETT): 407 MS
EKG T AXIS: 3 DEGREES

## 2024-01-09 PROCEDURE — 51798 US URINE CAPACITY MEASURE: CPT

## 2024-01-09 PROCEDURE — 94640 AIRWAY INHALATION TREATMENT: CPT

## 2024-01-09 PROCEDURE — 6370000000 HC RX 637 (ALT 250 FOR IP): Performed by: HOSPITALIST

## 2024-01-09 PROCEDURE — 2700000000 HC OXYGEN THERAPY PER DAY

## 2024-01-09 PROCEDURE — 94760 N-INVAS EAR/PLS OXIMETRY 1: CPT

## 2024-01-09 PROCEDURE — 6370000000 HC RX 637 (ALT 250 FOR IP): Performed by: UROLOGY

## 2024-01-09 PROCEDURE — 6360000002 HC RX W HCPCS: Performed by: INTERNAL MEDICINE

## 2024-01-09 PROCEDURE — 6360000002 HC RX W HCPCS: Performed by: HOSPITALIST

## 2024-01-09 PROCEDURE — 97530 THERAPEUTIC ACTIVITIES: CPT

## 2024-01-09 PROCEDURE — 2140000000 HC CCU INTERMEDIATE R&B

## 2024-01-09 PROCEDURE — 2580000003 HC RX 258: Performed by: HOSPITALIST

## 2024-01-09 PROCEDURE — 99497 ADVNCD CARE PLAN 30 MIN: CPT

## 2024-01-09 PROCEDURE — 97535 SELF CARE MNGMENT TRAINING: CPT

## 2024-01-09 PROCEDURE — 6370000000 HC RX 637 (ALT 250 FOR IP): Performed by: INTERNAL MEDICINE

## 2024-01-09 PROCEDURE — 99231 SBSQ HOSP IP/OBS SF/LOW 25: CPT | Performed by: NURSE PRACTITIONER

## 2024-01-09 RX ADMIN — BUDESONIDE INHALATION 500 MCG: 0.5 SUSPENSION RESPIRATORY (INHALATION) at 07:45

## 2024-01-09 RX ADMIN — FOLIC ACID 1 MG: 1 TABLET ORAL at 08:12

## 2024-01-09 RX ADMIN — ARFORMOTEROL TARTRATE 15 MCG: 15 SOLUTION RESPIRATORY (INHALATION) at 07:45

## 2024-01-09 RX ADMIN — ARFORMOTEROL TARTRATE 15 MCG: 15 SOLUTION RESPIRATORY (INHALATION) at 18:24

## 2024-01-09 RX ADMIN — Medication 1 TABLET: at 08:12

## 2024-01-09 RX ADMIN — ENOXAPARIN SODIUM 80 MG: 100 INJECTION SUBCUTANEOUS at 21:46

## 2024-01-09 RX ADMIN — FINASTERIDE 5 MG: 5 TABLET, FILM COATED ORAL at 08:12

## 2024-01-09 RX ADMIN — TAMSULOSIN HYDROCHLORIDE 0.8 MG: 0.4 CAPSULE ORAL at 08:11

## 2024-01-09 RX ADMIN — ENOXAPARIN SODIUM 80 MG: 100 INJECTION SUBCUTANEOUS at 08:11

## 2024-01-09 RX ADMIN — SODIUM CHLORIDE, PRESERVATIVE FREE 10 ML: 5 INJECTION INTRAVENOUS at 08:12

## 2024-01-09 RX ADMIN — METOPROLOL TARTRATE 25 MG: 25 TABLET, FILM COATED ORAL at 08:12

## 2024-01-09 RX ADMIN — METOPROLOL TARTRATE 25 MG: 25 TABLET, FILM COATED ORAL at 21:46

## 2024-01-09 RX ADMIN — BUDESONIDE INHALATION 500 MCG: 0.5 SUSPENSION RESPIRATORY (INHALATION) at 18:24

## 2024-01-09 RX ADMIN — Medication 100 MG: at 08:12

## 2024-01-09 ASSESSMENT — PAIN DESCRIPTION - LOCATION: LOCATION: GENERALIZED

## 2024-01-09 ASSESSMENT — PAIN DESCRIPTION - PAIN TYPE: TYPE: CHRONIC PAIN

## 2024-01-09 ASSESSMENT — PAIN - FUNCTIONAL ASSESSMENT: PAIN_FUNCTIONAL_ASSESSMENT: PREVENTS OR INTERFERES SOME ACTIVE ACTIVITIES AND ADLS

## 2024-01-09 ASSESSMENT — PAIN SCALES - WONG BAKER: WONGBAKER_NUMERICALRESPONSE: 4

## 2024-01-09 ASSESSMENT — PAIN DESCRIPTION - DESCRIPTORS: DESCRIPTORS: DISCOMFORT

## 2024-01-09 ASSESSMENT — PAIN DESCRIPTION - FREQUENCY: FREQUENCY: CONTINUOUS

## 2024-01-09 NOTE — ACP (ADVANCE CARE PLANNING)
Advance Care Planning     Advance Care Planning (ACP) Physician/NP/PA (Provider) Conversation      Date of ACP Conversation: 1/9/2024    Conversation Conducted with: Patient and spouse at bedside    Health Care Decision Maker:  Current Designated Health Care Decision Maker:     Primary Decision Maker: Chelo Mtz - Spouse - [phone]    Care Preferences:  Ventilation:  \"If you were in your present state of health and suddenly became very ill and were unable to breathe on your own, what would your preference be about the use of a ventilator (breathing machine) if it was available to you?\"    YES    \"If your health worsens and it becomes clear that your chance of recovery is unlikely, what would your preference be about the use of a ventilator (breathing machine) if it was available to you?\"   NO    Resuscitation:  \"CPR works best to restart the heart when there is a sudden event, like a heart attack, in someone who is otherwise healthy. Unfortunately, CPR does not typically restart the heart for people who have serious health conditions or who are very sick.\"    \"In the event your heart stopped as a result of an underlying serious health condition, would you want attempts to be made to restart your heart (answer \"yes\" for attempt to resuscitate) or would you prefer a natural death (answer \"no\" for do not attempt to resuscitate)?\"   YES    Conversation Outcomes / Follow-Up Plan:   Met with pts spouse at bedside to update on status and discuss plan of care. No ACP/POA documents on file. Pts spouse remains legal NOK decision maker. We reviewed code status as well as long term prognosis with chronic conditions. Discussed meaning of \"full code\" vs \"DNR\" and \"DNI\". Patients spouse seems to have an understanding that pts lung disease is an irreversible process and that he would be high risk for poor outcomes or sustained trauma if he would receive aggressive resuscitation. However at this time, they are not yet ready for

## 2024-01-10 LAB
ANION GAP SERPL CALCULATED.3IONS-SCNC: 8 MMOL/L (ref 7–19)
BUN SERPL-MCNC: 7 MG/DL (ref 8–23)
CALCIUM SERPL-MCNC: 9 MG/DL (ref 8.8–10.2)
CHLORIDE SERPL-SCNC: 94 MMOL/L (ref 98–111)
CO2 SERPL-SCNC: 31 MMOL/L (ref 22–29)
CREAT SERPL-MCNC: 0.4 MG/DL (ref 0.5–1.2)
ERYTHROCYTE [DISTWIDTH] IN BLOOD BY AUTOMATED COUNT: 11.5 % (ref 11.5–14.5)
GLUCOSE SERPL-MCNC: 125 MG/DL (ref 74–109)
HCT VFR BLD AUTO: 39 % (ref 42–52)
HGB BLD-MCNC: 13.5 G/DL (ref 14–18)
MCH RBC QN AUTO: 32 PG (ref 27–31)
MCHC RBC AUTO-ENTMCNC: 34.6 G/DL (ref 33–37)
MCV RBC AUTO: 92.4 FL (ref 80–94)
PLATELET # BLD AUTO: 328 K/UL (ref 130–400)
PMV BLD AUTO: 9.1 FL (ref 9.4–12.4)
POTASSIUM SERPL-SCNC: 3.9 MMOL/L (ref 3.5–5)
RBC # BLD AUTO: 4.22 M/UL (ref 4.7–6.1)
SODIUM SERPL-SCNC: 133 MMOL/L (ref 136–145)
WBC # BLD AUTO: 8.9 K/UL (ref 4.8–10.8)

## 2024-01-10 PROCEDURE — 6370000000 HC RX 637 (ALT 250 FOR IP): Performed by: INTERNAL MEDICINE

## 2024-01-10 PROCEDURE — 2140000000 HC CCU INTERMEDIATE R&B

## 2024-01-10 PROCEDURE — 6370000000 HC RX 637 (ALT 250 FOR IP): Performed by: UROLOGY

## 2024-01-10 PROCEDURE — 6360000002 HC RX W HCPCS: Performed by: INTERNAL MEDICINE

## 2024-01-10 PROCEDURE — 6360000002 HC RX W HCPCS: Performed by: HOSPITALIST

## 2024-01-10 PROCEDURE — 99231 SBSQ HOSP IP/OBS SF/LOW 25: CPT | Performed by: NURSE PRACTITIONER

## 2024-01-10 PROCEDURE — 36415 COLL VENOUS BLD VENIPUNCTURE: CPT

## 2024-01-10 PROCEDURE — 94640 AIRWAY INHALATION TREATMENT: CPT

## 2024-01-10 PROCEDURE — 6370000000 HC RX 637 (ALT 250 FOR IP): Performed by: HOSPITALIST

## 2024-01-10 PROCEDURE — 94760 N-INVAS EAR/PLS OXIMETRY 1: CPT

## 2024-01-10 PROCEDURE — 2700000000 HC OXYGEN THERAPY PER DAY

## 2024-01-10 PROCEDURE — 85027 COMPLETE CBC AUTOMATED: CPT

## 2024-01-10 PROCEDURE — 80048 BASIC METABOLIC PNL TOTAL CA: CPT

## 2024-01-10 PROCEDURE — 99232 SBSQ HOSP IP/OBS MODERATE 35: CPT

## 2024-01-10 RX ORDER — ENOXAPARIN SODIUM 100 MG/ML
40 INJECTION SUBCUTANEOUS DAILY
Status: DISCONTINUED | OUTPATIENT
Start: 2024-01-11 | End: 2024-01-11 | Stop reason: HOSPADM

## 2024-01-10 RX ADMIN — METOPROLOL TARTRATE 25 MG: 25 TABLET, FILM COATED ORAL at 20:38

## 2024-01-10 RX ADMIN — BUDESONIDE INHALATION 500 MCG: 0.5 SUSPENSION RESPIRATORY (INHALATION) at 18:14

## 2024-01-10 RX ADMIN — FINASTERIDE 5 MG: 5 TABLET, FILM COATED ORAL at 08:29

## 2024-01-10 RX ADMIN — TAMSULOSIN HYDROCHLORIDE 0.8 MG: 0.4 CAPSULE ORAL at 08:29

## 2024-01-10 RX ADMIN — METOPROLOL TARTRATE 25 MG: 25 TABLET, FILM COATED ORAL at 08:29

## 2024-01-10 RX ADMIN — ENOXAPARIN SODIUM 80 MG: 100 INJECTION SUBCUTANEOUS at 08:29

## 2024-01-10 RX ADMIN — ARFORMOTEROL TARTRATE 15 MCG: 15 SOLUTION RESPIRATORY (INHALATION) at 06:38

## 2024-01-10 RX ADMIN — Medication 100 MG: at 08:29

## 2024-01-10 RX ADMIN — FOLIC ACID 1 MG: 1 TABLET ORAL at 08:29

## 2024-01-10 RX ADMIN — BUDESONIDE INHALATION 500 MCG: 0.5 SUSPENSION RESPIRATORY (INHALATION) at 06:38

## 2024-01-10 RX ADMIN — ARFORMOTEROL TARTRATE 15 MCG: 15 SOLUTION RESPIRATORY (INHALATION) at 18:14

## 2024-01-10 RX ADMIN — Medication 1 TABLET: at 08:29

## 2024-01-11 ENCOUNTER — TELEPHONE (OUTPATIENT)
Dept: UROLOGY | Age: 74
End: 2024-01-11

## 2024-01-11 VITALS
BODY MASS INDEX: 21.45 KG/M2 | TEMPERATURE: 97 F | OXYGEN SATURATION: 97 % | WEIGHT: 176.13 LBS | RESPIRATION RATE: 16 BRPM | SYSTOLIC BLOOD PRESSURE: 100 MMHG | DIASTOLIC BLOOD PRESSURE: 59 MMHG | HEART RATE: 77 BPM | HEIGHT: 76 IN

## 2024-01-11 PROCEDURE — 6360000002 HC RX W HCPCS: Performed by: STUDENT IN AN ORGANIZED HEALTH CARE EDUCATION/TRAINING PROGRAM

## 2024-01-11 PROCEDURE — 6360000002 HC RX W HCPCS: Performed by: HOSPITALIST

## 2024-01-11 PROCEDURE — 6370000000 HC RX 637 (ALT 250 FOR IP): Performed by: UROLOGY

## 2024-01-11 PROCEDURE — 94760 N-INVAS EAR/PLS OXIMETRY 1: CPT

## 2024-01-11 PROCEDURE — 6370000000 HC RX 637 (ALT 250 FOR IP): Performed by: INTERNAL MEDICINE

## 2024-01-11 PROCEDURE — 2580000003 HC RX 258: Performed by: HOSPITALIST

## 2024-01-11 PROCEDURE — 6370000000 HC RX 637 (ALT 250 FOR IP): Performed by: HOSPITALIST

## 2024-01-11 PROCEDURE — 2700000000 HC OXYGEN THERAPY PER DAY

## 2024-01-11 PROCEDURE — 94640 AIRWAY INHALATION TREATMENT: CPT

## 2024-01-11 RX ORDER — FOLIC ACID 1 MG/1
1 TABLET ORAL DAILY
Qty: 30 TABLET | Refills: 0 | Status: SHIPPED | OUTPATIENT
Start: 2024-01-12

## 2024-01-11 RX ORDER — FINASTERIDE 5 MG/1
5 TABLET, FILM COATED ORAL DAILY
Qty: 30 TABLET | Refills: 0 | Status: SHIPPED | OUTPATIENT
Start: 2024-01-12

## 2024-01-11 RX ORDER — POLYETHYLENE GLYCOL 3350 17 G/17G
17 POWDER, FOR SOLUTION ORAL DAILY PRN
Qty: 527 G | Refills: 1 | Status: SHIPPED | OUTPATIENT
Start: 2024-01-11 | End: 2024-03-13

## 2024-01-11 RX ORDER — BUDESONIDE AND FORMOTEROL FUMARATE DIHYDRATE 160; 4.5 UG/1; UG/1
2 AEROSOL RESPIRATORY (INHALATION) 2 TIMES DAILY
Qty: 10.2 G | Refills: 0 | Status: SHIPPED | OUTPATIENT
Start: 2024-01-11

## 2024-01-11 RX ADMIN — Medication 1 TABLET: at 07:45

## 2024-01-11 RX ADMIN — SODIUM CHLORIDE, PRESERVATIVE FREE 10 ML: 5 INJECTION INTRAVENOUS at 07:45

## 2024-01-11 RX ADMIN — METOPROLOL TARTRATE 25 MG: 25 TABLET, FILM COATED ORAL at 07:44

## 2024-01-11 RX ADMIN — FOLIC ACID 1 MG: 1 TABLET ORAL at 07:44

## 2024-01-11 RX ADMIN — FINASTERIDE 5 MG: 5 TABLET, FILM COATED ORAL at 07:44

## 2024-01-11 RX ADMIN — TAMSULOSIN HYDROCHLORIDE 0.8 MG: 0.4 CAPSULE ORAL at 07:44

## 2024-01-11 RX ADMIN — BUDESONIDE INHALATION 500 MCG: 0.5 SUSPENSION RESPIRATORY (INHALATION) at 06:12

## 2024-01-11 RX ADMIN — ARFORMOTEROL TARTRATE 15 MCG: 15 SOLUTION RESPIRATORY (INHALATION) at 06:12

## 2024-01-11 RX ADMIN — ENOXAPARIN SODIUM 40 MG: 100 INJECTION SUBCUTANEOUS at 07:44

## 2024-01-11 RX ADMIN — Medication 100 MG: at 07:44

## 2024-01-11 NOTE — DISCHARGE SUMMARY
upon discharge for management of chronic medical issues    Condition on Discharge: stable  Discharge Disposition: Skilled Facility    Recommended Follow Up:  Evette Duenas, APRN  [address]  [facility] [place] [facility] [address]  [phone]    Follow up on 1/31/2024  9:15 am Cardiology Follow-Up    Ashlie Andrade, APRN - CNP  [address]  [address]  [place] [address]  [phone]    Follow up on 2/12/2024  2:30pm follow-up    Ross Masterson MD  [address]  [facility] [place] [facility] [address]  [phone]    Follow up on 1/18/2024  1:30pm hospital follow-up    Followup Appointments Scheduled at Time of Discharge:  Future Appointments   Date Time Provider Department Center   1/31/2024  9:15 AM Evette Duenas APRN [facility] [place]   2/5/2024  2:00 PM Vincent Rincon MD [facility]        Discharge Instructions:   Please see the discharge paperwork.    Patient was seen at bedside today, and the examination shows improvement since yesterday.    Diet Instructions:  ADULT DIET; Dysphagia - Soft and Bite Sized; Low Fat/Low Chol/High Fiber/2 gm Na  ADULT ORAL NUTRITION SUPPLEMENT; Breakfast, Lunch, Dinner; Standard High Calorie/High Protein Oral Supplement     Detailed discharge directions delivered to the patient by myself and our nursing staff, who verbalizes understanding and is satisfied with the plan. Patient has been advised to continue all medications as prescribed and advised, and f/u with PCP within 1 week. Patient is stable from medical standpoint to be discharged.    Total time spent during patient evaluation and assessment, discussion with the nurse/family, addressing discharge medications/scripts and coordination of care for safe discharge was 35 minutes.      Signed Electronically:    Zachary Ibanez MD  10:26 AM 1/11/2024

## 2024-01-11 NOTE — TELEPHONE ENCOUNTER
Patient already has fu scheduled 2/5/24 with Dr. Rincon. Per providers patient is to keep f/u as scheduled.

## 2024-01-11 NOTE — CARE COORDINATION
Called and spoke with pt's spouse Reta and informed her of possible discharge today to [facility].  She states that she can come here around 1:30 this afternoon to sign IMM.  This CM did explain the IMM to spouse and answered questions.  Electronically signed by Mayuri Crocker RN on 1/11/2024 at 9:58 AM

## 2024-01-11 NOTE — CARE COORDINATION
[facility].  Pt has not had any further watery stools since 1/8.    Sandeep   PH: [phone] F:[phone]  Electronically signed by Mayuri Crocker RN on 1/11/2024 at 9:43 AM

## 2024-01-11 NOTE — PROGRESS NOTES
01/08/24 1100   Subjective   Subjective Patient in bed wants to go home & agrees to work with therapy.   Pain Assessment   Pain Assessment None - Denies Pain   Cognition   Overall Cognitive Status WFL   Vitals   O2 Device Nasal cannula   Bed Mobility Training   Bed Mobility Training Yes   Supine to Sit Minimum assistance   Transfer Training   Transfer Training Yes   Sit to Stand Minimum assistance   Stand to Sit Minimum assistance   Bed to Chair Minimum assistance  (UNSTEADY)   Gait Training   Gait Training Yes   Gait   Overall Level of Assistance Minimum assistance  (UNSTEADY)   Distance (ft) 4 Feet   Assistive Device   (HH assist.  Patient would benefit from RW.  One acquired is in room.)   PT Exercises   A/AROM Exercises BL LE EX in sitting X 10 reps   Patient Education   Education Given To Patient   Education Provided Role of Therapy;Plan of Care;Transfer Training;Fall Prevention Strategies   Education Provided Comments use of call light   Education Method Demonstration;Verbal   Education Outcome Continued education needed   Other Specialty Interventions   Other Treatments/Modalities In chair alarm attached call light in reach & needs in reach.   Assessment   Activity Tolerance Patient tolerated treatment well;Patient limited by endurance;Patient limited by fatigue   PT Plan of Care   Monday X       Electronically signed by Abraham Masterson PTA on 1/8/2024 at 11:13 AM   
  Date:2024  Patient: Eze Rosa  : 1950  MRN:[id number]  CODE:                                                                        PCP:Ross Masterson MD    Admit Date: 2023  4:05 PM   LOS: 7 days     Hospital course : 73-year-old male with history of pulmonary fibrosis, alcoholism, and recent COVID-19 and RSV infections initially admitted to critical care unit for acute on chronic hypoxemic respiratory failure now with new onset AF with RVR.       Subjective:  seen and evaluated at bedside, no further complaint of abdominal pain, discussed about incentive spirometry to help in lung function, no acute complaint.  Discussed with RN about discontinuing Morataya catheter which was originally placed on admission    1/3 seen and evaluated at bedside presently doing okay no acute complaint, presently at normal sinus rhythm, waiting to be seen by cardiology.  Later on RN reported the patient has 696 mL of urine retention, will straight cath however consulted urology for further evaluation and management.     seen and evaluated at bedside along with RN having low blood pressure, electrolytes reviewed discussed about dehydration ordered 1 L of bolus, also updated him about the Morataya catheter to be continued.  He is deconditioned discussed with RN the patient need SNF placement     seen and evaluated at bedside encourage patient for incentive spirometry, discussed with RN will get PT consult for out of bed to chair the family agreed for SNF placement  involved    Review of Systems    Reviewed 12 system and found most of them negative except as stated above in subjective note    Objective:      Vital signs in last 24 hours:  Patient Vitals for the past 24 hrs:   BP Temp Temp src Pulse Resp SpO2 Height Weight   24 113/63 98.4 °F (36.9 °C) Temporal 79 18 100 % -- --   24 1752 115/64 97.5 °F (36.4 °C) Temporal 79 18 98 % -- --   24 1338 (!) 100/59 97.2 °F (36.2 °C) 
  Date:2024  Patient: Eze Rosa  : 1950  MRN:[id number]  CODE:                                                                        PCP:Ross Masterson MD    Admit Date: 2023  4:05 PM   LOS: 8 days     Hospital course : 73-year-old male with history of pulmonary fibrosis, alcoholism, and recent COVID-19 and RSV infections initially admitted to critical care unit for acute on chronic hypoxemic respiratory failure now with new onset AF with RVR.       Subjective:  seen and evaluated at bedside, no further complaint of abdominal pain, discussed about incentive spirometry to help in lung function, no acute complaint.  Discussed with RN about discontinuing Morataya catheter which was originally placed on admission    1/3 seen and evaluated at bedside presently doing okay no acute complaint, presently at normal sinus rhythm, waiting to be seen by cardiology.  Later on RN reported the patient has 696 mL of urine retention, will straight cath however consulted urology for further evaluation and management.     seen and evaluated at bedside along with RN having low blood pressure, electrolytes reviewed discussed about dehydration ordered 1 L of bolus, also updated him about the Morataya catheter to be continued.  He is deconditioned discussed with RN the patient need SNF placement     seen and evaluated at bedside encourage patient for incentive spirometry, discussed with RN will get PT consult for out of bed to chair the family agreed for SNF placement  involved     seen and evaluated at bedside he is doing okay however RN complain of few episodes of confusion, also having grade 2 decubitus due to loose stool from incontinence, discussed with RN to obtain GI panel    Review of Systems    Reviewed 12 system and found most of them negative except as stated above in subjective note    Objective:      Vital signs in last 24 hours:  Patient Vitals for the past 24 hrs:   BP Temp Temp src 
  Date:2024  Patient: Eze Rosa  : 1950  MRN:[id number]  CODE:                                                                        PCP:Ross Masterson MD    Admit Date: 2023  4:05 PM   LOS: 11 days     Hospital course : 73-year-old male with history of pulmonary fibrosis, alcoholism, and recent COVID-19 and RSV infections initially admitted to critical care unit for acute on chronic hypoxemic respiratory failure now with new onset AF with RVR.  For abdominal pain he has been seen by general surgery, for acute urine retention seen by urologist with details in assessment and plan  Presently having deconditioning working with physical therapy family agreed for SNF placement  Respiratory status greatly improved encourage incentive spirometry however family knows about the prognosis of pulmonary fibrosis for last 11 years  Was alcoholic stopped 4 months ago   discussed with wife about the various medical condition and progressive lung fibrosis presently failure to thrive with poor oral intake encourage the patient to talk to palliative care about CODE STATUS discussion    Subjective:    yesterday the diarrhea was no more but today has 2 episodes of diarrhea, will order GI panel.  Patient is encouraged to take regular food instead of only Ensure    Review of Systems    Reviewed 12 system and found most of them negative except as stated above in subjective note    Objective:      Vital signs in last 24 hours:  Patient Vitals for the past 24 hrs:   BP Temp Temp src Pulse Resp SpO2   24 1718 121/67 96.8 °F (36 °C) Temporal 79 18 96 %   24 1243 116/82 96.8 °F (36 °C) Temporal 78 18 96 %   24 0829 120/67 97 °F (36.1 °C) Temporal 86 18 97 %   24 0800 -- -- -- 68 -- --   24 0747 -- -- -- -- -- 98 %   24 0535 121/68 97.2 °F (36.2 °C) Temporal 78 16 100 %   24 0055 114/65 97 °F (36.1 °C) Temporal 74 16 99 %   24 115/67 97 °F (36.1 °C) Temporal 83 16 98 
  Date:2024  Patient: Eze Rosa  : 1950  MRN:[id number]  CODE:                                                                        PCP:Ross Masterson MD    Admit Date: 2023  4:05 PM   LOS: 4 days     Hospital course : 73-year-old male with history of pulmonary fibrosis, alcoholism, and recent COVID-19 and RSV infections initially admitted to critical care unit for acute on chronic hypoxemic respiratory failure now with new onset AF with RVR.       Subjective: 1/2 seen and evaluated at bedside, no further complaint of abdominal pain, discussed about incentive spirometry to help in lung function, no acute complaint.  Discussed with RN about discontinuing Morataya catheter which was originally placed on admission    Review of Systems    Reviewed 12 system and found most of them negative except as stated above in subjective note    Objective:      Vital signs in last 24 hours:  Patient Vitals for the past 24 hrs:   BP Temp Temp src Pulse Resp SpO2   24 1210 121/64 97.5 °F (36.4 °C) Temporal 78 16 99 %   24 0835 107/65 97.3 °F (36.3 °C) Temporal 78 20 100 %   24 0611 -- -- -- -- -- 98 %   24 0452 90/78 97.5 °F (36.4 °C) Temporal 81 16 99 %   24 2358 128/66 98.2 °F (36.8 °C) Temporal 82 16 100 %   24 1931 102/73 97.7 °F (36.5 °C) Temporal 80 16 100 %   24 1730 98/64 -- -- -- -- --   24 1653 94/62 97.3 °F (36.3 °C) Temporal 75 18 100 %       Patient examined with appropriate PPE  Physical Exam:  Vital Signs: /64   Pulse 78   Temp 97.5 °F (36.4 °C) (Temporal)   Resp 16   Ht 1.93 m (6' 4\")   Wt 80.1 kg (176 lb 9.6 oz)   SpO2 99%   BMI 21.50 kg/m²   General appearance:.Lying comfortably in bed ,   HEENT: Normocephalic , Atraumatic, PERRL, JVP not raised  Chest: On inspection no use of accessory muscles of respiration, on auscultation vesicular breath sounds equal bilaterally no rales rhonchi or wheezing   cardiac: Regular rate and rhythm, S1, S2 
  Date:2024  Patient: Eze Rosa  : 1950  MRN:[id number]  CODE:                                                                        PCP:Ross Masterson MD    Admit Date: 2023  4:05 PM   LOS: 6 days     Hospital course : 73-year-old male with history of pulmonary fibrosis, alcoholism, and recent COVID-19 and RSV infections initially admitted to critical care unit for acute on chronic hypoxemic respiratory failure now with new onset AF with RVR.       Subjective:  seen and evaluated at bedside, no further complaint of abdominal pain, discussed about incentive spirometry to help in lung function, no acute complaint.  Discussed with RN about discontinuing Morataya catheter which was originally placed on admission    1/3 seen and evaluated at bedside presently doing okay no acute complaint, presently at normal sinus rhythm, waiting to be seen by cardiology.  Later on RN reported the patient has 696 mL of urine retention, will straight cath however consulted urology for further evaluation and management.     seen and evaluated at bedside along with RN having low blood pressure, electrolytes reviewed discussed about dehydration ordered 1 L of bolus, also updated him about the Morataya catheter to be continued.  He is deconditioned discussed with RN the patient need SNF placement    Review of Systems    Reviewed 12 system and found most of them negative except as stated above in subjective note    Objective:      Vital signs in last 24 hours:  Patient Vitals for the past 24 hrs:   BP Temp Temp src Pulse Resp SpO2   24 1244 (!) 100/55 97.5 °F (36.4 °C) Temporal 79 16 100 %   24 0806 (!) 97/53 (!) 96.6 °F (35.9 °C) Temporal 69 18 100 %   24 0613 -- -- -- -- -- 98 %   24 0352 (!) 111/57 97.3 °F (36.3 °C) Temporal 73 18 97 %   24 0000 115/70 97.7 °F (36.5 °C) Temporal 75 16 98 %   24 2118 (!) 117/58 98.6 °F (37 °C) -- 88 18 91 %   24 1716 (!) 117/57 98.3 °F (36.8 °C) 
  Palliative Care Progress Note  1/10/2024 7:53 AM    Patient:  Eze Rosa  YOB: 1950  Primary Care Physician: Ross Masterson MD  Advance Directive: Full Code  Admit Date: 12/29/2023       Hospital Day: 12  Portions of this note have been copied forward, however, changed to reflect the most current clinical status of this patient.    CHIEF COMPLAINT/REASON FOR CONSULTATION Goals of care, family support, Code status, symptom management     SUBJECTIVE:  Mr. Rosa is alert and resting comfortably in bed. He reports he does not feel well today but is unable to elaborate on symptoms. Denies specific complaints when asked about pain, abdominal changes, or respiratory symptoms. No acute distress at time of my visit.     HPI: The patient is a 73 y.o. male with PMH pulmonary fibrosis, BPH, alcoholism, chronic respiratory failure with NC O2 3L continuous, and other comorbidities who presented to [facility] ED 12/29/2023 complaining of worsening SOB.  He was recently evaluated at [facility] 12/20/2023 with similar complaints and found to be positive for COVID, RSV, with possible pneumonia.  He was offered admission at that time however he was tolerating baseline home O2 and wished to be discharged home.  He was given regimen of Augmentin and doxycycline x 10 days.  He presented on day of admission with worsening symptoms, frequent dry cough, and low-grade fever.  His abdomen was noted to be extremely tender to palpation.  Patient/spouse declined alcohol use for the last 4 months.  Imaging workup revealed concern of bowel obstruction.  CTA chest with no evidence of PE.  He was admitted under hospitalist services with acute on chronic hypoxemic respiratory failure to ICU with general surgery evaluation.  Patient did not demonstrate having a surgical abdomen following admission and was having bowel movements.  He did however appear to be having alcohol withdrawal symptoms despite patient/family denying any recent use.  He 
  Urology Progress Note      SUBJECTIVE: Wife at bedside.  No real  complaints patient still confused    OBJECTIVE:      REVIEW OF SYSTEMS:   Review of Systems   Unable to perform ROS: Mental status change         Physical  VITALS:  BP (!) 100/55   Pulse 79   Temp 97.5 °F (36.4 °C) (Temporal)   Resp 16   Ht 1.93 m (6' 4\")   Wt 79.3 kg (174 lb 14.4 oz)   SpO2 100%   BMI 21.29 kg/m²   TEMPERATURE:  Current - Temp: 97.5 °F (36.4 °C); Max - Temp  Av.7 °F (36.5 °C)  Min: 96.6 °F (35.9 °C)  Max: 98.6 °F (37 °C)   24 HR I&O   Intake/Output Summary (Last 24 hours) at 2024 1511  Last data filed at 2024 1049  Gross per 24 hour   Intake 240 ml   Output 1400 ml   Net -1160 ml     BACK: inspection of back is normal  ABDOMEN:  soft, non-distended, and non-tender  HEART:  normal rate  CHEST: No distress normal effort  GENITAL/URINARY: Morataya catheter in place draining dark kvng urine.    Data       CBC:   Recent Labs     24  0210   WBC 11.0*   HGB 12.3*   HCT 35.8*        BMP:    Recent Labs     24  0210 24  0956   * 129*   K 3.4* 3.4*   CL 96* 93*   CO2 26 25   BUN 11 8   CREATININE 0.4* 0.5   GLUCOSE 119* 147*       No results for input(s): \"LABURIN\" in the last 72 hours.  No results for input(s): \"BC\" in the last 72 hours.  No results for input(s): \"BLOODCULT2\" in the last 72 hours.    Radiology:      Imaging studies:      ASSESSMENT AND PLAN    Patient Active Problem List   Diagnosis    Idiopathic pulmonary fibrosis (HCC)    Alcohol abuse    Benign prostatic hyperplasia with urinary retention    Hyponatremia    Encounter for Morataya catheter replacement    Urinary retention    Moderate malnutrition (HCC)    Acute on chronic hypoxic respiratory failure (HCC)    Tachypnea, tachyypneic to 40 bpm in ED    Acute respiratory distress, Tachypneic to 40 bpm    RSV infection    Abdominal pain       1.  BPH with urinary retention recurrent.  Now has Morataya catheter in place.  Started on 
 Daily Progress Note    Date:1/10/2024  Patient: Eze Rosa  : 1950  MRN:[id number]  CODE:Full Code No additional code details  PCP:Ross Masterson MD    Admit Date: 2023  4:05 PM   LOS: 12 days     Chief Complaint   Patient presents with    Shortness of Breath     Fever illness x 1 week pt has hx of Pulmonary Fibrosis         Subjective: NAEON. No bowel movement today. Had bowel movement overnight but consistency not documented. Wife at bedside reports stool is more formed than prior, paste-like consistency last night.         Hospital Summary: 74 yo M with pulmonary fibrosis, alcoholism, recent COVID and RSV infection who presented to [facility] ED with dyspnea.   Found to have new onset Afib with RVR. Admitted to ICU hospitalist for further management.   Cardiology consulted for Afib. Initially treated with Digoxin, Lopressor added for rate control. Outpatient cardiology follow up. [id number] score of 1, does not need AC.     Urinary retention has been an ongoing issue. Morataya catheter placed on admission, urology consulted, failed voiding trial .     He has had intermittent loose stools. Likely related to liquid diet. GI panel was ordered but C diff is very unlikely given intermittent nature.     Evaluated by PT/OT. Plan for SNF at discharge.            Review of Systems   All other systems reviewed and are negative.      Objective:      Vital signs in last 24 hours:  Patient Vitals for the past 24 hrs:   BP Temp Temp src Pulse Resp SpO2   01/10/24 1448 (!) 107/59 97 °F (36.1 °C) Temporal 78 18 97 %   01/10/24 0806 103/61 97 °F (36.1 °C) -- 77 16 98 %   01/10/24 0639 -- -- -- -- -- 98 %   01/10/24 0451 133/69 97.3 °F (36.3 °C) Temporal 79 16 99 %   01/10/24 0039 114/72 97.3 °F (36.3 °C) Temporal 91 16 99 %   24 2107 (!) 141/76 97.3 °F (36.3 °C) Temporal 88 16 98 %   24 1718 121/67 96.8 °F (36 °C) Temporal 79 18 96 %       I/O last 3 completed shifts:  In: 240 [P.O.:240]  Out: 750 
 Occupational Therapy Initial Assessment  Date: 1/3/2024   Patient Name: Eze Rosa  MRN: [id number]     : 1950    Date of Service: 1/3/2024    Discharge Recommendations:  Continue to assess pending progress       Assessment   Assessment: OT evaluation completed and tx initiated. Pt may benefit from continued skilled services to address functional deficits. Pt will likely progress with further tx. Pt presents with extensive debility with an inability to perform movements past bed mobility for therapy eval. Currently requires dependent level of assist and would require the highest level of care if D/C is imminent.  REQUIRES OT FOLLOW-UP: Yes  Activity Tolerance  Activity Tolerance: Treatment limited secondary to medical complications (free text);Patient limited by fatigue              Patient Diagnosis(es): The encounter diagnosis was Dyspnea and respiratory abnormalities.    Past Medical History:   Past Medical History:   Diagnosis Date    GERD (gastroesophageal reflux disease)     Pneumonia     Psychiatric problem         Past Surgical History:   Past Surgical History:   Procedure Laterality Date    ABDOMEN SURGERY      COLONOSCOPY                Restrictions  Restrictions/Precautions  Restrictions/Precautions: Seizure, Fall Risk  Required Braces or Orthoses?: No    Subjective      Pain Assessment  Pain Assessment: 0-10  Pain Level: 3  Pain Location: Generalized  Pre Treatment Pain Screening  Pain at present: 6  Scale Used: Faces  Intervention List:  (Pt declined anything beyond bed mobility - reported that earlier therapist had cleaned him up and rolled him and that was enough)  Vital Signs  Temp: 98.5 °F (36.9 °C)  Temp Source: Temporal  Pulse: (!) 106  Heart Rate Source: Monitor  Respirations: 16  BP: (!) 139/92  MAP (Calculated): 108  BP Location: Right upper arm  BP Method: Automatic  Patient Position: Semi fowlers  Height and Weight  Weight - Scale: 79.3 kg (174 lb 14.4 oz)  Weight Method: Bed 
4 Eyes Skin Assessment     NAME:  Eze Rosa  YOB: 1950  MEDICAL RECORD NUMBER:  [id number]    The patient is being assessed for  Admission    I agree that at least one RN has performed a thorough Head to Toe Skin Assessment on the patient. ALL assessment sites listed below have been assessed.      Areas assessed by both nurses:    Head, Face, Ears, Sacrum, heels, ischium, coccyx, shoulders        Does the Patient have a Wound? No noted wound(s)       Drew Prevention initiated by RN: Yes  Wound Care Orders initiated by RN: No    Pressure Injury (Stage 3,4, Unstageable, DTI, NWPT, and Complex wounds) if present, place Wound referral order by RN under : No    New Ostomies, if present place, Ostomy referral order under : No     Nurse 1 eSignature: Electronically signed by Nelson Cruz RN on 12/30/23 at 4:45 AM CST    **SHARE this note so that the co-signing nurse can place an eSignature**    Nurse 2 eSignature: {Esignature:[id number]}   
Comprehensive Nutrition Assessment    Type and Reason for Visit:  Initial, RD Nutrition Re-Screen/LOS    Nutrition Recommendations/Plan:   Modify ONS orders to Ensure Clear  Monitor intakes     Malnutrition Assessment:  Malnutrition Status:  At risk for malnutrition (Comment) (01/04/24 6487)    Context:  Acute Illness     Findings of the 6 clinical characteristics of malnutrition:  Energy Intake:  50% or less of estimated energy requirements for 5 or more days  Weight Loss:  No significant weight loss     Body Fat Loss:  Mild body fat loss     Muscle Mass Loss:  No significant muscle mass loss    Fluid Accumulation:  No significant fluid accumulation     Strength:  Not Performed    Nutrition Assessment:    Pt is currently on a Full Liquid diet. ONS has been modified to Ensure Clear. PO intake does not meet nutritional needs. Will monitor diet progression and intakes to determine if further nutrition intervention is needed.    Nutrition Related Findings:      Wound Type: Skin Tears       Current Nutrition Intake & Therapies:    Average Meal Intake: 26-50%  ADULT DIET; Full Liquid  ADULT ORAL NUTRITION SUPPLEMENT; Breakfast, Lunch, Dinner; Clear Liquid Oral Supplement    Anthropometric Measures:  Height: 193 cm (6' 4\")  Ideal Body Weight (IBW): 202 lbs (92 kg)    Current Body Weight: 79.3 kg (174 lb 13.2 oz)  Current BMI (kg/m2): 21.3  BMI Categories: Underweight (BMI less than 22) age over 65    Estimated Daily Nutrient Needs:  Energy Requirements Based On: Kcal/kg  Weight Used for Energy Requirements: Current  Energy (kcal/day): 8163-9540 kcals/day  Weight Used for Protein Requirements: Current  Protein (g/day):  g/protein/day  Method Used for Fluid Requirements: 1 ml/kcal  Fluid (ml/day): 7705-1332 mL/day    Nutrition Diagnosis:   Inadequate oral intake related to acute injury/trauma as evidenced by intake 26-50%    Nutrition Interventions:   Food and/or Nutrient Delivery: Continue Current Diet, Modify 
HR elevated and irregular. 12 Lead EKG obtained which showed a-fib with RVR in the 140s. Attending notified. New orders placed per attending.   
Message sent to Dr. Meier regarding new consult after 6 attempts to call consult to answering service. Answering service never answered call. Dr. Meier instructed to contact him through [employer] in the future  
No bowel movement on current shift.  
No urine output on current shift. Bladder scan>696 ml. Notified Dr. Junior per perfect serve.     Received order for straight cath, attempted to insert with sterile procedure, meeting resistance while inserting catheter, no urine return. Pt is screaming and c/o pain during procedure. Straight cath procedure aborted. Will consult urology.    Spoke to Dr. Rincon over the phone, physician will come to insert the catheter at bedside soon.    1805: 18 Fr Coude catheter inserted by Dr. Rincon at bedside.  
Occupational Therapy     01/09/24 1600   Subjective   Subjective Pt in bed upon arrival for therapy with wife present. Pt in soiled bed and needed to be cleaned up. Pt required encouragement to participate but more participatory with wife present.   Pain Assessment   Pain Assessment Russ-Baker FACES   Russ-Baker Pain Rating 4   Pain Location Generalized   Pain Descriptors Discomfort   Functional Pain Assessment Prevents or interferes some active activities and ADLs   Pain Type Chronic pain   Pain Frequency Continuous   Non-Pharmaceutical Pain Intervention(s) Ambulation/Increased Activity;Repositioned;Rest;Emotional support   Response to Pain Intervention Patient satisfied   Cognition   Overall Cognitive Status Exceptions   Arousal/Alertness Appropriate responses to stimuli   Following Commands Inconsistently follows commands   Attention Span Difficulty attending to directions   Memory Decreased long term memory;Decreased recall of precautions;Decreased recall of recent events   Safety Judgement Decreased awareness of need for assistance;Decreased awareness of need for safety   Problem Solving Decreased awareness of errors   Insights Decreased awareness of deficits   Initiation Requires cues for all   Sequencing Requires cues for all   Cognition Comment Needs encouragement.   Orientation   Overall Orientation Status WFL   Bed Mobility Training   Bed Mobility Training Yes   Overall Level of Assistance Moderate assistance   Interventions Verbal cues;Tactile cues;Manual cues   Supine to Sit Moderate assistance   Scooting Moderate assistance   Transfer Training   Transfer Training Yes   Overall Level of Assistance Minimum assistance;Moderate assistance;Assist X2   Interventions Verbal cues;Tactile cues;Manual cues   Sit to Stand Minimum assistance;Moderate assistance;Assist X2   Stand to Sit Minimum assistance;Moderate assistance;Assist X2   Bed to Chair Minimum assistance;Moderate assistance;Assist X2   Balance 
Occupational Therapy  Pt in bed upon arrival for therapy. Pt states \"I just want to lay here. I do not want to get up right now\". Will continue to follow. Electronically signed by ANTHONY Patterson on 1/10/2024 at 12:48 PM    
Occupational Therapy  Pt in bed upon arrival for therapy. Pt states he does not feel like doing anything at this time. Despite encouragement, patient declining therapy this pm. Will continue to follow. Electronically signed by ANTHONY Patterson on 1/5/2024 at 4:17 PM    
Occupational Therapy  Second attempt made this afternoon. Pt continues to refuse to get up or do anything at this time. Electronically signed by ANTHONY Patterson on 1/10/2024 at 3:21 PM    
Patient HR jumped into the 160's. EKG obtained showing a-fib RVR. Dr. Villalobos notified. Order for amio gtt placed per attending.   
Patient back in in normal sinus rhythm. HR in the 80's.  
Patient in and out of atrial fib, flutter and sinus rhythm.  Rates 80's to 160's Dr. Powell made aware orders received.  
Physical Therapy     01/09/24 1500   Restrictions/Precautions   Restrictions/Precautions Seizure;Fall Risk   Required Braces or Orthoses? No   General   Diagnosis dyspnea and respiratory abnormalities, incarcerated spigellian hernia with SBO, suspected withdrawal from alcohol   Subjective   Subjective Pt requires much encouragement to participate with therapy.   Subjective   Pain moderate/intense pain in bottom due to near skin break down noted during clean up post bowel movement; nurse notified   Bed mobility   Supine to Sit Moderate assistance   Bed Mobility Comments with use of bed functions   Transfers   Sit to Stand Minimal Assistance;2 Person Assistance   Stand to Sit Minimal Assistance;2 Person Assistance   Bed to Chair Minimal assistance;2 Person Assistance   Comment max cuing for all STSs to include from EOB and recliner to RW. multiple STSs for maxA with clean up post bowel movement episode in bed.   Ambulation   Surface Level tile   Device Rolling Walker   Assistance Minimal assistance;2 Person assistance   Quality of Gait unsteady overall   Gait Deviations Slow Regina;Decreased step length;Decreased step height;Increased GHAZALA   Distance 4'   PT Exercises   Static Standing Balance Exercises static standing for 1-2 min x 4 for clean up and application of waffle cushion due to significant redness and near skin break down on bottom from time spent in bed.   Short Term Goals   Time Frame for Short Term Goals 2 wks   Short Term Goal 1 supine to sit Min A   Short Term Goal 2 sit to stand Min A   Short Term Goal 3 amb. 100' Min A RW   Activity Tolerance   Activity Tolerance Patient tolerated treatment well;Treatment limited secondary to decreased cognition   Assessment   Assessment initial posterior lean in standing at RW with overly narrow GHAZALA- able to improve balance and stability with max cuing for foot placement and posture alignment. pt quick to fatigue and required multiple standing episodes for post bowel 
Physical Therapy  Facility/Department: [facility] PROGRESSIVE CARE  Physical Therapy Initial Assessment    Name: Eze Rosa  : 1950  MRN: [id number]  Date of Service: 2024    Discharge Recommendations:  Continue to assess pending progress, 24 hour supervision or assist, Patient would benefit from continued therapy after discharge          Patient Diagnosis(es): The encounter diagnosis was Dyspnea and respiratory abnormalities.  Past Medical History:  has a past medical history of GERD (gastroesophageal reflux disease), Pneumonia, and Psychiatric problem.  Past Surgical History:  has a past surgical history that includes Abdomen surgery and Colonoscopy.    Assessment   Body Structures, Functions, Activity Limitations Requiring Skilled Therapeutic Intervention: Decreased functional mobility ;Decreased ADL status;Decreased ROM;Decreased safe awareness;Decreased cognition;Decreased strength;Decreased balance;Decreased endurance;Decreased posture;Increased pain  Assessment: Pt. dependent with all care. Needs 24 hr care and placement. Will work on progressive mobility at pt tolerates. 2A  Treatment Diagnosis: debility  Therapy Prognosis: Guarded  Decision Making: Medium Complexity  Barriers to Learning: cognition  Requires PT Follow-Up: Yes  Activity Tolerance  Activity Tolerance: Patient limited by fatigue;Treatment limited secondary to decreased cognition     Plan   Physical Therapy Plan  General Plan: 3-5 times per week  Therapy Duration: 2 Weeks  Current Treatment Recommendations: Strengthening, ROM, Balance training, Functional mobility training, Transfer training, Gait training, Endurance training, Therapeutic activities, Positioning, Safety education & training, Patient/Caregiver education & training  Safety Devices  Type of Devices: Patient at risk for falls, Nurse notified, Call light within reach, Bed alarm in place, Heels elevated for pressure relief, Left in bed 
Physical Therapy  Name: Eze Rosa  MRN:  [id number]  Date of service:  1/10/2024     01/10/24 1400   Subjective   Subjective 2 attempts, patient adamantly refused each time stating \" i don't want to do it now\". explained the improtance of him participating with therapy in order to go home to no avail. It may be helpful to have his wife present since this is the only time he has particitated with therapy when she was present           Electronically signed by Chad Camejo PTA on 1/10/2024 at 2:47 PM      
Physical Therapy  Name: Eze Rosa  MRN:  [id number]  Date of service:  1/5/2024 01/05/24 1505   Subjective   Subjective Pt adamently unwilling to participate with therapy at this time.         Electronically signed by Lewis Posada PTA on 1/5/2024 at 3:06 PM     
Physical Therapy  Name: Eze Rosa  MRN:  [id number]  Date of service:  1/7/2024 01/07/24 1052   Restrictions/Precautions   Restrictions/Precautions Seizure;Fall Risk   Required Braces or Orthoses? No   General   Chart Reviewed Yes   Subjective   Subjective Pt requires much encouragement to participate with therapy.   Pain Assessment   Pain Assessment None - Denies Pain   Bed Mobility   Rolling Modified independent;Rolling Left  (with use of bedrail)   Supine to Sit Minimal assistance   Sit to Supine Stand by assistance   Comment pt sat EOB for brief period with good static sitting balance noted   Transfers   Sit to Stand Unable to assess   Stand to Sit Unable to assess   Short Term Goals   Time Frame for Short Term Goals 2 wks   Short Term Goal 1 supine to sit Min A   Short Term Goal 2 sit to stand Min A   Short Term Goal 3 amb. 100' Min A RW   Conditions Requiring Skilled Therapeutic Intervention   Body Structures, Functions, Activity Limitations Requiring Skilled Therapeutic Intervention Decreased functional mobility ;Decreased ADL status;Decreased ROM;Decreased safe awareness;Decreased cognition;Decreased strength;Decreased balance;Decreased endurance;Decreased posture;Increased pain   Assessment Pt shows progression with bed mobility tasks compared with initial evaluation, able to come to seated position at EOB with Jovon at most. Pt demonstrates good static sitting balance at EOB but fatigues quickly and requested to return to supine, adamently declines attempts to stand.   Activity Tolerance   Activity Tolerance Patient limited by fatigue;Patient limited by endurance   PT Plan of Care   Sunday X   Safety Devices   Type of Devices Call light within reach;Left in bed         Electronically signed by Lewis Posada PTA on 1/7/2024 at 10:55 AM     
Progress Note  Date:2024       Room:0711/711-02  Patient Name:Eze Rosa     YOB: 1950     Age:73 y.o.        Subjective    Subjective: Still confused.  Not able to have any meaningful discussion with patient.  Review of Systems: See admission history and physical  Objective         Vitals Last 24 Hours:  TEMPERATURE:  Temp  Av.4 °F (36.3 °C)  Min: 97 °F (36.1 °C)  Max: 98.2 °F (36.8 °C)  RESPIRATIONS RANGE: Resp  Av  Min: 16  Max: 16  PULSE OXIMETRY RANGE: SpO2  Av %  Min: 97 %  Max: 100 %  PULSE RANGE: Pulse  Av.8  Min: 67  Max: 85  BLOOD PRESSURE RANGE: Systolic (24hrs), Av , Min:98 , Max:128   ; Diastolic (24hrs), Av, Min:56, Max:70    I/O (24Hr):    Intake/Output Summary (Last 24 hours) at 2024 0652  Last data filed at 2024 0618  Gross per 24 hour   Intake --   Output 1700 ml   Net -1700 ml     Objective  Labs/Imaging/Diagnostics    Labs:  CBC:No results for input(s): \"WBC\", \"RBC\", \"HGB\", \"HCT\", \"MCV\", \"RDW\", \"PLT\" in the last 72 hours.  CHEMISTRIES:No results for input(s): \"NA\", \"K\", \"CL\", \"CO2\", \"BUN\", \"CREATININE\", \"GLUCOSE\", \"CA\", \"PHOS\", \"MG\" in the last 72 hours.  PT/INR:No results for input(s): \"PROTIME\", \"INR\" in the last 72 hours.  APTT:No results for input(s): \"APTT\" in the last 72 hours.  LIVER PROFILE:No results for input(s): \"AST\", \"ALT\", \"BILIDIR\", \"BILITOT\", \"ALKPHOS\" in the last 72 hours.    Imaging Last 24 Hours:  No results found.  Assessment//Plan           Hospital Problems             Last Modified POA    * (Principal) Acute on chronic hypoxic respiratory failure (HCC) 2023 Yes    Alcohol abuse 2023 Yes    Benign prostatic hyperplasia with urinary retention 1/3/2024 Yes    Encounter for Morataya catheter replacement 1/3/2024 Yes    Tachypnea, tachyypneic to 40 bpm in ED 2023 Yes    Acute respiratory distress, Tachypneic to 40 bpm 2023 Yes    RSV infection 2023 Yes    Abdominal pain 2023 Yes 
Progress Note  Date:2024       Room:11/711-02  Patient Name:Eze Rosa     YOB: 1950     Age:73 y.o.        Subjective    Subjective: Still confused.  No new urological complaints  Review of Systems: See admission history and physical  Objective         Vitals Last 24 Hours:  TEMPERATURE:  Temp  Av.5 °F (36.4 °C)  Min: 96.6 °F (35.9 °C)  Max: 98.4 °F (36.9 °C)  RESPIRATIONS RANGE: Resp  Av.3  Min: 16  Max: 18  PULSE OXIMETRY RANGE: SpO2  Av.1 %  Min: 98 %  Max: 100 %  PULSE RANGE: Pulse  Av.2  Min: 74  Max: 81  BLOOD PRESSURE RANGE: Systolic (24hrs), Av , Min:108 , Max:128   ; Diastolic (24hrs), Av, Min:63, Max:72    I/O (24Hr):    Intake/Output Summary (Last 24 hours) at 2024 1507  Last data filed at 2024 0542  Gross per 24 hour   Intake --   Output 1800 ml   Net -1800 ml     Objective:  Chest: Good expansion and excursion.  Heart regular rate and rhythm.  Abdomen soft.  : Morataya catheter position.  Extremities: Without edema  Neuro: Confused.  Psych: Inappropriate    Labs/Imaging/Diagnostics    Labs:  CBC:No results for input(s): \"WBC\", \"RBC\", \"HGB\", \"HCT\", \"MCV\", \"RDW\", \"PLT\" in the last 72 hours.  CHEMISTRIES:No results for input(s): \"NA\", \"K\", \"CL\", \"CO2\", \"BUN\", \"CREATININE\", \"GLUCOSE\", \"CA\", \"PHOS\", \"MG\" in the last 72 hours.  PT/INR:No results for input(s): \"PROTIME\", \"INR\" in the last 72 hours.  APTT:No results for input(s): \"APTT\" in the last 72 hours.  LIVER PROFILE:No results for input(s): \"AST\", \"ALT\", \"BILIDIR\", \"BILITOT\", \"ALKPHOS\" in the last 72 hours.    Imaging Last 24 Hours:  No results found.  Assessment//Plan           Hospital Problems             Last Modified POA    * (Principal) Acute on chronic hypoxic respiratory failure (HCC) 2023 Yes    Alcohol abuse 2023 Yes    Benign prostatic hyperplasia with urinary retention 1/3/2024 Yes    Encounter for Morataya catheter replacement 1/3/2024 Yes    Tachypnea, tachyypneic to 40 
Progress Note  Date:2024       Room:[facility]/711-02  Patient Name:Eze Rosa     YOB: 1950     Age:73 y.o.        Subjective    Subjective: No new complaints.  Review of Systems: See admission history  Objective         Vitals Last 24 Hours:  TEMPERATURE:  Temp  Av.3 °F (36.3 °C)  Min: 96.4 °F (35.8 °C)  Max: 97.7 °F (36.5 °C)  RESPIRATIONS RANGE: Resp  Av.8  Min: 16  Max: 19  PULSE OXIMETRY RANGE: SpO2  Av.5 %  Min: 93 %  Max: 99 %  PULSE RANGE: Pulse  Av.2  Min: 74  Max: 84  BLOOD PRESSURE RANGE: Systolic (24hrs), Av , Min:100 , Max:116   ; Diastolic (24hrs), Av, Min:59, Max:62    I/O (24Hr):    Intake/Output Summary (Last 24 hours) at 2024 1748  Last data filed at 2024 1342  Gross per 24 hour   Intake 0 ml   Output 1750 ml   Net -1750 ml     Objective  Lungs: Clear  Heart: Regular rate and rhythm.  Abdomen: Soft  : Morataya cath in position.  Extremities: Without edema.  Neuro: Alert and oriented.  Psych: Appropriate      Labs/Imaging/Diagnostics    Labs:  CBC:No results for input(s): \"WBC\", \"RBC\", \"HGB\", \"HCT\", \"MCV\", \"RDW\", \"PLT\" in the last 72 hours.  CHEMISTRIES:  Recent Labs     24  0956   *   K 3.4*   CL 93*   CO2 25   BUN 8   CREATININE 0.5   GLUCOSE 147*   MG 1.7     PT/INR:No results for input(s): \"PROTIME\", \"INR\" in the last 72 hours.  APTT:No results for input(s): \"APTT\" in the last 72 hours.  LIVER PROFILE:No results for input(s): \"AST\", \"ALT\", \"BILIDIR\", \"BILITOT\", \"ALKPHOS\" in the last 72 hours.    Imaging Last 24 Hours:  No results found.  Assessment//Plan           Hospital Problems             Last Modified POA    * (Principal) Acute on chronic hypoxic respiratory failure (HCC) 2023 Yes    Alcohol abuse 2023 Yes    Benign prostatic hyperplasia with urinary retention 1/3/2024 Yes    Encounter for Morataya catheter replacement 1/3/2024 Yes    Tachypnea, tachyypneic to 40 bpm in ED 2023 Yes    Acute respiratory distress, 
Pt in NSR x at least 2 hours.MD aware.  
Report called to 7th floor [facility]. [phone]  
Report called to RAMON Caceres at [place], DC summary, AVS faxed to the facility. Pt will be transferred by EMS.  
S/W pt spouse at bedside this evening. She states that they would be ok to try [facility] ( pt was here just a few months ago), [facility], or [facility] for rehab upon discharge.   
Urology Progress Note      SUBJECTIVE:  No new  complaints    OBJECTIVE:   Review of Systems     Physical  VITALS:  /69   Pulse 79   Temp 97.3 °F (36.3 °C) (Temporal)   Resp 16   Ht 1.93 m (6' 4\")   Wt 79.9 kg (176 lb 2 oz)   SpO2 98%   BMI 21.44 kg/m²   TEMPERATURE:  Current - Temp: 97.3 °F (36.3 °C); Max - Temp  Av.1 °F (36.2 °C)  Min: 96.8 °F (36 °C)  Max: 97.3 °F (36.3 °C)   24 HR I&O   Intake/Output Summary (Last 24 hours) at 1/10/2024 0724  Last data filed at 1/10/2024 0451  Gross per 24 hour   Intake 240 ml   Output 750 ml   Net -510 ml       Physical Exam   BACK: inspection of back is normal and no tenderness in spine or flanks  ABDOMEN:  soft, non-distended, and non-tender  HEART:  normal rate and regular rhythm  CHEST:  Normal respiratory effort   GENITAL/URINARY:  Unable to void. Morataya replaced yesterday. Circumcised     Data  CBC:   Recent Labs     01/10/24  0148   WBC 8.9   HGB 13.5*   HCT 39.0*        BMP:    Recent Labs     01/10/24  0148   *   K 3.9   CL 94*   CO2 31*   BUN 7*   CREATININE 0.4*   GLUCOSE 125*       No results for input(s): \"LABURIN\" in the last 72 hours.  No results for input(s): \"BC\" in the last 72 hours.  No results for input(s): \"BLOODCULT2\" in the last 72 hours.      U/A:    Lab Results   Component Value Date/Time    COLORU Yellow 10/30/2023 12:00 AM    PHUR 7 10/30/2023 12:00 AM    WBCUA 5 2023 03:10 PM    RBCUA 2 10/30/2023 12:00 AM    RBCUA 372 2023 03:10 PM    YEAST 0 10/30/2023 12:00 AM    BACTERIA 1+ 10/30/2023 12:00 AM    BACTERIA NEGATIVE 2023 03:10 PM    CLARITYU Cloudy 10/30/2023 12:00 AM    SPECGRAV 1.015 10/30/2023 12:00 AM    LEUKOCYTESUR large 10/30/2023 12:00 AM    LEUKOCYTESUR TRACE 2023 03:10 PM    UROBILINOGEN Normal 10/30/2023 12:00 AM    BILIRUBINUR 0 10/30/2023 12:00 AM    BLOODU Positive 10/30/2023 12:00 AM    GLUCOSEU - 10/30/2023 12:00 AM       Radiology:   No results found.     ASSESSMENT AND 
Urology Progress Note      SUBJECTIVE:  No new  complaints. Having BM's. Stated he was getting out of bed. When speaking with the RN he is refusing to get out of bed.     OBJECTIVE:   Review of Systems     Physical  VITALS:  /71   Pulse 84   Temp 98.2 °F (36.8 °C) (Temporal)   Resp 16   Ht 1.93 m (6' 4\")   Wt 79.9 kg (176 lb 2 oz)   SpO2 97%   BMI 21.44 kg/m²   TEMPERATURE:  Current - Temp: 98.2 °F (36.8 °C); Max - Temp  Av.6 °F (36.4 °C)  Min: 97 °F (36.1 °C)  Max: 98.2 °F (36.8 °C)   24 HR I&O   Intake/Output Summary (Last 24 hours) at 2024 0808  Last data filed at 2024 0454  Gross per 24 hour   Intake 0 ml   Output 1050 ml   Net -1050 ml       Physical Exam   BACK: inspection of back is normal and no tenderness in spine or flanks  ABDOMEN:  soft, non-distended, and non-tender  HEART:  normal rate  CHEST:  Normal respiratory effort   GENITAL/URINARY:  Morataya catheter in place draining clear yellow urine    Data  CBC: No results for input(s): \"WBC\", \"HGB\", \"HCT\", \"PLT\" in the last 72 hours.  BMP:  No results for input(s): \"NA\", \"K\", \"CL\", \"CO2\", \"BUN\", \"CREATININE\", \"GLUCOSE\" in the last 72 hours.    No results for input(s): \"LABURIN\" in the last 72 hours.  No results for input(s): \"BC\" in the last 72 hours.  No results for input(s): \"BLOODCULT2\" in the last 72 hours.      U/A:    Lab Results   Component Value Date/Time    COLORU Yellow 10/30/2023 12:00 AM    PHUR 7 10/30/2023 12:00 AM    WBCUA 5 2023 03:10 PM    RBCUA 2 10/30/2023 12:00 AM    RBCUA 372 2023 03:10 PM    YEAST 0 10/30/2023 12:00 AM    BACTERIA 1+ 10/30/2023 12:00 AM    BACTERIA NEGATIVE 2023 03:10 PM    CLARITYU Cloudy 10/30/2023 12:00 AM    SPECGRAV 1.015 10/30/2023 12:00 AM    LEUKOCYTESUR large 10/30/2023 12:00 AM    LEUKOCYTESUR TRACE 2023 03:10 PM    UROBILINOGEN Normal 10/30/2023 12:00 AM    BILIRUBINUR 0 10/30/2023 12:00 AM    BLOODU Positive 10/30/2023 12:00 AM    GLUCOSEU - 10/30/2023 12:00 
Urology Progress Note      SUBJECTIVE:  No new  complaints. Refusing to get out of bed.     OBJECTIVE: Lieberman catheter has not been removed yet this AM  Review of Systems     Physical  VITALS:  /68   Pulse 78   Temp 97.2 °F (36.2 °C) (Temporal)   Resp 16   Ht 1.93 m (6' 4\")   Wt 79.9 kg (176 lb 2 oz)   SpO2 98%   BMI 21.44 kg/m²   TEMPERATURE:  Current - Temp: 97.2 °F (36.2 °C); Max - Temp  Av.4 °F (36.3 °C)  Min: 97 °F (36.1 °C)  Max: 98.2 °F (36.8 °C)   24 HR I&O No intake or output data in the 24 hours ending 24 0751    Physical Exam   BACK: inspection of back is normal and no tenderness in spine or flanks  ABDOMEN:  soft, non-distended, and non-tender  HEART:  normal rate  CHEST:  Normal respiratory effort   GENITAL/URINARY:  lieberman catheter to bedside draining clear yellow urine    Data  CBC: No results for input(s): \"WBC\", \"HGB\", \"HCT\", \"PLT\" in the last 72 hours.  BMP:  No results for input(s): \"NA\", \"K\", \"CL\", \"CO2\", \"BUN\", \"CREATININE\", \"GLUCOSE\" in the last 72 hours.    No results for input(s): \"LABURIN\" in the last 72 hours.  No results for input(s): \"BC\" in the last 72 hours.  No results for input(s): \"BLOODCULT2\" in the last 72 hours.      U/A:    Lab Results   Component Value Date/Time    COLORU Yellow 10/30/2023 12:00 AM    PHUR 7 10/30/2023 12:00 AM    WBCUA 5 2023 03:10 PM    RBCUA 2 10/30/2023 12:00 AM    RBCUA 372 2023 03:10 PM    YEAST 0 10/30/2023 12:00 AM    BACTERIA 1+ 10/30/2023 12:00 AM    BACTERIA NEGATIVE 2023 03:10 PM    CLARITYU Cloudy 10/30/2023 12:00 AM    SPECGRAV 1.015 10/30/2023 12:00 AM    LEUKOCYTESUR large 10/30/2023 12:00 AM    LEUKOCYTESUR TRACE 2023 03:10 PM    UROBILINOGEN Normal 10/30/2023 12:00 AM    BILIRUBINUR 0 10/30/2023 12:00 AM    BLOODU Positive 10/30/2023 12:00 AM    GLUCOSEU - 10/30/2023 12:00 AM       Radiology:   No results found.       ASSESSMENT AND PLAN    Patient Active Problem List   Diagnosis    Idiopathic 
When entering the room, the patients rectal tube was lying in the bed out of the patients rectum. The bulb was not inflated when assessing the tube but it was intact. There is no visible trauma/damage to the patients rectum. Dr. Junior has been made aware.   
Wife reports patient admitted to having a fall about a week ago and hitting back of  his head pretty hard. Discussed with Dr. Villalobos. CT head ordered  
  Physical Activity: Inactive (9/3/2023)    Exercise Vital Sign     Days of Exercise per Week: 0 days     Minutes of Exercise per Session: 0 min   Stress: Not on file   Social Connections: Not on file   Intimate Partner Violence: Not on file   Housing Stability: Low Risk  (12/29/2023)    Housing Stability Vital Sign     Unable to Pay for Housing in the Last Year: No     Number of Places Lived in the Last Year: 1     Unstable Housing in the Last Year: No       Current Facility-Administered Medications   Medication Dose Route Frequency Provider Last Rate Last Admin    enoxaparin (LOVENOX) injection 70 mg  1 mg/kg SubCUTAneous BID Nirmal Villalobos MD        [START ON 12/31/2023] digoxin (LANOXIN) injection 125 mcg  125 mcg IntraVENous Daily Nirmal Villalobos MD        albuterol (PROVENTIL) (2.5 MG/3ML) 0.083% nebulizer solution 2.5 mg  2.5 mg Nebulization Q4H PRN Jovan Powell MD        thiamine mononitrate tablet 100 mg  100 mg Oral Daily Jovan Powell MD   100 mg at 12/30/23 0901    tamsulosin (FLOMAX) capsule 0.8 mg  0.8 mg Oral Daily Jovan Powell MD   0.8 mg at 12/30/23 0902    therapeutic multivitamin-minerals 1 tablet  1 tablet Oral Daily Jovan Powell MD   1 tablet at 12/30/23 0900    [Held by provider] metoprolol succinate (TOPROL XL) extended release tablet 25 mg  25 mg Oral Daily Jovan Powell MD   25 mg at 12/30/23 0859    doxycycline hyclate (VIBRAMYCIN) capsule 100 mg  100 mg Oral BID Jovan Powell MD   100 mg at 12/30/23 0900    amoxicillin-clavulanate (AUGMENTIN) 875-125 MG per tablet 1 tablet  1 tablet Oral BID Jovan Powell MD   1 tablet at 12/30/23 0901    sodium chloride flush 0.9 % injection 5-40 mL  5-40 mL IntraVENous PRN Jovan Powell MD        0.9 % sodium chloride infusion   IntraVENous PRN Jovan Powell MD        potassium chloride 10 mEq/100 mL IVPB (Peripheral Line)  10 mEq IntraVENous PRN Jovan Powell MD        magnesium sulfate 2000 mg in 50 mL IVPB premix  2,000 mg 
Weight    Discharge Planning:    Too soon to determine     Mary Wiley MS, RD, LD, [facility]  Contact: [phone]    
Daily    tamsulosin  0.8 mg Oral Daily    therapeutic multivitamin-minerals  1 tablet Oral Daily    folic acid  1 mg Oral Daily    arformoterol tartrate  15 mcg Nebulization BID RT    budesonide  0.5 mg Nebulization Q12H           I have reviewed the patient's daily labs, including BMP and CBC with pertinent results discussed below.     Reviewed imaging     Assessment & Plan     Acute hypoxic respiratory failure  History of pulmonary fibrosis followed at [facility]  CTA chest 12/29/2023 with no evidence of PE  Continue on arformoterol and budesonide twice daily  Since weaned down to 3 L NC which is baseline  1/2 continue above and started on incentive spirometry    A-fib with RVR likely new onset/paroxysmal  Will keep the electrolytes potassium more than 4 and magnesium more than 2  Serial high-sensitivity troponin 40 > 31 > 26  TTE 12/31/2023: EF 55-60%  TSH 1.4  Started on digoxin along with metoprolol succinate  Follow-up with cardiology  full dose Lovenox for new onset AF with RVR  1/3 cardiology input appreciated> Yoel vas score 1 low risk of A-fib induced stroke no further anticoagulation only to continue on metoprolol succinate    Abdominal pain likely from incisional hernia   General surgery seen and evaluated no further intervention presently reducible    Hypokalemia 3.4 supplemented orally will monitor BMP daily's  1/3 increase potassium supplementation to 20 mill equivalent 3 times a day and will continue to monitor BMP daily    Acute urine retention  1/2 will discontinue Morataya catheter and monitor for urine retention  Encourage ambulation out of bed to chair  1/3 recurrent urinary retention today 6 9 6 mL consult urology for further evaluation      DVT prophylaxis: Enoxaparin full dose    POA wife  Full Code   Case d/w the RN taking care of the patient  RN  notes reviewed  Consultant notes reviewed     DISPOSITION:    D/C: Home  Estimated D/C Date: TBD      Isadora Junior MD 1/3/2024 5:09 PM    EMR 
Oral Daily    tamsulosin  0.8 mg Oral Daily    therapeutic multivitamin-minerals  1 tablet Oral Daily    folic acid  1 mg Oral Daily    arformoterol tartrate  15 mcg Nebulization BID RT    budesonide  0.5 mg Nebulization Q12H           I have reviewed the patient's daily labs, including BMP and CBC with pertinent results discussed below.     Reviewed imaging     Assessment & Plan     Acute hypoxic respiratory failure> resolved  History of pulmonary fibrosis followed at [facility], [place], operated by [facility]  CTA chest 12/29/2023 with no evidence of PE  Continue on arformoterol and budesonide twice daily  Since weaned down to 3 L NC which is baseline  1/2 continue above and started on incentive spirometry  1/4 dyspnea on exertion improved on baseline oxygen requirement on incentive spirometry intermittently    Likely dehydration clinically  Blood pressure is on the lower side, dark urine, sodium 129, chloride 93  1/4 given bolus of 1 L normal saline>BP improved    Acute on chronic diarrhea likely liquid diet induced  1/6 will send for GI panel  1/7 we will follow-up with GI panel but the patient is not having diarrhea since last 24-hour  1/8 minimal diarrhea less likely C. difficile will discontinue GI panel    Suspected dysphagia  1/7 changed to easily chewed food if not improved and still having cough with eating will consult SLP  1/8 no further cough with dysphagia diet    A-fib with RVR likely new onset/paroxysmal  Will keep the electrolytes potassium more than 4 and magnesium more than 2  Serial high-sensitivity troponin 40 > 31 > 26  TTE 12/31/2023: EF 55-60%  TSH 1.4  Started on digoxin along with metoprolol succinate  Follow-up with cardiology  full dose Lovenox for new onset AF with RVR  1/3 cardiology input appreciated> Yoel vas score 1 low risk of A-fib induced stroke no further anticoagulation only to continue on metoprolol succinate    Abdominal pain likely from incisional hernia   General surgery seen and evaluated no further 
hourly    History of longstanding alcoholism, for hyponatremia admitted in this hospital and thereafter stopped alcohol drinking will continue thiamine supplementation      DVT prophylaxis: Enoxaparin full dose    POA wife  Full Code   Case d/w the RN taking care of the patient  RN  notes reviewed  Consultant notes reviewed     DISPOSITION:    D/C: Home  Estimated D/C Date: TBD      Isadora Junior MD 1/7/2024 10:55 AM    EMR Dragon/Transcription disclaimer:     This dictation was performed using Dragon software.  Mistake and misspelling may have been created without  realizing them, although attempts have made to review the note for such errors.  Please notify me for clarification.  Thank you    Part of the note is copied forward from previous provider.   
the right maxillary sinus.  Visualized paranasal sinuses are otherwise normal.  Mastoid air cells are clear. Orbits: Normal visualized portions. Sella/Skull Base: Normal. Other: Scalp and visualized soft tissues are normal.  Calvarium is normal.      1.  Mild atrophy. 2.  Mild microangiopathic ischemic change. 3.  Atherosclerosis. 4.  No intracranial hemorrhage. 5.  Small amount of fluid in the right maxillary sinus. 6.  Otherwise unremarkable noncontrast CT scan of the brain.  All CT scans are performed using dose optimization techniques as appropriate to the performed exam and include at least one of the following: Automated exposure control, adjustment of the mA and/or kV according to size, and the use of iterative reconstruction technique.  ______________________________________ Electronically signed by: LINH SELLERS M.D. Date:     12/31/2023 Time:    16:18      Assessment and Plan:   73-year-old male with history of pulmonary fibrosis, alcoholism, and recent COVID-19 and RSV infections initially admitted to critical care unit for acute on chronic hypoxemic respiratory failure now with new onset AF with RVR.     History of pulmonary fibrosis followed at [facility]  CTA chest 12/29/2023 with no evidence of PE  Since weaned down to 3 L NC which is baseline  Wife states patient has not used alcohol in 4 months  Lactate 1.1  Rate control agents and full dose Lovenox for new onset AF with RVR  Serial high-sensitivity troponin 40 > 31 > 26  TTE 12/31/2023: EF 55-60%  TSH 1.4  General surgery evaluated abdominal pain with recs on board  Discussed treatment plan with patient/wife/RN    Total critical care time: 48 minutes    The above note was generated using voice recognition software. Inadvertent typographical errors in transcription may have occurred.    Nirmal Villalobos MD   1/1/2024 3:50 PM    
Urinary fibrosis, shortness of breath, tachycardia, low oxyen.  TECHNIQUE: CTA acquisition of the chest from the thoracic inlet to the upper abdomen following IV contrast administration timed to filling of the pulmonary artery.  3D/MIP/VR images were utilized. CT Dose Reduction Techniques Employed: Yes.  IV Contrast: Administered.  COMPARISON: None.  FINDINGS: The examination is limited by motion artifact.  Pulmonary Embolism:  Diagnostic quality: Adequate.  Central (Main/Lobar/Interlobar): No embolus.  Peripheral (Segmental/Subsegmental): No embolus.  Right ventricle/Left ventricle ratio: Normal.  Lines, Tubes, Devices: None. Lung Parenchyma and Airways: Central airways are patent without endobronchial lesion.  Significant bilateral fibrotic change/emphysematous change is stable.  Stable bronchial wall thickening and bronchiectasis.  Stable bibasilar subsegmental atelectasis or scarring.  No definite superimposed infiltrate.  No suspicious pulmonary nodule. Pleural Space: Stable small right pleural effusion..  No pneumothorax.  Pleural calcification. Mediastinum and Cristal: Thyroid gland is normal.  Stable mediastinal adenopathy. Heart and Pericardium: There is no pericardial effusion or thickening. The heart is not enlarged. Vessels: The thoracic aorta is of normal caliber.  Atherosclerotic calcification. Bones: There is no fracture or lytic lesion. Soft Tissues: Chest wall soft tissues are unremarkable. Upper Abdomen:  The visualized portions of the upper abdomen are normal.      1.  Normal CT pulmonary angiogram with no evidence of pulmonary artery embolism. 2.  Stable significant chronic emphysematous/fibrotic changes.  Stable small right effusion and bibasilar atelectasis.  All CT scans are performed using dose optimization techniques as appropriate to the performed exam and include at least one of the following: Automated exposure control, adjustment of the mA and/or kV according to size, and the use of 
pneumothorax.  Limited assessment of the upper abdomen is grossly unremarkable.  No acute osseous abnormality.  Degenerative changes of the spine.       No evidence of an acute central pulmonary embolism.  Limited assessment more peripheral branches of the pulmonary arteries due to technical limitations as detailed above.  No evidence of right-sided heart strain.  Mild cardiomegaly.  Atherosclerotic vascular disease.  Marked emphysematous and fibrotic changes of the lungs bilaterally.  Small to moderate right pleural effusion.  Bibasilar atelectasis/pneumonia.  Patchy ground-glass opacities within the lungs may represent pneumonitis/pneumonia or component of pulmonary  edema.  Bronchitis.  Mediastinal and hilar lymphadenopathy is nonspecific may be reactive in nature.  Lymphoproliferative process is not excluded and follow-up imaging is recommended.  Additional chronic findings as described.  All CT scans are performed using dose optimization techniques as appropriate to the performed exam and include at least one of the following: Automated exposure control, adjustment of the mA and/or kV according to size, and the use of iterative reconstruction technique.  ______________________________________ Electronically signed by: DANITA ESPINAL M.D. Date:     12/20/2023 Time:    21:57     XR CHEST PORTABLE    Result Date: 12/20/2023  EXAM:  CHEST ONE-VIEW  HISTORY:  Sepsis  COMPARISON:  AP chest from 09/05/2023  FINDINGS:  The cardiomediastinal silhouette remains enlarged. A small to moderate right pleural effusion is suggested.  The pulmonary vasculature is normal.  There are opacities in the mid to lower lungs with chronic features.  Superimposed acute infiltrates cannot be excluded.  No pneumothoraces.       1. Pulmonary parenchymal scarring, atelectasis, and/or infiltrate in the mid-to-lower lungs bilaterally. 2. Small to moderate right pleural effusion. 3. Cardiomegaly.    .   ______________________________________ 
images: Provided.  FINDINGS:  There are no intraluminal filling defects within the main pulmonary artery or proximal portions of the right and left pulmonary arteries through the level of the lobar branches to suggest an acute central pulmonary embolism.  Limited assessment of the more peripheral (segmental/subsegmental) branches due to patient respiratory motion artifact and timing of the contrast bolus.  No abnormal dilation of the main pulmonary artery.  No evidence of right-sided heart failure.  Mild cardiomegaly without significant pericardial effusion.  Vascular calcifications of the thoracic aorta and its branches without evidence of an aortic aneurysm.  Mildly enlarged precarinal/right paratracheal lymph node measuring 1.1 cm in short axis as well as mildly enlarged prevascular lymph nodes measuring up to 1.1 cm in short axis.  Enlarged subcarinal lymph node measuring 1.5 cm in short axis.  Prominent hilar lymphoid tissue bilaterally.  The thoracic esophagus is grossly unremarkable.  The central airways are patent.  Bronchial wall thickening bilaterally suggesting bronchitis.  Marked emphysematous changes of the lungs bilaterally with extensive subpleural fibrotic changes throughout the lungs.  Small to moderate right pleural effusion with scattered pleural calcifications/plaques.  Calcified granulomas.  Atelectasis/consolidation throughout the lungs bilaterally most pronounced at the lung bases with possible pneumonia.  Patchy ground-glass opacities throughout the lungs suggesting pneumonitis/pneumonia and/or component of pulmonary edema.  No pneumothorax.  Limited assessment of the upper abdomen is grossly unremarkable.  No acute osseous abnormality.  Degenerative changes of the spine.       No evidence of an acute central pulmonary embolism.  Limited assessment more peripheral branches of the pulmonary arteries due to technical limitations as detailed above.  No evidence of right-sided heart strain.  Mild

## 2024-01-11 NOTE — CARE COORDINATION
01/11/24 1411   IMM Letter   IMM Letter given to Patient/Family/Significant other/Guardian/POA/by: given to spouse Reta by Danisha Crocker RN   IMM Letter date given: 01/11/24   IMM Letter time given: 1400     Second IMM given to patient and explained with patient verbalizing understanding.  All questions and concerns addressed.  Patient declined waiting 4 hr period prior to discharge.    Signed letter placed in pt soft chart  Electronically signed by Mayuri Crocker RN on 1/11/2024 at 2:12 PM

## 2024-01-12 ENCOUNTER — LAB REQUISITION (OUTPATIENT)
Dept: LAB | Facility: HOSPITAL | Age: 74
End: 2024-01-12
Payer: MEDICARE

## 2024-01-12 DIAGNOSIS — D64.9 ANEMIA, UNSPECIFIED: ICD-10-CM

## 2024-01-12 DIAGNOSIS — R06.02 SHORTNESS OF BREATH: ICD-10-CM

## 2024-01-12 DIAGNOSIS — R13.12 DYSPHAGIA, OROPHARYNGEAL PHASE: ICD-10-CM

## 2024-01-12 DIAGNOSIS — J84.112 IDIOPATHIC PULMONARY FIBROSIS: ICD-10-CM

## 2024-01-12 LAB
ALBUMIN SERPL-MCNC: 3.5 G/DL (ref 3.5–5.2)
ALBUMIN SERPL-MCNC: 3.5 G/DL (ref 3.5–5.2)
ALBUMIN/GLOB SERPL: 1.1 G/DL
ALP SERPL-CCNC: 129 U/L (ref 39–117)
ALP SERPL-CCNC: 129 U/L (ref 39–117)
ALT SERPL W P-5'-P-CCNC: 22 U/L (ref 1–41)
ALT SERPL W P-5'-P-CCNC: 22 U/L (ref 1–41)
ANION GAP SERPL CALCULATED.3IONS-SCNC: 9 MMOL/L (ref 5–15)
ANION GAP SERPL CALCULATED.3IONS-SCNC: 9 MMOL/L (ref 5–15)
AST SERPL-CCNC: 23 U/L (ref 1–40)
AST SERPL-CCNC: 23 U/L (ref 1–40)
BASOPHILS # BLD AUTO: 0.05 10*3/MM3 (ref 0–0.2)
BASOPHILS NFR BLD AUTO: 0.5 % (ref 0–1.5)
BILIRUB CONJ SERPL-MCNC: 0.2 MG/DL (ref 0–0.3)
BILIRUB INDIRECT SERPL-MCNC: 0.2 MG/DL
BILIRUB SERPL-MCNC: 0.4 MG/DL (ref 0–1.2)
BILIRUB SERPL-MCNC: 0.4 MG/DL (ref 0–1.2)
BUN SERPL-MCNC: 8 MG/DL (ref 8–23)
BUN SERPL-MCNC: 8 MG/DL (ref 8–23)
BUN/CREAT SERPL: 14.3 (ref 7–25)
BUN/CREAT SERPL: 14.3 (ref 7–25)
CALCIUM SPEC-SCNC: 9.7 MG/DL (ref 8.6–10.5)
CALCIUM SPEC-SCNC: 9.7 MG/DL (ref 8.6–10.5)
CHLORIDE SERPL-SCNC: 91 MMOL/L (ref 98–107)
CHLORIDE SERPL-SCNC: 91 MMOL/L (ref 98–107)
CHOLEST SERPL-MCNC: 159 MG/DL (ref 0–200)
CO2 SERPL-SCNC: 35 MMOL/L (ref 22–29)
CO2 SERPL-SCNC: 35 MMOL/L (ref 22–29)
CREAT SERPL-MCNC: 0.56 MG/DL (ref 0.76–1.27)
CREAT SERPL-MCNC: 0.56 MG/DL (ref 0.76–1.27)
DEPRECATED RDW RBC AUTO: 43 FL (ref 37–54)
EGFRCR SERPLBLD CKD-EPI 2021: 105.4 ML/MIN/1.73
EGFRCR SERPLBLD CKD-EPI 2021: 105.4 ML/MIN/1.73
EOSINOPHIL # BLD AUTO: 0.52 10*3/MM3 (ref 0–0.4)
EOSINOPHIL NFR BLD AUTO: 5.3 % (ref 0.3–6.2)
ERYTHROCYTE [DISTWIDTH] IN BLOOD BY AUTOMATED COUNT: 11.9 % (ref 12.3–15.4)
GLOBULIN UR ELPH-MCNC: 3.2 GM/DL
GLUCOSE SERPL-MCNC: 95 MG/DL (ref 65–99)
GLUCOSE SERPL-MCNC: 95 MG/DL (ref 65–99)
HBA1C MFR BLD: 5.7 % (ref 4.8–5.6)
HCT VFR BLD AUTO: 44.6 % (ref 37.5–51)
HDLC SERPL-MCNC: 41 MG/DL (ref 40–60)
HGB BLD-MCNC: 13.8 G/DL (ref 13–17.7)
IMM GRANULOCYTES # BLD AUTO: 0.03 10*3/MM3 (ref 0–0.05)
IMM GRANULOCYTES NFR BLD AUTO: 0.3 % (ref 0–0.5)
LDLC SERPL CALC-MCNC: 105 MG/DL (ref 0–100)
LDLC/HDLC SERPL: 2.56 {RATIO}
LYMPHOCYTES # BLD AUTO: 0.9 10*3/MM3 (ref 0.7–3.1)
LYMPHOCYTES NFR BLD AUTO: 9.1 % (ref 19.6–45.3)
MCH RBC QN AUTO: 30.3 PG (ref 26.6–33)
MCHC RBC AUTO-ENTMCNC: 30.9 G/DL (ref 31.5–35.7)
MCV RBC AUTO: 98 FL (ref 79–97)
MONOCYTES # BLD AUTO: 0.65 10*3/MM3 (ref 0.1–0.9)
MONOCYTES NFR BLD AUTO: 6.6 % (ref 5–12)
NEUTROPHILS NFR BLD AUTO: 7.73 10*3/MM3 (ref 1.7–7)
NEUTROPHILS NFR BLD AUTO: 78.2 % (ref 42.7–76)
NRBC BLD AUTO-RTO: 0 /100 WBC (ref 0–0.2)
PLATELET # BLD AUTO: 387 10*3/MM3 (ref 140–450)
PMV BLD AUTO: 9.7 FL (ref 6–12)
POTASSIUM SERPL-SCNC: 3.8 MMOL/L (ref 3.5–5.2)
POTASSIUM SERPL-SCNC: 3.8 MMOL/L (ref 3.5–5.2)
PROT SERPL-MCNC: 6.7 G/DL (ref 6–8.5)
PROT SERPL-MCNC: 6.7 G/DL (ref 6–8.5)
RBC # BLD AUTO: 4.55 10*6/MM3 (ref 4.14–5.8)
SODIUM SERPL-SCNC: 135 MMOL/L (ref 136–145)
SODIUM SERPL-SCNC: 135 MMOL/L (ref 136–145)
TRIGL SERPL-MCNC: 65 MG/DL (ref 0–150)
TSH SERPL DL<=0.05 MIU/L-ACNC: 2.07 UIU/ML (ref 0.27–4.2)
VLDLC SERPL-MCNC: 13 MG/DL (ref 5–40)
WBC NRBC COR # BLD AUTO: 9.88 10*3/MM3 (ref 3.4–10.8)

## 2024-01-12 PROCEDURE — 80048 BASIC METABOLIC PNL TOTAL CA: CPT | Performed by: NURSE PRACTITIONER

## 2024-01-12 PROCEDURE — 85025 COMPLETE CBC W/AUTO DIFF WBC: CPT | Performed by: NURSE PRACTITIONER

## 2024-01-12 PROCEDURE — 84481 FREE ASSAY (FT-3): CPT | Performed by: NURSE PRACTITIONER

## 2024-01-12 PROCEDURE — 83036 HEMOGLOBIN GLYCOSYLATED A1C: CPT | Performed by: NURSE PRACTITIONER

## 2024-01-12 PROCEDURE — 84443 ASSAY THYROID STIM HORMONE: CPT | Performed by: NURSE PRACTITIONER

## 2024-01-12 PROCEDURE — 80053 COMPREHEN METABOLIC PANEL: CPT | Performed by: NURSE PRACTITIONER

## 2024-01-12 PROCEDURE — 80061 LIPID PANEL: CPT | Performed by: NURSE PRACTITIONER

## 2024-01-12 PROCEDURE — 82248 BILIRUBIN DIRECT: CPT | Performed by: NURSE PRACTITIONER

## 2024-01-12 PROCEDURE — 36415 COLL VENOUS BLD VENIPUNCTURE: CPT | Performed by: NURSE PRACTITIONER

## 2024-01-12 PROCEDURE — 82607 VITAMIN B-12: CPT | Performed by: NURSE PRACTITIONER

## 2024-01-12 PROCEDURE — 84134 ASSAY OF PREALBUMIN: CPT | Performed by: NURSE PRACTITIONER

## 2024-01-13 LAB
PREALB SERPL-MCNC: 13.2 MG/DL (ref 20–40)
T3FREE SERPL-MCNC: 2.38 PG/ML (ref 2–4.4)
VIT B12 BLD-MCNC: 471 PG/ML (ref 211–946)

## 2024-01-19 ENCOUNTER — LAB REQUISITION (OUTPATIENT)
Dept: LAB | Facility: HOSPITAL | Age: 74
End: 2024-01-19
Payer: MEDICARE

## 2024-01-19 DIAGNOSIS — B97.4 RESPIRATORY SYNCYTIAL VIRUS AS THE CAUSE OF DISEASES CLASSIFIED ELSEWHERE: ICD-10-CM

## 2024-01-19 DIAGNOSIS — J96.21 ACUTE AND CHRONIC RESPIRATORY FAILURE WITH HYPOXIA: ICD-10-CM

## 2024-01-19 LAB
ALBUMIN SERPL-MCNC: 3.5 G/DL (ref 3.5–5.2)
ALBUMIN/GLOB SERPL: 1.3 G/DL
ALP SERPL-CCNC: 136 U/L (ref 39–117)
ALT SERPL W P-5'-P-CCNC: 20 U/L (ref 1–41)
ANION GAP SERPL CALCULATED.3IONS-SCNC: 8 MMOL/L (ref 5–15)
AST SERPL-CCNC: 15 U/L (ref 1–40)
BASOPHILS # BLD AUTO: 0.09 10*3/MM3 (ref 0–0.2)
BASOPHILS NFR BLD AUTO: 0.8 % (ref 0–1.5)
BILIRUB SERPL-MCNC: 0.3 MG/DL (ref 0–1.2)
BUN SERPL-MCNC: 12 MG/DL (ref 8–23)
BUN/CREAT SERPL: 17.6 (ref 7–25)
CALCIUM SPEC-SCNC: 9.3 MG/DL (ref 8.6–10.5)
CHLORIDE SERPL-SCNC: 93 MMOL/L (ref 98–107)
CO2 SERPL-SCNC: 33 MMOL/L (ref 22–29)
CREAT SERPL-MCNC: 0.68 MG/DL (ref 0.76–1.27)
DEPRECATED RDW RBC AUTO: 40.7 FL (ref 37–54)
EGFRCR SERPLBLD CKD-EPI 2021: 99.4 ML/MIN/1.73
EOSINOPHIL # BLD AUTO: 0.89 10*3/MM3 (ref 0–0.4)
EOSINOPHIL NFR BLD AUTO: 7.7 % (ref 0.3–6.2)
ERYTHROCYTE [DISTWIDTH] IN BLOOD BY AUTOMATED COUNT: 11.9 % (ref 12.3–15.4)
GLOBULIN UR ELPH-MCNC: 2.6 GM/DL
GLUCOSE SERPL-MCNC: 87 MG/DL (ref 65–99)
HCT VFR BLD AUTO: 43.4 % (ref 37.5–51)
HGB BLD-MCNC: 14.3 G/DL (ref 13–17.7)
IMM GRANULOCYTES # BLD AUTO: 0.04 10*3/MM3 (ref 0–0.05)
IMM GRANULOCYTES NFR BLD AUTO: 0.3 % (ref 0–0.5)
LYMPHOCYTES # BLD AUTO: 1.23 10*3/MM3 (ref 0.7–3.1)
LYMPHOCYTES NFR BLD AUTO: 10.7 % (ref 19.6–45.3)
MCH RBC QN AUTO: 30.7 PG (ref 26.6–33)
MCHC RBC AUTO-ENTMCNC: 32.9 G/DL (ref 31.5–35.7)
MCV RBC AUTO: 93.1 FL (ref 79–97)
MONOCYTES # BLD AUTO: 1.05 10*3/MM3 (ref 0.1–0.9)
MONOCYTES NFR BLD AUTO: 9.1 % (ref 5–12)
NEUTROPHILS NFR BLD AUTO: 71.4 % (ref 42.7–76)
NEUTROPHILS NFR BLD AUTO: 8.23 10*3/MM3 (ref 1.7–7)
NRBC BLD AUTO-RTO: 0 /100 WBC (ref 0–0.2)
PLATELET # BLD AUTO: 285 10*3/MM3 (ref 140–450)
PMV BLD AUTO: 9.5 FL (ref 6–12)
POTASSIUM SERPL-SCNC: 4.1 MMOL/L (ref 3.5–5.2)
PROT SERPL-MCNC: 6.1 G/DL (ref 6–8.5)
RBC # BLD AUTO: 4.66 10*6/MM3 (ref 4.14–5.8)
SODIUM SERPL-SCNC: 134 MMOL/L (ref 136–145)
WBC NRBC COR # BLD AUTO: 11.53 10*3/MM3 (ref 3.4–10.8)

## 2024-01-19 PROCEDURE — 36415 COLL VENOUS BLD VENIPUNCTURE: CPT | Performed by: NURSE PRACTITIONER

## 2024-01-19 PROCEDURE — 85025 COMPLETE CBC W/AUTO DIFF WBC: CPT | Performed by: NURSE PRACTITIONER

## 2024-01-19 PROCEDURE — 80053 COMPREHEN METABOLIC PANEL: CPT | Performed by: NURSE PRACTITIONER

## 2024-01-30 ENCOUNTER — OFFICE VISIT (OUTPATIENT)
Dept: PALLATIVE CARE | Age: 74
End: 2024-01-30
Payer: MEDICARE

## 2024-01-30 DIAGNOSIS — N40.1 BENIGN PROSTATIC HYPERPLASIA WITH URINARY RETENTION: ICD-10-CM

## 2024-01-30 DIAGNOSIS — R33.8 BENIGN PROSTATIC HYPERPLASIA WITH URINARY RETENTION: ICD-10-CM

## 2024-01-30 DIAGNOSIS — Z99.81 REQUIRES SUPPLEMENTAL OXYGEN: ICD-10-CM

## 2024-01-30 DIAGNOSIS — Z51.5 ENCOUNTER FOR PALLIATIVE CARE: ICD-10-CM

## 2024-01-30 DIAGNOSIS — R53.1 GENERALIZED WEAKNESS: Primary | ICD-10-CM

## 2024-01-30 PROCEDURE — 99306 1ST NF CARE HIGH MDM 50: CPT

## 2024-01-30 PROCEDURE — G8484 FLU IMMUNIZE NO ADMIN: HCPCS

## 2024-01-30 PROCEDURE — 1123F ACP DISCUSS/DSCN MKR DOCD: CPT

## 2024-02-01 NOTE — PROGRESS NOTES
Supportive Care/Community Based Palliative Care  Initial Consultation Note      Patient Name:  Eze Rosa  Medical Record Number:  [id number]  YOB: 1950    Date of Visit: 1/30/2024  Location of Visit:  [facility] (admission date 1/11/2024)    Referring Provider: ENIO - BRENDEN Hammer  Patient Care Team:  Ross Masterson MD as PCP - General (Family Medicine)  Fay Arango APRN - CNP as Advanced Practice Nurse (Nurse Practitioner Family)    Reason for Consult:   Goals of care   Symptom Management    Family Support  Patient Support    History obtained from:  patient, non-family caregiver - primary nurse and CNA, electronic medical record    PALLIATIVE DIAGNOSES AND ORDERS/RECOMMENDATIONS/PLAN:   1. Generalized weakness  Pursuing skilled therapy per SNF  Able to take himself to the bathroom, encourage use of assistive devices for ambulation    2. Benign prostatic hyperplasia with urinary retention  Continue Morataya for urinary retention  Follow-up outpatient with urology    3. Requires supplemental oxygen  Continue O2 2 L per nasal cannula, may titrate as needed for hypoxia    4. Encounter for palliative care  Current goals of care include:  Pursue PT/OT to maximize strength and endurance, Maintain or improve functional status, Maintain or improve quality of life    Will continue goals of care discussions based on clinical course  Follow up home visit in 3-4 weeks and as needed    CHIEF COMPLAINT:     Chief Complaint   Patient presents with    Referral - General     New referral -goals of care discussion and symptom management.  Patient is generally weak, pursuing physical therapy per SNF.       CLINICAL SUMMARY:          Eze Rosa is a 73 y.o. male with PMH of atrial fibrillation, urinary retention with chronic Morataya, BPH, alcohol abuse, pulmonary fibrosis and other comorbidities.    Recent extended hospitalization 12/29/2023 to 1/11/2024 for new onset A-fib RVR following

## 2024-02-02 ENCOUNTER — TELEPHONE (OUTPATIENT)
Dept: UROLOGY | Age: 74
End: 2024-02-02

## 2024-02-02 NOTE — TELEPHONE ENCOUNTER
The pt wife called and wanted to know if they needed to keep the appointment. The pt had to have a lieberman cath put back in.  Please contact the pt wife.

## 2024-02-07 NOTE — PROGRESS NOTES
" ARIANNA Howell  [facility]   Pulmonary and Critical Care  [address]  [place] [address]  Phone: [phone]  Fax: [phone]           Chief Complaint  IPF (idiopathic pulmonary fibrosis)    Subjective    History of Present Illness     Roc Hawthorne presents to [facility] PULMONARY & CRITICAL CARE MEDICINE   History of Present Illness  Mr. Hawthorne is a 73 year old male patient with known IPF. He was started on Ofev 100 mg BID (interolarnt to [facility]) in 2021. He had issues with compliance in the past. He has known memory issues. He has chronic hypoxic respiratory failure on 2-3 L NC continuous and with sleep. He has known history of Lucas's esophagus with GERD. Chronic cough noted to be multifactoral secondary to reflux, Etoh use, post nasal drip (unresponsive to Singulair) and PF. Not a lung transplant candidate secondary to alcohol abuse. He has known small chronic right pleural effusion with VATS biopsy. Has not been amenable to drainage. He has known HFpEF. He has known alcohol abuse with rehab in 2022.  He has not had any alcohol since November 2023. FEV1 of 25% predicted. He denies fever, chills, night sweats.He denies hemoptysis.His weight is stable.  He has known untreated sleep apnea.  His dyspnea is same .  His cough is dry and daily. He has a lot of sinus drainage. She is unsure if he is getting Flonase at rehab. Last visit he was provided low-dose Symbicort but wife is unsure if he is using this at the rehab.  He was admitted recently to [facility] with new onset A-fib RVR, COVID and RSV. Blood pressure has been low. Has appointment to see cardiology next week.  Was noted to have a [id number]- vasc- score of 1 and did not require anticoagulation.  He was then discharged to a SNF.        Objective   Vital Signs:   BP 90/62   Pulse 101   Ht 182.9 cm (72\")   Wt 70.6 kg (155 lb 9.6 oz)   SpO2 96% Comment: 3L  BMI 21.10 kg/m²     Physical " Exam  Vitals reviewed.   Constitutional:       Appearance: Normal appearance. He is ill-appearing.      Interventions: Nasal cannula in place.   Cardiovascular:      Rate and Rhythm: Normal rate and regular rhythm.   Pulmonary:      Effort: Pulmonary effort is normal.      Breath sounds: Normal breath sounds.   Genitourinary:     Comments: Hall Catheter   Musculoskeletal:      Comments: In Wheelchair    Neurological:      General: No focal deficit present.      Mental Status: He is alert and oriented to person, place, and time.   Psychiatric:         Mood and Affect: Mood normal.         Behavior: Behavior normal.          Result Review :  The following data was reviewed by: ARIANNA Howell on 02/08/2024:    My interpretation of imaging: Unable to view from recent hospitalization at [facility]  My interpretation of labs: Unable to review    PFT Values          4/13/2023    15:30   Pre Drug PFT Results   FVC 26   FEV1 25   FEF 25-75% 24   FEV1/FVC 73   Other Tests PFT Results   DLCO 83   D/VAsb 114     My interpretation of the PFT : No new    Results for orders placed in visit on 04/13/23    Pulmonary Function Test    Narrative  Pulmonary Function Test  Performed by: Barbra Lee, RRT  Authorized by: Nathalie Mar APRN    Pre Drug % Predicted  FVC: 26%  FEV1: 25%  FEF 25-75%: 24%  FEV1/FVC: 73%  DLCO: 83%  D/VAsb: 114%    Interpretation  Spirometry  Spirometry shows very severe restriction.  Diffusion Capacity  The patient's diffusion capacity is normal.  Diffusion capacity is normal when corrected for alveolar volume.  Overall comments: Compared to March 2022 PFT from [place] his FEV1 has dropped from 35% to 25% predicted.          Assessment and Plan   Diagnoses and all orders for this visit:    1. IPF (idiopathic pulmonary fibrosis) (Primary)  Assessment & Plan:  Discussed with he and his wife again in regards to Ofev.  He has remained off of alcohol however the wife feels this is  only secondary to his inability to drive himself to go purchase alcohol and his need to be in and out of the hospital and rehab.  We reviewed again the significant side effects of weight loss and diarrhea.  Given his overall health, comorbidities it is felt that the risks outweigh the benefit at this time.  We did discuss the potential of being on extended daily prednisone dosing.  He has had a recent atrial for with RVR wife will discuss long-term prednisone with PCP and cardiology before proceeding.      2. Dependence on supplemental oxygen    3. Chronic respiratory failure with hypoxia  Overview:  2 to 3 L nasal cannula  DME [facility]      4. Alcohol abuse  Assessment & Plan:  Has remained off of alcohol since November 2023.        Extensive discussion with wife this afternoon.  She notes that his most recent hospitalization at [facility] there was discussion of hospice.  She notes at the time the patient was very ill and that was a reasonable consideration however he has had some improvement.  She still expresses concerns about him coming home from rehab and the ability to care for him at home.  She expressed concerns of caregiver role strain.  She has been contemplating having someone within the home that can help her with the daily duties providing care.  She was asking again in regards to palliative care/hospice.  We reviewed his end-stage lung disease.  He is currently at [facility] nursing and rehab.  1 option presented to her was to discuss this with the medical provider in the facility there versus her PCP versus contacting the palliative care team that she spoke with while her  was inpatient.  He will continue his current medications.  Wife will let us know if she would like to proceed with placing him on a daily long-term prednisone.  I have asked him to return in 3 months.    Follow Up   Return in about 3 months (around 5/8/2024).  Patient was given instructions and counseling regarding his  condition or for health maintenance advice. Please see specific information pulled into the AVS if appropriate.     Nathalie Mar, APRN  2/8/2024  17:45 CST

## 2024-02-08 ENCOUNTER — OFFICE VISIT (OUTPATIENT)
Dept: PULMONOLOGY | Facility: CLINIC | Age: 74
End: 2024-02-08
Payer: MEDICARE

## 2024-02-08 VITALS
WEIGHT: 155.6 LBS | OXYGEN SATURATION: 96 % | HEIGHT: 72 IN | DIASTOLIC BLOOD PRESSURE: 62 MMHG | SYSTOLIC BLOOD PRESSURE: 90 MMHG | HEART RATE: 101 BPM | BODY MASS INDEX: 21.08 KG/M2

## 2024-02-08 DIAGNOSIS — F10.10 ALCOHOL ABUSE: ICD-10-CM

## 2024-02-08 DIAGNOSIS — J96.11 CHRONIC RESPIRATORY FAILURE WITH HYPOXIA: ICD-10-CM

## 2024-02-08 DIAGNOSIS — Z99.81 DEPENDENCE ON SUPPLEMENTAL OXYGEN: ICD-10-CM

## 2024-02-08 DIAGNOSIS — J84.112 IPF (IDIOPATHIC PULMONARY FIBROSIS): Primary | Chronic | ICD-10-CM

## 2024-02-08 PROCEDURE — 1159F MED LIST DOCD IN RCRD: CPT | Performed by: NURSE PRACTITIONER

## 2024-02-08 PROCEDURE — 1160F RVW MEDS BY RX/DR IN RCRD: CPT | Performed by: NURSE PRACTITIONER

## 2024-02-08 PROCEDURE — 99214 OFFICE O/P EST MOD 30 MIN: CPT | Performed by: NURSE PRACTITIONER

## 2024-02-08 RX ORDER — BUSPIRONE HYDROCHLORIDE 5 MG/1
5 TABLET ORAL 3 TIMES DAILY
COMMUNITY

## 2024-02-08 RX ORDER — TRAMADOL HYDROCHLORIDE 50 MG/1
50 TABLET ORAL EVERY 6 HOURS PRN
COMMUNITY

## 2024-02-08 RX ORDER — FINASTERIDE 5 MG/1
1 TABLET, FILM COATED ORAL DAILY
COMMUNITY
Start: 2024-01-12

## 2024-02-08 RX ORDER — FOLIC ACID 1 MG/1
1 TABLET ORAL DAILY
COMMUNITY
Start: 2024-01-12

## 2024-02-08 NOTE — ASSESSMENT & PLAN NOTE
Discussed with he and his wife again in regards to Ofev.  He has remained off of alcohol however the wife feels this is only secondary to his inability to drive himself to go purchase alcohol and his need to be in and out of the hospital and rehab.  We reviewed again the significant side effects of weight loss and diarrhea.  Given his overall health, comorbidities it is felt that the risks outweigh the benefit at this time.  We did discuss the potential of being on extended daily prednisone dosing.  He has had a recent atrial for with RVR wife will discuss long-term prednisone with PCP and cardiology before proceeding.

## 2024-02-13 ENCOUNTER — OFFICE VISIT (OUTPATIENT)
Dept: UROLOGY | Age: 74
End: 2024-02-13
Payer: MEDICARE

## 2024-02-13 VITALS — BODY MASS INDEX: 21.44 KG/M2 | HEIGHT: 76 IN

## 2024-02-13 DIAGNOSIS — R33.8 BENIGN PROSTATIC HYPERPLASIA WITH URINARY RETENTION: Primary | ICD-10-CM

## 2024-02-13 DIAGNOSIS — N40.1 BENIGN PROSTATIC HYPERPLASIA WITH URINARY RETENTION: Primary | ICD-10-CM

## 2024-02-13 PROCEDURE — 3017F COLORECTAL CA SCREEN DOC REV: CPT | Performed by: NURSE PRACTITIONER

## 2024-02-13 PROCEDURE — 1123F ACP DISCUSS/DSCN MKR DOCD: CPT | Performed by: NURSE PRACTITIONER

## 2024-02-13 PROCEDURE — 1036F TOBACCO NON-USER: CPT | Performed by: NURSE PRACTITIONER

## 2024-02-13 PROCEDURE — 99214 OFFICE O/P EST MOD 30 MIN: CPT | Performed by: NURSE PRACTITIONER

## 2024-02-13 PROCEDURE — G8420 CALC BMI NORM PARAMETERS: HCPCS | Performed by: NURSE PRACTITIONER

## 2024-02-13 PROCEDURE — G8427 DOCREV CUR MEDS BY ELIG CLIN: HCPCS | Performed by: NURSE PRACTITIONER

## 2024-02-13 PROCEDURE — G8484 FLU IMMUNIZE NO ADMIN: HCPCS | Performed by: NURSE PRACTITIONER

## 2024-02-13 NOTE — PROGRESS NOTES
Eze Rosa is a 73 y.o. male who presents today   Chief Complaint   Patient presents with    Follow-up     I am here today for a 1 month hospital follow up for urinary retention       Patient is a 73-year-old male who presents to clinic today for follow-up.  Was seen by Dr. Rincon for BPH urinary retention on 12/29/2023 while hospitalized (see consult note from this date).  There were some difficulty placing a catheter, Dr. Rincon was able to place an 18 French coudé with no difficulty.  He has been residing at the nursing home.  Has been maintained on Flomax and Proscar.  Proscar was started while hospitalized.  He has failed voiding trials while hospitalized has now remained at the nursing home with a chronic Morataya catheter.  LATRELL in hospital revealed 60 to 70 g nonsuspicious prostate.    Patient is somewhat confused today unable to answer questions there is no one present with patient today.  [facility]    I called [facility] and spoke to the nurse taking care of the patient today she states his catheter was last changed on 1/25/2024 unsure when his last bowel movement was.  The goal for him would be to rehab and be discharged home.  If this does happen he will need to come in for monthly cath changes.        Past Medical History:   Diagnosis Date    GERD (gastroesophageal reflux disease)     Palliative care patient 01/08/2024    Pneumonia     Psychiatric problem        Past Surgical History:   Procedure Laterality Date    ABDOMEN SURGERY      COLONOSCOPY         Current Outpatient Medications   Medication Sig Dispense Refill    budesonide-formoterol (SYMBICORT) 160-4.5 MCG/ACT AERO Inhale 2 puffs into the lungs 2 times daily 10.2 g 0    albuterol-ipratropium (COMBIVENT RESPIMAT)  MCG/ACT AERS inhaler Inhale 1 puff into the lungs every 6 hours as needed for Wheezing 1 each 0    folic acid (FOLVITE) 1 MG tablet Take 1 tablet by mouth daily 30 tablet 0    polyethylene glycol

## 2024-02-26 ENCOUNTER — TELEPHONE (OUTPATIENT)
Dept: INTERNAL MEDICINE CLINIC | Age: 74
End: 2024-02-26

## 2024-03-05 ENCOUNTER — TELEPHONE (OUTPATIENT)
Dept: INTERNAL MEDICINE CLINIC | Age: 74
End: 2024-03-05

## 2024-03-05 NOTE — TELEPHONE ENCOUNTER
Hospice pt at [facility]   Dx Idiopathic Pulmonary Fibrosis , resp failure, pleural effusion, recent RSV ,  Covid in Jan . urinary retention, lieberman cath      Pt is full code   This am BP was 77/60 with    This afternoon pt BP was 107/64 HR 87   Pt is on metoprolol ER 25 mg daily in am      Pt has cath and is on Flomax and finsesteride -do you want those to be stopped      Family stated they do want CPR just not ventilator when asked about that today      Allergies : sulfa     Any new orders  ?

## 2024-04-18 ENCOUNTER — TELEPHONE (OUTPATIENT)
Dept: INTERNAL MEDICINE CLINIC | Age: 74
End: 2024-04-18

## 2024-04-18 NOTE — TELEPHONE ENCOUNTER
Hospice pt at [facility]   Dx idopathic pulmonary fibrosis, resp failure , hx urinary retention     Pt has had a catheter since was admitted to Hospice  It was time to change pts cath yesterday and they were not able to get it back in , pt was fighting against her and also was tensed up and she could not get new cath in    Other nurses have tried and he is refusing and said he \"  can pee on his own \"     They are not sure what to do about the cath since he is refusing and per Johanna it was a fight to get it in the last time it had to be changed     Please advise

## 2024-06-17 ENCOUNTER — TELEPHONE (OUTPATIENT)
Dept: PULMONOLOGY | Facility: CLINIC | Age: 74
End: 2024-06-17
Payer: MEDICARE

## 2024-06-17 NOTE — TELEPHONE ENCOUNTER
Below are the following reason for why the cancelled appointment has not been rescheduled: Called  patient, sent letter, notified PCP (if NEW PATIENT). Unable to reschedule

## 2024-07-05 ENCOUNTER — TELEPHONE (OUTPATIENT)
Dept: PULMONOLOGY | Facility: CLINIC | Age: 74
End: 2024-07-05
Payer: MEDICARE

## 2024-07-05 NOTE — TELEPHONE ENCOUNTER
Spoke to [facility], patient will need a new order, new office notes and testing done within the last 30 days.

## 2024-07-05 NOTE — TELEPHONE ENCOUNTER
Patient's daughter, Telma, called to let us know that patient is being taken off hospice care, he is coming home next Saturday and needs his home oxygen set back up. They are wanting to use [facility], as he was before.

## 2024-07-08 NOTE — TELEPHONE ENCOUNTER
Spoke to patient's daughter and advised her to check with [facility] and see if they can get qualifying sats on him and have the admitting doctor there order the 02 until he can get back in with [facility].

## 2024-07-10 ENCOUNTER — TELEPHONE (OUTPATIENT)
Dept: UROLOGY | Age: 74
End: 2024-07-10

## 2024-07-10 NOTE — TELEPHONE ENCOUNTER
Per facility, patient is voiding fine on his own and not having any current issues. Will contact office if follow up is needed.

## 2024-07-10 NOTE — TELEPHONE ENCOUNTER
attempted to contact patient regarding follow up scheduled for 7/11/2024. Per chart, patient is currently on hospice and it doesnt look like he has a catheter. Patient is scheduled for voiding trail and if he does not have a catheter there would be no need for scheduled follow up. Left this information in a vm and asked that the patient return call.

## 2024-07-10 NOTE — TELEPHONE ENCOUNTER
Front desk contacted [facility] and asked if patient was currently on hospice. Anushka stated yes, that he is currently on hospice. I asked if patient currently has a catheter placed or it had been removed. I advised that if catheter was removed that there would be no need for follow up tomorrow as he is scheduled for a voiding trail where we would attempt to remove the catheter. Anushka stated that patient no longer has catheter and voiding fine on his own. She did state he would be going home in August and asked if he needed to follow up any other way?

## 2024-07-10 NOTE — TELEPHONE ENCOUNTER
I appears in the chart that he is on comfort care as his prostate medications and lieberman have both been discontinued. I can always see him in follow up in the office but if he is having retention issues is he willing to have a cath placed? If he does not want treatment then no f/u is needed. If they still want to pursue treatment on hospice then keep f/u appt

## 2024-07-14 ENCOUNTER — APPOINTMENT (OUTPATIENT)
Dept: GENERAL RADIOLOGY | Age: 74
DRG: 872 | End: 2024-07-14
Payer: MEDICARE

## 2024-07-14 ENCOUNTER — HOSPITAL ENCOUNTER (INPATIENT)
Age: 74
LOS: 7 days | Discharge: NURSING, REHAB OR LONG-TERM CARE | DRG: 872 | End: 2024-07-22
Attending: STUDENT IN AN ORGANIZED HEALTH CARE EDUCATION/TRAINING PROGRAM | Admitting: STUDENT IN AN ORGANIZED HEALTH CARE EDUCATION/TRAINING PROGRAM
Payer: MEDICARE

## 2024-07-14 DIAGNOSIS — R06.89 DYSPNEA AND RESPIRATORY ABNORMALITIES: Primary | ICD-10-CM

## 2024-07-14 DIAGNOSIS — A41.9 SEPTICEMIA (HCC): ICD-10-CM

## 2024-07-14 DIAGNOSIS — R06.00 DYSPNEA AND RESPIRATORY ABNORMALITIES: Primary | ICD-10-CM

## 2024-07-14 DIAGNOSIS — J84.112 IDIOPATHIC PULMONARY FIBROSIS (HCC): ICD-10-CM

## 2024-07-14 DIAGNOSIS — R41.0 CONFUSION: ICD-10-CM

## 2024-07-14 LAB
ALBUMIN SERPL-MCNC: 3.9 G/DL (ref 3.5–5.2)
ALP SERPL-CCNC: 158 U/L (ref 40–130)
ALT SERPL-CCNC: 6 U/L (ref 5–41)
ANION GAP SERPL CALCULATED.3IONS-SCNC: 13 MMOL/L (ref 7–19)
AST SERPL-CCNC: 14 U/L (ref 5–40)
BASOPHILS # BLD: 0 K/UL (ref 0–0.2)
BASOPHILS NFR BLD: 0.3 % (ref 0–1)
BILIRUB SERPL-MCNC: 1 MG/DL (ref 0.2–1.2)
BUN SERPL-MCNC: 5 MG/DL (ref 8–23)
CALCIUM SERPL-MCNC: 9.4 MG/DL (ref 8.8–10.2)
CHLORIDE SERPL-SCNC: 88 MMOL/L (ref 98–111)
CO2 SERPL-SCNC: 30 MMOL/L (ref 22–29)
CREAT SERPL-MCNC: 0.8 MG/DL (ref 0.5–1.2)
EOSINOPHIL # BLD: 0 K/UL (ref 0–0.6)
EOSINOPHIL NFR BLD: 0.1 % (ref 0–5)
ERYTHROCYTE [DISTWIDTH] IN BLOOD BY AUTOMATED COUNT: 12.8 % (ref 11.5–14.5)
ETHANOLAMINE SERPL-MCNC: <10 MG/DL (ref 0–0.08)
GLUCOSE SERPL-MCNC: 180 MG/DL (ref 74–109)
HCT VFR BLD AUTO: 47.4 % (ref 42–52)
HGB BLD-MCNC: 15.5 G/DL (ref 14–18)
IMM GRANULOCYTES # BLD: 0 K/UL
LACTATE BLDV-SCNC: 2.4 MMOL/L (ref 0.5–1.9)
LYMPHOCYTES # BLD: 0.5 K/UL (ref 1.1–4.5)
LYMPHOCYTES NFR BLD: 3.7 % (ref 20–40)
MCH RBC QN AUTO: 30.2 PG (ref 27–31)
MCHC RBC AUTO-ENTMCNC: 32.7 G/DL (ref 33–37)
MCV RBC AUTO: 92.2 FL (ref 80–94)
MONOCYTES # BLD: 1.2 K/UL (ref 0–0.9)
MONOCYTES NFR BLD: 8.8 % (ref 0–10)
NEUTROPHILS # BLD: 12.2 K/UL (ref 1.5–7.5)
NEUTS SEG NFR BLD: 86.9 % (ref 50–65)
PLATELET # BLD AUTO: 384 K/UL (ref 130–400)
PMV BLD AUTO: 9.3 FL (ref 9.4–12.4)
POTASSIUM SERPL-SCNC: 3.9 MMOL/L (ref 3.5–5)
PROT SERPL-MCNC: 7.5 G/DL (ref 6.6–8.7)
RBC # BLD AUTO: 5.14 M/UL (ref 4.7–6.1)
SODIUM SERPL-SCNC: 131 MMOL/L (ref 136–145)
WBC # BLD AUTO: 14.1 K/UL (ref 4.8–10.8)

## 2024-07-14 PROCEDURE — 71045 X-RAY EXAM CHEST 1 VIEW: CPT

## 2024-07-14 PROCEDURE — 2580000003 HC RX 258: Performed by: NURSE PRACTITIONER

## 2024-07-14 PROCEDURE — 2700000000 HC OXYGEN THERAPY PER DAY

## 2024-07-14 PROCEDURE — 99285 EMERGENCY DEPT VISIT HI MDM: CPT

## 2024-07-14 RX ORDER — 0.9 % SODIUM CHLORIDE 0.9 %
1000 INTRAVENOUS SOLUTION INTRAVENOUS ONCE
Status: COMPLETED | OUTPATIENT
Start: 2024-07-14 | End: 2024-07-15

## 2024-07-14 RX ADMIN — SODIUM CHLORIDE 1000 ML: 9 INJECTION, SOLUTION INTRAVENOUS at 23:56

## 2024-07-14 ASSESSMENT — PAIN - FUNCTIONAL ASSESSMENT: PAIN_FUNCTIONAL_ASSESSMENT: NONE - DENIES PAIN

## 2024-07-15 PROBLEM — A41.9 SEPSIS (HCC): Status: ACTIVE | Noted: 2024-07-15

## 2024-07-15 LAB
A BAUMANNII DNA BLD POS QL NAA+NON-PROBE: NOT DETECTED
ALLENS TEST: ABNORMAL
ANION GAP SERPL CALCULATED.3IONS-SCNC: 11 MMOL/L (ref 7–19)
B PARAP IS1001 DNA NPH QL NAA+NON-PROBE: NOT DETECTED
B PERT.PT PRMT NPH QL NAA+NON-PROBE: NOT DETECTED
BACTERIA #/AREA URNS HPF: ABNORMAL /HPF
BASE EXCESS ARTERIAL: 9.8 MMOL/L (ref -2–2)
BASOPHILS # BLD: 0 K/UL (ref 0–0.2)
BASOPHILS NFR BLD: 0.2 % (ref 0–1)
BILIRUB UR QL STRIP: ABNORMAL
BLACTX-M ISLT/SPM QL: NOT DETECTED
BLAIMP ISLT/SPM QL: NOT DETECTED
BLAKPC ISLT/SPM QL: NOT DETECTED
BLAVIM ISLT/SPM QL: NOT DETECTED
BNP BLD-MCNC: 662 PG/ML (ref 0–124)
BUN SERPL-MCNC: 6 MG/DL (ref 8–23)
C ALBICANS DNA BLD POS QL NAA+NON-PROBE: NOT DETECTED
C AURIS DNA BLD POS QL NAA+PROBE: NOT DETECTED
C GLABRATA DNA BLD POS QL NAA+NON-PROBE: NOT DETECTED
C KRUSEI DNA BLD POS QL NAA+NON-PROBE: NOT DETECTED
C PARAP DNA BLD POS QL NAA+NON-PROBE: NOT DETECTED
C PNEUM DNA NPH QL NAA+NON-PROBE: NOT DETECTED
C TROPICLS DNA BLD POS QL NAA+NON-PROBE: NOT DETECTED
CALCIUM SERPL-MCNC: 8.5 MG/DL (ref 8.8–10.2)
CARBAPENEM RESISTANCE NDM GENE BY PCR: NOT DETECTED
CARBAPENEM RESISTANCE OXA-48 GENE BY PCR: NOT DETECTED
CARBOXYHEMOGLOBIN ARTERIAL: 2.1 % (ref 0–5)
CHLORIDE SERPL-SCNC: 92 MMOL/L (ref 98–111)
CLARITY UR: ABNORMAL
CO2 SERPL-SCNC: 28 MMOL/L (ref 22–29)
COLISTIN RES MCR-1 ISLT/SPM QL: NOT DETECTED
COLOR UR: ABNORMAL
CREAT SERPL-MCNC: 0.7 MG/DL (ref 0.5–1.2)
CRYPTOCOCCUS NEOFORMANS/GATTII BY PCR: NOT DETECTED
E CLOAC COMP DNA BLD POS NAA+NON-PROBE: NOT DETECTED
E COLI DNA BLD POS QL NAA+NON-PROBE: NOT DETECTED
E FAECALIS DNA BLD POS QL NAA+PROBE: NOT DETECTED
E FAECIUM DNA BLD POS QL NAA+PROBE: NOT DETECTED
ENTEROBACT DNA BLD POS QL NAA+NON-PROBE: DETECTED
ENTEROCOC DNA BLD POS QL NAA+NON-PROBE: NOT DETECTED
EOSINOPHIL # BLD: 0 K/UL (ref 0–0.6)
EOSINOPHIL NFR BLD: 0 % (ref 0–5)
ERYTHROCYTE [DISTWIDTH] IN BLOOD BY AUTOMATED COUNT: 12.9 % (ref 11.5–14.5)
FLUAV RNA NPH QL NAA+NON-PROBE: NOT DETECTED
FLUBV RNA NPH QL NAA+NON-PROBE: NOT DETECTED
GLUCOSE SERPL-MCNC: 109 MG/DL (ref 74–109)
GLUCOSE UR STRIP.AUTO-MCNC: NEGATIVE MG/DL
GN BLD CULTURE PNL BLD POS NAA+PROBE: NOT DETECTED
GP B STREP DNA BLD POS QL NAA+NON-PROBE: NOT DETECTED
HADV DNA NPH QL NAA+NON-PROBE: NOT DETECTED
HCO3 ARTERIAL: 33.4 MMOL/L (ref 22–26)
HCOV 229E RNA NPH QL NAA+NON-PROBE: NOT DETECTED
HCOV HKU1 RNA NPH QL NAA+NON-PROBE: NOT DETECTED
HCOV NL63 RNA NPH QL NAA+NON-PROBE: NOT DETECTED
HCOV OC43 RNA NPH QL NAA+NON-PROBE: NOT DETECTED
HCT VFR BLD AUTO: 41.5 % (ref 42–52)
HEMOGLOBIN, ART, EXTENDED: 14.3 G/DL (ref 14–18)
HGB BLD-MCNC: 13.4 G/DL (ref 14–18)
HGB UR STRIP.AUTO-MCNC: ABNORMAL MG/L
HMPV RNA NPH QL NAA+NON-PROBE: NOT DETECTED
HPIV1 RNA NPH QL NAA+NON-PROBE: NOT DETECTED
HPIV2 RNA NPH QL NAA+NON-PROBE: NOT DETECTED
HPIV3 RNA NPH QL NAA+NON-PROBE: NOT DETECTED
HPIV4 RNA NPH QL NAA+NON-PROBE: NOT DETECTED
IMM GRANULOCYTES # BLD: 0.1 K/UL
K OXYTOCA DNA BLD POS QL NAA+NON-PROBE: DETECTED
K PNEUMON DNA SPEC QL NAA+PROBE: NOT DETECTED
K. AEROGENES DNA SPEC QL NAA+PROBE: NOT DETECTED
KETONES UR STRIP.AUTO-MCNC: NEGATIVE MG/DL
L MONOCYTOG DNA BLD POS QL NAA+NON-PROBE: NOT DETECTED
LACTATE BLDV-SCNC: 1.3 MMOL/L (ref 0.5–1.9)
LACTATE BLDV-SCNC: 1.8 MMOL/L (ref 0.5–1.9)
LEUKOCYTE ESTERASE UR QL STRIP.AUTO: ABNORMAL
LYMPHOCYTES # BLD: 0.6 K/UL (ref 1.1–4.5)
LYMPHOCYTES NFR BLD: 5 % (ref 20–40)
M PNEUMO DNA NPH QL NAA+NON-PROBE: NOT DETECTED
MAGNESIUM SERPL-MCNC: 1.4 MG/DL (ref 1.6–2.4)
MCH RBC QN AUTO: 30.2 PG (ref 27–31)
MCHC RBC AUTO-ENTMCNC: 32.3 G/DL (ref 33–37)
MCV RBC AUTO: 93.5 FL (ref 80–94)
METHEMOGLOBIN ARTERIAL: 1.2 %
MONOCYTES # BLD: 1.2 K/UL (ref 0–0.9)
MONOCYTES NFR BLD: 9.7 % (ref 0–10)
N MEN DNA BLD POS QL NAA+NON-PROBE: NOT DETECTED
NEUTROPHILS # BLD: 10.5 K/UL (ref 1.5–7.5)
NEUTS SEG NFR BLD: 84.6 % (ref 50–65)
NITRITE UR QL STRIP.AUTO: POSITIVE
O2 CONTENT ARTERIAL: 19 ML/DL
O2 DELIVERY DEVICE: ABNORMAL
O2 SAT, ARTERIAL: 94.3 %
O2 THERAPY: ABNORMAL
OXYGEN FLOW: 1
P AERUGINOSA DNA BLD POS NAA+NON-PROBE: NOT DETECTED
PCO2 ARTERIAL: 40 MMHG (ref 35–45)
PH ARTERIAL: 7.53 (ref 7.35–7.45)
PH UR STRIP.AUTO: 7 [PH] (ref 5–8)
PLATELET # BLD AUTO: 266 K/UL (ref 130–400)
PMV BLD AUTO: 9.7 FL (ref 9.4–12.4)
PO2 ARTERIAL: 71 MMHG (ref 80–100)
POTASSIUM BLD-SCNC: 3 MMOL/L
POTASSIUM SERPL-SCNC: 3.3 MMOL/L (ref 3.5–5)
PROT UR STRIP.AUTO-MCNC: 30 MG/DL
PROTEUS SP DNA BLD POS QL NAA+NON-PROBE: NOT DETECTED
RBC # BLD AUTO: 4.44 M/UL (ref 4.7–6.1)
RBC #/AREA URNS HPF: ABNORMAL /HPF (ref 0–2)
RSV RNA NPH QL NAA+NON-PROBE: NOT DETECTED
RV+EV RNA NPH QL NAA+NON-PROBE: NOT DETECTED
S AUREUS DNA BLD POS QL NAA+NON-PROBE: NOT DETECTED
S AUREUS+CONS DNA BLD POS NAA+NON-PROBE: NOT DETECTED
S EPIDERMIDIS DNA BLD POS QL NAA+PROBE: NOT DETECTED
S LUGDUNENSIS DNA BLD POS QL NAA+PROBE: NOT DETECTED
S MALTOPH DNA BLD POS QL NAA+PROBE: NOT DETECTED
S MARCESCENS DNA BLD POS NAA+NON-PROBE: NOT DETECTED
S PNEUM DNA BLD POS QL NAA+NON-PROBE: NOT DETECTED
S PYO DNA BLD POS QL NAA+NON-PROBE: NOT DETECTED
SALMONELLA DNA BLD POS QL NAA+PROBE: NOT DETECTED
SAMPLE SOURCE: ABNORMAL
SARS-COV-2 RNA NPH QL NAA+NON-PROBE: NOT DETECTED
SODIUM SERPL-SCNC: 131 MMOL/L (ref 136–145)
SP GR UR STRIP.AUTO: 1.01 (ref 1–1.03)
SPONT RATE(BPM): 22
SQUAMOUS #/AREA URNS HPF: ABNORMAL /HPF
STREPTOCOCCUS DNA BLD POS NAA+NON-PROBE: NOT DETECTED
UROBILINOGEN UR STRIP.AUTO-MCNC: 2 E.U./DL
WBC # BLD AUTO: 12.5 K/UL (ref 4.8–10.8)
WBC #/AREA URNS HPF: ABNORMAL /HPF (ref 0–5)

## 2024-07-15 PROCEDURE — 96375 TX/PRO/DX INJ NEW DRUG ADDON: CPT

## 2024-07-15 PROCEDURE — 96365 THER/PROPH/DIAG IV INF INIT: CPT

## 2024-07-15 PROCEDURE — 94150 VITAL CAPACITY TEST: CPT

## 2024-07-15 PROCEDURE — 94640 AIRWAY INHALATION TREATMENT: CPT

## 2024-07-15 PROCEDURE — 85025 COMPLETE CBC W/AUTO DIFF WBC: CPT

## 2024-07-15 PROCEDURE — 1200000000 HC SEMI PRIVATE

## 2024-07-15 PROCEDURE — 0202U NFCT DS 22 TRGT SARS-COV-2: CPT

## 2024-07-15 PROCEDURE — 83880 ASSAY OF NATRIURETIC PEPTIDE: CPT

## 2024-07-15 PROCEDURE — 51702 INSERT TEMP BLADDER CATH: CPT

## 2024-07-15 PROCEDURE — 82077 ASSAY SPEC XCP UR&BREATH IA: CPT

## 2024-07-15 PROCEDURE — 80053 COMPREHEN METABOLIC PANEL: CPT

## 2024-07-15 PROCEDURE — 6360000002 HC RX W HCPCS: Performed by: STUDENT IN AN ORGANIZED HEALTH CARE EDUCATION/TRAINING PROGRAM

## 2024-07-15 PROCEDURE — 87040 BLOOD CULTURE FOR BACTERIA: CPT

## 2024-07-15 PROCEDURE — 81001 URINALYSIS AUTO W/SCOPE: CPT

## 2024-07-15 PROCEDURE — 83605 ASSAY OF LACTIC ACID: CPT

## 2024-07-15 PROCEDURE — 87077 CULTURE AEROBIC IDENTIFY: CPT

## 2024-07-15 PROCEDURE — 6370000000 HC RX 637 (ALT 250 FOR IP): Performed by: NURSE PRACTITIONER

## 2024-07-15 PROCEDURE — 82803 BLOOD GASES ANY COMBINATION: CPT

## 2024-07-15 PROCEDURE — 2580000003 HC RX 258: Performed by: NURSE PRACTITIONER

## 2024-07-15 PROCEDURE — 2580000003 HC RX 258: Performed by: STUDENT IN AN ORGANIZED HEALTH CARE EDUCATION/TRAINING PROGRAM

## 2024-07-15 PROCEDURE — 6360000002 HC RX W HCPCS: Performed by: NURSE PRACTITIONER

## 2024-07-15 PROCEDURE — 87186 SC STD MICRODIL/AGAR DIL: CPT

## 2024-07-15 PROCEDURE — 94760 N-INVAS EAR/PLS OXIMETRY 1: CPT

## 2024-07-15 PROCEDURE — 93005 ELECTROCARDIOGRAM TRACING: CPT | Performed by: NURSE PRACTITIONER

## 2024-07-15 PROCEDURE — 83735 ASSAY OF MAGNESIUM: CPT

## 2024-07-15 PROCEDURE — 36600 WITHDRAWAL OF ARTERIAL BLOOD: CPT

## 2024-07-15 PROCEDURE — 2700000000 HC OXYGEN THERAPY PER DAY

## 2024-07-15 PROCEDURE — 87086 URINE CULTURE/COLONY COUNT: CPT

## 2024-07-15 PROCEDURE — 87150 DNA/RNA AMPLIFIED PROBE: CPT

## 2024-07-15 PROCEDURE — 6370000000 HC RX 637 (ALT 250 FOR IP): Performed by: STUDENT IN AN ORGANIZED HEALTH CARE EDUCATION/TRAINING PROGRAM

## 2024-07-15 PROCEDURE — 36415 COLL VENOUS BLD VENIPUNCTURE: CPT

## 2024-07-15 RX ORDER — SODIUM CHLORIDE 9 MG/ML
INJECTION, SOLUTION INTRAVENOUS PRN
Status: DISCONTINUED | OUTPATIENT
Start: 2024-07-15 | End: 2024-07-22 | Stop reason: HOSPADM

## 2024-07-15 RX ORDER — MAGNESIUM SULFATE IN WATER 40 MG/ML
4000 INJECTION, SOLUTION INTRAVENOUS ONCE
Status: COMPLETED | OUTPATIENT
Start: 2024-07-15 | End: 2024-07-15

## 2024-07-15 RX ORDER — ALBUTEROL SULFATE 2.5 MG/3ML
2.5 SOLUTION RESPIRATORY (INHALATION) EVERY 4 HOURS PRN
Status: DISCONTINUED | OUTPATIENT
Start: 2024-07-15 | End: 2024-07-22 | Stop reason: HOSPADM

## 2024-07-15 RX ORDER — BUDESONIDE 0.25 MG/2ML
0.25 INHALANT ORAL
Status: DISCONTINUED | OUTPATIENT
Start: 2024-07-15 | End: 2024-07-22 | Stop reason: HOSPADM

## 2024-07-15 RX ORDER — METOPROLOL SUCCINATE 25 MG/1
25 TABLET, EXTENDED RELEASE ORAL DAILY
Status: DISCONTINUED | OUTPATIENT
Start: 2024-07-15 | End: 2024-07-22 | Stop reason: HOSPADM

## 2024-07-15 RX ORDER — POTASSIUM CHLORIDE 20 MEQ/1
40 TABLET, EXTENDED RELEASE ORAL PRN
Status: DISCONTINUED | OUTPATIENT
Start: 2024-07-15 | End: 2024-07-22 | Stop reason: HOSPADM

## 2024-07-15 RX ORDER — POLYETHYLENE GLYCOL 3350 17 G/17G
17 POWDER, FOR SOLUTION ORAL DAILY PRN
Status: DISCONTINUED | OUTPATIENT
Start: 2024-07-15 | End: 2024-07-22 | Stop reason: HOSPADM

## 2024-07-15 RX ORDER — FINASTERIDE 5 MG/1
5 TABLET, FILM COATED ORAL DAILY
Status: DISCONTINUED | OUTPATIENT
Start: 2024-07-15 | End: 2024-07-22 | Stop reason: HOSPADM

## 2024-07-15 RX ORDER — ENOXAPARIN SODIUM 100 MG/ML
40 INJECTION SUBCUTANEOUS DAILY
Status: DISCONTINUED | OUTPATIENT
Start: 2024-07-15 | End: 2024-07-22 | Stop reason: HOSPADM

## 2024-07-15 RX ORDER — TAMSULOSIN HYDROCHLORIDE 0.4 MG/1
0.8 CAPSULE ORAL DAILY
Status: DISCONTINUED | OUTPATIENT
Start: 2024-07-15 | End: 2024-07-22 | Stop reason: HOSPADM

## 2024-07-15 RX ORDER — ACETAMINOPHEN 325 MG/1
650 TABLET ORAL EVERY 6 HOURS PRN
Status: DISCONTINUED | OUTPATIENT
Start: 2024-07-15 | End: 2024-07-22 | Stop reason: HOSPADM

## 2024-07-15 RX ORDER — SODIUM CHLORIDE 0.9 % (FLUSH) 0.9 %
5-40 SYRINGE (ML) INJECTION PRN
Status: DISCONTINUED | OUTPATIENT
Start: 2024-07-15 | End: 2024-07-22 | Stop reason: HOSPADM

## 2024-07-15 RX ORDER — MAGNESIUM SULFATE IN WATER 40 MG/ML
2000 INJECTION, SOLUTION INTRAVENOUS PRN
Status: DISCONTINUED | OUTPATIENT
Start: 2024-07-15 | End: 2024-07-22 | Stop reason: HOSPADM

## 2024-07-15 RX ORDER — PHENAZOPYRIDINE HYDROCHLORIDE 100 MG/1
200 TABLET, FILM COATED ORAL
Status: DISCONTINUED | OUTPATIENT
Start: 2024-07-15 | End: 2024-07-22 | Stop reason: HOSPADM

## 2024-07-15 RX ORDER — GAUZE BANDAGE 2" X 2"
100 BANDAGE TOPICAL DAILY
Status: DISCONTINUED | OUTPATIENT
Start: 2024-07-15 | End: 2024-07-22 | Stop reason: HOSPADM

## 2024-07-15 RX ORDER — FOLIC ACID 1 MG/1
1 TABLET ORAL DAILY
Status: DISCONTINUED | OUTPATIENT
Start: 2024-07-15 | End: 2024-07-22 | Stop reason: HOSPADM

## 2024-07-15 RX ORDER — 0.9 % SODIUM CHLORIDE 0.9 %
1500 INTRAVENOUS SOLUTION INTRAVENOUS ONCE
Status: COMPLETED | OUTPATIENT
Start: 2024-07-15 | End: 2024-07-15

## 2024-07-15 RX ORDER — ARFORMOTEROL TARTRATE 15 UG/2ML
15 SOLUTION RESPIRATORY (INHALATION)
Status: DISCONTINUED | OUTPATIENT
Start: 2024-07-15 | End: 2024-07-22 | Stop reason: HOSPADM

## 2024-07-15 RX ORDER — POTASSIUM CHLORIDE 7.45 MG/ML
10 INJECTION INTRAVENOUS PRN
Status: DISCONTINUED | OUTPATIENT
Start: 2024-07-15 | End: 2024-07-22 | Stop reason: HOSPADM

## 2024-07-15 RX ORDER — DOXYCYCLINE HYCLATE 100 MG/1
100 CAPSULE ORAL EVERY 12 HOURS SCHEDULED
Status: DISCONTINUED | OUTPATIENT
Start: 2024-07-15 | End: 2024-07-17

## 2024-07-15 RX ORDER — ONDANSETRON 4 MG/1
4 TABLET, ORALLY DISINTEGRATING ORAL EVERY 8 HOURS PRN
Status: DISCONTINUED | OUTPATIENT
Start: 2024-07-15 | End: 2024-07-22 | Stop reason: HOSPADM

## 2024-07-15 RX ORDER — ONDANSETRON 2 MG/ML
4 INJECTION INTRAMUSCULAR; INTRAVENOUS EVERY 6 HOURS PRN
Status: DISCONTINUED | OUTPATIENT
Start: 2024-07-15 | End: 2024-07-22 | Stop reason: HOSPADM

## 2024-07-15 RX ORDER — SODIUM CHLORIDE 0.9 % (FLUSH) 0.9 %
5-40 SYRINGE (ML) INJECTION EVERY 12 HOURS SCHEDULED
Status: DISCONTINUED | OUTPATIENT
Start: 2024-07-15 | End: 2024-07-22 | Stop reason: HOSPADM

## 2024-07-15 RX ORDER — ACETAMINOPHEN 325 MG/1
650 TABLET ORAL ONCE
Status: COMPLETED | OUTPATIENT
Start: 2024-07-15 | End: 2024-07-15

## 2024-07-15 RX ADMIN — SODIUM CHLORIDE, PRESERVATIVE FREE 10 ML: 5 INJECTION INTRAVENOUS at 09:44

## 2024-07-15 RX ADMIN — CEFEPIME 2000 MG: 2 INJECTION, POWDER, FOR SOLUTION INTRAVENOUS at 03:50

## 2024-07-15 RX ADMIN — IPRATROPIUM BROMIDE 0.5 MG: 0.5 SOLUTION RESPIRATORY (INHALATION) at 10:22

## 2024-07-15 RX ADMIN — SODIUM CHLORIDE 1500 ML: 9 INJECTION, SOLUTION INTRAVENOUS at 00:21

## 2024-07-15 RX ADMIN — BUDESONIDE 250 MCG: 0.25 SUSPENSION RESPIRATORY (INHALATION) at 06:45

## 2024-07-15 RX ADMIN — CEFEPIME 2000 MG: 2 INJECTION, POWDER, FOR SOLUTION INTRAVENOUS at 14:15

## 2024-07-15 RX ADMIN — IPRATROPIUM BROMIDE 0.5 MG: 0.5 SOLUTION RESPIRATORY (INHALATION) at 06:45

## 2024-07-15 RX ADMIN — AZITHROMYCIN MONOHYDRATE 500 MG: 500 INJECTION, POWDER, LYOPHILIZED, FOR SOLUTION INTRAVENOUS at 00:21

## 2024-07-15 RX ADMIN — WATER 1000 MG: 1 INJECTION INTRAMUSCULAR; INTRAVENOUS; SUBCUTANEOUS at 00:19

## 2024-07-15 RX ADMIN — ENOXAPARIN SODIUM 40 MG: 100 INJECTION SUBCUTANEOUS at 09:43

## 2024-07-15 RX ADMIN — TAMSULOSIN HYDROCHLORIDE 0.8 MG: 0.4 CAPSULE ORAL at 09:43

## 2024-07-15 RX ADMIN — PHENAZOPYRIDINE 200 MG: 100 TABLET ORAL at 20:32

## 2024-07-15 RX ADMIN — PHENAZOPYRIDINE 200 MG: 100 TABLET ORAL at 09:44

## 2024-07-15 RX ADMIN — FOLIC ACID 1 MG: 1 TABLET ORAL at 09:45

## 2024-07-15 RX ADMIN — METOPROLOL SUCCINATE 25 MG: 25 TABLET, EXTENDED RELEASE ORAL at 09:44

## 2024-07-15 RX ADMIN — IPRATROPIUM BROMIDE 0.5 MG: 0.5 SOLUTION RESPIRATORY (INHALATION) at 14:20

## 2024-07-15 RX ADMIN — CEFEPIME 2000 MG: 2 INJECTION, POWDER, FOR SOLUTION INTRAVENOUS at 20:26

## 2024-07-15 RX ADMIN — DOXYCYCLINE HYCLATE 100 MG: 100 CAPSULE ORAL at 20:23

## 2024-07-15 RX ADMIN — ACETAMINOPHEN 650 MG: 325 TABLET ORAL at 00:31

## 2024-07-15 RX ADMIN — ARFORMOTEROL TARTRATE 15 MCG: 15 SOLUTION RESPIRATORY (INHALATION) at 06:45

## 2024-07-15 RX ADMIN — MAGNESIUM SULFATE HEPTAHYDRATE 4000 MG: 40 INJECTION, SOLUTION INTRAVENOUS at 09:55

## 2024-07-15 RX ADMIN — Medication 100 MG: at 09:44

## 2024-07-15 RX ADMIN — IPRATROPIUM BROMIDE 0.5 MG: 0.5 SOLUTION RESPIRATORY (INHALATION) at 18:40

## 2024-07-15 RX ADMIN — FINASTERIDE 5 MG: 5 TABLET, FILM COATED ORAL at 09:43

## 2024-07-15 RX ADMIN — SODIUM CHLORIDE, PRESERVATIVE FREE 10 ML: 5 INJECTION INTRAVENOUS at 20:29

## 2024-07-15 RX ADMIN — ARFORMOTEROL TARTRATE 15 MCG: 15 SOLUTION RESPIRATORY (INHALATION) at 18:40

## 2024-07-15 RX ADMIN — PHENAZOPYRIDINE 200 MG: 100 TABLET ORAL at 15:13

## 2024-07-15 RX ADMIN — DOXYCYCLINE HYCLATE 100 MG: 100 CAPSULE ORAL at 09:44

## 2024-07-15 RX ADMIN — BUDESONIDE 250 MCG: 0.25 SUSPENSION RESPIRATORY (INHALATION) at 18:40

## 2024-07-15 ASSESSMENT — PAIN SCALES - GENERAL
PAINLEVEL_OUTOF10: 10
PAINLEVEL_OUTOF10: 6

## 2024-07-15 ASSESSMENT — PAIN DESCRIPTION - LOCATION: LOCATION: PENIS

## 2024-07-15 NOTE — PROGRESS NOTES
4 Eyes Skin Assessment     NAME:  Eze Rosa  YOB: 1950  MEDICAL RECORD NUMBER:  [id number]    The patient is being assessed for  Admission    I agree that at least one RN has performed a thorough Head to Toe Skin Assessment on the patient. ALL assessment sites listed below have been assessed.      Areas assessed by both nurses:    Head, Face, Ears, Shoulders, Back, Chest, Arms, Elbows, Hands, Sacrum. Buttock, Coccyx, Ischium, Legs. Feet and Heels, and Under Medical Devices         Does the Patient have a Wound? No noted wound(s)       Drew Prevention initiated by RN: Yes  Wound Care Orders initiated by RN: No    Pressure Injury (Stage 3,4, Unstageable, DTI, NWPT, and Complex wounds) if present, place Wound referral order by RN under : No    New Ostomies, if present place, Ostomy referral order under : No     Nurse 1 eSignature: Electronically signed by GEORGE CORONADO RN on 7/15/24 at 3:12 AM CDT    **SHARE this note so that the co-signing nurse can place an eSignature**    Nurse 2 eSignature: Electronically signed by Lyudmila Bautista RN on 7/15/24 at 3:13 AM CDT4 Eyes Skin Assessment

## 2024-07-15 NOTE — ACP (ADVANCE CARE PLANNING)
Advance Care Planning     Advance Care Planning Clinical Specialist  Conversation Note      Date of ACP Conversation: 7/15/2024    Conversation Conducted with:  Pt and his daughter, Rebeca by phone    ACP Clinical Specialist: Nicolasa Reyes RN      Healthcare Decision Maker:     Current Designated Healthcare Decision Maker:     Primary Decision Maker: Chelo Mtz - Spouse - [phone]; Rebeca states she is POA as her mother has brain cancer.  Rebeca advised to bring POA paperwork to have place on chart.  She is an only child.        Care Preferences    Hospitalization:  \"If your health worsens and it becomes clear that your chance of recovery is unlikely, what would your preference be regarding hospitalization?\"    Choice:  [x] The patient wants hospitalization.  [] The patient prefers comfort-focused treatment without hospitalization.    Ventilation:  \"If you were in your present state of health and suddenly became very ill and were unable to breathe on your own, what would your preference be about the use of a ventilator (breathing machine) if it were available to you?\"      If the patient would desire the use of ventilator (breathing machine), answer \"yes\".  If not, \"no\": Yes    \"If your health worsens and it becomes clear that your chance of recovery is unlikely, what would your preference be about the use of a ventilator (breathing machine) if it were available to you?\"     Would the patient desire the use of ventilator (breathing machine)?: Yes      Resuscitation  \"CPR works best to restart the heart when there is a sudden event, like a heart attack, in someone who is otherwise healthy. Unfortunately, CPR does not typically restart the heart for people who have serious health conditions or who are very sick.\"    \"In the event your heart stopped as a result of an underlying serious health condition, would you want attempts to be made to restart your heart (answer \"yes\" for attempt to resuscitate) or would

## 2024-07-15 NOTE — PROGRESS NOTES
[facility]      Patient:  Eze Rosa  YOB: 1950  Date of Service: 7/15/2024  MRN: [id number]   Acct: [id number]   Primary Care Physician: Ross Masterson MD  Advance Directive: Full Code  Admit Date: 7/14/2024       Hospital Day: 0  Portions of this note have been copied forward, however, changed to reflect the most current clinical status of this patient.  CHIEF COMPLAINT   Shortness of breath    SUBJECTIVE:  Resting in the bed, no complaints at this time.  Morataya to BSD, clear yellow urine noted.     CUMULATIVE HOSPITAL COURSE:  Patient is a 73-year-old male with a past medical history of pulmonary fibrosis, emphysema, chronic hypoxic respiratory failure on supplemental O2 at baseline, as well as BPH with urinary retention, presented from to [facility] ED with confusion(reportedly has some confusion more so at night at baseline), increased dyspnea from baseline with associated cough, noted with shallow breathing with respiratory rate up to 30s, as well as difficulty with urinating and able to empty his bladder.  Patient noted sinus tachycardia with heart rates as high as 130s to 140s, low-grade fever, leukocytosis WBCs over 14,000, lactic acidosis noted.  Blood cultures obtained on admission, respiratory PCR obtained, noted negative.  Empirically treated for sepsis with crystalloid resuscitation in the ED and empiric IV antibiotics. Chest x-ray obtained with final results pending but appears to show extensive chronic lung disease with fibrosis and emphysematous lung changes, difficult to exclude pneumonia. Patient noted to have urinary retention requiring Morataya placement with turbid urine output with active urine sediment on UA consistent with UTI.     Azithromycin completed. Cefepime 2,000mg Q8hrs for 14 doses, initiated 7/15.  Doxycycline 100mg q12hrs for 5 days.  Urine culture pending.  Blood culture 7/14, noted one bottle gram negative rods, will repeat 7/15.  Sodium stable, 131.  Potassium

## 2024-07-15 NOTE — ED NOTES
ED TO INPATIENT SBAR HANDOFF    Patient Name: Eze Rosa   : 1950  73 y.o.   Family/Caregiver Present: No; wife has a brain tumor, daughter sandy is reachable by phone and made aware  Code Status Order: Prior    C-SSRS: Risk of Suicide: No Risk  Sitter No  Restraints:         Situation  Chief Complaint:   Chief Complaint   Patient presents with    Shortness of Breath     Initial SPO2 91%, hx pulmonary fibrosis, normally wears 2 L O2 NC continuous at home. Given Duoneb en route with EMS     Patient Diagnosis: Sepsis (HCC) [A41.9]     Brief Description of Patient's Condition: Eze arrived from home with c/o SOA, normally wears 2 L O2 NC all the time, now on 1 L NC after ABG. He's been tachy in the 130s-140s since he got here, met sepsis criteria, had a total of 1500 NS. He couldn't void into a urinal and was retaining 360 so we put a lieberman in.   Mental Status: disoriented and alert formerly in a SNF  Arrived from: home    Imaging:   XR CHEST PORTABLE    (Results Pending)     COVID-19 Results:   Internal Administration   First Dose      Second Dose           Last COVID Lab SARS-CoV-2, PCR (no units)   Date Value   2024 Not Detected     SARS-CoV-2, NAAT (no units)   Date Value   2023 Not Detected           Abnormal labs:   Abnormal Labs Reviewed   CBC WITH AUTO DIFFERENTIAL - Abnormal; Notable for the following components:       Result Value    WBC 14.1 (*)     MCHC 32.7 (*)     MPV 9.3 (*)     Neutrophils % 86.9 (*)     Lymphocytes % 3.7 (*)     Neutrophils Absolute 12.2 (*)     Lymphocytes Absolute 0.5 (*)     Monocytes Absolute 1.20 (*)     All other components within normal limits   COMPREHENSIVE METABOLIC PANEL W/ REFLEX TO MG FOR LOW K - Abnormal; Notable for the following components:    Sodium 131 (*)     Chloride 88 (*)     CO2 30 (*)     Glucose 180 (*)     BUN 5 (*)     Alkaline Phosphatase 158 (*)     All other components within normal limits   LACTIC ACID - Abnormal; Notable for the         sodium chloride 0.9 % bolus 1,500 mL Admin Date  07/15/2024 Action  New Bag Dose  1,500 mL Rate  1,500 mL/hr Route  IntraVENous Administered By  Aimee Montes RN            History:   Past Medical History:   Diagnosis Date    GERD (gastroesophageal reflux disease)     Palliative care patient 01/08/2024    Pneumonia     Psychiatric problem        Assessment  Vitals: Level of Consciousness: Responds to voice (1)   Vitals:    07/15/24 0014 07/15/24 0019 07/15/24 0030 07/15/24 0125   BP:  105/67 125/80 107/64   Pulse:  (!) 138 (!) 135 (!) 119   Resp: 30  (!) 33 26   Temp:    98.6 °F (37 °C)   TempSrc:    Temporal   SpO2:  95% 91% 95%   Weight:       Height:         Predictive Model Details          52 (Warning)  Factor Value    Calculated 7/15/2024 01:31 25% Respiratory rate 26    Deterioration Index Model 22% Age 73 years old     21% Supplemental oxygen Nasal cannula     13% Pulse 119     7% WBC count abnormal (14.1 K/uL)     6% Sodium abnormal (131 mmol/L)     4% Blood pH abnormal (7.530)     2% BUN abnormal (5 mg/dL)     1% Systolic 107     0% Hematocrit 47.4 %     0% Pulse oximetry 95 %     0% Temperature 98.6 °F (37 °C)       NPO? No  O2 Flow Rate: O2 Device: Nasal cannula O2 Flow Rate (L/min): 1 L/min  Cardiac Rhythm: sinus tach  NIH Score: NIH     Active LDA's: 18 x 2 LFA and LAC  Peripheral IV 07/14/24 Left Forearm (Active)   Site Assessment Clean, dry & intact 07/14/24 2308   Line Status Blood return noted 07/14/24 2308   Infiltration Assessment 0 07/14/24 2308   Dressing Status New dressing applied 07/14/24 2308       Peripheral IV 07/14/24 Left Antecubital (Active)   Site Assessment Clean, dry & intact 07/14/24 2315   Line Status Blood return noted 07/14/24 2315   Phlebitis Assessment No symptoms 07/14/24 2315   Infiltration Assessment 0 07/14/24 2315   Dressing Status New dressing applied 07/14/24 2315     Pertinent or High Risk Medications/Drips: no   If Yes, please provide details: n/a  Blood

## 2024-07-15 NOTE — ED NOTES
Pt's daughter, Rebeca updated via phone at this time. Daughter reports pt is confused at baseline, especially at night.

## 2024-07-15 NOTE — CONSULTS
Palliative Care:        Pt is known to Palliative.  Pt is sitting up in bed with  breakfast tray at bedside.  Pt has no appetite states he doesn't feel like eating.  Patient states he has pain but it is just a little bit.  Pt is alert with confusion and some memory loss.  Pt states he lives with his wife with support of his daughter, Rebeca.  However, after visit with Pt, call placed to daughter, Rebeca who stated Pt has been a resident at [facility] and his wife who has Brain Cancer is currently living with Rebeca.  Rebeca states she has 2 small children and is expecting anytime with her 3rd child and has her hands full.  Rebeca updated on Pt as far as this visit with Pt.  Rebeca states she has no idea of Pt's discharge plan at this time.  She states she is an only child, is POA, but is overwhelmed.       Past Medical History:        Past Medical History:   Diagnosis Date    GERD (gastroesophageal reflux disease)     Palliative care patient 01/08/2024    Pneumonia     Psychiatric problem        Advance Directives:     Rebeca states she is POA and has POA paperwork and states she will try to get documents on file.              How are symptoms affecting QOL:  Pt was a resident of [facility]       Psychological/Spiritual:    Pt was raised Mu-ism, but has not been in Pentec[employer]alism since he was a boy.           Plan:  Rebeca states she is unsure of discharge plan.  She states she is taking one day at a time.                  Palliative Performance Scale:     40%       Palliative Care team will continue to follow and support, with ongoing review of pt's goals of care.                Electronically signed by Nicolasa Reyes RN on 7/15/2024 at 11:59 AM

## 2024-07-15 NOTE — ED NOTES
PT agitated and found attempting to stand up without assistance, pt stated he has to void. Pt assisted with urinal in supine position but unable to void for the last 20 minutes, bladder scan measuring 360 ML, BRENDEN Rehman made aware lieberman to be placed.

## 2024-07-15 NOTE — ED PROVIDER NOTES
[facility] 4 ONCOLOGY UNIT  eMERGENCY dEPARTMENT eNCOUnter      Pt Name: Eze Rosa  MRN: [id number]  Birthdate 1950  Date of evaluation: 7/14/2024  Provider: BRENDEN Gonzalez    CHIEF COMPLAINT       Chief Complaint   Patient presents with    Shortness of Breath     Initial SPO2 91%, hx pulmonary fibrosis, normally wears 2 L O2 NC continuous at home. Given Duoneb en route with EMS         HISTORY OF PRESENT ILLNESS   (Location/Symptom, Timing/Onset,Context/Setting, Quality, Duration, Modifying Factors, Severity)  Note limiting factors.   Eze Rosa is a 73 y.o. male who presents to the emergency department by ambulance from home with shortness of breath.  Pt is poor historian.  He has a history of pulmonary fibrosis, afib, urinary retention, alcohol abuse.  Pt tells me he is not a hospice pt.  Pt is confused.  Trying to contact family for more info     The history is provided by the patient and medical records.   Shortness of Breath  Severity:  Moderate  Onset quality:  Sudden  Associated symptoms: cough    Associated symptoms: no chest pain        NursingNotes were reviewed.    REVIEW OF SYSTEMS    (2-9 systems for level 4, 10 or more for level 5)     Review of Systems   Respiratory:  Positive for cough and shortness of breath.    Cardiovascular:  Negative for chest pain.       Except as noted above the remainder of the review of systems was reviewed and negative.       PAST MEDICAL HISTORY     Past Medical History:   Diagnosis Date    GERD (gastroesophageal reflux disease)     Palliative care patient 01/08/2024    Pneumonia     Psychiatric problem          SURGICALHISTORY       Past Surgical History:   Procedure Laterality Date    ABDOMEN SURGERY      COLONOSCOPY           CURRENT MEDICATIONS       Current Discharge Medication List        CONTINUE these medications which have NOT CHANGED    Details   budesonide-formoterol (SYMBICORT) 160-4.5 MCG/ACT AERO Inhale 2 puffs into the lungs 2 times daily  Qty: 10.2 g,

## 2024-07-15 NOTE — H&P
Inhale 1 puff into the lungs every 6 hours as needed for Wheezing 1/11/24   Zachary Ibanez MD   folic acid (FOLVITE) 1 MG tablet Take 1 tablet by mouth daily 1/12/24   Zachary Ibanez MD   finasteride (PROSCAR) 5 MG tablet Take 1 tablet by mouth daily 1/12/24   Zachary Ibanez MD   tamsulosin (FLOMAX) 0.4 MG capsule Take 2 capsules by mouth daily 9/27/23   Gaviota Green APRN - CNP   metoprolol succinate (TOPROL XL) 25 MG extended release tablet Take 1 tablet by mouth daily 9/9/23   Maddie Valentin APRN - CNP   Multiple Vitamins-Minerals (MULTIVITAMIN WITH MINERALS) tablet Take 1 tablet by mouth daily 9/8/23   Maddie Valentin APRN - CNP   vitamin B-1 (THIAMINE) 100 MG tablet Take 1 tablet by mouth daily 9/9/23   Maddie Valentin APRN - CNP       I have reviewed all pertinent history. Prior medical records and laboratory evaluation reviewed. Imaging independently reviewed.    Review of Systems:   Not performed    Physical Exam:  Vitals:    07/15/24 0019   BP: 105/67   Pulse: (!) 138   Resp:    Temp:    SpO2: 95%     CONSTITUTIONAL: Ill-appearing  LUNGS: Tachypnea, shallow breathing, diminished bilaterally, no wheezes  CARDIOVASCULAR: Tachycardic, regular rhythm  GI/ABDOMEN: Soft, mild lower abdominal tenderness and suprapubic tenderness  SKIN: Warm and dry.  NEUROLOGICAL: Pleasantly confused, antigravity in all extremities  EXTREMITIES: No edema      Labs:   Recent Results (from the past 72 hour(s))   CBC with Auto Differential    Collection Time: 07/14/24 11:11 PM   Result Value Ref Range    WBC 14.1 (H) 4.8 - 10.8 K/uL    RBC 5.14 4.70 - 6.10 M/uL    Hemoglobin 15.5 14.0 - 18.0 g/dL    Hematocrit 47.4 42.0 - 52.0 %    MCV 92.2 80.0 - 94.0 fL    MCH 30.2 27.0 - 31.0 pg    MCHC 32.7 (L) 33.0 - 37.0 g/dL    RDW 12.8 11.5 - 14.5 %    Platelets 384 130 - 400 K/uL    MPV 9.3 (L) 9.4 - 12.4 fL    Neutrophils % 86.9 (H) 50.0 - 65.0 %    Lymphocytes % 3.7 (L) 20.0 - 40.0 %    Monocytes % 8.8 0.0 - 10.0 %     Eosinophils % 0.1 0.0 - 5.0 %    Basophils % 0.3 0.0 - 1.0 %    Neutrophils Absolute 12.2 (H) 1.5 - 7.5 K/uL    Immature Granulocytes # 0.0 K/uL    Lymphocytes Absolute 0.5 (L) 1.1 - 4.5 K/uL    Monocytes Absolute 1.20 (H) 0.00 - 0.90 K/uL    Eosinophils Absolute 0.00 0.00 - 0.60 K/uL    Basophils Absolute 0.00 0.00 - 0.20 K/uL   Comprehensive Metabolic Panel w/ Reflex to MG    Collection Time: 07/14/24 11:11 PM   Result Value Ref Range    Sodium 131 (L) 136 - 145 mmol/L    Potassium reflex Magnesium 3.9 3.5 - 5.0 mmol/L    Chloride 88 (L) 98 - 111 mmol/L    CO2 30 (H) 22 - 29 mmol/L    Anion Gap 13 7 - 19 mmol/L    Glucose 180 (H) 74 - 109 mg/dL    BUN 5 (L) 8 - 23 mg/dL    Creatinine 0.8 0.5 - 1.2 mg/dL    Est, Glom Filt Rate >90 >60    Calcium 9.4 8.8 - 10.2 mg/dL    Total Protein 7.5 6.6 - 8.7 g/dL    Albumin 3.9 3.5 - 5.2 g/dL    Total Bilirubin 1.0 0.2 - 1.2 mg/dL    Alkaline Phosphatase 158 (H) 40 - 130 U/L    ALT 6 5 - 41 U/L    AST 14 5 - 40 U/L   Lactic Acid    Collection Time: 07/14/24 11:11 PM   Result Value Ref Range    Lactic Acid 2.4 (HH) 0.5 - 1.9 mmol/L   Ethanol    Collection Time: 07/14/24 11:11 PM   Result Value Ref Range    Ethanol Lvl <10 mg/dL   Brain Natriuretic Peptide    Collection Time: 07/14/24 11:11 PM   Result Value Ref Range    Pro- (H) 0 - 124 pg/mL   Respiratory Panel, Molecular, with COVID-19 (Restricted: peds pts or suitable admitted adults)    Collection Time: 07/14/24 11:16 PM    Specimen: Nasopharyngeal   Result Value Ref Range    Adenovirus by PCR Not Detected Not Detected    Bordetella parapertussis by PCR Not Detected Not Detected    Bordetella pertussis by PCR Not Detected Not Detected    Chlamydophilia pneumoniae by PCR Not Detected Not Detected    Coronavirus 229E by PCR Not Detected Not Detected    Coronavirus HKU1 by PCR Not Detected Not Detected    Coronavirus NL63 by PCR Not Detected Not Detected    Coronavirus OC43 by PCR Not Detected Not Detected

## 2024-07-16 LAB
ANION GAP SERPL CALCULATED.3IONS-SCNC: 9 MMOL/L (ref 7–19)
BACTERIA BLD CULT ORG #2: ABNORMAL
BASOPHILS # BLD: 0 K/UL (ref 0–0.2)
BASOPHILS NFR BLD: 0.4 % (ref 0–1)
BUN SERPL-MCNC: 7 MG/DL (ref 8–23)
CALCIUM SERPL-MCNC: 8 MG/DL (ref 8.8–10.2)
CHLORIDE SERPL-SCNC: 92 MMOL/L (ref 98–111)
CO2 SERPL-SCNC: 29 MMOL/L (ref 22–29)
CREAT SERPL-MCNC: 0.6 MG/DL (ref 0.5–1.2)
EKG P AXIS: 18 DEGREES
EKG P-R INTERVAL: 148 MS
EKG Q-T INTERVAL: 318 MS
EKG QRS DURATION: 78 MS
EKG QTC CALCULATION (BAZETT): 442 MS
EKG T AXIS: -14 DEGREES
EOSINOPHIL # BLD: 0 K/UL (ref 0–0.6)
EOSINOPHIL NFR BLD: 0 % (ref 0–5)
ERYTHROCYTE [DISTWIDTH] IN BLOOD BY AUTOMATED COUNT: 12.8 % (ref 11.5–14.5)
GLUCOSE SERPL-MCNC: 122 MG/DL (ref 74–109)
HCT VFR BLD AUTO: 37.6 % (ref 42–52)
HGB BLD-MCNC: 12.8 G/DL (ref 14–18)
IMM GRANULOCYTES # BLD: 0 K/UL
LYMPHOCYTES # BLD: 0.9 K/UL (ref 1.1–4.5)
LYMPHOCYTES NFR BLD: 11.2 % (ref 20–40)
MAGNESIUM SERPL-MCNC: 1.9 MG/DL (ref 1.6–2.4)
MCH RBC QN AUTO: 31.3 PG (ref 27–31)
MCHC RBC AUTO-ENTMCNC: 34 G/DL (ref 33–37)
MCV RBC AUTO: 91.9 FL (ref 80–94)
MONOCYTES # BLD: 0.7 K/UL (ref 0–0.9)
MONOCYTES NFR BLD: 8.8 % (ref 0–10)
NEUTROPHILS # BLD: 6.4 K/UL (ref 1.5–7.5)
NEUTS SEG NFR BLD: 79.2 % (ref 50–65)
ORGANISM: ABNORMAL
PLATELET # BLD AUTO: 217 K/UL (ref 130–400)
PMV BLD AUTO: 9.7 FL (ref 9.4–12.4)
POTASSIUM SERPL-SCNC: 3.4 MMOL/L (ref 3.5–5)
RBC # BLD AUTO: 4.09 M/UL (ref 4.7–6.1)
SODIUM SERPL-SCNC: 130 MMOL/L (ref 136–145)
WBC # BLD AUTO: 8.1 K/UL (ref 4.8–10.8)

## 2024-07-16 PROCEDURE — 1200000000 HC SEMI PRIVATE

## 2024-07-16 PROCEDURE — 93010 ELECTROCARDIOGRAM REPORT: CPT | Performed by: INTERNAL MEDICINE

## 2024-07-16 PROCEDURE — 6360000002 HC RX W HCPCS: Performed by: STUDENT IN AN ORGANIZED HEALTH CARE EDUCATION/TRAINING PROGRAM

## 2024-07-16 PROCEDURE — 83735 ASSAY OF MAGNESIUM: CPT

## 2024-07-16 PROCEDURE — 2700000000 HC OXYGEN THERAPY PER DAY

## 2024-07-16 PROCEDURE — 97530 THERAPEUTIC ACTIVITIES: CPT

## 2024-07-16 PROCEDURE — 85025 COMPLETE CBC W/AUTO DIFF WBC: CPT

## 2024-07-16 PROCEDURE — 94760 N-INVAS EAR/PLS OXIMETRY 1: CPT

## 2024-07-16 PROCEDURE — 6370000000 HC RX 637 (ALT 250 FOR IP): Performed by: STUDENT IN AN ORGANIZED HEALTH CARE EDUCATION/TRAINING PROGRAM

## 2024-07-16 PROCEDURE — 97165 OT EVAL LOW COMPLEX 30 MIN: CPT

## 2024-07-16 PROCEDURE — 94640 AIRWAY INHALATION TREATMENT: CPT

## 2024-07-16 PROCEDURE — 2580000003 HC RX 258: Performed by: STUDENT IN AN ORGANIZED HEALTH CARE EDUCATION/TRAINING PROGRAM

## 2024-07-16 PROCEDURE — 36415 COLL VENOUS BLD VENIPUNCTURE: CPT

## 2024-07-16 PROCEDURE — 80048 BASIC METABOLIC PNL TOTAL CA: CPT

## 2024-07-16 RX ADMIN — IPRATROPIUM BROMIDE 0.5 MG: 0.5 SOLUTION RESPIRATORY (INHALATION) at 19:22

## 2024-07-16 RX ADMIN — ARFORMOTEROL TARTRATE 15 MCG: 15 SOLUTION RESPIRATORY (INHALATION) at 19:23

## 2024-07-16 RX ADMIN — ENOXAPARIN SODIUM 40 MG: 100 INJECTION SUBCUTANEOUS at 08:13

## 2024-07-16 RX ADMIN — METOPROLOL SUCCINATE 25 MG: 25 TABLET, EXTENDED RELEASE ORAL at 08:13

## 2024-07-16 RX ADMIN — FINASTERIDE 5 MG: 5 TABLET, FILM COATED ORAL at 08:14

## 2024-07-16 RX ADMIN — PHENAZOPYRIDINE 200 MG: 100 TABLET ORAL at 11:19

## 2024-07-16 RX ADMIN — ARFORMOTEROL TARTRATE 15 MCG: 15 SOLUTION RESPIRATORY (INHALATION) at 06:19

## 2024-07-16 RX ADMIN — TAMSULOSIN HYDROCHLORIDE 0.8 MG: 0.4 CAPSULE ORAL at 08:13

## 2024-07-16 RX ADMIN — CEFEPIME 2000 MG: 2 INJECTION, POWDER, FOR SOLUTION INTRAVENOUS at 11:16

## 2024-07-16 RX ADMIN — SODIUM CHLORIDE, PRESERVATIVE FREE 10 ML: 5 INJECTION INTRAVENOUS at 08:14

## 2024-07-16 RX ADMIN — IPRATROPIUM BROMIDE 0.5 MG: 0.5 SOLUTION RESPIRATORY (INHALATION) at 14:30

## 2024-07-16 RX ADMIN — Medication 100 MG: at 08:14

## 2024-07-16 RX ADMIN — BUDESONIDE 250 MCG: 0.25 SUSPENSION RESPIRATORY (INHALATION) at 19:22

## 2024-07-16 RX ADMIN — CEFEPIME 2000 MG: 2 INJECTION, POWDER, FOR SOLUTION INTRAVENOUS at 17:14

## 2024-07-16 RX ADMIN — PHENAZOPYRIDINE 200 MG: 100 TABLET ORAL at 08:13

## 2024-07-16 RX ADMIN — IPRATROPIUM BROMIDE 0.5 MG: 0.5 SOLUTION RESPIRATORY (INHALATION) at 10:08

## 2024-07-16 RX ADMIN — PHENAZOPYRIDINE 200 MG: 100 TABLET ORAL at 17:15

## 2024-07-16 RX ADMIN — DOXYCYCLINE HYCLATE 100 MG: 100 CAPSULE ORAL at 08:14

## 2024-07-16 RX ADMIN — DOXYCYCLINE HYCLATE 100 MG: 100 CAPSULE ORAL at 20:33

## 2024-07-16 RX ADMIN — POTASSIUM BICARBONATE 40 MEQ: 782 TABLET, EFFERVESCENT ORAL at 08:14

## 2024-07-16 RX ADMIN — CEFEPIME 2000 MG: 2 INJECTION, POWDER, FOR SOLUTION INTRAVENOUS at 03:34

## 2024-07-16 RX ADMIN — FOLIC ACID 1 MG: 1 TABLET ORAL at 08:14

## 2024-07-16 RX ADMIN — BUDESONIDE 250 MCG: 0.25 SUSPENSION RESPIRATORY (INHALATION) at 06:19

## 2024-07-16 RX ADMIN — IPRATROPIUM BROMIDE 0.5 MG: 0.5 SOLUTION RESPIRATORY (INHALATION) at 06:19

## 2024-07-16 ASSESSMENT — PAIN SCALES - GENERAL: PAINLEVEL_OUTOF10: 3

## 2024-07-16 NOTE — PROGRESS NOTES
Occupational Therapy  Facility/Department: [facility] 4 ONCOLOGY UNIT  Occupational Therapy Initial Assessment    Name: Eze Rosa  : 1950  MRN: [id number]  Date of Service: 2024    Discharge Recommendations:             Patient Diagnosis(es): The primary encounter diagnosis was Dyspnea and respiratory abnormalities. Diagnoses of Idiopathic pulmonary fibrosis (HCC), Septicemia (HCC), and Confusion were also pertinent to this visit.  Past Medical History:  has a past medical history of GERD (gastroesophageal reflux disease), Palliative care patient, Pneumonia, and Psychiatric problem.  Past Surgical History:  has a past surgical history that includes Abdomen surgery and Colonoscopy.    Treatment Diagnosis: Sepsis due to UTI      Assessment   Performance deficits / Impairments: Decreased functional mobility ;Decreased strength;Decreased endurance;Decreased ADL status;Decreased safe awareness;Decreased ROM;Decreased balance;Decreased cognition  Assessment: Will progress as tolerated  Treatment Diagnosis: Sepsis due to UTI  Prognosis: Good  Decision Making: Low Complexity  REQUIRES OT FOLLOW-UP: Yes  Activity Tolerance  Activity Tolerance: Patient limited by fatigue        Plan   Occupational Therapy Plan  Times Per Week: 3-5x/week  Times Per Day: Once a day     Restrictions  Restrictions/Precautions  Restrictions/Precautions: Fall Risk    Subjective   General  Chart Reviewed: Yes  Patient assessed for rehabilitation services?: Yes  Additional Pertinent Hx: Pulmonary Fibrosis, ETOH abuse  Family / Caregiver Present: Yes (Sitter)  Diagnosis: Sepsis due to UTI  General Comment  Comments: Per chart, family removed pt. from [facility] over the weekend and pt. admitted from home on 7-15-24     Social/Functional History  Social/Functional History  Lives With:  (Per chart, will be d/c to daughter's house)  Home Equipment: Walker - Rolling  ADL Assistance: Needs assistance  Ambulation Assistance: Needs

## 2024-07-16 NOTE — PROGRESS NOTES
Clarified with hospice intake that pt is not on hospice services as of 7/12/24.    Electronically signed by Promise Holt RN on 7/16/2024 at 2:10 PM

## 2024-07-16 NOTE — PROGRESS NOTES
[facility]      Patient:  Eze Rosa  YOB: 1950  Date of Service: 7/16/2024  MRN: [id number]   Acct: [id number]   Primary Care Physician: Ross Masterson MD  Advance Directive: Full Code  Admit Date: 7/14/2024       Hospital Day: 1  Portions of this note have been copied forward, however, changed to reflect the most current clinical status of this patient.  CHIEF COMPLAINT   Shortness of breath    SUBJECTIVE:  Resting in the bed, no complaints at this time.  Morataya to BSD, clear yellow urine noted.     CUMULATIVE HOSPITAL COURSE:  Patient is a 73-year-old male with a past medical history of pulmonary fibrosis, emphysema, chronic hypoxic respiratory failure on supplemental O2 at baseline, as well as BPH with urinary retention, presented from to [facility] ED with confusion(reportedly has some confusion more so at night at baseline), increased dyspnea from baseline with associated cough, noted with shallow breathing with respiratory rate up to 30s, as well as difficulty with urinating and able to empty his bladder.  Patient noted sinus tachycardia with heart rates as high as 130s to 140s, low-grade fever, leukocytosis WBCs over 14,000, lactic acidosis noted.  Blood cultures obtained on admission, respiratory PCR obtained, noted negative.  Empirically treated for sepsis with crystalloid resuscitation in the ED and empiric IV antibiotics. Chest x-ray obtained with final results pending but appears to show extensive chronic lung disease with fibrosis and emphysematous lung changes, difficult to exclude pneumonia. Patient noted to have urinary retention requiring Morataya placement with turbid urine output with active urine sediment on UA consistent with UTI.     Azithromycin completed. Cefepime 2,000mg Q8hrs for 14 doses, initiated 7/15.  Doxycycline 100mg q12hrs for 5 days.  Urine culture pending.  Blood culture 7/14, Enterobacteriaceae and Klebsiella oxytoca, other positive for Klebsiella oxytoca,  will

## 2024-07-16 NOTE — CARE COORDINATION
Last refill 3/12/21 no rfs   Last appt 3 /12/21 RTC 6 months     Refill done    SW called Carmen, and the family discharged him over the weekend and took him home, and was admitted here on 7/15/24 according to Carmen he was on hospice at their facility and she was unclear if that service was dropped  Electronically signed by EMILY Ibanez on 7/16/2024 at 9:14 AM

## 2024-07-16 NOTE — CARE COORDINATION
Case Management Assessment  Initial Evaluation    Date/Time of Evaluation: 7/16/2024 3:38 PM  Assessment Completed by: Antonieta Almeida RN    If patient is discharged prior to next notation, then this note serves as note for discharge by case management.    Patient Name: Eze Rosa                   YOB: 1950  Diagnosis: Confusion [R41.0]  Septicemia (HCC) [A41.9]  Dyspnea and respiratory abnormalities [R06.00, R06.89]  Idiopathic pulmonary fibrosis (HCC) [J84.112]  Sepsis (HCC) [A41.9]                   Date / Time: 7/14/2024 10:59 PM    Patient Admission Status: Inpatient   Readmission Risk (Low < 19, Mod (19-27), High > 27): Readmission Risk Score: 12.4    Current PCP: Ross Masterson MD  PCP verified by CM? Yes    Chart Reviewed: Yes      History Provided by: Child/Family  Patient Orientation: Alert and Oriented    Patient Cognition: Alert    Hospitalization in the last 30 days (Readmission):  No    If yes, Readmission Assessment in  Navigator will be completed.    Advance Directives:      Code Status: Full Code   Patient's Primary Decision Maker is:      Primary Decision Maker: Chelo Mtz - Spouse - [phone]    Discharge Planning:    Patient lives with: Alone Type of Home: House  Primary Care Giver: Family  Patient Support Systems include: Spouse/Significant Other, Children   Current Financial resources: Medicare  Current community resources: None  Current services prior to admission: Oxygen Therapy            Current DME:              Type of Home Care services:  None    ADLS  Prior functional level: Assistance with the following:, Cooking, Housework, Shopping  Current functional level: Assistance with the following:, Bathing, Dressing, Toileting, Cooking, Housework, Shopping, Mobility    PT AM-PAC:   /24  OT AM-PAC:   /24    Family can provide assistance at DC: Yes  Would you like Case Management to discuss the discharge plan with any other family members/significant others, and if so, who?

## 2024-07-16 NOTE — CARE COORDINATION
Called patient's daughter, Rebeca (p [phone]), to complete initial Care Management Assessment.  No answer.  Left VM.  Awaiting return call.  Electronically signed by Antonieta Almeida RN on 7/16/2024 at 8:39 AM    Called patient's spouse, June, to complete initial Care Management Assessment.  June states patient will be discharging to his daughter's house.  June states Rebeca is at a doctor's appointment.  Voice mail left earlier for Rebeca to return call.  Awaiting return call.  Electronically signed by Antonieta Almeida RN on 7/16/2024 at 12:02 PM    Called patient's spouse, June, to learn if Rebeca is available.  She states she is on a call at this time.  Awaiting return call.  Electronically signed by Antonieta Almeida RN on 7/16/2024 at 3:03 PM

## 2024-07-17 LAB
ALBUMIN SERPL-MCNC: 2.6 G/DL (ref 3.5–5.2)
ALP SERPL-CCNC: 107 U/L (ref 40–130)
ALT SERPL-CCNC: 5 U/L (ref 5–41)
ANION GAP SERPL CALCULATED.3IONS-SCNC: 8 MMOL/L (ref 7–19)
AST SERPL-CCNC: 15 U/L (ref 5–40)
BACTERIA BLD CULT: ABNORMAL
BACTERIA BLD CULT: ABNORMAL
BACTERIA UR CULT: ABNORMAL
BACTERIA UR CULT: ABNORMAL
BILIRUB SERPL-MCNC: 0.3 MG/DL (ref 0.2–1.2)
BUN SERPL-MCNC: 6 MG/DL (ref 8–23)
CALCIUM SERPL-MCNC: 8 MG/DL (ref 8.8–10.2)
CHLORIDE SERPL-SCNC: 89 MMOL/L (ref 98–111)
CO2 SERPL-SCNC: 29 MMOL/L (ref 22–29)
CREAT SERPL-MCNC: 0.4 MG/DL (ref 0.5–1.2)
GLUCOSE SERPL-MCNC: 121 MG/DL (ref 74–109)
MAGNESIUM SERPL-MCNC: 1.5 MG/DL (ref 1.6–2.4)
ORGANISM: ABNORMAL
ORGANISM: ABNORMAL
POTASSIUM SERPL-SCNC: 3.5 MMOL/L (ref 3.5–5)
PROT SERPL-MCNC: 5.5 G/DL (ref 6.6–8.7)
SODIUM SERPL-SCNC: 126 MMOL/L (ref 136–145)

## 2024-07-17 PROCEDURE — 94640 AIRWAY INHALATION TREATMENT: CPT

## 2024-07-17 PROCEDURE — 36415 COLL VENOUS BLD VENIPUNCTURE: CPT

## 2024-07-17 PROCEDURE — 2700000000 HC OXYGEN THERAPY PER DAY

## 2024-07-17 PROCEDURE — 6360000002 HC RX W HCPCS: Performed by: STUDENT IN AN ORGANIZED HEALTH CARE EDUCATION/TRAINING PROGRAM

## 2024-07-17 PROCEDURE — 97161 PT EVAL LOW COMPLEX 20 MIN: CPT

## 2024-07-17 PROCEDURE — 97530 THERAPEUTIC ACTIVITIES: CPT

## 2024-07-17 PROCEDURE — 2580000003 HC RX 258: Performed by: STUDENT IN AN ORGANIZED HEALTH CARE EDUCATION/TRAINING PROGRAM

## 2024-07-17 PROCEDURE — 94760 N-INVAS EAR/PLS OXIMETRY 1: CPT

## 2024-07-17 PROCEDURE — 6370000000 HC RX 637 (ALT 250 FOR IP): Performed by: STUDENT IN AN ORGANIZED HEALTH CARE EDUCATION/TRAINING PROGRAM

## 2024-07-17 PROCEDURE — 83735 ASSAY OF MAGNESIUM: CPT

## 2024-07-17 PROCEDURE — 97535 SELF CARE MNGMENT TRAINING: CPT

## 2024-07-17 PROCEDURE — 80053 COMPREHEN METABOLIC PANEL: CPT

## 2024-07-17 PROCEDURE — 1200000000 HC SEMI PRIVATE

## 2024-07-17 RX ADMIN — IPRATROPIUM BROMIDE 0.5 MG: 0.5 SOLUTION RESPIRATORY (INHALATION) at 20:08

## 2024-07-17 RX ADMIN — BUDESONIDE 250 MCG: 0.25 SUSPENSION RESPIRATORY (INHALATION) at 20:08

## 2024-07-17 RX ADMIN — FINASTERIDE 5 MG: 5 TABLET, FILM COATED ORAL at 08:42

## 2024-07-17 RX ADMIN — ARFORMOTEROL TARTRATE 15 MCG: 15 SOLUTION RESPIRATORY (INHALATION) at 06:52

## 2024-07-17 RX ADMIN — ARFORMOTEROL TARTRATE 15 MCG: 15 SOLUTION RESPIRATORY (INHALATION) at 20:08

## 2024-07-17 RX ADMIN — CEFEPIME 2000 MG: 2 INJECTION, POWDER, FOR SOLUTION INTRAVENOUS at 03:11

## 2024-07-17 RX ADMIN — DOXYCYCLINE HYCLATE 100 MG: 100 CAPSULE ORAL at 08:43

## 2024-07-17 RX ADMIN — BUDESONIDE 250 MCG: 0.25 SUSPENSION RESPIRATORY (INHALATION) at 06:52

## 2024-07-17 RX ADMIN — CEFEPIME 2000 MG: 2 INJECTION, POWDER, FOR SOLUTION INTRAVENOUS at 11:36

## 2024-07-17 RX ADMIN — Medication 100 MG: at 08:42

## 2024-07-17 RX ADMIN — CEFEPIME 2000 MG: 2 INJECTION, POWDER, FOR SOLUTION INTRAVENOUS at 18:17

## 2024-07-17 RX ADMIN — ENOXAPARIN SODIUM 40 MG: 100 INJECTION SUBCUTANEOUS at 08:43

## 2024-07-17 RX ADMIN — SODIUM CHLORIDE, PRESERVATIVE FREE 10 ML: 5 INJECTION INTRAVENOUS at 08:44

## 2024-07-17 RX ADMIN — FOLIC ACID 1 MG: 1 TABLET ORAL at 08:43

## 2024-07-17 RX ADMIN — IPRATROPIUM BROMIDE 0.5 MG: 0.5 SOLUTION RESPIRATORY (INHALATION) at 10:56

## 2024-07-17 RX ADMIN — METOPROLOL SUCCINATE 25 MG: 25 TABLET, EXTENDED RELEASE ORAL at 08:43

## 2024-07-17 RX ADMIN — SODIUM CHLORIDE, PRESERVATIVE FREE 10 ML: 5 INJECTION INTRAVENOUS at 22:17

## 2024-07-17 RX ADMIN — PHENAZOPYRIDINE 200 MG: 100 TABLET ORAL at 08:42

## 2024-07-17 RX ADMIN — TAMSULOSIN HYDROCHLORIDE 0.8 MG: 0.4 CAPSULE ORAL at 08:42

## 2024-07-17 RX ADMIN — IPRATROPIUM BROMIDE 0.5 MG: 0.5 SOLUTION RESPIRATORY (INHALATION) at 14:35

## 2024-07-17 RX ADMIN — IPRATROPIUM BROMIDE 0.5 MG: 0.5 SOLUTION RESPIRATORY (INHALATION) at 06:52

## 2024-07-17 ASSESSMENT — PAIN DESCRIPTION - DESCRIPTORS: DESCRIPTORS: ACHING;DISCOMFORT;SORE

## 2024-07-17 ASSESSMENT — PAIN SCALES - GENERAL
PAINLEVEL_OUTOF10: 0
PAINLEVEL_OUTOF10: 5
PAINLEVEL_OUTOF10: 0

## 2024-07-17 ASSESSMENT — PAIN - FUNCTIONAL ASSESSMENT: PAIN_FUNCTIONAL_ASSESSMENT: PREVENTS OR INTERFERES SOME ACTIVE ACTIVITIES AND ADLS

## 2024-07-17 ASSESSMENT — PAIN DESCRIPTION - ONSET: ONSET: ON-GOING

## 2024-07-17 ASSESSMENT — PAIN DESCRIPTION - LOCATION: LOCATION: GENERALIZED

## 2024-07-17 ASSESSMENT — PAIN DESCRIPTION - FREQUENCY: FREQUENCY: INTERMITTENT

## 2024-07-17 NOTE — PROGRESS NOTES
[facility]      Patient:  Eze Rosa  YOB: 1950  Date of Service: 7/17/2024  MRN: [id number]   Acct: [id number]   Primary Care Physician: Ross Masterson MD  Advance Directive: Full Code  Admit Date: 7/14/2024       Hospital Day: 2  Portions of this note have been copied forward, however, changed to reflect the most current clinical status of this patient.  CHIEF COMPLAINT   Shortness of breath    SUBJECTIVE:  Resting in the bed, no complaints at this time.  Morataya to BSD, clear yellow urine noted.     CUMULATIVE HOSPITAL COURSE:  Patient is a 73-year-old male with a past medical history of pulmonary fibrosis, emphysema, chronic hypoxic respiratory failure on supplemental O2 at baseline, as well as BPH with urinary retention, presented from to [facility] ED with confusion(reportedly has some confusion more so at night at baseline), increased dyspnea from baseline with associated cough, noted with shallow breathing with respiratory rate up to 30s, as well as difficulty with urinating and able to empty his bladder.  Patient noted sinus tachycardia with heart rates as high as 130s to 140s, low-grade fever, leukocytosis WBCs over 14,000, lactic acidosis noted.  Blood cultures obtained on admission, respiratory PCR obtained, noted negative.  Empirically treated for sepsis with crystalloid resuscitation in the ED and empiric IV antibiotics. Chest x-ray obtained with final results pending but appears to show extensive chronic lung disease with fibrosis and emphysematous lung changes, difficult to exclude pneumonia. Patient noted to have urinary retention requiring Morataya placement with turbid urine output with active urine sediment on UA consistent with UTI.     Azithromycin completed. Cefepime 2,000mg Q8hrs for 14 doses, initiated 7/15.  Doxycycline 100mg q12hrs for 5 days.  Urine culture pending.  Blood culture 7/14, Enterobacteriaceae and Klebsiella oxytoca, other positive for Klebsiella oxytoca,  will

## 2024-07-17 NOTE — PLAN OF CARE
Problem: Safety - Adult  Goal: Free from fall injury  7/17/2024 1156 by Lorena Houston, RN  Outcome: Progressing  7/16/2024 2349 by Sho Flores LPN  Outcome: Progressing

## 2024-07-17 NOTE — CARE COORDINATION
[facility]  [facility]  [phone] P  [phone] F  Electronically signed by Antonieta Almeida RN on 7/17/2024 at 8:29 AM    Per [facility] does not have any beds available.  [facility]  P [phone]  F [phone]  Electronically signed by Antonieta Almeida RN on 7/17/2024 at 9:37 AM    Per [facility] can offer a bed once patient is seen by PT.  [facility]  P [phone]  F [phone]  Fax for Admissions [phone]  Electronically signed by Antonieta Almeida RN on 7/17/2024 at 10:57 AM

## 2024-07-17 NOTE — PROGRESS NOTES
Occupational Therapy     07/17/24 1500   Subjective   Subjective Pt in bed upon arrival for therapy. Pt agreeable to participate. Pt requires some encouragement to participate.   Pain Assessment   Pain Assessment 0-10   Pain Level 5   Pain Location Generalized   Pain Descriptors Aching;Discomfort;Sore   Functional Pain Assessment Prevents or interferes some active activities and ADLs   Pain Frequency Intermittent   Pain Onset On-going   Non-Pharmaceutical Pain Intervention(s) Ambulation/Increased Activity;Repositioned;Rest   Response to Pain Intervention Patient satisfied   Cognition   Overall Cognitive Status Exceptions   Arousal/Alertness Appropriate responses to stimuli   Following Commands Follows one step commands with increased time;Follows one step commands with repetition   Attention Span Difficulty attending to directions;Difficulty dividing attention   Memory Decreased recall of recent events;Decreased short term memory   Safety Judgement Impaired   Problem Solving Impaired   Insights Impaired   Initiation Impaired   Sequencing Impaired   Orientation   Overall Orientation Status X   Orientation Level Disoriented to situation;Disoriented to place;Disoriented to time;Oriented to person   Bed Mobility Training   Bed Mobility Training Yes   Overall Level of Assistance Stand-by assistance   Interventions Verbal cues   Supine to Sit Stand-by assistance   Sit to Supine Stand-by assistance   Scooting Stand-by assistance   Transfer Training   Transfer Training Yes   Overall Level of Assistance Minimum assistance;Contact-guard assistance   Interventions Verbal cues;Tactile cues;Manual cues   Sit to Stand Minimum assistance;Contact-guard assistance   Stand to Sit Minimum assistance;Contact-guard assistance   Balance   Sitting Intact   Standing With support  (HH assist but could benefit from a RW)   ADL   Feeding Supervision   Grooming Stand by assistance;Verbal cueing   UE Bathing Minimal assistance   LE Bathing

## 2024-07-17 NOTE — PROGRESS NOTES
DIS Nurse Note  SUBJECTIVE: Patient assessed secondary to elevated Deterioration Index Score.      Deterioration Index Score:  Predictive Model Details          50 (Warning)  Factor Value    Calculated 7/17/2024 12:06 23% Age 73 years old    Deterioration Index Model 22% Supplemental oxygen Nasal cannula     18% Neurological exam X     13% Corrine coma scale 14     10% Sodium abnormal (126 mmol/L)     4% Blood pH abnormal (7.530)     3% Pulse 97     2% Hematocrit abnormal (37.6 %)     2% BUN abnormal (6 mg/dL)     1% Potassium 3.5 mmol/L     1% Systolic 107     0% WBC count 8.1 K/uL     0% Pulse oximetry 98 %     0% Temperature 97.5 °F (36.4 °C)     0% Respiratory rate 16        Vital Signs:  Vitals:    07/17/24 0513 07/17/24 0652 07/17/24 0800 07/17/24 0843   BP: 104/67  97/64 107/68   Pulse: 94  92 97   Resp:   16    Temp: 97.2 °F (36.2 °C)  97.5 °F (36.4 °C)    TempSrc:   Temporal    SpO2: 96% 95% 98%    Weight:       Height:            Provider Notified: Reviewed patient status  & DIS score with Provider Jerrell Hills NP    ASSESSMENT:   No transfer indicated, patient at his baseline. Lab results reviewed by provider    Plan of Care: Continue to monitor patient for any changes. Hourly rounding and AM labs    Electronically signed by Lorena Houston RN

## 2024-07-17 NOTE — PROGRESS NOTES
Physical Therapy  Facility/Department: [facility] ONCOLOGY UNIT  Physical Therapy Initial Assessment    Name: Eze Rosa  : 1950  MRN: [id number]  Date of Service: 2024    Discharge Recommendations:  Continue to assess pending progress, 24 hour supervision or assist, Patient would benefit from continued therapy after discharge (Per chart, pt planning to dc to SNF)          Patient Diagnosis(es): The primary encounter diagnosis was Dyspnea and respiratory abnormalities. Diagnoses of Idiopathic pulmonary fibrosis (HCC), Septicemia (HCC), and Confusion were also pertinent to this visit.  Past Medical History:  has a past medical history of GERD (gastroesophageal reflux disease), Palliative care patient, Pneumonia, and Psychiatric problem.  Past Surgical History:  has a past surgical history that includes Abdomen surgery and Colonoscopy.    Assessment   Body Structures, Functions, Activity Limitations Requiring Skilled Therapeutic Intervention: Decreased functional mobility ;Decreased balance;Decreased endurance;Decreased cognition;Increased pain  Assessment: Pt would benefit from continued skilled PT in this setting to address decline in functional mobility.  Therapy Prognosis: Good  Decision Making: Low Complexity  Requires PT Follow-Up: Yes  Activity Tolerance  Activity Tolerance: Patient tolerated evaluation without incident;Patient limited by fatigue;Patient limited by pain     Plan   Physical Therapy Plan  General Plan: 5-7 times per week  Therapy Duration: 2 Weeks  Current Treatment Recommendations: Strengthening, Balance training, Functional mobility training, Gait training, Positioning, Pain management, Patient/Caregiver education & training, Safety education & training  Additional Comments: SNF  Safety Devices  Type of Devices: Gait belt, Call light within reach, Bed alarm in place, Left in bed     Restrictions  Restrictions/Precautions  Restrictions/Precautions: Fall Risk     Subjective   Pain: Pt c/o    Observation/Palpation  Observation: 2L O2 (wears baseline); pt coughing continually  Gross Assessment  AROM: Within functional limits  Strength: Generally decreased, functional                    Bed mobility  Supine to Sit: Supervision  Sit to Supine: Supervision  Transfers  Sit to Stand: Contact guard assistance  Stand to Sit: Contact guard assistance  Comment: Pt stood for 2 mins with CGA w assist x1 while OT assisted with clean-up of BM.  Ambulation  Surface: Level tile  Device: No Device  Assistance: Contact guard assistance;Minimal assistance  Quality of Gait: Needed many cues for side stepping. Pt became fatigued and needed to sit down.  Distance: 2'  Comments: Pt took 4 side steps towards HOB. Pt denied walking in room or sitting in chair.     Balance  Sitting - Static: Good;-  Standing - Static: Fair;+  Standing - Dynamic: Fair                Goals  Short Term Goals  Time Frame for Short Term Goals: 14 days  Short Term Goal 1: AMB 50' w AD AS INDICATED, CGA/MIN A       Education  Patient Education  Education Given To: Patient  Education Provided: Role of Therapy;Plan of Care  Education Method: Verbal  Barriers to Learning: Cognition  Education Outcome: Continued education needed      Therapy Time   Individual Concurrent Group Co-treatment   Time In           Time Out           Minutes                   EMILY Avitia  Electronically signed by EMILY Avitia on 7/17/2024 at 2:37 PM

## 2024-07-18 LAB
ALBUMIN SERPL-MCNC: 2.5 G/DL (ref 3.5–5.2)
ALP SERPL-CCNC: 110 U/L (ref 40–130)
ALT SERPL-CCNC: 7 U/L (ref 5–41)
ANION GAP SERPL CALCULATED.3IONS-SCNC: 9 MMOL/L (ref 7–19)
AST SERPL-CCNC: 14 U/L (ref 5–40)
BILIRUB SERPL-MCNC: 0.4 MG/DL (ref 0.2–1.2)
BUN SERPL-MCNC: 4 MG/DL (ref 8–23)
CALCIUM SERPL-MCNC: 8 MG/DL (ref 8.8–10.2)
CHLORIDE SERPL-SCNC: 89 MMOL/L (ref 98–111)
CO2 SERPL-SCNC: 30 MMOL/L (ref 22–29)
CREAT SERPL-MCNC: 0.3 MG/DL (ref 0.5–1.2)
GLUCOSE SERPL-MCNC: 93 MG/DL (ref 74–109)
POTASSIUM SERPL-SCNC: 3.6 MMOL/L (ref 3.5–5)
PROT SERPL-MCNC: 5.5 G/DL (ref 6.6–8.7)
SODIUM SERPL-SCNC: 128 MMOL/L (ref 136–145)

## 2024-07-18 PROCEDURE — 97530 THERAPEUTIC ACTIVITIES: CPT

## 2024-07-18 PROCEDURE — 94760 N-INVAS EAR/PLS OXIMETRY 1: CPT

## 2024-07-18 PROCEDURE — 6360000002 HC RX W HCPCS: Performed by: STUDENT IN AN ORGANIZED HEALTH CARE EDUCATION/TRAINING PROGRAM

## 2024-07-18 PROCEDURE — 80053 COMPREHEN METABOLIC PANEL: CPT

## 2024-07-18 PROCEDURE — 94640 AIRWAY INHALATION TREATMENT: CPT

## 2024-07-18 PROCEDURE — 6370000000 HC RX 637 (ALT 250 FOR IP): Performed by: STUDENT IN AN ORGANIZED HEALTH CARE EDUCATION/TRAINING PROGRAM

## 2024-07-18 PROCEDURE — 2700000000 HC OXYGEN THERAPY PER DAY

## 2024-07-18 PROCEDURE — 87086 URINE CULTURE/COLONY COUNT: CPT

## 2024-07-18 PROCEDURE — 2580000003 HC RX 258: Performed by: STUDENT IN AN ORGANIZED HEALTH CARE EDUCATION/TRAINING PROGRAM

## 2024-07-18 PROCEDURE — 97535 SELF CARE MNGMENT TRAINING: CPT

## 2024-07-18 PROCEDURE — 36415 COLL VENOUS BLD VENIPUNCTURE: CPT

## 2024-07-18 PROCEDURE — 2580000003 HC RX 258

## 2024-07-18 PROCEDURE — 1200000000 HC SEMI PRIVATE

## 2024-07-18 RX ORDER — SODIUM CHLORIDE 9 MG/ML
INJECTION, SOLUTION INTRAVENOUS CONTINUOUS
Status: DISCONTINUED | OUTPATIENT
Start: 2024-07-18 | End: 2024-07-22 | Stop reason: HOSPADM

## 2024-07-18 RX ADMIN — FINASTERIDE 5 MG: 5 TABLET, FILM COATED ORAL at 09:26

## 2024-07-18 RX ADMIN — TAMSULOSIN HYDROCHLORIDE 0.8 MG: 0.4 CAPSULE ORAL at 09:26

## 2024-07-18 RX ADMIN — SODIUM CHLORIDE, PRESERVATIVE FREE 10 ML: 5 INJECTION INTRAVENOUS at 12:11

## 2024-07-18 RX ADMIN — PHENAZOPYRIDINE 200 MG: 100 TABLET ORAL at 17:07

## 2024-07-18 RX ADMIN — METOPROLOL SUCCINATE 25 MG: 25 TABLET, EXTENDED RELEASE ORAL at 09:26

## 2024-07-18 RX ADMIN — BUDESONIDE 250 MCG: 0.25 SUSPENSION RESPIRATORY (INHALATION) at 06:48

## 2024-07-18 RX ADMIN — ENOXAPARIN SODIUM 40 MG: 100 INJECTION SUBCUTANEOUS at 09:25

## 2024-07-18 RX ADMIN — SODIUM CHLORIDE: 9 INJECTION, SOLUTION INTRAVENOUS at 12:06

## 2024-07-18 RX ADMIN — ARFORMOTEROL TARTRATE 15 MCG: 15 SOLUTION RESPIRATORY (INHALATION) at 18:19

## 2024-07-18 RX ADMIN — ARFORMOTEROL TARTRATE 15 MCG: 15 SOLUTION RESPIRATORY (INHALATION) at 06:48

## 2024-07-18 RX ADMIN — PHENAZOPYRIDINE 200 MG: 100 TABLET ORAL at 11:55

## 2024-07-18 RX ADMIN — CEFEPIME 2000 MG: 2 INJECTION, POWDER, FOR SOLUTION INTRAVENOUS at 03:10

## 2024-07-18 RX ADMIN — IPRATROPIUM BROMIDE 0.5 MG: 0.5 SOLUTION RESPIRATORY (INHALATION) at 06:48

## 2024-07-18 RX ADMIN — CEFEPIME 2000 MG: 2 INJECTION, POWDER, FOR SOLUTION INTRAVENOUS at 18:19

## 2024-07-18 RX ADMIN — IPRATROPIUM BROMIDE 0.5 MG: 0.5 SOLUTION RESPIRATORY (INHALATION) at 18:19

## 2024-07-18 RX ADMIN — PHENAZOPYRIDINE 200 MG: 100 TABLET ORAL at 09:26

## 2024-07-18 RX ADMIN — BUDESONIDE 250 MCG: 0.25 SUSPENSION RESPIRATORY (INHALATION) at 18:19

## 2024-07-18 RX ADMIN — IPRATROPIUM BROMIDE 0.5 MG: 0.5 SOLUTION RESPIRATORY (INHALATION) at 14:44

## 2024-07-18 RX ADMIN — CEFEPIME 2000 MG: 2 INJECTION, POWDER, FOR SOLUTION INTRAVENOUS at 12:11

## 2024-07-18 RX ADMIN — FOLIC ACID 1 MG: 1 TABLET ORAL at 09:25

## 2024-07-18 RX ADMIN — Medication 100 MG: at 09:25

## 2024-07-18 RX ADMIN — IPRATROPIUM BROMIDE 0.5 MG: 0.5 SOLUTION RESPIRATORY (INHALATION) at 10:43

## 2024-07-18 NOTE — CARE COORDINATION
07/18/24 0822   IMM Letter   IMM Letter given to Patient/Family/Significant other/Guardian/POA/by: EMILY Ibanez   IMM Letter date given: 07/18/24   IMM Letter time given: 0815     Second IMM given to patient and explained with patient verbalizing understanding.  All questions and concerns addressed   Patient declined waiting 4 hr period prior to discharge.   Signed letter placed in pt soft chart   Electronically signed by EMILY Ibanez on 7/18/2024 at 8:23 AM

## 2024-07-18 NOTE — PROGRESS NOTES
Supportive visit:    Pt resting in bed.  He has oxygen on via n/c @ 2L. Morataya to bedside.  He talks about missing his dogs.  Asks me if his wife is at home.  Pt's wife is being cared for by her daughter, Rebeca.  Noted plan from Care Management, seeking nursing facility placement at discharge.    Daughter is POA.   Pt was pleasant and voiced no complaints at this time.  Denies pain.    Will continue to follow POC.    Electronically signed by Tiesha Prescott RN on 7/18/2024 at 2:20 PM

## 2024-07-18 NOTE — PROGRESS NOTES
Physical Therapy     07/18/24 1446   Restrictions/Precautions   Restrictions/Precautions Fall Risk   General   Diagnosis Sepsis   Subjective   Subjective pt willing to work with therapy on second attempt with encouragement   Vitals   O2 Device Nasal cannula   Subjective   Pain Pt c/o pain \"all over\" and experienced pain on bottom with clean up. Pt found to be incontinent of stool upon arrival. Assisted in clean up and linen change.   Bed mobility   Bridging Stand by assistance   Rolling to Left Contact guard assistance;Stand by assistance   Rolling to Right Stand by assistance;Contact guard assistance   Supine to Sit Minimal assistance   Sit to Supine Minimal assistance   Bed Mobility Comments multiple rolling for clean up of bowel movement with pt left positioned into partial side lying with pillows due to skin in sacral area near breakdown.   Transfers   Sit to Stand Minimal Assistance   Stand to Sit Minimal Assistance   Comment stood EOB for one minute with RW. limited steps to HOB with RW due to fatigue and pain CGA   Ambulation   Surface Level tile   Device Rolling Walker   Assistance Contact guard assistance;2 Person assistance   Distance 2'   Comments Pt took 4 side steps towards HOB.   Short Term Goals   Time Frame for Short Term Goals 14 days   Short Term Goal 1 AMB 50' w AD AS INDICATED, CGA/MIN A   Activity Tolerance   Activity Tolerance Patient limited by pain   Assessment   Assessment pt limited by pain and fatigue with bilat knees in strong flexion during short distance transfer towards HOB with RW CGA/Jovon assist x 2 for safety. nursing notified of pt skin condition post clean up in bed with nursing stating that pt will be getting specialty bed.   PT Plan of Care   Thursday X   Safety Devices   Type of Devices Gait belt;Call light within reach;Bed alarm in place;Left in bed;Nurse notified     .enzo

## 2024-07-18 NOTE — PROGRESS NOTES
Occupational Therapy  Facility/Department: [facility] ONCOLOGY UNIT  Occupational TherapyTreatment    Name: Eze Rosa  : 1950  MRN: [id number]  Date of Service: 2024    Discharge Recommendations:  Patient would benefit from continued therapy after discharge, 24 hour supervision or assist          Patient Diagnosis(es): The primary encounter diagnosis was Dyspnea and respiratory abnormalities. Diagnoses of Idiopathic pulmonary fibrosis (HCC), Septicemia (HCC), and Confusion were also pertinent to this visit.  Past Medical History:  has a past medical history of GERD (gastroesophageal reflux disease), Palliative care patient, Pneumonia, and Psychiatric problem.  Past Surgical History:  has a past surgical history that includes Abdomen surgery and Colonoscopy.    Treatment Diagnosis: Sepsis due to UTI      Assessment   Assessment: Pt. with decreased endurance in both sitting and standing.           Plan   Occupational Therapy Plan  Times Per Week: 3-5x/week  Times Per Day: Once a day     Restrictions  Restrictions/Precautions  Restrictions/Precautions: Fall Risk    Subjective   General  Chart Reviewed: Yes  Patient assessed for rehabilitation services?: Yes  Additional Pertinent Hx: Pulmonary Fibrosis, ETOH abuse  Family / Caregiver Present: Yes (Sitter)  Diagnosis: Sepsis due to UTI  General Comment  Comments: Pt. willing to sit up with therapy     Social/Functional History  Social/Functional History  Lives With: Alone  Type of Home: House  Home Layout: Able to Live on Main level with bedroom/bathroom, Multi-level  Home Access: Stairs to enter without rails  Entrance Stairs - Number of Steps: 2  Bathroom Shower/Tub: Walk-in shower, Shower chair with back  Bathroom Equipment: Shower chair  Bathroom Accessibility: Accessible  Home Equipment: Wheelchair - Manual  ADL Assistance: Needs assistance  Ambulation Assistance: Needs assistance  Transfer Assistance: Needs assistance  Active : No  Patient's   Info: daughter  Mode of Transportation: Car  Occupation: Retired  Additional Comments: Pt states he does not use any device to ambulate. Other PLOF uncertain.       Objective   Temp: 97 °F (36.1 °C)  Pulse: 90  Heart Rate Source: Monitor  Respirations: 20  SpO2: 98 %  O2 Device: Nasal cannula  BP: 112/63  MAP (Calculated): 79  BP Location: Left upper arm  BP Method: Automatic                                  Bed mobility  Rolling to Left: Minimal assistance  Rolling to Right: Minimal assistance  Supine to Sit: Minimal assistance  Bed Mobility Comments: Had BM he was unaware of and rolled in bed to allow therapist to perform toilet hygiene.  Transfers  Stand Step Transfers: Contact guard assistance (Once standing , CGA of 2 to sidestep to HOB)  Sit to stand: Minimal assistance                                                 G-Code     OutComes Score                                                  AM-PAC - ADL       Tinneti Score       Goals  Short Term Goals  Time Frame for Short Term Goals: 1 week  Short Term Goal 1: Yajaira with tfer to BSC or recliner  Short Term Goal 2: Tolerate standing for 20 seconds to assist with self care  Short Term Goal 3: Tolerate sitting midline on EOB 2 minutes with supervision  Short Term Goal 4: Supervision with washing face with washcloth  Long Term Goals  Long Term Goal 1: Return to PLOF       Therapy Time   Individual Concurrent Group Co-treatment   Time In           Time Out           Minutes                   Robi Castaneda, OT

## 2024-07-18 NOTE — PROGRESS NOTES
[facility]      Patient:  Eze Rosa  YOB: 1950  Date of Service: 7/18/2024  MRN: [id number]   Acct: [id number]   Primary Care Physician: Ross Masterson MD  Advance Directive: Full Code  Admit Date: 7/14/2024       Hospital Day: 3  Portions of this note have been copied forward, however, changed to reflect the most current clinical status of this patient.  CHIEF COMPLAINT   Shortness of breath    SUBJECTIVE:  Resting in the bed, no complaints at this time.  Morataya to BSD.    CUMULATIVE HOSPITAL COURSE:  Patient is a 73-year-old male with a past medical history of pulmonary fibrosis, emphysema, chronic hypoxic respiratory failure on supplemental O2 at baseline, as well as BPH with urinary retention, presented from to [facility] ED with confusion(reportedly has some confusion more so at night at baseline), increased dyspnea from baseline with associated cough, noted with shallow breathing with respiratory rate up to 30s, as well as difficulty with urinating and able to empty his bladder.  Patient noted sinus tachycardia with heart rates as high as 130s to 140s, low-grade fever, leukocytosis WBCs over 14,000, lactic acidosis noted.  Blood cultures obtained on admission, respiratory PCR obtained, noted negative.  Empirically treated for sepsis with crystalloid resuscitation in the ED and empiric IV antibiotics. Chest x-ray obtained with final results pending but appears to show extensive chronic lung disease with fibrosis and emphysematous lung changes, difficult to exclude pneumonia. Patient noted to have urinary retention requiring Morataya placement with turbid urine output with active urine sediment on UA consistent with UTI.     Azithromycin completed. Cefepime 2,000mg Q8hrs for 14 doses, initiated 7/15.  Doxycycline 100mg q12hrs for 5 days.  Urine culture pending.  Blood culture 7/14, Enterobacteriaceae and Klebsiella oxytoca, other positive for Klebsiella oxytoca,  will repeat 7/15.  Sodium  stable, 131.  Potassium 3.3, replaced per protocol.  Magnesium 1.4, replaced per protocol.  WBC improved, 12.5.     Repeat blood cultures 7/15, pending. Sodium stable, 130. Replace potassium, 3.4, per protocol.  Magnesium 1.9. Urine positive, for Klebsiella oxytoca, continue current Cefepime per order.      Repeat blood cultures 7/15, NGTD. Sodium 126.  Potassium, 3.4, replace per protocol.  Will continue Cefepime, discontinue Doxycycline.     Will continue Cefepime till 7/20/2024. Limited intake, NS @ 75cc/hr.  Case management assisting with discharge, has placement at [facility] when antibiotic completed.        Review of Systems:   Review of Systems   Constitutional:  Positive for activity change. Negative for appetite change and fatigue.   Respiratory:  Positive for shortness of breath.    Cardiovascular:  Negative for leg swelling.   Gastrointestinal:  Negative for abdominal distention, abdominal pain, constipation, diarrhea, nausea and vomiting.   Genitourinary:  Negative for difficulty urinating.        Morataya in place   Neurological:  Negative for weakness.   Psychiatric/Behavioral:  Negative for agitation. The patient is not nervous/anxious.        Objective:   VITALS:  /63   Pulse 90   Temp 97 °F (36.1 °C) (Temporal)   Resp 20   Ht 1.93 m (6' 4\")   Wt 69.4 kg (153 lb)   SpO2 98%   BMI 18.62 kg/m²   24HR INTAKE/OUTPUT:    Intake/Output Summary (Last 24 hours) at 7/18/2024 0859  Last data filed at 7/18/2024 0315  Gross per 24 hour   Intake --   Output 1650 ml   Net -1650 ml       Physical Exam  Vitals and nursing note reviewed.   Constitutional:       Appearance: Normal appearance. He is not ill-appearing.   Cardiovascular:      Rate and Rhythm: Normal rate and regular rhythm.      Pulses: Normal pulses.      Heart sounds: Normal heart sounds.   Pulmonary:      Effort: Pulmonary effort is normal.      Breath sounds: Decreased air movement present. Decreased breath sounds present.   Abdominal:

## 2024-07-19 LAB
ALBUMIN SERPL-MCNC: 2.6 G/DL (ref 3.5–5.2)
ALP SERPL-CCNC: 105 U/L (ref 40–130)
ALT SERPL-CCNC: 7 U/L (ref 5–41)
ANION GAP SERPL CALCULATED.3IONS-SCNC: 11 MMOL/L (ref 7–19)
AST SERPL-CCNC: 13 U/L (ref 5–40)
BASOPHILS # BLD: 0 K/UL (ref 0–0.2)
BASOPHILS NFR BLD: 0.5 % (ref 0–1)
BILIRUB SERPL-MCNC: 0.4 MG/DL (ref 0.2–1.2)
BUN SERPL-MCNC: 4 MG/DL (ref 8–23)
CALCIUM SERPL-MCNC: 8 MG/DL (ref 8.8–10.2)
CHLORIDE SERPL-SCNC: 93 MMOL/L (ref 98–111)
CO2 SERPL-SCNC: 27 MMOL/L (ref 22–29)
CREAT SERPL-MCNC: 0.5 MG/DL (ref 0.5–1.2)
EOSINOPHIL # BLD: 0.1 K/UL (ref 0–0.6)
EOSINOPHIL NFR BLD: 1.2 % (ref 0–5)
ERYTHROCYTE [DISTWIDTH] IN BLOOD BY AUTOMATED COUNT: 12.2 % (ref 11.5–14.5)
GLUCOSE SERPL-MCNC: 113 MG/DL (ref 74–109)
HCT VFR BLD AUTO: 36.4 % (ref 42–52)
HGB BLD-MCNC: 12.2 G/DL (ref 14–18)
IMM GRANULOCYTES # BLD: 0 K/UL
LYMPHOCYTES # BLD: 0.6 K/UL (ref 1.1–4.5)
LYMPHOCYTES NFR BLD: 7 % (ref 20–40)
MAGNESIUM SERPL-MCNC: 1.5 MG/DL (ref 1.6–2.4)
MCH RBC QN AUTO: 30.7 PG (ref 27–31)
MCHC RBC AUTO-ENTMCNC: 33.5 G/DL (ref 33–37)
MCV RBC AUTO: 91.7 FL (ref 80–94)
MONOCYTES # BLD: 1.1 K/UL (ref 0–0.9)
MONOCYTES NFR BLD: 12.9 % (ref 0–10)
NEUTROPHILS # BLD: 6.3 K/UL (ref 1.5–7.5)
NEUTS SEG NFR BLD: 77.9 % (ref 50–65)
PLATELET # BLD AUTO: 221 K/UL (ref 130–400)
PMV BLD AUTO: 9.9 FL (ref 9.4–12.4)
POTASSIUM SERPL-SCNC: 3.5 MMOL/L (ref 3.5–5)
PROT SERPL-MCNC: 4.9 G/DL (ref 6.6–8.7)
RBC # BLD AUTO: 3.97 M/UL (ref 4.7–6.1)
SODIUM SERPL-SCNC: 131 MMOL/L (ref 136–145)
WBC # BLD AUTO: 8.1 K/UL (ref 4.8–10.8)

## 2024-07-19 PROCEDURE — 2580000003 HC RX 258: Performed by: STUDENT IN AN ORGANIZED HEALTH CARE EDUCATION/TRAINING PROGRAM

## 2024-07-19 PROCEDURE — 6360000002 HC RX W HCPCS

## 2024-07-19 PROCEDURE — 80053 COMPREHEN METABOLIC PANEL: CPT

## 2024-07-19 PROCEDURE — 6370000000 HC RX 637 (ALT 250 FOR IP): Performed by: STUDENT IN AN ORGANIZED HEALTH CARE EDUCATION/TRAINING PROGRAM

## 2024-07-19 PROCEDURE — 83735 ASSAY OF MAGNESIUM: CPT

## 2024-07-19 PROCEDURE — 2700000000 HC OXYGEN THERAPY PER DAY

## 2024-07-19 PROCEDURE — 2580000003 HC RX 258

## 2024-07-19 PROCEDURE — 6360000002 HC RX W HCPCS: Performed by: STUDENT IN AN ORGANIZED HEALTH CARE EDUCATION/TRAINING PROGRAM

## 2024-07-19 PROCEDURE — 99222 1ST HOSP IP/OBS MODERATE 55: CPT | Performed by: UROLOGY

## 2024-07-19 PROCEDURE — 0T9B70Z DRAINAGE OF BLADDER WITH DRAINAGE DEVICE, VIA NATURAL OR ARTIFICIAL OPENING: ICD-10-PCS | Performed by: UROLOGY

## 2024-07-19 PROCEDURE — 6370000000 HC RX 637 (ALT 250 FOR IP): Performed by: INTERNAL MEDICINE

## 2024-07-19 PROCEDURE — 1200000000 HC SEMI PRIVATE

## 2024-07-19 PROCEDURE — 94640 AIRWAY INHALATION TREATMENT: CPT

## 2024-07-19 PROCEDURE — 51703 INSERT BLADDER CATH COMPLEX: CPT | Performed by: UROLOGY

## 2024-07-19 PROCEDURE — 94760 N-INVAS EAR/PLS OXIMETRY 1: CPT

## 2024-07-19 PROCEDURE — 85025 COMPLETE CBC W/AUTO DIFF WBC: CPT

## 2024-07-19 PROCEDURE — 36415 COLL VENOUS BLD VENIPUNCTURE: CPT

## 2024-07-19 RX ORDER — CODEINE PHOSPHATE AND GUAIFENESIN 10; 100 MG/5ML; MG/5ML
5 SOLUTION ORAL EVERY 6 HOURS PRN
Status: DISCONTINUED | OUTPATIENT
Start: 2024-07-19 | End: 2024-07-22 | Stop reason: HOSPADM

## 2024-07-19 RX ADMIN — ENOXAPARIN SODIUM 40 MG: 100 INJECTION SUBCUTANEOUS at 10:53

## 2024-07-19 RX ADMIN — IPRATROPIUM BROMIDE 0.5 MG: 0.5 SOLUTION RESPIRATORY (INHALATION) at 18:44

## 2024-07-19 RX ADMIN — PHENAZOPYRIDINE 200 MG: 100 TABLET ORAL at 10:51

## 2024-07-19 RX ADMIN — BUDESONIDE 250 MCG: 0.25 SUSPENSION RESPIRATORY (INHALATION) at 06:17

## 2024-07-19 RX ADMIN — IPRATROPIUM BROMIDE 0.5 MG: 0.5 SOLUTION RESPIRATORY (INHALATION) at 10:46

## 2024-07-19 RX ADMIN — SODIUM CHLORIDE: 9 INJECTION, SOLUTION INTRAVENOUS at 00:57

## 2024-07-19 RX ADMIN — IPRATROPIUM BROMIDE 0.5 MG: 0.5 SOLUTION RESPIRATORY (INHALATION) at 06:17

## 2024-07-19 RX ADMIN — ARFORMOTEROL TARTRATE 15 MCG: 15 SOLUTION RESPIRATORY (INHALATION) at 06:17

## 2024-07-19 RX ADMIN — Medication 100 MG: at 10:51

## 2024-07-19 RX ADMIN — PHENAZOPYRIDINE 200 MG: 100 TABLET ORAL at 18:42

## 2024-07-19 RX ADMIN — CEFEPIME 2000 MG: 2 INJECTION, POWDER, FOR SOLUTION INTRAVENOUS at 18:44

## 2024-07-19 RX ADMIN — SODIUM CHLORIDE, PRESERVATIVE FREE 10 ML: 5 INJECTION INTRAVENOUS at 23:14

## 2024-07-19 RX ADMIN — FINASTERIDE 5 MG: 5 TABLET, FILM COATED ORAL at 10:51

## 2024-07-19 RX ADMIN — TAMSULOSIN HYDROCHLORIDE 0.8 MG: 0.4 CAPSULE ORAL at 10:51

## 2024-07-19 RX ADMIN — BUDESONIDE 250 MCG: 0.25 SUSPENSION RESPIRATORY (INHALATION) at 18:44

## 2024-07-19 RX ADMIN — METOPROLOL SUCCINATE 25 MG: 25 TABLET, EXTENDED RELEASE ORAL at 10:51

## 2024-07-19 RX ADMIN — GUAIFENESIN AND CODEINE PHOSPHATE 5 ML: 100; 10 SOLUTION ORAL at 00:27

## 2024-07-19 RX ADMIN — SODIUM CHLORIDE: 9 INJECTION, SOLUTION INTRAVENOUS at 23:20

## 2024-07-19 RX ADMIN — FOLIC ACID 1 MG: 1 TABLET ORAL at 10:51

## 2024-07-19 RX ADMIN — ARFORMOTEROL TARTRATE 15 MCG: 15 SOLUTION RESPIRATORY (INHALATION) at 18:44

## 2024-07-19 RX ADMIN — IPRATROPIUM BROMIDE 0.5 MG: 0.5 SOLUTION RESPIRATORY (INHALATION) at 14:04

## 2024-07-19 RX ADMIN — CEFEPIME 2000 MG: 2 INJECTION, POWDER, FOR SOLUTION INTRAVENOUS at 03:30

## 2024-07-19 ASSESSMENT — PAIN SCALES - GENERAL: PAINLEVEL_OUTOF10: 0

## 2024-07-19 NOTE — PROGRESS NOTES
Spoke with patients daughter, Rebeca. Explained plan of care and she inquired as to his increased confusion. States he was a daily drinker until last year, and has been increasingly confused ever since.

## 2024-07-19 NOTE — PROCEDURES
PROCEDURE NOTE  Date: 7/19/2024   Name: Eze Rosa  YOB: 1950    Procedures:  Complex Morataya catheter insertion.    History:   73-year-old white male with multiple medical problems admitted for mental status changes with urinary retention while in hospital.  Multiple attempts to insert catheter by nursing staff unsuccessful and as such consultation was obtained to insert Morataya catheter.    Findings:  Normal external male genitalia.  16 Korean coudé catheter inserted with mild difficulty.  Clear yellow urine drainage  Urine submitted for urinalysis and culture if indicated.    Procedure:  The patient was already in a supine position.  Groin area was prepped with Betadine solution and then draped to expose the penis.  10 cc of K-Y jelly was inserted transurethrally to the bladder.  A #16 Korean coudé catheter was then inserted transurethrally into the bladder.  There was minimal resistance to the catheter insertion.    10 cc of fluid was used to inflate the Morataya balloon.  The catheter irrigated well.  Catheter was left to gravity drainage.    The patient tolerated this procedure well.

## 2024-07-19 NOTE — PROGRESS NOTES
[facility]      Patient:  Eze Rosa  YOB: 1950  Date of Service: 7/19/2024  MRN: [id number]   Acct: [id number]   Primary Care Physician: Ross Masterson MD  Advance Directive: Full Code  Admit Date: 7/14/2024       Hospital Day: 4  Portions of this note have been copied forward, however, changed to reflect the most current clinical status of this patient.  CHIEF COMPLAINT   Shortness of breath    SUBJECTIVE:  Resting in the bed, no complaints at this time.  Morataya removed prior evening, noting some urinary retention.     CUMULATIVE HOSPITAL COURSE:  Patient is a 73-year-old male with a past medical history of pulmonary fibrosis, emphysema, chronic hypoxic respiratory failure on supplemental O2 at baseline, as well as BPH with urinary retention, presented from to [facility] ED with confusion(reportedly has some confusion more so at night at baseline), increased dyspnea from baseline with associated cough, noted with shallow breathing with respiratory rate up to 30s, as well as difficulty with urinating and able to empty his bladder.  Patient noted sinus tachycardia with heart rates as high as 130s to 140s, low-grade fever, leukocytosis WBCs over 14,000, lactic acidosis noted.  Blood cultures obtained on admission, respiratory PCR obtained, noted negative.  Empirically treated for sepsis with crystalloid resuscitation in the ED and empiric IV antibiotics. Chest x-ray obtained with final results pending but appears to show extensive chronic lung disease with fibrosis and emphysematous lung changes, difficult to exclude pneumonia. Patient noted to have urinary retention requiring Morataya placement with turbid urine output with active urine sediment on UA consistent with UTI.     Azithromycin completed. Cefepime 2,000mg Q8hrs for 14 doses, initiated 7/15.  Doxycycline 100mg q12hrs for 5 days.  Urine culture pending.  Blood culture 7/14, Enterobacteriaceae and Klebsiella oxytoca, other positive for

## 2024-07-19 NOTE — PROGRESS NOTES
Physician Progress Note      PATIENT:               NERY TABOR  CSN #:                  [id number]  :                       1950  ADMIT DATE:       2024 10:59 PM  DISCH DATE:  RESPONDING  PROVIDER #:        JACK AMARAL          QUERY TEXT:    Patient admitted with Sepsis secondary to UTI bacteremia.  Noted documentation   of blood cultures positive for Enterobacteriaceae and Klebsiella oxytoca with   urine culture positive for Klebsiella oxytoca.  If possible, please document   in progress notes and discharge summary the source of sepsis:    The medical record reflects the following:  Risk Factors: Sepsis secondary to UTI and possible pneumonia  Clinical Indicators: BC positive for Enterobacteriaceae and Klebsiella   oxytoca; UA: dark yellow turbid, 30 protein, large leuks, 4+ bacteria,  WBC   31-50; UC POSITIVE for Klebsiella oxytoca;  IM PN: \"Follow blood, ,   +Enterobacteriaceae and Klebsiella oxytoca, other + Klebsiella oxytoca  --Urine cultures, 7/15- Klebsiella oxytoca.\"  Treatment: IV Zithromax, IV Rocephin, IVFB 2.5L, IV Cefepime, IV doxycycline,   BCx2, UA/UC, CXR, labs and monitoring    Thank you,  Yue Tolliver   Clinical Documentation Improvement Specialist  W: [phone]  Options provided:  -- Sepsis, present on admission, due to Enterobacteriaceae and Klebsiella   oxytoca  -- Other - I will add my own diagnosis  -- Disagree - Not applicable / Not valid  -- Disagree - Clinically unable to determine / Unknown  -- Refer to Clinical Documentation Reviewer    PROVIDER RESPONSE TEXT:    Sepsis, present on admission, due to Enterobacteriaceae and Klebsiella oxytoca    Query created by: Yue Tolliver on 2024 3:59 PM      Electronically signed by:  JACK AMARAL 2024 9:27 AM

## 2024-07-20 PROBLEM — Z87.898 HISTORY OF URINARY RETENTION: Status: ACTIVE | Noted: 2024-07-20

## 2024-07-20 LAB
ALBUMIN SERPL-MCNC: 2.4 G/DL (ref 3.5–5.2)
ALP SERPL-CCNC: 99 U/L (ref 40–130)
ALT SERPL-CCNC: 7 U/L (ref 5–41)
ANION GAP SERPL CALCULATED.3IONS-SCNC: 6 MMOL/L (ref 7–19)
AST SERPL-CCNC: 13 U/L (ref 5–40)
BACTERIA BLD CULT ORG #2: NORMAL
BACTERIA BLD CULT: NORMAL
BACTERIA UR CULT: NORMAL
BASOPHILS # BLD: 0 K/UL (ref 0–0.2)
BASOPHILS NFR BLD: 0.4 % (ref 0–1)
BILIRUB SERPL-MCNC: 0.3 MG/DL (ref 0.2–1.2)
BUN SERPL-MCNC: 2 MG/DL (ref 8–23)
CALCIUM SERPL-MCNC: 8.2 MG/DL (ref 8.8–10.2)
CHLORIDE SERPL-SCNC: 96 MMOL/L (ref 98–111)
CO2 SERPL-SCNC: 29 MMOL/L (ref 22–29)
CREAT SERPL-MCNC: 0.4 MG/DL (ref 0.5–1.2)
EOSINOPHIL # BLD: 0.2 K/UL (ref 0–0.6)
EOSINOPHIL NFR BLD: 2.6 % (ref 0–5)
ERYTHROCYTE [DISTWIDTH] IN BLOOD BY AUTOMATED COUNT: 12.3 % (ref 11.5–14.5)
GLUCOSE SERPL-MCNC: 111 MG/DL (ref 74–109)
HCT VFR BLD AUTO: 37.5 % (ref 42–52)
HGB BLD-MCNC: 12.4 G/DL (ref 14–18)
IMM GRANULOCYTES # BLD: 0 K/UL
LYMPHOCYTES # BLD: 0.6 K/UL (ref 1.1–4.5)
LYMPHOCYTES NFR BLD: 7.2 % (ref 20–40)
MCH RBC QN AUTO: 30.5 PG (ref 27–31)
MCHC RBC AUTO-ENTMCNC: 33.1 G/DL (ref 33–37)
MCV RBC AUTO: 92.4 FL (ref 80–94)
MONOCYTES # BLD: 1 K/UL (ref 0–0.9)
MONOCYTES NFR BLD: 12.2 % (ref 0–10)
NEUTROPHILS # BLD: 6 K/UL (ref 1.5–7.5)
NEUTS SEG NFR BLD: 77.1 % (ref 50–65)
PLATELET # BLD AUTO: 212 K/UL (ref 130–400)
PMV BLD AUTO: 9.7 FL (ref 9.4–12.4)
POTASSIUM SERPL-SCNC: 3.7 MMOL/L (ref 3.5–5)
PROT SERPL-MCNC: 5.4 G/DL (ref 6.6–8.7)
RBC # BLD AUTO: 4.06 M/UL (ref 4.7–6.1)
SODIUM SERPL-SCNC: 131 MMOL/L (ref 136–145)
WBC # BLD AUTO: 7.8 K/UL (ref 4.8–10.8)

## 2024-07-20 PROCEDURE — 80053 COMPREHEN METABOLIC PANEL: CPT

## 2024-07-20 PROCEDURE — 6360000002 HC RX W HCPCS: Performed by: STUDENT IN AN ORGANIZED HEALTH CARE EDUCATION/TRAINING PROGRAM

## 2024-07-20 PROCEDURE — 85025 COMPLETE CBC W/AUTO DIFF WBC: CPT

## 2024-07-20 PROCEDURE — 94760 N-INVAS EAR/PLS OXIMETRY 1: CPT

## 2024-07-20 PROCEDURE — 2700000000 HC OXYGEN THERAPY PER DAY

## 2024-07-20 PROCEDURE — 1200000000 HC SEMI PRIVATE

## 2024-07-20 PROCEDURE — 36415 COLL VENOUS BLD VENIPUNCTURE: CPT

## 2024-07-20 PROCEDURE — 2580000003 HC RX 258

## 2024-07-20 PROCEDURE — 99232 SBSQ HOSP IP/OBS MODERATE 35: CPT | Performed by: UROLOGY

## 2024-07-20 PROCEDURE — 94640 AIRWAY INHALATION TREATMENT: CPT

## 2024-07-20 PROCEDURE — 2580000003 HC RX 258: Performed by: STUDENT IN AN ORGANIZED HEALTH CARE EDUCATION/TRAINING PROGRAM

## 2024-07-20 PROCEDURE — 6370000000 HC RX 637 (ALT 250 FOR IP): Performed by: STUDENT IN AN ORGANIZED HEALTH CARE EDUCATION/TRAINING PROGRAM

## 2024-07-20 PROCEDURE — 6360000002 HC RX W HCPCS

## 2024-07-20 RX ADMIN — FINASTERIDE 5 MG: 5 TABLET, FILM COATED ORAL at 16:20

## 2024-07-20 RX ADMIN — CEFEPIME 2000 MG: 2 INJECTION, POWDER, FOR SOLUTION INTRAVENOUS at 03:41

## 2024-07-20 RX ADMIN — ARFORMOTEROL TARTRATE 15 MCG: 15 SOLUTION RESPIRATORY (INHALATION) at 19:12

## 2024-07-20 RX ADMIN — IPRATROPIUM BROMIDE 0.5 MG: 0.5 SOLUTION RESPIRATORY (INHALATION) at 06:11

## 2024-07-20 RX ADMIN — BUDESONIDE 250 MCG: 0.25 SUSPENSION RESPIRATORY (INHALATION) at 19:12

## 2024-07-20 RX ADMIN — Medication 100 MG: at 16:20

## 2024-07-20 RX ADMIN — IPRATROPIUM BROMIDE 0.5 MG: 0.5 SOLUTION RESPIRATORY (INHALATION) at 14:28

## 2024-07-20 RX ADMIN — PHENAZOPYRIDINE 200 MG: 100 TABLET ORAL at 13:45

## 2024-07-20 RX ADMIN — SODIUM CHLORIDE, PRESERVATIVE FREE 10 ML: 5 INJECTION INTRAVENOUS at 20:35

## 2024-07-20 RX ADMIN — IPRATROPIUM BROMIDE 0.5 MG: 0.5 SOLUTION RESPIRATORY (INHALATION) at 19:12

## 2024-07-20 RX ADMIN — IPRATROPIUM BROMIDE 0.5 MG: 0.5 SOLUTION RESPIRATORY (INHALATION) at 10:30

## 2024-07-20 RX ADMIN — METOPROLOL SUCCINATE 25 MG: 25 TABLET, EXTENDED RELEASE ORAL at 16:20

## 2024-07-20 RX ADMIN — ENOXAPARIN SODIUM 40 MG: 100 INJECTION SUBCUTANEOUS at 16:19

## 2024-07-20 RX ADMIN — ARFORMOTEROL TARTRATE 15 MCG: 15 SOLUTION RESPIRATORY (INHALATION) at 06:10

## 2024-07-20 RX ADMIN — CEFEPIME 2000 MG: 2 INJECTION, POWDER, FOR SOLUTION INTRAVENOUS at 16:19

## 2024-07-20 RX ADMIN — PHENAZOPYRIDINE 200 MG: 100 TABLET ORAL at 20:35

## 2024-07-20 RX ADMIN — TAMSULOSIN HYDROCHLORIDE 0.8 MG: 0.4 CAPSULE ORAL at 16:20

## 2024-07-20 RX ADMIN — FOLIC ACID 1 MG: 1 TABLET ORAL at 16:20

## 2024-07-20 RX ADMIN — BUDESONIDE 250 MCG: 0.25 SUSPENSION RESPIRATORY (INHALATION) at 06:11

## 2024-07-20 ASSESSMENT — PAIN SCALES - GENERAL: PAINLEVEL_OUTOF10: 0

## 2024-07-20 NOTE — PROGRESS NOTES
[facility]      Patient:  Eze Rosa  YOB: 1950  Date of Service: 7/20/2024  MRN: [id number]   Acct: [id number]   Primary Care Physician: Ross Masterson MD  Advance Directive: Full Code  Admit Date: 7/14/2024       Hospital Day: 5  Portions of this note have been copied forward, however, changed to reflect the most current clinical status of this patient.  CHIEF COMPLAINT   Shortness of breath    SUBJECTIVE:  Resting in the bed, no complaints at this time.  Morataya removed prior evening, noting some urinary retention.     CUMULATIVE HOSPITAL COURSE:  Patient is a 73-year-old male with a past medical history of pulmonary fibrosis, emphysema, chronic hypoxic respiratory failure on supplemental O2 at baseline, as well as BPH with urinary retention, presented from to [facility] ED with confusion(reportedly has some confusion more so at night at baseline), increased dyspnea from baseline with associated cough, noted with shallow breathing with respiratory rate up to 30s, as well as difficulty with urinating and able to empty his bladder.  Patient noted sinus tachycardia with heart rates as high as 130s to 140s, low-grade fever, leukocytosis WBCs over 14,000, lactic acidosis noted.  Blood cultures obtained on admission, respiratory PCR obtained, noted negative.  Empirically treated for sepsis with crystalloid resuscitation in the ED and empiric IV antibiotics. Chest x-ray obtained with final results pending but appears to show extensive chronic lung disease with fibrosis and emphysematous lung changes, difficult to exclude pneumonia. Patient noted to have urinary retention requiring Morataya placement with turbid urine output with active urine sediment on UA consistent with UTI.     Azithromycin completed. Cefepime 2,000mg Q8hrs for 14 doses, initiated 7/15.  Doxycycline 100mg q12hrs for 5 days.  Urine culture pending.  Blood culture 7/14, Enterobacteriaceae and Klebsiella oxytoca, other positive for

## 2024-07-20 NOTE — CONSULTS
[address] [place] [address]                              CONSULTATION      PATIENT NAME: NERY TABOR                 : 1950  MED REC NO: [id number]                          ROOM: [id number]  ACCOUNT NO: [id number]                       ADMIT DATE: 2024  PROVIDER: Zoltan Christine MD      CONSULT DATE: 2024    REASON FOR CONSULTATION:  73-year-old white male with urinary retention.  Apparently nursing staff had difficulty placing Morataya catheter.  Urological consultation was obtained for Morataya catheter placement and evaluation/follow up    CLINICAL HISTORY:  A 73-year-old white male with multiple medical problems including pulmonary problems, who was admitted with hypoxic respiratory failure.  While in the hospital, the patient apparently developed difficulty voiding.  Attempts to place Morataya catheter by nursing staff met with difficulty, and as such, urological consultation was obtained.    The patient gives an AUA symptom score of 15/35.    The patient in the past has been treated for lower urinary tract obstructive symptoms.  The patient is maintained on Flomax and finasteride.     On admission, the patient's urinalysis was suggestive of urinary tract infection.  The urine culture is still pending.    MEDICATIONS:    1. Symbicort.  2. Combivent.  3. Folvite.  4. Proscar.  5. Flomax.   6. Toprol.  7. MultiVites.   8. Thiamine.    ALLERGIES:  SULFA DRUGS.      PAST MEDICAL HISTORY:    1. GERD.   2. Functional problems.  3. Pneumonia.  4. History of pulmonary fibrosis.  5. Palliative care patient.    PAST SURGICAL HISTORY:    1. Laparotomy.  2. Colonoscopy.    FAMILY HISTORY:  Reviewed and essentially noncontributory.    SOCIAL HISTORY:  The patient is .  Quit smoking several years ago.  Denies vapor usage or smokeless tobacco and does consume alcohol.    REVIEW OF SYSTEMS:  Somewhat confused and difficult to get proper review of  systems from patient.    PHYSICAL EXAMINATION:  GENERAL:  Well-nourished white male and appeared to be lucid at times.  VITAL SIGNS:  Reviewed.  LUNGS:  With diminished expansion and excursion of chest wall.   HEART:  Regular rate and rhythm.   ABDOMEN:  Distended over the suprapubic area.  GENITOURINARY:  Circumcised.  Both testicles are down.   EXTREMITIES:  Full range of motion.  NEUROLOGIC:  Alert, but intermittently confused.   PSYCH:  Inappropriate at times.    LABORATORY DATA:  Includes BMP; sodium 131, potassium 3.5, chloride 93, CO2 of 27, random glucose of 113, BUN 4, creatinine 0.5.  CBC; white count of 8.1, hemoglobin of 12.2, and hematocrit 36.4.  Most recent urine culture:  Unremarkable for bacterial growth.    IMPRESSION:    1. Urinary retention.  2. Multiple medical problems that includes pulmonary fibrosis.  3.  Possibility of urinary tract infection    PLANS:    1. Insert 16-Liberian Morataya catheter.   2. Once up and ambulating and with better focus on his mental status then voiding trial.  3. Continue with Flomax and finasteride.  4.  Antibiotic adjustment await urine culture report    Urology will continue to follow with you and make recommendations as clinical course dictates.          SAHCA GUTIERRES MD      D:  07/19/2024 17:28:43     T:  07/19/2024 23:38:28     FAF/LESLYS  Job #:  [id number]     Doc#:  [id number]

## 2024-07-20 NOTE — PLAN OF CARE
Problem: Safety - Adult  Goal: Free from fall injury  Outcome: Progressing  Flowsheets (Taken 7/20/2024 0240)  Free From Fall Injury: Instruct family/caregiver on patient safety     Problem: Skin/Tissue Integrity  Goal: Absence of new skin breakdown  Description: 1.  Monitor for areas of redness and/or skin breakdown  Outcome: Progressing     Problem: Pain  Goal: Verbalizes/displays adequate comfort level or baseline comfort level  Outcome: Progressing  Flowsheets (Taken 7/19/2024 6693)  Verbalizes/displays adequate comfort level or baseline comfort level:   Encourage patient to monitor pain and request assistance   Assess pain using appropriate pain scale   Administer analgesics based on type and severity of pain and evaluate response   Implement non-pharmacological measures as appropriate and evaluate response   Consider cultural and social influences on pain and pain management   Notify Licensed Independent Practitioner if interventions unsuccessful or patient reports new pain

## 2024-07-20 NOTE — PROGRESS NOTES
Progress Note  Date:2024       Room:[address] [place] [address]  Patient Name:Eze Rosa     YOB: 1950     Age:73 y.o.        Subjective    Subjective: No new urological complaints.  Review of Systems: See admission history and physical  Objective         Vitals Last 24 Hours:  TEMPERATURE:  Temp  Av.6 °F (36.4 °C)  Min: 97.3 °F (36.3 °C)  Max: 97.9 °F (36.6 °C)  RESPIRATIONS RANGE: Resp  Av.4  Min: 18  Max: 20  PULSE OXIMETRY RANGE: SpO2  Av.4 %  Min: 96 %  Max: 100 %  PULSE RANGE: Pulse  Av.3  Min: 82  Max: 101  BLOOD PRESSURE RANGE: Systolic (24hrs), Av , Min:103 , Max:129   ; Diastolic (24hrs), Av, Min:51, Max:67    I/O (24Hr):    Intake/Output Summary (Last 24 hours) at 2024 1349  Last data filed at 2024 0429  Gross per 24 hour   Intake 2501.99 ml   Output 3050 ml   Net -548.01 ml     Objective  Lungs: Decreased expansion and excursion of chest with  Heart: Regular rate and rhythm.  Abdomen: Soft:  : Morataya cath in position.  Urine appears to be clear yellow.  Extremities: Without edema.  Neuro: Alert  Psych: Appropriate  Labs/Imaging/Diagnostics    Labs:  CBC:  Recent Labs     24  1105 24  0733   WBC 8.1 7.8   RBC 3.97* 4.06*   HGB 12.2* 12.4*   HCT 36.4* 37.5*   MCV 91.7 92.4   RDW 12.2 12.3    212     CHEMISTRIES:  Recent Labs     24  0256 24  1105 24  0733   * 131* 131*   K 3.6 3.5 3.7   CL 89* 93* 96*   CO2 30* 27 29   BUN 4* 4* 2*   CREATININE 0.3* 0.5 0.4*   GLUCOSE 93 113* 111*   MG  --  1.5*  --      PT/INR:No results for input(s): \"PROTIME\", \"INR\" in the last 72 hours.  APTT:No results for input(s): \"APTT\" in the last 72 hours.  LIVER PROFILE:  Recent Labs     24  0256 24  1105 24  0733   AST 14 13 13   ALT 7 7 7   BILITOT 0.4 0.4 0.3   ALKPHOS 110 105 99       Imaging Last 24 Hours:  No results found.  Assessment//Plan           Hospital Problems             Last Modified POA    * (Principal)

## 2024-07-21 LAB
ALBUMIN SERPL-MCNC: 2.5 G/DL (ref 3.5–5.2)
ALP SERPL-CCNC: 101 U/L (ref 40–130)
ALT SERPL-CCNC: 6 U/L (ref 5–41)
ANION GAP SERPL CALCULATED.3IONS-SCNC: 9 MMOL/L (ref 7–19)
AST SERPL-CCNC: 12 U/L (ref 5–40)
BASOPHILS # BLD: 0.1 K/UL (ref 0–0.2)
BASOPHILS NFR BLD: 0.5 % (ref 0–1)
BILIRUB SERPL-MCNC: 0.3 MG/DL (ref 0.2–1.2)
BUN SERPL-MCNC: 2 MG/DL (ref 8–23)
CALCIUM SERPL-MCNC: 7.9 MG/DL (ref 8.8–10.2)
CHLORIDE SERPL-SCNC: 95 MMOL/L (ref 98–111)
CO2 SERPL-SCNC: 29 MMOL/L (ref 22–29)
CREAT SERPL-MCNC: 0.4 MG/DL (ref 0.5–1.2)
EOSINOPHIL # BLD: 0.3 K/UL (ref 0–0.6)
EOSINOPHIL NFR BLD: 3.7 % (ref 0–5)
ERYTHROCYTE [DISTWIDTH] IN BLOOD BY AUTOMATED COUNT: 12.1 % (ref 11.5–14.5)
GLUCOSE SERPL-MCNC: 118 MG/DL (ref 74–109)
HCT VFR BLD AUTO: 36.4 % (ref 42–52)
HGB BLD-MCNC: 11.8 G/DL (ref 14–18)
IMM GRANULOCYTES # BLD: 0.1 K/UL
LYMPHOCYTES # BLD: 0.8 K/UL (ref 1.1–4.5)
LYMPHOCYTES NFR BLD: 8.9 % (ref 20–40)
MAGNESIUM SERPL-MCNC: 1.5 MG/DL (ref 1.6–2.4)
MCH RBC QN AUTO: 29.7 PG (ref 27–31)
MCHC RBC AUTO-ENTMCNC: 32.4 G/DL (ref 33–37)
MCV RBC AUTO: 91.7 FL (ref 80–94)
MONOCYTES # BLD: 1 K/UL (ref 0–0.9)
MONOCYTES NFR BLD: 10.8 % (ref 0–10)
NEUTROPHILS # BLD: 7 K/UL (ref 1.5–7.5)
NEUTS SEG NFR BLD: 75.6 % (ref 50–65)
PLATELET # BLD AUTO: 249 K/UL (ref 130–400)
PMV BLD AUTO: 9.6 FL (ref 9.4–12.4)
POTASSIUM SERPL-SCNC: 3.3 MMOL/L (ref 3.5–5)
PROT SERPL-MCNC: 4.8 G/DL (ref 6.6–8.7)
RBC # BLD AUTO: 3.97 M/UL (ref 4.7–6.1)
SODIUM SERPL-SCNC: 133 MMOL/L (ref 136–145)
WBC # BLD AUTO: 9.2 K/UL (ref 4.8–10.8)

## 2024-07-21 PROCEDURE — 6360000002 HC RX W HCPCS: Performed by: STUDENT IN AN ORGANIZED HEALTH CARE EDUCATION/TRAINING PROGRAM

## 2024-07-21 PROCEDURE — 1200000000 HC SEMI PRIVATE

## 2024-07-21 PROCEDURE — 85025 COMPLETE CBC W/AUTO DIFF WBC: CPT

## 2024-07-21 PROCEDURE — 94760 N-INVAS EAR/PLS OXIMETRY 1: CPT

## 2024-07-21 PROCEDURE — 80053 COMPREHEN METABOLIC PANEL: CPT

## 2024-07-21 PROCEDURE — 94640 AIRWAY INHALATION TREATMENT: CPT

## 2024-07-21 PROCEDURE — 6360000002 HC RX W HCPCS

## 2024-07-21 PROCEDURE — 36415 COLL VENOUS BLD VENIPUNCTURE: CPT

## 2024-07-21 PROCEDURE — 2700000000 HC OXYGEN THERAPY PER DAY

## 2024-07-21 PROCEDURE — 2580000003 HC RX 258

## 2024-07-21 PROCEDURE — 2580000003 HC RX 258: Performed by: STUDENT IN AN ORGANIZED HEALTH CARE EDUCATION/TRAINING PROGRAM

## 2024-07-21 PROCEDURE — 83735 ASSAY OF MAGNESIUM: CPT

## 2024-07-21 PROCEDURE — 6370000000 HC RX 637 (ALT 250 FOR IP): Performed by: STUDENT IN AN ORGANIZED HEALTH CARE EDUCATION/TRAINING PROGRAM

## 2024-07-21 PROCEDURE — 99231 SBSQ HOSP IP/OBS SF/LOW 25: CPT | Performed by: UROLOGY

## 2024-07-21 RX ADMIN — CEFEPIME 2000 MG: 2 INJECTION, POWDER, FOR SOLUTION INTRAVENOUS at 17:46

## 2024-07-21 RX ADMIN — CEFEPIME 2000 MG: 2 INJECTION, POWDER, FOR SOLUTION INTRAVENOUS at 00:15

## 2024-07-21 RX ADMIN — BUDESONIDE 250 MCG: 0.25 SUSPENSION RESPIRATORY (INHALATION) at 06:35

## 2024-07-21 RX ADMIN — ARFORMOTEROL TARTRATE 15 MCG: 15 SOLUTION RESPIRATORY (INHALATION) at 18:55

## 2024-07-21 RX ADMIN — FOLIC ACID 1 MG: 1 TABLET ORAL at 08:56

## 2024-07-21 RX ADMIN — PHENAZOPYRIDINE 200 MG: 100 TABLET ORAL at 12:42

## 2024-07-21 RX ADMIN — IPRATROPIUM BROMIDE 0.5 MG: 0.5 SOLUTION RESPIRATORY (INHALATION) at 06:35

## 2024-07-21 RX ADMIN — ENOXAPARIN SODIUM 40 MG: 100 INJECTION SUBCUTANEOUS at 08:56

## 2024-07-21 RX ADMIN — PHENAZOPYRIDINE 200 MG: 100 TABLET ORAL at 17:45

## 2024-07-21 RX ADMIN — IPRATROPIUM BROMIDE 0.5 MG: 0.5 SOLUTION RESPIRATORY (INHALATION) at 14:34

## 2024-07-21 RX ADMIN — Medication 100 MG: at 08:57

## 2024-07-21 RX ADMIN — IPRATROPIUM BROMIDE 0.5 MG: 0.5 SOLUTION RESPIRATORY (INHALATION) at 18:55

## 2024-07-21 RX ADMIN — FINASTERIDE 5 MG: 5 TABLET, FILM COATED ORAL at 08:56

## 2024-07-21 RX ADMIN — PHENAZOPYRIDINE 200 MG: 100 TABLET ORAL at 08:56

## 2024-07-21 RX ADMIN — ARFORMOTEROL TARTRATE 15 MCG: 15 SOLUTION RESPIRATORY (INHALATION) at 06:35

## 2024-07-21 RX ADMIN — POTASSIUM CHLORIDE 40 MEQ: 1500 TABLET, EXTENDED RELEASE ORAL at 06:27

## 2024-07-21 RX ADMIN — TAMSULOSIN HYDROCHLORIDE 0.8 MG: 0.4 CAPSULE ORAL at 08:56

## 2024-07-21 RX ADMIN — BUDESONIDE 250 MCG: 0.25 SUSPENSION RESPIRATORY (INHALATION) at 18:55

## 2024-07-21 RX ADMIN — MAGNESIUM SULFATE HEPTAHYDRATE 2000 MG: 40 INJECTION, SOLUTION INTRAVENOUS at 06:32

## 2024-07-21 RX ADMIN — CEFEPIME 2000 MG: 2 INJECTION, POWDER, FOR SOLUTION INTRAVENOUS at 09:03

## 2024-07-21 RX ADMIN — METOPROLOL SUCCINATE 25 MG: 25 TABLET, EXTENDED RELEASE ORAL at 08:56

## 2024-07-21 RX ADMIN — SODIUM CHLORIDE, PRESERVATIVE FREE 10 ML: 5 INJECTION INTRAVENOUS at 21:51

## 2024-07-21 ASSESSMENT — PAIN SCALES - GENERAL: PAINLEVEL_OUTOF10: 0

## 2024-07-21 NOTE — PROGRESS NOTES
[facility]      Patient:  Eze Rosa  YOB: 1950  Date of Service: 7/21/2024  MRN: [id number]   Acct: [id number]   Primary Care Physician: Ross Masterson MD  Advance Directive: Full Code  Admit Date: 7/14/2024       Hospital Day: 6  Portions of this note have been copied forward, however, changed to reflect the most current clinical status of this patient.  CHIEF COMPLAINT   Shortness of breath    SUBJECTIVE:  Resting in the bed, no complaints at this time.  Morataya removed prior evening, noting some urinary retention.     CUMULATIVE HOSPITAL COURSE:  Patient is a 73-year-old male with a past medical history of pulmonary fibrosis, emphysema, chronic hypoxic respiratory failure on supplemental O2 at baseline, as well as BPH with urinary retention, presented from to [facility] ED with confusion(reportedly has some confusion more so at night at baseline), increased dyspnea from baseline with associated cough, noted with shallow breathing with respiratory rate up to 30s, as well as difficulty with urinating and able to empty his bladder.  Patient noted sinus tachycardia with heart rates as high as 130s to 140s, low-grade fever, leukocytosis WBCs over 14,000, lactic acidosis noted.  Blood cultures obtained on admission, respiratory PCR obtained, noted negative.  Empirically treated for sepsis with crystalloid resuscitation in the ED and empiric IV antibiotics. Chest x-ray obtained with final results pending but appears to show extensive chronic lung disease with fibrosis and emphysematous lung changes, difficult to exclude pneumonia. Patient noted to have urinary retention requiring Morataya placement with turbid urine output with active urine sediment on UA consistent with UTI.     Azithromycin completed. Cefepime 2,000mg Q8hrs for 14 doses, initiated 7/15.  Doxycycline 100mg q12hrs for 5 days.  Urine culture pending.  Blood culture 7/14, Enterobacteriaceae and Klebsiella oxytoca, other positive for

## 2024-07-21 NOTE — PROGRESS NOTES
Progress Note  Date:2024       Room:[address] [place] [address]  Patient Name:Eze Rosa     YOB: 1950     Age:73 y.o.        Subjective    Subjective: Still not up and ambulating well.-Patient's choice.  Review of Systems: See admission history and physical  Objective         Vitals Last 24 Hours:  TEMPERATURE:  Temp  Av.2 °F (36.8 °C)  Min: 97.2 °F (36.2 °C)  Max: 98.8 °F (37.1 °C)  RESPIRATIONS RANGE: Resp  Av  Min: 20  Max: 20  PULSE OXIMETRY RANGE: SpO2  Av.8 %  Min: 96 %  Max: 99 %  PULSE RANGE: Pulse  Av.5  Min: 88  Max: 96  BLOOD PRESSURE RANGE: Systolic (24hrs), Av , Min:107 , Max:112   ; Diastolic (24hrs), Av, Min:58, Max:72    I/O (24Hr):    Intake/Output Summary (Last 24 hours) at 2024 1534  Last data filed at 2024 1110  Gross per 24 hour   Intake 820 ml   Output 4250 ml   Net -3430 ml     Objective  Lungs: Good expansion and excursion of chest wall.  Heart: Regular rate   Abdomen soft, nontender  : Morataya catheter in position.  Psych: Appropriate.  Neuro: Alert  Labs/Imaging/Diagnostics    Labs:  CBC:  Recent Labs     24  1105 24  0733 24  0324   WBC 8.1 7.8 9.2   RBC 3.97* 4.06* 3.97*   HGB 12.2* 12.4* 11.8*   HCT 36.4* 37.5* 36.4*   MCV 91.7 92.4 91.7   RDW 12.2 12.3 12.1    212 249     CHEMISTRIES:  Recent Labs     24  1105 24  0733 24  0324   * 131* 133*   K 3.5 3.7 3.3*   CL 93* 96* 95*   CO2 27 29 29   BUN 4* 2* 2*   CREATININE 0.5 0.4* 0.4*   GLUCOSE 113* 111* 118*   MG 1.5*  --  1.5*     PT/INR:No results for input(s): \"PROTIME\", \"INR\" in the last 72 hours.  APTT:No results for input(s): \"APTT\" in the last 72 hours.  LIVER PROFILE:  Recent Labs     24  1105 24  0733 24  0324   AST 13 13 12   ALT 7 7 6   BILITOT 0.4 0.3 0.3   ALKPHOS 105 99 101       Imaging Last 24 Hours:  No results found.  Assessment//Plan           Hospital Problems             Last Modified POA    * (Principal)

## 2024-07-21 NOTE — PLAN OF CARE
Problem: Safety - Adult  Goal: Free from fall injury  Outcome: Progressing  Flowsheets (Taken 7/21/2024 0046)  Free From Fall Injury: Instruct family/caregiver on patient safety     Problem: Skin/Tissue Integrity  Goal: Absence of new skin breakdown  Description: 1.  Monitor for areas of redness and/or skin breakdown  Outcome: Progressing     Problem: Pain  Goal: Verbalizes/displays adequate comfort level or baseline comfort level  Outcome: Progressing  Flowsheets (Taken 7/20/2024 4726)  Verbalizes/displays adequate comfort level or baseline comfort level:   Encourage patient to monitor pain and request assistance   Assess pain using appropriate pain scale   Administer analgesics based on type and severity of pain and evaluate response   Implement non-pharmacological measures as appropriate and evaluate response   Consider cultural and social influences on pain and pain management   Notify Licensed Independent Practitioner if interventions unsuccessful or patient reports new pain

## 2024-07-22 ENCOUNTER — TELEPHONE (OUTPATIENT)
Dept: UROLOGY | Age: 74
End: 2024-07-22

## 2024-07-22 VITALS
BODY MASS INDEX: 18.63 KG/M2 | HEIGHT: 76 IN | WEIGHT: 153 LBS | RESPIRATION RATE: 16 BRPM | HEART RATE: 83 BPM | TEMPERATURE: 97.9 F | OXYGEN SATURATION: 100 % | SYSTOLIC BLOOD PRESSURE: 111 MMHG | DIASTOLIC BLOOD PRESSURE: 63 MMHG

## 2024-07-22 LAB
ALBUMIN SERPL-MCNC: 2.7 G/DL (ref 3.5–5.2)
ALP SERPL-CCNC: 111 U/L (ref 40–130)
ALT SERPL-CCNC: 7 U/L (ref 5–41)
ANION GAP SERPL CALCULATED.3IONS-SCNC: 9 MMOL/L (ref 7–19)
AST SERPL-CCNC: 15 U/L (ref 5–40)
BILIRUB SERPL-MCNC: 0.4 MG/DL (ref 0.2–1.2)
BUN SERPL-MCNC: 1 MG/DL (ref 8–23)
CALCIUM SERPL-MCNC: 8.5 MG/DL (ref 8.8–10.2)
CHLORIDE SERPL-SCNC: 95 MMOL/L (ref 98–111)
CO2 SERPL-SCNC: 30 MMOL/L (ref 22–29)
CREAT SERPL-MCNC: 0.4 MG/DL (ref 0.5–1.2)
GLUCOSE SERPL-MCNC: 125 MG/DL (ref 74–109)
POTASSIUM SERPL-SCNC: 4.2 MMOL/L (ref 3.5–5)
PROT SERPL-MCNC: 5.3 G/DL (ref 6.6–8.7)
SODIUM SERPL-SCNC: 134 MMOL/L (ref 136–145)

## 2024-07-22 PROCEDURE — 94760 N-INVAS EAR/PLS OXIMETRY 1: CPT

## 2024-07-22 PROCEDURE — 2700000000 HC OXYGEN THERAPY PER DAY

## 2024-07-22 PROCEDURE — 80053 COMPREHEN METABOLIC PANEL: CPT

## 2024-07-22 PROCEDURE — 2580000003 HC RX 258

## 2024-07-22 PROCEDURE — 36415 COLL VENOUS BLD VENIPUNCTURE: CPT

## 2024-07-22 PROCEDURE — 6360000002 HC RX W HCPCS: Performed by: STUDENT IN AN ORGANIZED HEALTH CARE EDUCATION/TRAINING PROGRAM

## 2024-07-22 PROCEDURE — 6360000002 HC RX W HCPCS

## 2024-07-22 PROCEDURE — 99231 SBSQ HOSP IP/OBS SF/LOW 25: CPT | Performed by: UROLOGY

## 2024-07-22 PROCEDURE — 94640 AIRWAY INHALATION TREATMENT: CPT

## 2024-07-22 PROCEDURE — 6370000000 HC RX 637 (ALT 250 FOR IP): Performed by: STUDENT IN AN ORGANIZED HEALTH CARE EDUCATION/TRAINING PROGRAM

## 2024-07-22 RX ORDER — PHENAZOPYRIDINE HYDROCHLORIDE 200 MG/1
200 TABLET, FILM COATED ORAL
Qty: 9 TABLET | Refills: 0 | Status: SHIPPED | OUTPATIENT
Start: 2024-07-22 | End: 2024-07-25

## 2024-07-22 RX ADMIN — PHENAZOPYRIDINE 200 MG: 100 TABLET ORAL at 12:18

## 2024-07-22 RX ADMIN — ARFORMOTEROL TARTRATE 15 MCG: 15 SOLUTION RESPIRATORY (INHALATION) at 07:25

## 2024-07-22 RX ADMIN — CEFEPIME 2000 MG: 2 INJECTION, POWDER, FOR SOLUTION INTRAVENOUS at 02:37

## 2024-07-22 RX ADMIN — SODIUM CHLORIDE: 9 INJECTION, SOLUTION INTRAVENOUS at 02:36

## 2024-07-22 RX ADMIN — FINASTERIDE 5 MG: 5 TABLET, FILM COATED ORAL at 08:49

## 2024-07-22 RX ADMIN — IPRATROPIUM BROMIDE 0.5 MG: 0.5 SOLUTION RESPIRATORY (INHALATION) at 10:49

## 2024-07-22 RX ADMIN — IPRATROPIUM BROMIDE 0.5 MG: 0.5 SOLUTION RESPIRATORY (INHALATION) at 07:25

## 2024-07-22 RX ADMIN — PHENAZOPYRIDINE 200 MG: 100 TABLET ORAL at 08:48

## 2024-07-22 RX ADMIN — METOPROLOL SUCCINATE 25 MG: 25 TABLET, EXTENDED RELEASE ORAL at 08:49

## 2024-07-22 RX ADMIN — FOLIC ACID 1 MG: 1 TABLET ORAL at 08:49

## 2024-07-22 RX ADMIN — CEFEPIME 2000 MG: 2 INJECTION, POWDER, FOR SOLUTION INTRAVENOUS at 08:48

## 2024-07-22 RX ADMIN — ENOXAPARIN SODIUM 40 MG: 100 INJECTION SUBCUTANEOUS at 08:48

## 2024-07-22 RX ADMIN — TAMSULOSIN HYDROCHLORIDE 0.8 MG: 0.4 CAPSULE ORAL at 08:48

## 2024-07-22 RX ADMIN — Medication 100 MG: at 08:49

## 2024-07-22 RX ADMIN — BUDESONIDE 250 MCG: 0.25 SUSPENSION RESPIRATORY (INHALATION) at 07:25

## 2024-07-22 NOTE — DISCHARGE SUMMARY
[facility]    Discharge Summary      Eze Rosa  :  1950  MRN:  [id number]    Admit date:  2024  Discharge date:    2024    Discharging Physician:  Dr. Junior    Advance Directive: Full Code    Consults: urology    Primary Care Physician:  Ross Masterson MD    Discharge Diagnoses:  Principal Problem:    Sepsis (HCC)  Active Problems:    Benign prostatic hyperplasia with urinary retention    History of urinary retention  Resolved Problems:    * No resolved hospital problems. *      Portions of this note have been copied forward, however, changed to reflect the most current clinical status of this patient.    Hospital Course:    This patient is a 73-year-old male with PMH pulmonary fibrosis, emphysema, chronic hypoxic respiratory failure on 2L oxygen at baseline, BPH with urinary retention, who presented to [facility] ED on  for evaluation of increased confusion from baseline, dyspnea, and inability to void.  He was found to be septic with UTI and possible pneumonia.  Morataya catheter was placed in ER and he was admitted to hospitalist with consult to urology for further evaluation and management.  Blood cultures drawn on admission positive for Enterobacteriaceae and Klebsiella oxytoca. He has completed a course of antibiotics including azithromycin, doxycycline, and cefepime x 7 days.  Repeat blood cultures have shown NGTD.  Attempted voiding trial, however patient had continued retention.  Morataya catheter was replaced and urology recommending to continue Flomax and finasteride, as well as follow-up in 2 to 3 weeks for voiding trial once his activity has returned to baseline with PT.  His labs and vital signs are stable. Patient is currently in stable condition to be discharged to [facility].    Significant Diagnostic Studies:   XR CHEST PORTABLE    Result Date: 7/15/2024    EXAM:  SINGLE FRONTAL VIEW OF THE CHEST  HISTORY: Shortness of breath.  COMPARISON:  Chest x-ray 2023  FINDINGS:

## 2024-07-22 NOTE — PROGRESS NOTES
Attempted to call [facility] for report again. Was placed on hold. After a few minutes the call was disconnected.

## 2024-07-22 NOTE — TELEPHONE ENCOUNTER
[facility] called to schedule a HFU with gerardo aldana. Patient is being discharged today to [facility]. Please contact [facility] @ [phone] with the appointment.

## 2024-07-22 NOTE — PROGRESS NOTES
Attempted to call report to [facility]. Was put on hold, after 3 minutes of waiting line disconnected.

## 2024-07-22 NOTE — PLAN OF CARE
Problem: Safety - Adult  Goal: Free from fall injury  7/22/2024 0901 by Alejandra Trevizo RN  Outcome: Progressing  7/22/2024 0333 by Annalee Owusu LPN  Outcome: Progressing  Flowsheets (Taken 7/21/2024 2334)  Free From Fall Injury: Instruct family/caregiver on patient safety     Problem: Skin/Tissue Integrity  Goal: Absence of new skin breakdown  Description: 1.  Monitor for areas of redness and/or skin breakdown  7/22/2024 0901 by Alejandra Trevizo RN  Outcome: Progressing  7/22/2024 0333 by Annalee Owusu LPN  Outcome: Progressing     Problem: Pain  Goal: Verbalizes/displays adequate comfort level or baseline comfort level  7/22/2024 0901 by Alejandra Trevizo RN  Outcome: Progressing  7/22/2024 0333 by Annalee Owusu LPN  Outcome: Progressing  Flowsheets (Taken 7/21/2024 2315)  Verbalizes/displays adequate comfort level or baseline comfort level:   Encourage patient to monitor pain and request assistance   Assess pain using appropriate pain scale   Administer analgesics based on type and severity of pain and evaluate response   Implement non-pharmacological measures as appropriate and evaluate response   Consider cultural and social influences on pain and pain management   Notify Licensed Independent Practitioner if interventions unsuccessful or patient reports new pain     Problem: Discharge Planning  Goal: Discharge to home or other facility with appropriate resources  7/22/2024 0901 by Alejandra Trevizo RN  Outcome: Progressing  Flowsheets (Taken 7/22/2024 0857)  Discharge to home or other facility with appropriate resources: Identify barriers to discharge with patient and caregiver  7/22/2024 0333 by Annalee Owusu LPN  Outcome: Progressing  Flowsheets (Taken 7/22/2024 0333)  Discharge to home or other facility with appropriate resources:   Identify barriers to discharge with patient and caregiver   Identify discharge learning needs (meds, wound care, etc)   Refer to discharge

## 2024-07-22 NOTE — PLAN OF CARE
Problem: Safety - Adult  Goal: Free from fall injury  Outcome: Progressing  Flowsheets (Taken 7/21/2024 2334)  Free From Fall Injury: Instruct family/caregiver on patient safety     Problem: Skin/Tissue Integrity  Goal: Absence of new skin breakdown  Description: 1.  Monitor for areas of redness and/or skin breakdown  Outcome: Progressing     Problem: Pain  Goal: Verbalizes/displays adequate comfort level or baseline comfort level  Outcome: Progressing  Flowsheets (Taken 7/21/2024 9125)  Verbalizes/displays adequate comfort level or baseline comfort level:   Encourage patient to monitor pain and request assistance   Assess pain using appropriate pain scale   Administer analgesics based on type and severity of pain and evaluate response   Implement non-pharmacological measures as appropriate and evaluate response   Consider cultural and social influences on pain and pain management   Notify Licensed Independent Practitioner if interventions unsuccessful or patient reports new pain     Problem: Discharge Planning  Goal: Discharge to home or other facility with appropriate resources  Outcome: Progressing  Flowsheets (Taken 7/22/2024 0333)  Discharge to home or other facility with appropriate resources:   Identify barriers to discharge with patient and caregiver   Identify discharge learning needs (meds, wound care, etc)   Refer to discharge planning if patient needs post-hospital services based on physician order or complex needs related to functional status, cognitive ability or social support system   Arrange for needed discharge resources and transportation as appropriate

## 2024-07-22 NOTE — PLAN OF CARE
Problem: Safety - Adult  Goal: Free from fall injury  7/22/2024 1228 by lAejandra Trevizo RN  Outcome: Adequate for Discharge  7/22/2024 0901 by Alejandra Trevizo RN  Outcome: Progressing  7/22/2024 0333 by Annalee Owusu LPN  Outcome: Progressing  Flowsheets (Taken 7/21/2024 2334)  Free From Fall Injury: Instruct family/caregiver on patient safety     Problem: Skin/Tissue Integrity  Goal: Absence of new skin breakdown  Description: 1.  Monitor for areas of redness and/or skin breakdown  7/22/2024 1228 by Alejandra Trevizo RN  Outcome: Adequate for Discharge  7/22/2024 0901 by Alejandra Trevizo RN  Outcome: Progressing  7/22/2024 0333 by Annalee Owusu LPN  Outcome: Progressing     Problem: Pain  Goal: Verbalizes/displays adequate comfort level or baseline comfort level  7/22/2024 1228 by Alejandra Trevizo RN  Outcome: Adequate for Discharge  7/22/2024 0901 by Alejandra Trevzio RN  Outcome: Progressing  7/22/2024 0333 by Annalee Owusu LPN  Outcome: Progressing  Flowsheets (Taken 7/21/2024 2315)  Verbalizes/displays adequate comfort level or baseline comfort level:   Encourage patient to monitor pain and request assistance   Assess pain using appropriate pain scale   Administer analgesics based on type and severity of pain and evaluate response   Implement non-pharmacological measures as appropriate and evaluate response   Consider cultural and social influences on pain and pain management   Notify Licensed Independent Practitioner if interventions unsuccessful or patient reports new pain     Problem: Discharge Planning  Goal: Discharge to home or other facility with appropriate resources  7/22/2024 1228 by Alejandra Trevizo RN  Outcome: Adequate for Discharge  7/22/2024 0901 by Alejandra Trevizo RN  Outcome: Progressing  Flowsheets (Taken 7/22/2024 08)  Discharge to home or other facility with appropriate resources: Identify barriers to discharge with patient and

## 2024-07-22 NOTE — PROGRESS NOTES
Urology Progress Note      SUBJECTIVE: No new complaints    OBJECTIVE: Complex catheter insertion catheter day #4    REVIEW OF SYSTEMS:   Review of Systems      Physical  VITALS:  /63   Pulse 83   Temp 97.9 °F (36.6 °C) (Temporal)   Resp 16   Ht 1.93 m (6' 4\")   Wt 69.4 kg (153 lb)   SpO2 100%   BMI 18.62 kg/m²   TEMPERATURE:  Current - Temp: 97.9 °F (36.6 °C); Max - Temp  Av.9 °F (36.6 °C)  Min: 97.9 °F (36.6 °C)  Max: 97.9 °F (36.6 °C)   24 HR I&O   Intake/Output Summary (Last 24 hours) at 2024 0752  Last data filed at 2024 0245  Gross per 24 hour   Intake 1994.19 ml   Output 4000 ml   Net -2005.81 ml     BACK: not done  ABDOMEN:  soft, non-distended, and non-tender  HEART:  normal rate  CHEST: Patient has respiratory treatments mask on  GENITAL/URINARY: Normal circumcised penis.  Morataya catheter in place draining clear urine    Data       CBC:   Recent Labs     24  1105 24  0733 24  0324   WBC 8.1 7.8 9.2   HGB 12.2* 12.4* 11.8*   HCT 36.4* 37.5* 36.4*    212 249     BMP:    Recent Labs     24  0733 24  0324 24  0317   * 133* 134*   K 3.7 3.3* 4.2   CL 96* 95* 95*   CO2 29 29 30*   BUN 2* 2* 1*   CREATININE 0.4* 0.4* 0.4*   GLUCOSE 111* 118* 125*       No results for input(s): \"LABURIN\" in the last 72 hours.  No results for input(s): \"BC\" in the last 72 hours.  No results for input(s): \"BLOODCULT2\" in the last 72 hours.    Radiology:      Imaging studies:      ASSESSMENT AND PLAN    Patient Active Problem List   Diagnosis    Idiopathic pulmonary fibrosis (HCC)    Alcohol abuse    Benign prostatic hyperplasia with urinary retention    Hyponatremia    Encounter for Morataya catheter replacement    Urinary retention    Moderate malnutrition (HCC)    Acute on chronic hypoxic respiratory failure (HCC)    Tachypnea, tachyypneic to 40 bpm in ED    Acute respiratory distress, Tachypneic to 40 bpm    RSV infection    Abdominal pain    Palliative

## 2024-07-22 NOTE — PROGRESS NOTES
Attempt number 3 to call report to [facility]. Was placed on hold and after approximately 3 minutes the line was disconnected.

## 2024-09-18 ENCOUNTER — APPOINTMENT (OUTPATIENT)
Dept: CT IMAGING | Age: 74
End: 2024-09-18
Payer: MEDICARE

## 2024-09-18 ENCOUNTER — APPOINTMENT (OUTPATIENT)
Dept: GENERAL RADIOLOGY | Age: 74
End: 2024-09-18
Payer: MEDICARE

## 2024-09-18 ENCOUNTER — HOSPITAL ENCOUNTER (INPATIENT)
Age: 74
LOS: 30 days | Discharge: NURSING, REHAB OR LONG-TERM CARE | End: 2024-10-18
Attending: EMERGENCY MEDICINE | Admitting: INTERNAL MEDICINE
Payer: MEDICARE

## 2024-09-18 DIAGNOSIS — J84.10 PULMONARY FIBROSIS (HCC): ICD-10-CM

## 2024-09-18 DIAGNOSIS — U07.1 COVID: Primary | ICD-10-CM

## 2024-09-18 DIAGNOSIS — Z51.5 HOSPICE CARE: ICD-10-CM

## 2024-09-18 DIAGNOSIS — K22.70 BARRETT'S ESOPHAGUS: ICD-10-CM

## 2024-09-18 DIAGNOSIS — A41.9 SEPTICEMIA (HCC): ICD-10-CM

## 2024-09-18 DIAGNOSIS — R07.9 CHEST PAIN, UNSPECIFIED TYPE: ICD-10-CM

## 2024-09-18 DIAGNOSIS — Z51.5 PALLIATIVE CARE PATIENT: ICD-10-CM

## 2024-09-18 DIAGNOSIS — R41.82 ALTERED MENTAL STATUS, UNSPECIFIED ALTERED MENTAL STATUS TYPE: ICD-10-CM

## 2024-09-18 DIAGNOSIS — R13.10 DYSPHAGIA: ICD-10-CM

## 2024-09-18 DIAGNOSIS — J18.9 PNEUMONIA DUE TO INFECTIOUS ORGANISM, UNSPECIFIED LATERALITY, UNSPECIFIED PART OF LUNG: ICD-10-CM

## 2024-09-18 PROBLEM — I10 HYPERTENSION: Status: ACTIVE | Noted: 2024-09-18

## 2024-09-18 LAB
ALBUMIN SERPL-MCNC: 3.7 G/DL (ref 3.5–5.2)
ALLENS TEST: ABNORMAL
ALP SERPL-CCNC: 153 U/L (ref 40–129)
ALT SERPL-CCNC: 25 U/L (ref 5–41)
ANION GAP SERPL CALCULATED.3IONS-SCNC: 17 MMOL/L (ref 7–19)
APTT PPP: 28.7 SEC (ref 26–36.2)
AST SERPL-CCNC: 23 U/L (ref 5–40)
B PARAP IS1001 DNA NPH QL NAA+NON-PROBE: NOT DETECTED
B PERT.PT PRMT NPH QL NAA+NON-PROBE: NOT DETECTED
BASE EXCESS ARTERIAL: 9.6 MMOL/L (ref -2–2)
BASOPHILS # BLD: 0 K/UL (ref 0–0.2)
BASOPHILS NFR BLD: 0.1 % (ref 0–1)
BILIRUB SERPL-MCNC: 0.8 MG/DL (ref 0.2–1.2)
BNP BLD-MCNC: 512 PG/ML (ref 0–124)
BUN SERPL-MCNC: 16 MG/DL (ref 8–23)
C PNEUM DNA NPH QL NAA+NON-PROBE: NOT DETECTED
CALCIUM SERPL-MCNC: 9.5 MG/DL (ref 8.8–10.2)
CARBOXYHEMOGLOBIN ARTERIAL: 1.6 % (ref 0–5)
CHLORIDE SERPL-SCNC: 89 MMOL/L (ref 98–111)
CO2 SERPL-SCNC: 30 MMOL/L (ref 22–29)
CREAT SERPL-MCNC: 0.7 MG/DL (ref 0.7–1.2)
D DIMER PPP FEU-MCNC: 2.43 UG/ML FEU (ref 0–0.48)
EOSINOPHIL # BLD: 0 K/UL (ref 0–0.6)
EOSINOPHIL NFR BLD: 0 % (ref 0–5)
ERYTHROCYTE [DISTWIDTH] IN BLOOD BY AUTOMATED COUNT: 12.9 % (ref 11.5–14.5)
FLUAV RNA NPH QL NAA+NON-PROBE: NOT DETECTED
FLUBV RNA NPH QL NAA+NON-PROBE: NOT DETECTED
GLUCOSE SERPL-MCNC: 247 MG/DL (ref 70–99)
HADV DNA NPH QL NAA+NON-PROBE: NOT DETECTED
HCO3 ARTERIAL: 33.5 MMOL/L (ref 22–26)
HCOV 229E RNA NPH QL NAA+NON-PROBE: NOT DETECTED
HCOV HKU1 RNA NPH QL NAA+NON-PROBE: NOT DETECTED
HCOV NL63 RNA NPH QL NAA+NON-PROBE: NOT DETECTED
HCOV OC43 RNA NPH QL NAA+NON-PROBE: NOT DETECTED
HCT VFR BLD AUTO: 45.8 % (ref 42–52)
HEMOGLOBIN, ART, EXTENDED: 14.7 G/DL (ref 14–18)
HGB BLD-MCNC: 14.9 G/DL (ref 14–18)
HMPV RNA NPH QL NAA+NON-PROBE: NOT DETECTED
HPIV1 RNA NPH QL NAA+NON-PROBE: NOT DETECTED
HPIV2 RNA NPH QL NAA+NON-PROBE: NOT DETECTED
HPIV3 RNA NPH QL NAA+NON-PROBE: NOT DETECTED
HPIV4 RNA NPH QL NAA+NON-PROBE: NOT DETECTED
IMM GRANULOCYTES # BLD: 0.2 K/UL
INR PPP: 1.09 (ref 0.88–1.18)
LACTATE BLDV-SCNC: 5.8 MMOL/L (ref 0.5–1.9)
LACTATE BLDV-SCNC: 6.3 MMOL/L (ref 0.5–1.9)
LIPASE SERPL-CCNC: 7 U/L (ref 13–60)
LYMPHOCYTES # BLD: 0.6 K/UL (ref 1.1–4.5)
LYMPHOCYTES NFR BLD: 2.2 % (ref 20–40)
M PNEUMO DNA NPH QL NAA+NON-PROBE: NOT DETECTED
MCH RBC QN AUTO: 29.9 PG (ref 27–31)
MCHC RBC AUTO-ENTMCNC: 32.5 G/DL (ref 33–37)
MCV RBC AUTO: 92 FL (ref 80–94)
METHEMOGLOBIN ARTERIAL: 0.9 %
MONOCYTES # BLD: 1.1 K/UL (ref 0–0.9)
MONOCYTES NFR BLD: 4.1 % (ref 0–10)
NEUTROPHILS # BLD: 24.5 K/UL (ref 1.5–7.5)
NEUTS SEG NFR BLD: 92.9 % (ref 50–65)
O2 CONTENT ARTERIAL: 18.9 ML/DL
O2 DELIVERY DEVICE: ABNORMAL
O2 SAT, ARTERIAL: 91.6 %
O2 THERAPY: ABNORMAL
OXYGEN FLOW: 4
PCO2 ARTERIAL: 41 MMHG (ref 35–45)
PH ARTERIAL: 7.52 (ref 7.35–7.45)
PLATELET # BLD AUTO: 505 K/UL (ref 130–400)
PMV BLD AUTO: 9.6 FL (ref 9.4–12.4)
PO2 ARTERIAL: 64 MMHG (ref 80–100)
POTASSIUM BLD-SCNC: 3.5 MMOL/L
POTASSIUM SERPL-SCNC: 3.7 MMOL/L (ref 3.5–5)
PROT SERPL-MCNC: 8.2 G/DL (ref 6.4–8.3)
PROTHROMBIN TIME: 13.8 SEC (ref 12–14.6)
RBC # BLD AUTO: 4.98 M/UL (ref 4.7–6.1)
RSV RNA NPH QL NAA+NON-PROBE: NOT DETECTED
RV+EV RNA NPH QL NAA+NON-PROBE: NOT DETECTED
SAMPLE SOURCE: ABNORMAL
SARS-COV-2 RNA NPH QL NAA+NON-PROBE: DETECTED
SODIUM SERPL-SCNC: 136 MMOL/L (ref 136–145)
TROPONIN, HIGH SENSITIVITY: 26 NG/L (ref 0–22)
TROPONIN, HIGH SENSITIVITY: 26 NG/L (ref 0–22)
WBC # BLD AUTO: 26.3 K/UL (ref 4.8–10.8)

## 2024-09-18 PROCEDURE — 85379 FIBRIN DEGRADATION QUANT: CPT

## 2024-09-18 PROCEDURE — 80053 COMPREHEN METABOLIC PANEL: CPT

## 2024-09-18 PROCEDURE — 99285 EMERGENCY DEPT VISIT HI MDM: CPT

## 2024-09-18 PROCEDURE — 36415 COLL VENOUS BLD VENIPUNCTURE: CPT

## 2024-09-18 PROCEDURE — 96366 THER/PROPH/DIAG IV INF ADDON: CPT

## 2024-09-18 PROCEDURE — 96375 TX/PRO/DX INJ NEW DRUG ADDON: CPT

## 2024-09-18 PROCEDURE — 96365 THER/PROPH/DIAG IV INF INIT: CPT

## 2024-09-18 PROCEDURE — 71045 X-RAY EXAM CHEST 1 VIEW: CPT

## 2024-09-18 PROCEDURE — 71275 CT ANGIOGRAPHY CHEST: CPT

## 2024-09-18 PROCEDURE — 93005 ELECTROCARDIOGRAM TRACING: CPT | Performed by: EMERGENCY MEDICINE

## 2024-09-18 PROCEDURE — 83690 ASSAY OF LIPASE: CPT

## 2024-09-18 PROCEDURE — 70450 CT HEAD/BRAIN W/O DYE: CPT

## 2024-09-18 PROCEDURE — 85025 COMPLETE CBC W/AUTO DIFF WBC: CPT

## 2024-09-18 PROCEDURE — 6370000000 HC RX 637 (ALT 250 FOR IP)

## 2024-09-18 PROCEDURE — 36600 WITHDRAWAL OF ARTERIAL BLOOD: CPT

## 2024-09-18 PROCEDURE — 2580000003 HC RX 258: Performed by: EMERGENCY MEDICINE

## 2024-09-18 PROCEDURE — 82803 BLOOD GASES ANY COMBINATION: CPT

## 2024-09-18 PROCEDURE — 85610 PROTHROMBIN TIME: CPT

## 2024-09-18 PROCEDURE — 83880 ASSAY OF NATRIURETIC PEPTIDE: CPT

## 2024-09-18 PROCEDURE — 6360000004 HC RX CONTRAST MEDICATION: Performed by: EMERGENCY MEDICINE

## 2024-09-18 PROCEDURE — 74177 CT ABD & PELVIS W/CONTRAST: CPT

## 2024-09-18 PROCEDURE — 84484 ASSAY OF TROPONIN QUANT: CPT

## 2024-09-18 PROCEDURE — 87040 BLOOD CULTURE FOR BACTERIA: CPT

## 2024-09-18 PROCEDURE — 85730 THROMBOPLASTIN TIME PARTIAL: CPT

## 2024-09-18 PROCEDURE — 94640 AIRWAY INHALATION TREATMENT: CPT

## 2024-09-18 PROCEDURE — 1200000000 HC SEMI PRIVATE

## 2024-09-18 PROCEDURE — 0202U NFCT DS 22 TRGT SARS-COV-2: CPT

## 2024-09-18 PROCEDURE — 2580000003 HC RX 258

## 2024-09-18 PROCEDURE — 83605 ASSAY OF LACTIC ACID: CPT

## 2024-09-18 PROCEDURE — 6360000002 HC RX W HCPCS: Performed by: EMERGENCY MEDICINE

## 2024-09-18 RX ORDER — GUAIFENESIN 600 MG/1
600 TABLET, EXTENDED RELEASE ORAL 2 TIMES DAILY
Status: DISCONTINUED | OUTPATIENT
Start: 2024-09-18 | End: 2024-10-18 | Stop reason: HOSPADM

## 2024-09-18 RX ORDER — FINASTERIDE 5 MG/1
5 TABLET, FILM COATED ORAL DAILY
Status: DISCONTINUED | OUTPATIENT
Start: 2024-09-19 | End: 2024-10-18 | Stop reason: HOSPADM

## 2024-09-18 RX ORDER — LORAZEPAM 2 MG/ML
1 INJECTION INTRAMUSCULAR ONCE
Status: COMPLETED | OUTPATIENT
Start: 2024-09-18 | End: 2024-09-18

## 2024-09-18 RX ORDER — BUDESONIDE AND FORMOTEROL FUMARATE DIHYDRATE 160; 4.5 UG/1; UG/1
2 AEROSOL RESPIRATORY (INHALATION) 2 TIMES DAILY
Status: DISCONTINUED | OUTPATIENT
Start: 2024-09-18 | End: 2024-10-18 | Stop reason: HOSPADM

## 2024-09-18 RX ORDER — SODIUM CHLORIDE 0.9 % (FLUSH) 0.9 %
5-40 SYRINGE (ML) INJECTION PRN
Status: DISCONTINUED | OUTPATIENT
Start: 2024-09-18 | End: 2024-09-22 | Stop reason: SDUPTHER

## 2024-09-18 RX ORDER — 0.9 % SODIUM CHLORIDE 0.9 %
30 INTRAVENOUS SOLUTION INTRAVENOUS ONCE
Status: COMPLETED | OUTPATIENT
Start: 2024-09-18 | End: 2024-09-18

## 2024-09-18 RX ORDER — POLYETHYLENE GLYCOL 3350 17 G/17G
17 POWDER, FOR SOLUTION ORAL DAILY PRN
Status: DISCONTINUED | OUTPATIENT
Start: 2024-09-18 | End: 2024-10-18 | Stop reason: HOSPADM

## 2024-09-18 RX ORDER — SODIUM CHLORIDE 9 MG/ML
INJECTION, SOLUTION INTRAVENOUS CONTINUOUS
Status: ACTIVE | OUTPATIENT
Start: 2024-09-18 | End: 2024-09-20

## 2024-09-18 RX ORDER — ALBUTEROL SULFATE 90 UG/1
2 INHALANT RESPIRATORY (INHALATION) EVERY 6 HOURS PRN
Status: DISCONTINUED | OUTPATIENT
Start: 2024-09-18 | End: 2024-10-18 | Stop reason: HOSPADM

## 2024-09-18 RX ORDER — ONDANSETRON 4 MG/1
4 TABLET, ORALLY DISINTEGRATING ORAL EVERY 8 HOURS PRN
Status: DISCONTINUED | OUTPATIENT
Start: 2024-09-18 | End: 2024-10-18 | Stop reason: HOSPADM

## 2024-09-18 RX ORDER — ENOXAPARIN SODIUM 100 MG/ML
40 INJECTION SUBCUTANEOUS DAILY
Status: DISCONTINUED | OUTPATIENT
Start: 2024-09-19 | End: 2024-09-19

## 2024-09-18 RX ORDER — ONDANSETRON 2 MG/ML
4 INJECTION INTRAMUSCULAR; INTRAVENOUS EVERY 6 HOURS PRN
Status: DISCONTINUED | OUTPATIENT
Start: 2024-09-18 | End: 2024-10-18 | Stop reason: HOSPADM

## 2024-09-18 RX ORDER — TAMSULOSIN HYDROCHLORIDE 0.4 MG/1
0.8 CAPSULE ORAL DAILY
Status: DISCONTINUED | OUTPATIENT
Start: 2024-09-19 | End: 2024-10-18 | Stop reason: HOSPADM

## 2024-09-18 RX ORDER — SODIUM CHLORIDE 9 MG/ML
INJECTION, SOLUTION INTRAVENOUS PRN
Status: DISCONTINUED | OUTPATIENT
Start: 2024-09-18 | End: 2024-09-22 | Stop reason: SDUPTHER

## 2024-09-18 RX ORDER — BENZONATATE 100 MG/1
100 CAPSULE ORAL 3 TIMES DAILY PRN
Status: DISCONTINUED | OUTPATIENT
Start: 2024-09-18 | End: 2024-10-18 | Stop reason: HOSPADM

## 2024-09-18 RX ORDER — SODIUM CHLORIDE 0.9 % (FLUSH) 0.9 %
5-40 SYRINGE (ML) INJECTION EVERY 12 HOURS SCHEDULED
Status: DISCONTINUED | OUTPATIENT
Start: 2024-09-18 | End: 2024-09-22 | Stop reason: SDUPTHER

## 2024-09-18 RX ORDER — IPRATROPIUM BROMIDE AND ALBUTEROL SULFATE 2.5; .5 MG/3ML; MG/3ML
1 SOLUTION RESPIRATORY (INHALATION) ONCE
Status: DISCONTINUED | OUTPATIENT
Start: 2024-09-18 | End: 2024-09-18

## 2024-09-18 RX ORDER — ACETAMINOPHEN 325 MG/1
650 TABLET ORAL EVERY 6 HOURS PRN
Status: DISCONTINUED | OUTPATIENT
Start: 2024-09-18 | End: 2024-10-18 | Stop reason: HOSPADM

## 2024-09-18 RX ORDER — METOPROLOL SUCCINATE 25 MG/1
25 TABLET, EXTENDED RELEASE ORAL DAILY
Status: DISCONTINUED | OUTPATIENT
Start: 2024-09-19 | End: 2024-09-19

## 2024-09-18 RX ORDER — IOPAMIDOL 755 MG/ML
70 INJECTION, SOLUTION INTRAVASCULAR
Status: COMPLETED | OUTPATIENT
Start: 2024-09-18 | End: 2024-09-18

## 2024-09-18 RX ORDER — ACETAMINOPHEN 650 MG/1
650 SUPPOSITORY RECTAL EVERY 6 HOURS PRN
Status: DISCONTINUED | OUTPATIENT
Start: 2024-09-18 | End: 2024-10-18 | Stop reason: HOSPADM

## 2024-09-18 RX ORDER — VITAMIN B COMPLEX
2000 TABLET ORAL DAILY
Status: DISCONTINUED | OUTPATIENT
Start: 2024-09-19 | End: 2024-10-18 | Stop reason: HOSPADM

## 2024-09-18 RX ADMIN — ACETAMINOPHEN 650 MG: 325 TABLET ORAL at 23:52

## 2024-09-18 RX ADMIN — SODIUM CHLORIDE: 9 INJECTION, SOLUTION INTRAVENOUS at 22:33

## 2024-09-18 RX ADMIN — WATER 125 MG: 1 INJECTION INTRAMUSCULAR; INTRAVENOUS; SUBCUTANEOUS at 14:38

## 2024-09-18 RX ADMIN — SODIUM CHLORIDE 1905 ML: 9 INJECTION, SOLUTION INTRAVENOUS at 14:38

## 2024-09-18 RX ADMIN — BUDESONIDE AND FORMOTEROL FUMARATE DIHYDRATE 2 PUFF: 160; 4.5 AEROSOL RESPIRATORY (INHALATION) at 23:02

## 2024-09-18 RX ADMIN — SODIUM CHLORIDE, PRESERVATIVE FREE 10 ML: 5 INJECTION INTRAVENOUS at 23:37

## 2024-09-18 RX ADMIN — LORAZEPAM 1 MG: 2 INJECTION INTRAMUSCULAR; INTRAVENOUS at 15:18

## 2024-09-18 RX ADMIN — CEFEPIME 2000 MG: 2 INJECTION, POWDER, FOR SOLUTION INTRAVENOUS at 14:40

## 2024-09-18 RX ADMIN — IOPAMIDOL 70 ML: 755 INJECTION, SOLUTION INTRAVENOUS at 15:23

## 2024-09-18 RX ADMIN — Medication 1500 MG: at 15:47

## 2024-09-18 RX ADMIN — GUAIFENESIN 600 MG: 600 TABLET ORAL at 23:37

## 2024-09-18 ASSESSMENT — PAIN DESCRIPTION - DESCRIPTORS: DESCRIPTORS: ACHING

## 2024-09-18 ASSESSMENT — PAIN DESCRIPTION - LOCATION: LOCATION: HEAD

## 2024-09-18 NOTE — H&P
[facility]      Hospitalist - History & Physical      PCP: Ross Masterson MD    Date of Admission: 9/18/2024    Date of Service: 9/18/2024    Chief Complaint: Shortness of breath    History Of Present Illness:   The patient is a 73 y.o. male presents to ER for evaluation of shortness of breath.  Patient does also have some altered mental status and has been vomiting.  He is very poor historian, I am unsure of baseline mental status.  Patient apparently normally wears 2 L of oxygen at home but is requiring 4 L today.  He is complaining of some mid abdominal pain with nausea vomiting and some chest tightness.  He is unsure of place and time, he also has a chronic indwelling Morataya catheter.  He has been having incontinence of bowel as well.  His bottom is very red and irritated, pictures were taken in ED. Denies fever, chills, and chest pain.      In ED, CXR with small bilateral pleural effusions with bibasilar basilar atelectasis and/or pneumonia, right greater than left; CT head with no acute intracranial process, acute bilateral maxillary sinusitis; CT abdomen and pelvis with no acute abnormality of the abdomen and pelvis; CTA pulmonary with no pulmonary embolus, stable moderate to severe diffuse pulmonary fibrosis, most pronounced in the mid and lower lungs but no honeycombing; lactic acid 6.3-->5.8, , troponin 26, WBC 26.3, D-dimer 2.43.  Will admit inpatient to hospitalist.    Past Medical History:        Diagnosis Date    GERD (gastroesophageal reflux disease)     Palliative care patient 01/08/2024    Pneumonia     Psychiatric problem        Past Surgical History:        Procedure Laterality Date    ABDOMEN SURGERY      COLONOSCOPY         Home Medications:  Prior to Admission medications    Medication Sig Start Date End Date Taking? Authorizing Provider   budesonide-formoterol (SYMBICORT) 160-4.5 MCG/ACT AERO Inhale 2 puffs into the lungs 2 times daily 1/11/24   Zachary Ibanez MD

## 2024-09-18 NOTE — ED PROVIDER NOTES
[facility] EMERGENCY DEPT  eMERGENCY dEPARTMENT eNCOUnter      Pt Name: Eze Rosa  MRN: [id number]  Birthdate 1950  Date of evaluation: 9/18/2024  Provider: Carmen Paris MD    CHIEF COMPLAINT       Chief Complaint   Patient presents with    Shortness of Breath    Emesis         HISTORY OF PRESENT ILLNESS   (Location/Symptom, Timing/Onset,Context/Setting, Quality, Duration, Modifying Factors, Severity)  Note limiting factors.   Eze Rosa is a 73 y.o. male who presents to the emergency department with altered mental status shortness of breath and vomiting.  The patient says that he has been feeling short of breath.  He does wear normally 2 L of oxygen at baseline but is requiring 4 L today.  His chest feels tight.  He reports diffuse abdominal pain with nausea and vomiting.  He has a chronic indwelling Morataya catheter.  He is unsure if he has had a fever.  He is disoriented to place and time.    HPI    NursingNotes were reviewed.    REVIEW OF SYSTEMS    (2-9 systems for level 4, 10 or more for level 5)     Review of Systems   Unable to perform ROS: Mental status change   Respiratory:  Positive for shortness of breath.    Gastrointestinal:  Positive for abdominal pain, nausea and vomiting.       A complete review of systems was performed and is negative except as noted above in the HPI.       PAST MEDICAL HISTORY     Past Medical History:   Diagnosis Date    GERD (gastroesophageal reflux disease)     Palliative care patient 01/08/2024    Pneumonia     Psychiatric problem          SURGICAL HISTORY       Past Surgical History:   Procedure Laterality Date    ABDOMEN SURGERY      COLONOSCOPY           CURRENT MEDICATIONS       Previous Medications    ALBUTEROL-IPRATROPIUM (COMBIVENT RESPIMAT)  MCG/ACT AERS INHALER    Inhale 1 puff into the lungs every 6 hours as needed for Wheezing    BUDESONIDE-FORMOTEROL (SYMBICORT) 160-4.5 MCG/ACT AERO    Inhale 2 puffs into the lungs 2 times daily    FINASTERIDE (PROSCAR) 5 MG  unspecified part of lung    3. Septicemia (HCC)    4. Altered mental status, unspecified altered mental status type          DISPOSITION/PLAN   DISPOSITION Admitted 09/18/2024 06:08:47 PM  Condition at Disposition: Data Unavailable      PATIENT REFERRED TO:  @FUP@    DISCHARGE MEDICATIONS:  New Prescriptions    No medications on file          (Please note that portions of this note were completed with a voice recognition program.  Efforts were made to edit the dictations butoccasionally words are mis-transcribed.)    Carmen Paris MD (electronically signed)  AttendingEmerMer[facility] Hospital [facility] Physician          Carmen Paris MD  09/18/24 1910

## 2024-09-18 NOTE — ED NOTES
Pt with second incontinent bowel episode. Loose, dark brown. Pt cleaned up. Melany-care performed. New pad and sheet placed.

## 2024-09-18 NOTE — PROGRESS NOTES
[facility]   Pharmacy Pharmacokinetic Monitoring Service - Vancomycin     Eze Rosa is a 73 y.o. male starting on vancomycin therapy for sepsis. Pharmacy consulted by Dr. Paris for monitoring and adjustment.    Target Concentration: Goal AUC/MONIK 400-600 mg*hr/L    Additional Antimicrobials: Cefepime    Pertinent Laboratory Values:   Wt Readings from Last 1 Encounters:   09/18/24 63.5 kg (140 lb)     Temp Readings from Last 1 Encounters:   09/18/24 97.6 °F (36.4 °C) (Axillary)     Estimated Creatinine Clearance: 87 mL/min (based on SCr of 0.7 mg/dL).  Recent Labs     09/18/24  1350   CREATININE 0.7   BUN 16   WBC 26.3*     Procalcitonin: N/A    Pertinent Cultures:  Culture Date Source Results   09/18/24 Blood x 2 ordered   MRSA Nasal Swab: N/A. Non-respiratory infection.    Plan:  Dosing recommendations based on Bayesian software  Start vancomycin 1500 mg IV x 1 dose followed by 750 mg IV Q 8 hours  Anticipated AUC of 510 and trough concentration of 17 at steady state  Renal labs as indicated   Vancomycin concentration ordered for 09/19/24 @ 1530   Pharmacy will continue to monitor patient and adjust therapy as indicated    Thank you for the consult,  Uriel Esquivel RPH  9/18/2024 2:38 PM

## 2024-09-18 NOTE — ED NOTES
Applied arm board to right arm.  Pt refuses to keep arm straight in order to receive meds through IV in right AC.

## 2024-09-19 ENCOUNTER — APPOINTMENT (OUTPATIENT)
Age: 74
End: 2024-09-19
Payer: MEDICARE

## 2024-09-19 LAB
25(OH)D3 SERPL-MCNC: 34.3 NG/ML
ALBUMIN SERPL-MCNC: 3.1 G/DL (ref 3.5–5.2)
ALLENS TEST: ABNORMAL
ALP SERPL-CCNC: 109 U/L (ref 40–129)
ALT SERPL-CCNC: 17 U/L (ref 5–41)
ANION GAP SERPL CALCULATED.3IONS-SCNC: 10 MMOL/L (ref 7–19)
ANTI-XA UNFRAC HEPARIN: 0.47 IU/ML (ref 0.3–0.7)
APTT PPP: 29.8 SEC (ref 26–36.2)
AST SERPL-CCNC: 16 U/L (ref 5–40)
BASE EXCESS ARTERIAL: 2.6 MMOL/L (ref -2–2)
BASOPHILS # BLD: 0 K/UL (ref 0–0.2)
BASOPHILS NFR BLD: 0.1 % (ref 0–1)
BILIRUB SERPL-MCNC: 0.8 MG/DL (ref 0.2–1.2)
BUN SERPL-MCNC: 20 MG/DL (ref 8–23)
CALCIUM SERPL-MCNC: 8.8 MG/DL (ref 8.8–10.2)
CARBOXYHEMOGLOBIN ARTERIAL: 1.4 % (ref 0–5)
CHLORIDE SERPL-SCNC: 98 MMOL/L (ref 98–111)
CO2 SERPL-SCNC: 27 MMOL/L (ref 22–29)
CREAT SERPL-MCNC: 0.5 MG/DL (ref 0.7–1.2)
CRP SERPL HS-MCNC: 11.55 MG/DL (ref 0–0.5)
ECHO AO ASC DIAM: 3.2 CM
ECHO AO ROOT DIAM: 2.8 CM
ECHO AO SINUS VALSALVA DIAM: 3.2 CM
ECHO AO ST JNCT DIAM: 3.3 CM
ECHO AV AREA PEAK VELOCITY: 2.5 CM2
ECHO AV AREA VTI: 3.1 CM2
ECHO AV MEAN GRADIENT: 3 MMHG
ECHO AV MEAN VELOCITY: 0.8 M/S
ECHO AV PEAK GRADIENT: 6 MMHG
ECHO AV PEAK VELOCITY: 1.3 M/S
ECHO AV VELOCITY RATIO: 0.77
ECHO AV VTI: 19.7 CM
ECHO LA AREA 2C: 13.7 CM2
ECHO LA AREA 4C: 9.5 CM2
ECHO LA DIAMETER: 2.9 CM
ECHO LA MAJOR AXIS: 4.2 CM
ECHO LA MINOR AXIS: 3.8 CM
ECHO LA TO AORTIC ROOT RATIO: 1.04
ECHO LA VOL BP: 26 ML (ref 18–58)
ECHO LA VOL MOD A2C: 37 ML (ref 18–58)
ECHO LA VOL MOD A4C: 17 ML (ref 18–58)
ECHO LV E' LATERAL VELOCITY: 8 CM/S
ECHO LV E' SEPTAL VELOCITY: 8 CM/S
ECHO LV EDV A2C: 107 ML
ECHO LV EDV A4C: 93 ML
ECHO LV EJECTION FRACTION A2C: 54 %
ECHO LV EJECTION FRACTION A4C: 66 %
ECHO LV EJECTION FRACTION BIPLANE: 60 % (ref 55–100)
ECHO LV ESV A2C: 49 ML
ECHO LV ESV A4C: 32 ML
ECHO LV FRACTIONAL SHORTENING: 31 % (ref 28–44)
ECHO LV INTERNAL DIMENSION DIASTOLIC: 5.1 CM (ref 4.2–5.9)
ECHO LV INTERNAL DIMENSION SYSTOLIC: 3.5 CM
ECHO LV IVSD: 0.7 CM (ref 0.6–1)
ECHO LV MASS 2D: 118.7 G (ref 88–224)
ECHO LV POSTERIOR WALL DIASTOLIC: 0.7 CM (ref 0.6–1)
ECHO LV RELATIVE WALL THICKNESS RATIO: 0.27
ECHO LVOT AREA: 3.1 CM2
ECHO LVOT AV VTI INDEX: 0.98
ECHO LVOT DIAM: 2 CM
ECHO LVOT MEAN GRADIENT: 2 MMHG
ECHO LVOT PEAK GRADIENT: 4 MMHG
ECHO LVOT PEAK VELOCITY: 1 M/S
ECHO LVOT SV: 60.9 ML
ECHO LVOT VTI: 19.4 CM
ECHO MV A VELOCITY: 0.78 M/S
ECHO MV AREA VTI: 4.4 CM2
ECHO MV E DECELERATION TIME (DT): 206 MS
ECHO MV E VELOCITY: 0.86 M/S
ECHO MV E/A RATIO: 1.1
ECHO MV E/E' LATERAL: 10.75
ECHO MV E/E' RATIO (AVERAGED): 10.75
ECHO MV E/E' SEPTAL: 10.75
ECHO MV LVOT VTI INDEX: 0.72
ECHO MV MAX VELOCITY: 0.9 M/S
ECHO MV MEAN GRADIENT: 2 MMHG
ECHO MV MEAN VELOCITY: 0.6 M/S
ECHO MV PEAK GRADIENT: 3 MMHG
ECHO MV VTI: 14 CM
ECHO PULMONARY ARTERY END DIASTOLIC PRESSURE: 6 MMHG
ECHO PV REGURGITANT MAX VELOCITY: 1.3 M/S
ECHO RA AREA 4C: 10.7 CM2
ECHO RA VOLUME: 27 ML
ECHO RV BASAL DIMENSION: 3.5 CM
ECHO RV INTERNAL DIMENSION: 1.9 CM
ECHO RV LONGITUDINAL DIMENSION: 5.2 CM
ECHO RV MID DIMENSION: 3.1 CM
ECHO RV TAPSE: 2.4 CM (ref 1.7–?)
EKG P AXIS: 45 DEGREES
EKG P AXIS: NORMAL DEGREES
EKG P-R INTERVAL: 136 MS
EKG P-R INTERVAL: NORMAL MS
EKG Q-T INTERVAL: 288 MS
EKG Q-T INTERVAL: 306 MS
EKG QRS DURATION: 70 MS
EKG QRS DURATION: 74 MS
EKG QTC CALCULATION (BAZETT): 407 MS
EKG QTC CALCULATION (BAZETT): 433 MS
EKG T AXIS: -16 DEGREES
EKG T AXIS: 61 DEGREES
EOSINOPHIL # BLD: 0 K/UL (ref 0–0.6)
EOSINOPHIL NFR BLD: 0 % (ref 0–5)
ERYTHROCYTE [DISTWIDTH] IN BLOOD BY AUTOMATED COUNT: 13.2 % (ref 11.5–14.5)
GLUCOSE SERPL-MCNC: 136 MG/DL (ref 70–99)
HCO3 ARTERIAL: 27.2 MMOL/L (ref 22–26)
HCT VFR BLD AUTO: 40.4 % (ref 42–52)
HEMOGLOBIN, ART, EXTENDED: 11.2 G/DL (ref 14–18)
HGB BLD-MCNC: 12.7 G/DL (ref 14–18)
IMM GRANULOCYTES # BLD: 0.3 K/UL
INR PPP: 1.1 (ref 0.88–1.18)
LACTATE BLDV-SCNC: 1.4 MMOL/L (ref 0.5–1.9)
LYMPHOCYTES # BLD: 0.6 K/UL (ref 1.1–4.5)
LYMPHOCYTES NFR BLD: 2 % (ref 20–40)
MCH RBC QN AUTO: 30.2 PG (ref 27–31)
MCHC RBC AUTO-ENTMCNC: 31.4 G/DL (ref 33–37)
MCV RBC AUTO: 96 FL (ref 80–94)
METHEMOGLOBIN ARTERIAL: 1 %
MONOCYTES # BLD: 1.8 K/UL (ref 0–0.9)
MONOCYTES NFR BLD: 5.5 % (ref 0–10)
MRSA DNA SPEC QL NAA+PROBE: DETECTED
NEUTROPHILS # BLD: 28.9 K/UL (ref 1.5–7.5)
NEUTS SEG NFR BLD: 91.6 % (ref 50–65)
O2 CONTENT ARTERIAL: 15.3 ML/DL
O2 DELIVERY DEVICE: ABNORMAL
O2 SAT, ARTERIAL: 96 %
O2 THERAPY: ABNORMAL
OXYGEN FLOW: 5
PCO2 ARTERIAL: 41 MMHG (ref 35–45)
PH ARTERIAL: 7.43 (ref 7.35–7.45)
PLATELET # BLD AUTO: 377 K/UL (ref 130–400)
PMV BLD AUTO: 10 FL (ref 9.4–12.4)
PO2 ARTERIAL: 108 MMHG (ref 80–100)
POTASSIUM BLD-SCNC: 3.4 MMOL/L
POTASSIUM SERPL-SCNC: 3.7 MMOL/L (ref 3.5–5)
PROCALCITONIN: 0.08 NG/ML (ref 0–0.09)
PROT SERPL-MCNC: 6.7 G/DL (ref 6.4–8.3)
PROTHROMBIN TIME: 13.9 SEC (ref 12–14.6)
RBC # BLD AUTO: 4.21 M/UL (ref 4.7–6.1)
REASON FOR REJECTION: NORMAL
REJECTED TEST: NORMAL
S PYO AG THROAT QL: NEGATIVE
SAMPLE SOURCE: ABNORMAL
SODIUM SERPL-SCNC: 135 MMOL/L (ref 136–145)
TROPONIN, HIGH SENSITIVITY: 33 NG/L (ref 0–22)
TROPONIN, HIGH SENSITIVITY: 37 NG/L (ref 0–22)
VANCOMYCIN TROUGH SERPL-MCNC: 14.8 UG/ML (ref 10–20)
WBC # BLD AUTO: 31.6 K/UL (ref 4.8–10.8)

## 2024-09-19 PROCEDURE — 6370000000 HC RX 637 (ALT 250 FOR IP)

## 2024-09-19 PROCEDURE — 84145 PROCALCITONIN (PCT): CPT

## 2024-09-19 PROCEDURE — 87641 MR-STAPH DNA AMP PROBE: CPT

## 2024-09-19 PROCEDURE — 93306 TTE W/DOPPLER COMPLETE: CPT | Performed by: INTERNAL MEDICINE

## 2024-09-19 PROCEDURE — 85730 THROMBOPLASTIN TIME PARTIAL: CPT

## 2024-09-19 PROCEDURE — XW0DXM6 INTRODUCTION OF BARICITINIB INTO MOUTH AND PHARYNX, EXTERNAL APPROACH, NEW TECHNOLOGY GROUP 6: ICD-10-PCS | Performed by: INTERNAL MEDICINE

## 2024-09-19 PROCEDURE — 93010 ELECTROCARDIOGRAM REPORT: CPT | Performed by: INTERNAL MEDICINE

## 2024-09-19 PROCEDURE — 2500000003 HC RX 250 WO HCPCS

## 2024-09-19 PROCEDURE — 2580000003 HC RX 258

## 2024-09-19 PROCEDURE — 87077 CULTURE AEROBIC IDENTIFY: CPT

## 2024-09-19 PROCEDURE — 87081 CULTURE SCREEN ONLY: CPT

## 2024-09-19 PROCEDURE — 2700000000 HC OXYGEN THERAPY PER DAY

## 2024-09-19 PROCEDURE — 82306 VITAMIN D 25 HYDROXY: CPT

## 2024-09-19 PROCEDURE — 84484 ASSAY OF TROPONIN QUANT: CPT

## 2024-09-19 PROCEDURE — 85025 COMPLETE CBC W/AUTO DIFF WBC: CPT

## 2024-09-19 PROCEDURE — 1200000000 HC SEMI PRIVATE

## 2024-09-19 PROCEDURE — 82803 BLOOD GASES ANY COMBINATION: CPT

## 2024-09-19 PROCEDURE — 86140 C-REACTIVE PROTEIN: CPT

## 2024-09-19 PROCEDURE — 93005 ELECTROCARDIOGRAM TRACING: CPT

## 2024-09-19 PROCEDURE — 36415 COLL VENOUS BLD VENIPUNCTURE: CPT

## 2024-09-19 PROCEDURE — 87880 STREP A ASSAY W/OPTIC: CPT

## 2024-09-19 PROCEDURE — 36600 WITHDRAWAL OF ARTERIAL BLOOD: CPT

## 2024-09-19 PROCEDURE — 99222 1ST HOSP IP/OBS MODERATE 55: CPT | Performed by: INTERNAL MEDICINE

## 2024-09-19 PROCEDURE — 6360000002 HC RX W HCPCS: Performed by: EMERGENCY MEDICINE

## 2024-09-19 PROCEDURE — 85520 HEPARIN ASSAY: CPT

## 2024-09-19 PROCEDURE — 6360000002 HC RX W HCPCS

## 2024-09-19 PROCEDURE — 80202 ASSAY OF VANCOMYCIN: CPT

## 2024-09-19 PROCEDURE — 92522 EVALUATE SPEECH PRODUCTION: CPT

## 2024-09-19 PROCEDURE — 83605 ASSAY OF LACTIC ACID: CPT

## 2024-09-19 PROCEDURE — 93356 MYOCRD STRAIN IMG SPCKL TRCK: CPT | Performed by: INTERNAL MEDICINE

## 2024-09-19 PROCEDURE — 80053 COMPREHEN METABOLIC PANEL: CPT

## 2024-09-19 PROCEDURE — 85610 PROTHROMBIN TIME: CPT

## 2024-09-19 PROCEDURE — 94640 AIRWAY INHALATION TREATMENT: CPT

## 2024-09-19 PROCEDURE — 92610 EVALUATE SWALLOWING FUNCTION: CPT

## 2024-09-19 PROCEDURE — 93306 TTE W/DOPPLER COMPLETE: CPT

## 2024-09-19 PROCEDURE — 2580000003 HC RX 258: Performed by: EMERGENCY MEDICINE

## 2024-09-19 RX ORDER — ENOXAPARIN SODIUM 100 MG/ML
1 INJECTION SUBCUTANEOUS 2 TIMES DAILY
Status: DISCONTINUED | OUTPATIENT
Start: 2024-09-19 | End: 2024-09-26

## 2024-09-19 RX ORDER — DIGOXIN 0.25 MG/ML
125 INJECTION INTRAMUSCULAR; INTRAVENOUS ONCE
Status: COMPLETED | OUTPATIENT
Start: 2024-09-19 | End: 2024-09-19

## 2024-09-19 RX ORDER — HEPARIN SODIUM 1000 [USP'U]/ML
60 INJECTION, SOLUTION INTRAVENOUS; SUBCUTANEOUS PRN
Status: DISCONTINUED | OUTPATIENT
Start: 2024-09-19 | End: 2024-09-19

## 2024-09-19 RX ORDER — SODIUM CHLORIDE 9 MG/ML
INJECTION, SOLUTION INTRAVENOUS PRN
Status: DISCONTINUED | OUTPATIENT
Start: 2024-09-19 | End: 2024-10-18 | Stop reason: HOSPADM

## 2024-09-19 RX ORDER — SODIUM CHLORIDE 0.9 % (FLUSH) 0.9 %
5-40 SYRINGE (ML) INJECTION PRN
Status: DISCONTINUED | OUTPATIENT
Start: 2024-09-19 | End: 2024-10-18 | Stop reason: HOSPADM

## 2024-09-19 RX ORDER — SODIUM CHLORIDE 0.9 % (FLUSH) 0.9 %
5-40 SYRINGE (ML) INJECTION EVERY 12 HOURS SCHEDULED
Status: DISCONTINUED | OUTPATIENT
Start: 2024-09-19 | End: 2024-10-18 | Stop reason: HOSPADM

## 2024-09-19 RX ORDER — 0.9 % SODIUM CHLORIDE 0.9 %
1000 INTRAVENOUS SOLUTION INTRAVENOUS ONCE
Status: COMPLETED | OUTPATIENT
Start: 2024-09-19 | End: 2024-09-19

## 2024-09-19 RX ORDER — DILTIAZEM HYDROCHLORIDE 5 MG/ML
10 INJECTION INTRAVENOUS ONCE
Status: DISCONTINUED | OUTPATIENT
Start: 2024-09-19 | End: 2024-09-19

## 2024-09-19 RX ORDER — HEPARIN SODIUM 10000 [USP'U]/100ML
5-30 INJECTION, SOLUTION INTRAVENOUS CONTINUOUS
Status: DISCONTINUED | OUTPATIENT
Start: 2024-09-19 | End: 2024-09-19

## 2024-09-19 RX ORDER — HEPARIN SODIUM 1000 [USP'U]/ML
60 INJECTION, SOLUTION INTRAVENOUS; SUBCUTANEOUS ONCE
Status: DISCONTINUED | OUTPATIENT
Start: 2024-09-19 | End: 2024-09-19

## 2024-09-19 RX ORDER — DILTIAZEM HCL IN NACL,ISO-OSM 125 MG/125
2.5-15 PLASTIC BAG, INJECTION (ML) INTRAVENOUS CONTINUOUS
Status: DISCONTINUED | OUTPATIENT
Start: 2024-09-19 | End: 2024-09-19

## 2024-09-19 RX ORDER — HEPARIN SODIUM 1000 [USP'U]/ML
30 INJECTION, SOLUTION INTRAVENOUS; SUBCUTANEOUS PRN
Status: DISCONTINUED | OUTPATIENT
Start: 2024-09-19 | End: 2024-09-19

## 2024-09-19 RX ORDER — METOPROLOL SUCCINATE 25 MG/1
25 TABLET, EXTENDED RELEASE ORAL 2 TIMES DAILY
Status: DISCONTINUED | OUTPATIENT
Start: 2024-09-19 | End: 2024-09-20

## 2024-09-19 RX ORDER — NOREPINEPHRINE BITARTRATE 0.06 MG/ML
1-100 INJECTION, SOLUTION INTRAVENOUS CONTINUOUS
Status: DISCONTINUED | OUTPATIENT
Start: 2024-09-19 | End: 2024-09-19

## 2024-09-19 RX ADMIN — Medication: at 08:00

## 2024-09-19 RX ADMIN — WATER 40 MG: 1 INJECTION INTRAMUSCULAR; INTRAVENOUS; SUBCUTANEOUS at 05:06

## 2024-09-19 RX ADMIN — Medication: at 18:09

## 2024-09-19 RX ADMIN — VANCOMYCIN HYDROCHLORIDE 750 MG: 750 INJECTION, POWDER, LYOPHILIZED, FOR SOLUTION INTRAVENOUS at 00:03

## 2024-09-19 RX ADMIN — PHENOL 1 SPRAY: 1.5 LIQUID ORAL at 14:37

## 2024-09-19 RX ADMIN — Medication: at 12:00

## 2024-09-19 RX ADMIN — SODIUM CHLORIDE 1000 ML: 9 INJECTION, SOLUTION INTRAVENOUS at 08:00

## 2024-09-19 RX ADMIN — CEFEPIME 2000 MG: 2 INJECTION, POWDER, FOR SOLUTION INTRAVENOUS at 01:10

## 2024-09-19 RX ADMIN — PHENOL 1 SPRAY: 1.5 LIQUID ORAL at 18:10

## 2024-09-19 RX ADMIN — VANCOMYCIN HYDROCHLORIDE 750 MG: 750 INJECTION, POWDER, LYOPHILIZED, FOR SOLUTION INTRAVENOUS at 08:44

## 2024-09-19 RX ADMIN — SODIUM CHLORIDE, PRESERVATIVE FREE 40 MG: 5 INJECTION INTRAVENOUS at 11:08

## 2024-09-19 RX ADMIN — WATER 40 MG: 1 INJECTION INTRAMUSCULAR; INTRAVENOUS; SUBCUTANEOUS at 16:55

## 2024-09-19 RX ADMIN — DIGOXIN 125 MCG: 0.25 INJECTION INTRAMUSCULAR; INTRAVENOUS at 09:14

## 2024-09-19 RX ADMIN — SODIUM CHLORIDE, PRESERVATIVE FREE 20 MG: 5 INJECTION INTRAVENOUS at 11:06

## 2024-09-19 RX ADMIN — BUDESONIDE AND FORMOTEROL FUMARATE DIHYDRATE 2 PUFF: 160; 4.5 AEROSOL RESPIRATORY (INHALATION) at 07:40

## 2024-09-19 RX ADMIN — PHENOL 1 SPRAY: 1.5 LIQUID ORAL at 09:30

## 2024-09-19 RX ADMIN — CEFEPIME 2000 MG: 2 INJECTION, POWDER, FOR SOLUTION INTRAVENOUS at 09:15

## 2024-09-19 RX ADMIN — BUDESONIDE AND FORMOTEROL FUMARATE DIHYDRATE 2 PUFF: 160; 4.5 AEROSOL RESPIRATORY (INHALATION) at 14:49

## 2024-09-19 RX ADMIN — ENOXAPARIN SODIUM 60 MG: 100 INJECTION SUBCUTANEOUS at 11:49

## 2024-09-19 RX ADMIN — ENOXAPARIN SODIUM 60 MG: 100 INJECTION SUBCUTANEOUS at 20:57

## 2024-09-19 RX ADMIN — VANCOMYCIN HYDROCHLORIDE 1000 MG: 10 INJECTION, POWDER, LYOPHILIZED, FOR SOLUTION INTRAVENOUS at 16:55

## 2024-09-19 RX ADMIN — CEFEPIME 2000 MG: 2 INJECTION, POWDER, FOR SOLUTION INTRAVENOUS at 16:52

## 2024-09-19 RX ADMIN — SODIUM CHLORIDE, PRESERVATIVE FREE 10 ML: 5 INJECTION INTRAVENOUS at 08:00

## 2024-09-19 RX ADMIN — SODIUM CHLORIDE, PRESERVATIVE FREE 10 ML: 5 INJECTION INTRAVENOUS at 21:08

## 2024-09-19 RX ADMIN — SODIUM CHLORIDE, PRESERVATIVE FREE 20 MG: 5 INJECTION INTRAVENOUS at 20:59

## 2024-09-19 RX ADMIN — SODIUM CHLORIDE, PRESERVATIVE FREE 10 ML: 5 INJECTION INTRAVENOUS at 08:53

## 2024-09-19 ASSESSMENT — PAIN DESCRIPTION - DESCRIPTORS
DESCRIPTORS: SORE

## 2024-09-19 ASSESSMENT — PAIN DESCRIPTION - LOCATION
LOCATION: THROAT

## 2024-09-19 NOTE — ED NOTES
Per pt's daughter, pt has been at [facility] for therapy x approx 2 months and was discharged home x1 day and \"was more confused today\" so daughter called ambulance.

## 2024-09-19 NOTE — PROGRESS NOTES
[facility]   Pharmacy Pharmacokinetic Monitoring Service - Vancomycin    Consulting Provider: Dr. Paris    Indication: sepsis  Target Concentration: Goal AUC/MONIK 400-600 mg*hr/L  Day of Therapy: 2  Additional Antimicrobials: Cefepime    Pertinent Laboratory Values:   Wt Readings from Last 1 Encounters:   09/18/24 63.5 kg (140 lb)     Temp Readings from Last 1 Encounters:   09/19/24 97.2 °F (36.2 °C)     Estimated Creatinine Clearance: 122 mL/min (A) (based on SCr of 0.5 mg/dL (L)).  Recent Labs     09/18/24  1350 09/19/24  0521   CREATININE 0.7 0.5*   BUN 16 20   WBC 26.3* 31.6*     Procalcitonin: 09/19/24=0.08    Pertinent Cultures:  Culture Date Source Results   09/18/24 Blood sent   MRSA Nasal Swab: was ordered by provider, awaiting results.    Recent vancomycin administrations                     vancomycin (VANCOCIN) 750 mg in sodium chloride 0.9 % 250 mL IVPB ([id number]) (mg) 750 mg New Bag 09/19/24 0844     750 mg New Bag  0003    vancomycin (VANCOCIN) 1500 mg in sodium chloride 0.9 % 250 mL IVPB (mg) 1,500 mg New Bag 09/18/24 1547                    Assessment:  Date/Time Current Dose Concentration Timing of Concentration (h) AUC   09/19/24@1301 750mg IV q8h 14.8 4h 17m 411   Note: Serum concentrations collected for AUC dosing may appear elevated if collected in close proximity to the dose administered, this is not necessarily an indication of toxicity    Plan:  Current dosing regimen is barely therapeutic ().   Increase dose to 1000 mg IV every 8 hours  for  and trough of 14.9.  Repeat vancomycin concentration ordered for 09/20 @ 0800   Pharmacy will continue to monitor patient and adjust therapy as indicated    Thank you for the consult,  Sahara Melvin [facility]  9/19/2024 1:59 PM

## 2024-09-19 NOTE — SIGNIFICANT EVENT
Patient blood pressure 86/64 at 0800 with a heart rate in the 120s-140s. Patient's mentation not satisfactory. Last lactic drawn was 5.8 from yesterday, no redraw. Attending was messaged at 8:03am, NP Maddie Valentin called primary RN and ordered 1L bolus as well as was en route to bedside. Patient's heart rate seemed to be in irregular rhythm per auscultation. Assessed telemetry monitor, Afib rhythm in the 140s. Rapid Response was called to obtain more help at bedside as well as to get STAT labs and STAT EKG. Lanoxin 125mcg administered by Alexandra WALLS with Clinical House and NP at bedside. Transfer order for ICU bed initiated. Updates given to family via phone call by primary RN and .   Electronically signed by Saumya GROVES RN on 9/19/2024 at 3:02 PM

## 2024-09-19 NOTE — PROGRESS NOTES
New lieberman catheter placed by nursing. Chronic lieberman from SNF was caked with zinc paste and discharge. 16f coude placed, no resistance. Urine is cloudy and contains sediment.

## 2024-09-19 NOTE — PROGRESS NOTES
Facility/Department: [facility] ONCOLOGY UNIT   CLINICAL BEDSIDE SWALLOW EVALUATION  SPEECH PRODUCTION EVALUATION     NAME: Eze Rosa  : 1950  MRN: [id number]    ADMISSION DATE: 2024  ADMITTING DIAGNOSIS: has Idiopathic pulmonary fibrosis (HCC); Alcohol abuse; Benign prostatic hyperplasia with urinary retention; Hyponatremia; Encounter for Morataya catheter replacement; Urinary retention; Moderate malnutrition (HCC); Acute on chronic hypoxic respiratory failure (HCC); Tachypnea, tachyypneic to 40 bpm in ED; Acute respiratory distress, Tachypneic to 40 bpm; RSV infection; Abdominal pain; Palliative care patient; Dyspnea and respiratory abnormalities; Sepsis (HCC); History of urinary retention; COVID-19; and Hypertension on their problem list.    Date of Eval: 2024  Evaluating Therapist: PAULA Omalley    Current Diet level:  NPO    Pain:  Pain Location: Throat  Pain Descriptors: Sore    Reason for Referral  Eze Rosa was referred for a bedside swallow evaluation to assess the efficiency of his swallow function, identify signs and symptoms of aspiration and make recommendations regarding safe dietary consistencies, effective compensatory strategies, and safe eating environment.    Impression  Assessed patient's swallowing function. Patient exhibited decreased oral prep, sluggish and moderate-severely decreased laryngeal elevation for swallow airway protection, and double swallows with all presentations. Patient exhibited S/S penetration/aspiration with ice chip trials, puree consistency trials, and honey thick liquid trials with frequent delayed throat clears and wet vocal quality noted.    At this time, suspect delayed epiglottic inversion and residue in the throat post swallows. Would recommend continuation NPO status; consider alternative means of nutrition. Recommend frequent oral care as patient with decreased management of secretions; question if patient would benefit from scopolamine. If patient

## 2024-09-19 NOTE — PROGRESS NOTES
09/19/24 0926   Encounter Summary   Encounter Overview/Reason Initial Encounter;Spiritual/Emotional Needs   Service Provided For Family  (RR called for pt and this pt spoke with pt's daughter on the phone who included pt's spouse in code status discussion.)   Referral/Consult From Palliative Care   Support System Spouse;Children;Family members   Complexity of Encounter Moderate   Begin Time 0855   End Time  0925   Total Time Calculated 30 min   Spiritual/Emotional needs   Type Spiritual Support   Palliative Care   Type Palliative Care, Initial/Spiritual Assessment   Advance Care Planning   Type Care Preferences Addressed   Assessment/Intervention/Outcome   Assessment Coping   Intervention Active listening;Prayer (assurance of)/[place];Sustaining Presence/Ministry of presence   Outcome Acceptance;Engaged in conversation   Plan and Referrals   Plan/Referrals Continue to visit, (comment);Continue Support (comment)   Does the patient have a [facility] PCP? No         Palliative Care/Spiritual Care: This  responded to the RR and floor nurse Saumya asked this  to call and speak with pt's daughter Rebeca Rosa to confirm pt's code status. Pt's wife currently has a glioblastoma and is being treated with chemo medications per Rebeca. Pt's daughter says pt came home from [facility] and was very weak. He was at home less than 24 hours and they called the ambulance. ED note says Covid, pneumonia, septicemia, and AMS. Pt has other diagnoses in past medical history, and he is known to palliative care.         Advance Directives: Pt does not have a LW. His wife Chelo Rosa is primary decision maker, and his daughter Rebeca Rosa is his secondary decision maker. Saumya, staff nurse, says pt told them he wanted full code. Pt's daughter and wife who was included in the conversation, agree with letting the pt make his code status decision at this time. Pt is full code and wants CPR and Ventilator. SEE ACP NOTE.

## 2024-09-19 NOTE — PROGRESS NOTES
4 Eyes Skin Assessment     NAME:  Eze Rosa  YOB: 1950  MEDICAL RECORD NUMBER:  [id number]    The patient is being assessed for  Admission    I agree that at least one RN has performed a thorough Head to Toe Skin Assessment on the patient. ALL assessment sites listed below have been assessed.      Areas assessed by both nurses:    Head, Face, Ears, Shoulders, Back, Chest, Arms, Elbows, Hands, Sacrum. Buttock, Coccyx, Ischium, and Legs. Feet and Heels        Does the Patient have a Wound? Yes wound(s) were present on assessment. LDA wound assessment was Initiated and completed by RN       Drew Prevention initiated by RN: Yes  Wound Care Orders initiated by RN: Yes    Pressure Injury (Stage 3,4, Unstageable, DTI, NWPT, and Complex wounds) if present, place Wound referral order by RN under : No    New Ostomies, if present place, Ostomy referral order under : No     Nurse 1 eSignature: Electronically signed by BENEDICT TORRES LPN on 9/18/24 at 10:46 PM CDT    **SHARE this note so that the co-signing nurse can place an eSignature**    Nurse 2 eSignature: Electronically signed by Nisha Bill RN on 9/19/24 at 12:24 AM CDT

## 2024-09-19 NOTE — PROGRESS NOTES
Eze Rosa arrived to room # 409.   Presented with: COVID-19  Mental Status: Patient is disoriented and alert  Vitals:    09/18/24 2111   BP: 118/78   Pulse: (!) 113   Resp:    Temp:    SpO2: 98%     Patient safety contract and falls prevention contract reviewed with patient Yes.  Oriented Patient to room.  Call light within reach. Yes.  Needs, issues or concerns expressed at this time: no.      Electronically signed by BENEDICT TORRES LPN on 9/18/2024 at 10:46 PM

## 2024-09-19 NOTE — PROGRESS NOTES
[facility]    [facility]    Patient:  Eze Rosa  YOB: 1950  Date of Service: 9/19/2024  MRN: [id number]   Acct: [id number]   Primary Care Physician: Ross Masterson MD  Advance Directive: Full Code  Admit Date: 9/18/2024       Hospital Day: 1    Portions of this note have been copied forward, however, updated to reflect the most current clinical status of this patient.     CHIEF COMPLAINT: AMS    CUMULATIVE HOSPITAL COURSE:     This patient is a 73-year-old male with PMH pulmonary fibrosis, emphysema, chronic hypoxic respiratory failure on 2L oxygen at baseline, atrial fibrillation not on anticoagulation due to [id number] score of 1, BPH with urinary retention who presents to [facility] ED on 9/18 for evaluation of AMS and shortness of breath after being discharged from SNF 1 day prior.  Workup reveals bibasilar pneumonia R>L per CXR, COVID-positive on respiratory panel, head CT no acute intracranial process with acute bilateral maxillary sinusitis, CT AP negative for acute findings. CTA due to elevated D-dimer shows no PE, stable diffuse moderate to severe pulmonary fibrosis, and interval development of esophagitis versus mass.  Lab work reveals lactic 6.3 with repeat 5.8, troponin 26 with repeat 26, CRP 11.55, LFTs normal, neutrophilic leukocytosis with WBC 26.3, and ABGs showing metabolic alkalosis with pO2 64.  He was requiring 4-5L in ED.  Initially tachypneic and tachycardic.  Patient received broad-spectrum antibiotics with cefepime and vancomycin, as well as sepsis fluid bolus in ED.  Admitted to hospitalist for further evaluation management.  Patient had noted hypotension throughout the night with documented BP 82/67 at 0453.  There were no telemetry boxes overnight, and at some point from 9147-5282, patient went into atrial fibrillation with RVR and remained hypotensive.  He was given additional 1L IVF bolus.  Began complaining of chest pain shortness of breath.  Due to

## 2024-09-19 NOTE — ED NOTES
ED TO INPATIENT SBAR HANDOFF    Patient Name: Eze Rosa   : 1950  73 y.o.   Family/Caregiver Present: No  Code Status Order: Prior    C-SSRS: Risk of Suicide: No Risk  Sitter No  Restraints:         Situation  Chief Complaint:   Chief Complaint   Patient presents with    Shortness of Breath    Emesis     Patient Diagnosis: COVID-19 [U07.1]     Brief Description of Patient's Condition: pt arrived via EMS for SOA and emesis. Pt poor historian and states he lives at home with wife who is also hospitalized but he is unsure where. Pt is oriented to person only.   Pt with congested cough but unwilling to attempt to cough it up. Pt follows commands but is quickly forgetful. Pt with undocumented wounds pta that are now documented and media attached. Pt incontinent of bowel and bladder. COVID +, PNA. EMS reported pt is on 2L NC when they arrived. Pt requiring 4L via NC at since in the ER.     Mental Status: alert, able to concentrate and follow conversation, and oriented to person only.  Arrived from: home    Imaging:   CTA PULMONARY W CONTRAST   Final Result       1.  No pulmonary embolus is identified.   2.  Stable moderate to severe diffuse pulmonary fibrosis, most pronounced of the mid and lower lungs, but no honeycombing to suggest UIP.   3.  Stable trace dependent right pleural fluid, with pleural thickening and pleural calcification, consistent with chronicity.  No left pleural calcification.  Findings might be secondary to a prior right hemothorax or pyothorax.  Asbestos exposure    cannot be excluded.   4.  Apparent interval development of vague wall thickening of portions of the esophagus.  Differential diagnosis includes esophagitis and mass.  Endoscopy or barium esophagram would better assess.   5.  For findings in the abdomen/pelvis, please refer to separate report.        All CT scans are performed using dose optimization techniques as appropriate to the performed exam and include    at least one of  mL Rate  3,810 mL/hr Route  IntraVENous Documented By  Maddie Willis RN        vancomycin (VANCOCIN) 1500 mg in sodium chloride 0.9 % 250 mL IVPB Admin Date  09/18/2024 Action  New Bag Dose  1,500 mg Rate  166.7 mL/hr Route  IntraVENous Documented By  Maddie Willis RN            History:   Past Medical History:   Diagnosis Date    GERD (gastroesophageal reflux disease)     Palliative care patient 01/08/2024    Pneumonia     Psychiatric problem        Assessment  Vitals: Level of Consciousness: Alert (0)   Vitals:    09/18/24 2021 09/18/24 2031 09/18/24 2041 09/18/24 2051   BP: 108/66 (!) 114/54 102/62 119/64   Pulse: (!) 104 (!) 102 (!) 102 (!) 104   Resp: 23 26 20 20   Temp:       TempSrc:       SpO2: 95% 95% 95% 95%   Weight:         Predictive Model Details          59 (Warning)  Factor Value    Calculated 9/18/2024 20:51 20% Age 73 years old    Deterioration Index Model 19% Supplemental oxygen Nasal cannula     16% Neurological exam X     11% [place] coma scale 14     9% Respiratory rate 20     7% Cardiac rhythm Sinus tachy     7% WBC count abnormal (26.3 K/uL)     5% Pulse 104     3% Blood pH abnormal (7.520)     2% Sodium 136 mmol/L     1% Systolic 119     1% Platelet count abnormal (505 K/uL)     0% Temperature 98.9 °F (37.2 °C)     0% Pulse oximetry 95 %     0% Hematocrit 45.8 %       NPO? No  O2 Flow Rate: O2 Device: Nasal cannula O2 Flow Rate (L/min): 4 L/min  Cardiac Rhythm: NSR, sinus tachy  NIH Score: NIH     Active LDA's:   Peripheral IV 09/18/24 Left Antecubital (Active)   Site Assessment Clean, dry & intact 09/18/24 1400   Line Status Blood return noted;Flushed 09/18/24 1400   Phlebitis Assessment No symptoms 09/18/24 1400   Infiltration Assessment 0 09/18/24 1400   Dressing Status Clean, dry & intact 09/18/24 1400       Peripheral IV 09/18/24 Right Antecubital (Active)       Peripheral IV 09/18/24 Distal;Posterior;Right Forearm (Active)   Site Assessment Clean, dry & intact 09/18/24 4406

## 2024-09-19 NOTE — PROGRESS NOTES
Pharmacy Consult      Eze Rosa is a 73 y.o. male for whom pharmacy has been consulted to dose baricitinib.    Patient Active Problem List   Diagnosis    Idiopathic pulmonary fibrosis (HCC)    Alcohol abuse    Benign prostatic hyperplasia with urinary retention    Hyponatremia    Encounter for Morataya catheter replacement    Urinary retention    Moderate malnutrition (HCC)    Acute on chronic hypoxic respiratory failure (HCC)    Tachypnea, tachyypneic to 40 bpm in ED    Acute respiratory distress, Tachypneic to 40 bpm    RSV infection    Abdominal pain    Palliative care patient    Dyspnea and respiratory abnormalities    Sepsis (HCC)    History of urinary retention    COVID-19    Hypertension       Allergies:  Sulfa antibiotics     Ht/Wt:   Ht Readings from Last 1 Encounters:   07/14/24 1.93 m (6' 4\")        Wt Readings from Last 1 Encounters:   09/18/24 63.5 kg (140 lb)         Does the patient meet all of the criteria for receiving baricitinib (see below)?Yes.  Has a daily CMP (or LFTs) and CBC with auto differential been ordered? Yes.  If either are not ordered, the pharmacist should enter CMP and CBC with auto differential daily (per pharmacy practice) under the physician authorizing baricitinib.    Recent Labs     09/18/24  1350 09/19/24  0521   LABGLOM >90 >90   LYMPHSABS 0.6* 0.6*   NEUTROABS 24.5* 28.9*   ALT 25 17   AST 23 16           Assessment/Plan:    Start patient on baricitinib 4 mg daily for 14 days of total treatment or until hospital discharge, whichever is first. Pharmacy will continue to follow GFR, ALC, ANC and aminotransferases.    Thank you for the consult.     Electronically signed by Tiara Og RPH on 9/19/2024 at 9:48 AM

## 2024-09-19 NOTE — PROGRESS NOTES
DIS Nurse Note  SUBJECTIVE: Patient assessed secondary to elevated Deterioration Index Score.      Deterioration Index Score:  Predictive Model Details          64 (Warning)  Factor Value    Calculated 9/19/2024 07:43 18% Age 73 years old    Deterioration Index Model 18% Systolic 82     17% Supplemental oxygen Nasal cannula     14% Neurological exam X     10% [place] coma scale 14     6% WBC count abnormal (31.6 K/uL)     6% Cardiac rhythm Sinus tachy     4% Respiratory rate 18     3% Blood pH abnormal (7.520)     2% Sodium abnormal (135 mmol/L)     1% Potassium 3.7 mmol/L     0% Hematocrit abnormal (40.4 %)     0% Temperature 97.2 °F (36.2 °C)     0% Pulse oximetry 97 %     0% Pulse 78        Vital Signs:  Vitals:    09/18/24 2108 09/18/24 2111 09/19/24 0023 09/19/24 0453   BP:  118/78 97/61 (!) 82/67   Pulse:  (!) 113 83 78   Resp: 22  20 18   Temp:   97.2 °F (36.2 °C)    TempSrc:       SpO2:  98% 93% 97%   Weight:            Provider Notified: Reviewed patient status  & DIS score with Provider yes    ASSESSMENT:   admitted to floor from ER last night. Patient has had to be deep suctioned once at midnight and I have requested for another deep suction. Patient's  swallowing is poor per SLP, keeping NPO. WBC elevated this morning. BP soft, telemetry not placed over night as there were no monitors.     Plan of Care: Vitals signs are already at q4 hours. Will assess lieberman catheter and complete frequent mentation checks. Telemetry pack applied. Plan to move to higher level of care.     Electronically signed by Saumya GROVES RN

## 2024-09-19 NOTE — CARE COORDINATION
Case Management Assessment  Initial Evaluation    Date/Time of Evaluation: 9/19/2024 11:48 AM  Assessment Completed by: Antonieta Almeida RN    If patient is discharged prior to next notation, then this note serves as note for discharge by case management.    Patient Name: Eze Rosa                   YOB: 1950  Diagnosis: Septicemia (HCC) [A41.9]  Altered mental status, unspecified altered mental status type [R41.82]  Pneumonia due to infectious organism, unspecified laterality, unspecified part of lung [J18.9]  COVID [U07.1]  COVID-19 [U07.1]                   Date / Time: 9/18/2024  1:33 PM    Patient Admission Status: Inpatient   Readmission Risk (Low < 19, Mod (19-27), High > 27): Readmission Risk Score: 21.1    Current PCP: Ross Masterson MD  PCP verified by CM? Yes    Chart Reviewed: Yes      History Provided by: Child/Family  Patient Orientation: Other (see comment) (Assessment completed via phone call with his daughter, Rebeca)    Patient Cognition: Alert    Hospitalization in the last 30 days (Readmission):  No    If yes, Readmission Assessment in CM Navigator will be completed.    Advance Directives:      Code Status: Full Code   Patient's Primary Decision Maker is:      Primary Decision Maker: Chelo Mtz - Spouse - [phone]    Secondary Decision Maker: Rebeca Rosa - Child - [phone]    Discharge Planning:    Patient lives with: Children Type of Home: House  Primary Care Giver: Family  Patient Support Systems include: Children   Current Financial resources: Medicare  Current community resources: None  Current services prior to admission: None            Current DME:              Type of Home Care services:  None    ADLS  Prior functional level: Assistance with the following:, Bathing, Dressing, Toileting, Cooking, Housework, Shopping, Mobility  Current functional level: Assistance with the following:, Bathing, Dressing, Toileting, Cooking, Housework, Shopping, Mobility    PT  AM-PAC:   /24  OT AM-PAC:   /24    Family can provide assistance at DC: Yes  Would you like Case Management to discuss the discharge plan with any other family members/significant others, and if so, who? Yes (spouse and daughter)  Plans to Return to Present Housing: Unknown at present  Other Identified Issues/Barriers to RETURNING to current housing: mobility  Potential Assistance needed at discharge: Skilled Nursing Facility            Potential DME:    Patient expects to discharge to: Skilled nursing facility  Plan for transportation at discharge:      Financial    Payor: MEDICARE / Plan: MEDICARE PART A AND B / Product Type: *No Product type* /     Does insurance require precert for SNF: No    Potential assistance Purchasing Medications: No  Meds-to-Beds request:        [facility] [phone] - F [phone]  [address] [place] [id number]  Phone: [phone] Fax: [phone]      Notes:    Factors facilitating achievement of predicted outcomes: Family support    Barriers to discharge: Decreased endurance and Lower extremity weakness    Additional Case Management Notes:   Patient was discharged to his daughter's house, Rebeca Rosa, from [facility] for less than a day.  Per Rebeca, patient was discharged from SNF due to not participating in Therapy for the last 5 days.  She believes patient was feeling bad at that time.  She states patient was unable to ambulate when he was discharged home.  Previously, he was walking with a walker.    Rebeca is uncertain of discharge disposition at this time.  She prefers for him to not return to a SNF and would like for him to discharge home with Home Health.  However, she recognizes that he will need to be able to transfer independently.  Explained that once patient stabilizes medically, Therapy will evaluate.  CM/SW will continue to follow.    The Plan for Transition of Care is related to the following treatment goals

## 2024-09-19 NOTE — ED NOTES
Pt with incontinent bowel episode. Pericare performed with soap and water soaked wash cloths. Pt tolerated well. Morataya cath drained. New chux pad and sheet placed.

## 2024-09-19 NOTE — PROGRESS NOTES
Pharmacy Adjustment per [facility] protocol    Eze Rosa is a 73 y.o. male. Pharmacy has adjusted medications per [facility] protocol.    Recent Labs     09/18/24  1350   BUN 16       Recent Labs     09/18/24  1350   CREATININE 0.7       Estimated Creatinine Clearance: 87 mL/min (based on SCr of 0.7 mg/dL).    Height:   Ht Readings from Last 1 Encounters:   07/14/24 1.93 m (6' 4\")     Weight:  Wt Readings from Last 1 Encounters:   09/18/24 63.5 kg (140 lb)         Plan: Adjust the following medications based on [facility] protocol:           Changed Maxipime 2gm Q12hr to 2gm Q8hr due to patients renal function and indication for use    Electronically signed by Arian Calderon RPH on 9/18/2024 at 10:20 PM

## 2024-09-20 ENCOUNTER — APPOINTMENT (OUTPATIENT)
Dept: GENERAL RADIOLOGY | Age: 74
End: 2024-09-20
Payer: MEDICARE

## 2024-09-20 PROBLEM — E46 PROTEIN-CALORIE MALNUTRITION (HCC): Status: ACTIVE | Noted: 2024-09-20

## 2024-09-20 PROBLEM — K22.719 BARRETT'S ESOPHAGUS WITH DYSPLASIA: Status: ACTIVE | Noted: 2024-09-20

## 2024-09-20 PROBLEM — U07.1 COVID: Status: ACTIVE | Noted: 2024-09-20

## 2024-09-20 PROBLEM — K21.9 GERD (GASTROESOPHAGEAL REFLUX DISEASE): Status: ACTIVE | Noted: 2024-09-20

## 2024-09-20 PROBLEM — I48.0 PAROXYSMAL ATRIAL FIBRILLATION (HCC): Status: ACTIVE | Noted: 2024-09-20

## 2024-09-20 PROBLEM — J84.10 PULMONARY FIBROSIS (HCC): Status: ACTIVE | Noted: 2024-09-20

## 2024-09-20 PROBLEM — R13.10 DYSPHAGIA: Status: ACTIVE | Noted: 2024-09-20

## 2024-09-20 PROBLEM — F41.9 ANXIETY: Status: ACTIVE | Noted: 2024-09-20

## 2024-09-20 LAB
ALBUMIN SERPL-MCNC: 2.8 G/DL (ref 3.5–5.2)
ALP SERPL-CCNC: 85 U/L (ref 40–129)
ALT SERPL-CCNC: 12 U/L (ref 5–41)
ANION GAP SERPL CALCULATED.3IONS-SCNC: 9 MMOL/L (ref 7–19)
ANTI-XA UNFRAC HEPARIN: 0.17 IU/ML (ref 0.3–0.7)
ANTI-XA UNFRAC HEPARIN: 0.32 IU/ML (ref 0.3–0.7)
AST SERPL-CCNC: 16 U/L (ref 5–40)
BACTERIA URNS QL MICRO: NEGATIVE /HPF
BASE EXCESS ARTERIAL: -0.3 MMOL/L (ref -2–2)
BASOPHILS # BLD: 0 K/UL (ref 0–0.2)
BASOPHILS NFR BLD: 0.1 % (ref 0–1)
BILIRUB SERPL-MCNC: 0.5 MG/DL (ref 0.2–1.2)
BILIRUB UR QL STRIP: NEGATIVE
BUN SERPL-MCNC: 16 MG/DL (ref 8–23)
CALCIUM SERPL-MCNC: 8.3 MG/DL (ref 8.8–10.2)
CARBOXYHEMOGLOBIN ARTERIAL: 1.3 % (ref 0–5)
CHLORIDE SERPL-SCNC: 108 MMOL/L (ref 98–111)
CLARITY UR: CLEAR
CO2 SERPL-SCNC: 23 MMOL/L (ref 22–29)
COLOR UR: YELLOW
CREAT SERPL-MCNC: 0.5 MG/DL (ref 0.7–1.2)
CRYSTALS URNS MICRO: ABNORMAL /HPF
EOSINOPHIL # BLD: 0 K/UL (ref 0–0.6)
EOSINOPHIL NFR BLD: 0 % (ref 0–5)
EPI CELLS #/AREA URNS AUTO: 1 /HPF (ref 0–5)
ERYTHROCYTE [DISTWIDTH] IN BLOOD BY AUTOMATED COUNT: 13.2 % (ref 11.5–14.5)
GLUCOSE SERPL-MCNC: 120 MG/DL (ref 70–99)
GLUCOSE UR STRIP.AUTO-MCNC: NEGATIVE MG/DL
HCO3 ARTERIAL: 24 MMOL/L (ref 22–26)
HCT VFR BLD AUTO: 32.5 % (ref 42–52)
HEMOGLOBIN, ART, EXTENDED: 11.1 G/DL (ref 14–18)
HGB BLD-MCNC: 10.4 G/DL (ref 14–18)
HGB UR STRIP.AUTO-MCNC: NEGATIVE MG/L
HYALINE CASTS #/AREA URNS AUTO: 11 /HPF (ref 0–8)
IMM GRANULOCYTES # BLD: 0.1 K/UL
KETONES UR STRIP.AUTO-MCNC: 15 MG/DL
LEUKOCYTE ESTERASE UR QL STRIP.AUTO: ABNORMAL
LYMPHOCYTES # BLD: 0.5 K/UL (ref 1.1–4.5)
LYMPHOCYTES NFR BLD: 3 % (ref 20–40)
MCH RBC QN AUTO: 30 PG (ref 27–31)
MCHC RBC AUTO-ENTMCNC: 32 G/DL (ref 33–37)
MCV RBC AUTO: 93.7 FL (ref 80–94)
METHEMOGLOBIN ARTERIAL: 0.7 %
MONOCYTES # BLD: 0.6 K/UL (ref 0–0.9)
MONOCYTES NFR BLD: 3.8 % (ref 0–10)
NEUTROPHILS # BLD: 15.3 K/UL (ref 1.5–7.5)
NEUTS SEG NFR BLD: 92.4 % (ref 50–65)
NITRITE UR QL STRIP.AUTO: NEGATIVE
O2 CONTENT ARTERIAL: 14.5 ML/DL
O2 DELIVERY DEVICE: ABNORMAL
O2 SAT, ARTERIAL: 92.8 %
O2 THERAPY: ABNORMAL
OXYGEN FLOW: 6
PCO2 ARTERIAL: 37 MMHG (ref 35–45)
PH ARTERIAL: 7.42 (ref 7.35–7.45)
PH UR STRIP.AUTO: 5.5 [PH] (ref 5–8)
PLATELET # BLD AUTO: 255 K/UL (ref 130–400)
PMV BLD AUTO: 9.7 FL (ref 9.4–12.4)
PO2 ARTERIAL: 66 MMHG (ref 80–100)
POTASSIUM BLD-SCNC: 3.3 MMOL/L
POTASSIUM SERPL-SCNC: 3.9 MMOL/L (ref 3.5–5)
PROT SERPL-MCNC: 5.3 G/DL (ref 6.4–8.3)
PROT UR STRIP.AUTO-MCNC: NEGATIVE MG/DL
RBC # BLD AUTO: 3.47 M/UL (ref 4.7–6.1)
RBC #/AREA URNS AUTO: 1 /HPF (ref 0–4)
SAMPLE SOURCE: ABNORMAL
SODIUM SERPL-SCNC: 140 MMOL/L (ref 136–145)
SP GR UR STRIP.AUTO: 1.02 (ref 1–1.03)
TROPONIN, HIGH SENSITIVITY: 24 NG/L (ref 0–22)
UROBILINOGEN UR STRIP.AUTO-MCNC: 0.2 E.U./DL
VANCOMYCIN TROUGH SERPL-MCNC: 17.1 UG/ML (ref 10–20)
WBC # BLD AUTO: 16.5 K/UL (ref 4.8–10.8)
WBC #/AREA URNS AUTO: 4 /HPF (ref 0–5)

## 2024-09-20 PROCEDURE — 2580000003 HC RX 258

## 2024-09-20 PROCEDURE — 6360000002 HC RX W HCPCS

## 2024-09-20 PROCEDURE — 2580000003 HC RX 258: Performed by: EMERGENCY MEDICINE

## 2024-09-20 PROCEDURE — 80202 ASSAY OF VANCOMYCIN: CPT

## 2024-09-20 PROCEDURE — 84484 ASSAY OF TROPONIN QUANT: CPT

## 2024-09-20 PROCEDURE — 71045 X-RAY EXAM CHEST 1 VIEW: CPT

## 2024-09-20 PROCEDURE — 93005 ELECTROCARDIOGRAM TRACING: CPT

## 2024-09-20 PROCEDURE — 6360000002 HC RX W HCPCS: Performed by: EMERGENCY MEDICINE

## 2024-09-20 PROCEDURE — 2700000000 HC OXYGEN THERAPY PER DAY

## 2024-09-20 PROCEDURE — 36415 COLL VENOUS BLD VENIPUNCTURE: CPT

## 2024-09-20 PROCEDURE — 99223 1ST HOSP IP/OBS HIGH 75: CPT | Performed by: PHYSICIAN ASSISTANT

## 2024-09-20 PROCEDURE — 2580000003 HC RX 258: Performed by: INTERNAL MEDICINE

## 2024-09-20 PROCEDURE — 99223 1ST HOSP IP/OBS HIGH 75: CPT | Performed by: INTERNAL MEDICINE

## 2024-09-20 PROCEDURE — 6360000002 HC RX W HCPCS: Performed by: INTERNAL MEDICINE

## 2024-09-20 PROCEDURE — 2500000003 HC RX 250 WO HCPCS

## 2024-09-20 PROCEDURE — 6370000000 HC RX 637 (ALT 250 FOR IP)

## 2024-09-20 PROCEDURE — 81001 URINALYSIS AUTO W/SCOPE: CPT

## 2024-09-20 PROCEDURE — 6370000000 HC RX 637 (ALT 250 FOR IP): Performed by: INTERNAL MEDICINE

## 2024-09-20 PROCEDURE — 92526 ORAL FUNCTION THERAPY: CPT

## 2024-09-20 PROCEDURE — 92507 TX SP LANG VOICE COMM INDIV: CPT

## 2024-09-20 PROCEDURE — 36600 WITHDRAWAL OF ARTERIAL BLOOD: CPT

## 2024-09-20 PROCEDURE — 94640 AIRWAY INHALATION TREATMENT: CPT

## 2024-09-20 PROCEDURE — 2140000000 HC CCU INTERMEDIATE R&B

## 2024-09-20 PROCEDURE — 82803 BLOOD GASES ANY COMBINATION: CPT

## 2024-09-20 PROCEDURE — 85520 HEPARIN ASSAY: CPT

## 2024-09-20 PROCEDURE — 85025 COMPLETE CBC W/AUTO DIFF WBC: CPT

## 2024-09-20 PROCEDURE — 94760 N-INVAS EAR/PLS OXIMETRY 1: CPT

## 2024-09-20 PROCEDURE — 80053 COMPREHEN METABOLIC PANEL: CPT

## 2024-09-20 RX ORDER — LIDOCAINE HYDROCHLORIDE 20 MG/ML
JELLY TOPICAL ONCE
Status: COMPLETED | OUTPATIENT
Start: 2024-09-20 | End: 2024-09-20

## 2024-09-20 RX ORDER — METOPROLOL SUCCINATE 25 MG/1
25 TABLET, EXTENDED RELEASE ORAL 2 TIMES DAILY
Status: DISCONTINUED | OUTPATIENT
Start: 2024-09-20 | End: 2024-10-18 | Stop reason: HOSPADM

## 2024-09-20 RX ORDER — OXYMETAZOLINE HYDROCHLORIDE 0.05 G/100ML
2 SPRAY NASAL ONCE
Status: COMPLETED | OUTPATIENT
Start: 2024-09-20 | End: 2024-09-20

## 2024-09-20 RX ORDER — METOPROLOL TARTRATE 25 MG/1
25 TABLET, FILM COATED ORAL 2 TIMES DAILY
Status: DISCONTINUED | OUTPATIENT
Start: 2024-09-20 | End: 2024-09-20

## 2024-09-20 RX ORDER — LORAZEPAM 1 MG/1
0.5 TABLET ORAL
Status: COMPLETED | OUTPATIENT
Start: 2024-09-20 | End: 2024-09-20

## 2024-09-20 RX ORDER — LORAZEPAM 2 MG/ML
0.25 INJECTION INTRAMUSCULAR
Status: COMPLETED | OUTPATIENT
Start: 2024-09-20 | End: 2024-09-20

## 2024-09-20 RX ORDER — QUETIAPINE FUMARATE 25 MG/1
25 TABLET, FILM COATED ORAL 2 TIMES DAILY
Status: DISCONTINUED | OUTPATIENT
Start: 2024-09-20 | End: 2024-09-21

## 2024-09-20 RX ORDER — LORAZEPAM 2 MG/ML
0.5 INJECTION INTRAMUSCULAR ONCE
Status: DISCONTINUED | OUTPATIENT
Start: 2024-09-20 | End: 2024-09-20

## 2024-09-20 RX ADMIN — BUDESONIDE AND FORMOTEROL FUMARATE DIHYDRATE 2 PUFF: 160; 4.5 AEROSOL RESPIRATORY (INHALATION) at 19:52

## 2024-09-20 RX ADMIN — BUDESONIDE AND FORMOTEROL FUMARATE DIHYDRATE 2 PUFF: 160; 4.5 AEROSOL RESPIRATORY (INHALATION) at 07:08

## 2024-09-20 RX ADMIN — VANCOMYCIN HYDROCHLORIDE 1250 MG: 10 INJECTION, POWDER, LYOPHILIZED, FOR SOLUTION INTRAVENOUS at 10:32

## 2024-09-20 RX ADMIN — SODIUM CHLORIDE, PRESERVATIVE FREE 40 MG: 5 INJECTION INTRAVENOUS at 10:24

## 2024-09-20 RX ADMIN — SODIUM CHLORIDE, PRESERVATIVE FREE 10 ML: 5 INJECTION INTRAVENOUS at 10:33

## 2024-09-20 RX ADMIN — ENOXAPARIN SODIUM 60 MG: 100 INJECTION SUBCUTANEOUS at 19:58

## 2024-09-20 RX ADMIN — CEFEPIME 2000 MG: 2 INJECTION, POWDER, FOR SOLUTION INTRAVENOUS at 23:58

## 2024-09-20 RX ADMIN — VANCOMYCIN HYDROCHLORIDE 1000 MG: 10 INJECTION, POWDER, LYOPHILIZED, FOR SOLUTION INTRAVENOUS at 01:07

## 2024-09-20 RX ADMIN — CEFEPIME 2000 MG: 2 INJECTION, POWDER, FOR SOLUTION INTRAVENOUS at 10:32

## 2024-09-20 RX ADMIN — ENOXAPARIN SODIUM 60 MG: 100 INJECTION SUBCUTANEOUS at 12:44

## 2024-09-20 RX ADMIN — QUETIAPINE FUMARATE 25 MG: 25 TABLET ORAL at 19:58

## 2024-09-20 RX ADMIN — OXYMETAZOLINE HYDROCHLORIDE 2 SPRAY: 0.5 SPRAY NASAL at 11:21

## 2024-09-20 RX ADMIN — VANCOMYCIN HYDROCHLORIDE 1250 MG: 10 INJECTION, POWDER, LYOPHILIZED, FOR SOLUTION INTRAVENOUS at 23:34

## 2024-09-20 RX ADMIN — LORAZEPAM 0.26 MG: 2 INJECTION INTRAMUSCULAR; INTRAVENOUS at 15:27

## 2024-09-20 RX ADMIN — SODIUM CHLORIDE, PRESERVATIVE FREE 20 MG: 5 INJECTION INTRAVENOUS at 19:58

## 2024-09-20 RX ADMIN — WATER 40 MG: 1 INJECTION INTRAMUSCULAR; INTRAVENOUS; SUBCUTANEOUS at 04:29

## 2024-09-20 RX ADMIN — LIDOCAINE HYDROCHLORIDE: 20 JELLY TOPICAL at 11:21

## 2024-09-20 RX ADMIN — CEFEPIME 2000 MG: 2 INJECTION, POWDER, FOR SOLUTION INTRAVENOUS at 00:31

## 2024-09-20 RX ADMIN — LORAZEPAM 0.5 MG: 1 TABLET ORAL at 19:58

## 2024-09-20 RX ADMIN — QUETIAPINE FUMARATE 25 MG: 25 TABLET ORAL at 16:58

## 2024-09-20 RX ADMIN — SODIUM CHLORIDE, PRESERVATIVE FREE 10 ML: 5 INJECTION INTRAVENOUS at 19:59

## 2024-09-20 RX ADMIN — WATER 40 MG: 1 INJECTION INTRAMUSCULAR; INTRAVENOUS; SUBCUTANEOUS at 16:58

## 2024-09-20 RX ADMIN — SODIUM CHLORIDE, PRESERVATIVE FREE 20 MG: 5 INJECTION INTRAVENOUS at 10:24

## 2024-09-20 ASSESSMENT — PAIN DESCRIPTION - LOCATION: LOCATION: THROAT

## 2024-09-20 ASSESSMENT — PAIN DESCRIPTION - DESCRIPTORS: DESCRIPTORS: SORE

## 2024-09-20 NOTE — PROGRESS NOTES
Dobhoff placed X1 attempt. Pt tolerated procedure well. O2 95 post procedure. CXR for placement verification.

## 2024-09-20 NOTE — PROGRESS NOTES
[facility]    [facility]    Patient:  Eze Rosa  YOB: 1950  Date of Service: 9/20/2024  MRN: [id number]   Acct: [id number]   Primary Care Physician: Ross Masterson MD  Advance Directive: Full Code  Admit Date: 9/18/2024       Hospital Day: 2    Portions of this note have been copied forward, however, updated to reflect the most current clinical status of this patient.     CHIEF COMPLAINT: AMS    CUMULATIVE HOSPITAL COURSE:     This patient is a 73-year-old male with PMH pulmonary fibrosis, emphysema, chronic hypoxic respiratory failure on 2L oxygen at baseline, atrial fibrillation not on anticoagulation due to [id number] score of 1, BPH with urinary retention who presents to [facility] ED on 9/18 for evaluation of AMS and shortness of breath after being discharged from SNF 1 day prior.  Workup reveals bibasilar pneumonia R>L per CXR, COVID-positive on respiratory panel, head CT no acute intracranial process with acute bilateral maxillary sinusitis, CT AP negative for acute findings. CTA due to elevated D-dimer shows no PE, stable diffuse moderate to severe pulmonary fibrosis, and interval development of esophagitis versus mass.  Lab work reveals lactic 6.3 with repeat 5.8, troponin 26 with repeat 26, CRP 11.55, LFTs normal, neutrophilic leukocytosis with WBC 26.3, and ABGs showing metabolic alkalosis with pO2 64.  He was requiring 4-5L in ED.  Initially tachypneic and tachycardic.  Patient received broad-spectrum antibiotics with cefepime and vancomycin, as well as sepsis fluid bolus in ED.  Admitted to hospitalist for further evaluation management.      After admission, patient had noted hypotension and went into atrial fibrillation with RVR; given additional 1L IVF and digoxin 125mcg x1 with successful conversion to NSR.  Began complaining of chest pain, however EKG did not show ischemia and troponins were flat.  Echo indicates normal EF 60 to 65% with normal diastolic function. Patient  review the note for such errors, some may still exist.

## 2024-09-20 NOTE — CONSULTS
Consult Note            Date:9/20/2024        Patient Name:Eze Rosa     YOB: 1950     Age:73 y.o.    Reason for consult: Esophageal thickening noted on CT scan in the setting of dysphagia and altered mental status.    History of Present Illness   This is a 73-year-old male with a history of Mlcean's esophagus, GERD, idiopathic pulmonary fibrosis on home 2 L, atrial fibrillation and who is currently COVID-positive who was admitted 2 days ago with dyspnea.  Now requiring 5 L of oxygen to maintain saturations.  CT scan showed thickening of the distal esophagus in the setting of dysphagia therefore we have been consulted for further evaluation.    Speech therapy evaluated him yesterday and opted for n.p.o. status given oropharyngeal dysphagia, see their report.  The plan is for Dobbhoff tube placement today per nursing staff.  He is quite encephalopathic and mumbling for water.      Workup with CTA chest was negative for pulmonary embolism and did to support severe, diffuse pulmonary fibrosis.  There is vague wall thickening of the esophagus.  CT scan abdomen and pelvis with IV contrast shows a moderate stool burden with prior sigmoid sutures in place.  CT of the head is negative and chest x-ray shows bilateral effusions.  COVID was positive on admission, 9/18/2024.  White count is down to 16 from 31K yesterday.  He is still requiring increased oxygen support of 5 L.    Past Medical History     Past Medical History:   Diagnosis Date    GERD (gastroesophageal reflux disease)     Palliative care patient 01/08/2024    Pneumonia     Psychiatric problem         Past Surgical History     Past Surgical History:   Procedure Laterality Date    ABDOMEN SURGERY      COLONOSCOPY          Medications     Prior to Admission medications    Medication Sig Start Date End Date Taking? Authorizing Provider   budesonide-formoterol (SYMBICORT) 160-4.5 MCG/ACT AERO Inhale 2 puffs into the lungs 2 times daily 1/11/24

## 2024-09-20 NOTE — PLAN OF CARE
Problem: Safety - Adult  Goal: Free from fall injury  Outcome: Progressing  Flowsheets (Taken 9/19/2024 2153)  Free From Fall Injury: Instruct family/caregiver on patient safety     Problem: Skin/Tissue Integrity  Goal: Absence of new skin breakdown  Description: 1.  Monitor for areas of redness and/or skin breakdown  2.  Assess vascular access sites hourly  3.  Every 4-6 hours minimum:  Change oxygen saturation probe site  4.  Every 4-6 hours:  If on nasal continuous positive airway pressure, respiratory therapy assess nares and determine need for appliance change or resting period.  Outcome: Progressing     Problem: ABCDS Injury Assessment  Goal: Absence of physical injury  Outcome: Progressing

## 2024-09-20 NOTE — ACP (ADVANCE CARE PLANNING)
Advance Care Planning      Palliative Medicine Provider   Advance Care Planning (ACP) Conversation      Date of Conversation: 09/20/24  The patient and/or authorized decision maker consented to a voluntary Advance Care Planning conversation.   Individuals present for the conversation:   Patient with decision making capacity Rebeca Rosa available on the phone     Legal Healthcare Agent(s):    Primary Decision Maker: Chelo Mtz - Spouse - [phone]    Secondary Decision Maker: Rebeca Rosa - Child - [phone]    ACP documents available in EMR prior to discussion:  -None    Primary Palliative Diagnosis(es):  Pulmonary Fibrosis     Conversation Summary:  Patient seen at bedside. Ill appearing and diagnosed w/ COVID-19 and having issues w/ dysphagia. He has had significant weight loss per his daughter. A dobhoff was placed earlier and then the patient had significant anxiety and pulled it out. I discussed concern for need of nutritional support and way to give medications given dysphagia. The patient and his daughter are ok w/ another attempt at Dobhoff placement. Agree w/ giving Ativan prior to this and patient and his family are as well.  I discussed code status w the patient and reviewed meaning of Full code. He states \"I don't know. I don't care what you do\". I asked if he had ever talked about his wishes prior to this hospital stay and he subtly shook his head yes. I asked if he feels he would want resuscitation/intubation should he have worsening clinical status. He subtly shook his head no and then shrugged his shoulders. Discussed w/ his daughter. She feels intubation would be very difficult for him to recover from given his lung disease. She plans to further discuss this with the patient's spouse. We reviewed chest compressions, medication for resuscitation, intubation/ventilator management, cardioversion/defibrillation. He will remain full code at this time.    Resuscitation Status:    Code Status:  Full Code      I spent 45 minutes providing ACP services during consultation with the patient and/or surrogate decision maker in a voluntary, in-person conversation discussing the patient's wishes and goals as detailed in the above note.       Nicki Dick PA-C

## 2024-09-20 NOTE — CONSULTS
puff into the lungs every 6 hours as needed for Wheezing 1/11/24   Zachary Ibanez MD   folic acid (FOLVITE) 1 MG tablet Take 1 tablet by mouth daily 1/12/24   Zachary Ibanez MD   finasteride (PROSCAR) 5 MG tablet Take 1 tablet by mouth daily 1/12/24   Zachary Ibanez MD   tamsulosin (FLOMAX) 0.4 MG capsule Take 2 capsules by mouth daily 9/27/23   Gaviota Green APRN - CNP   metoprolol succinate (TOPROL XL) 25 MG extended release tablet Take 1 tablet by mouth daily 9/9/23   Maddie Valentin APRN - CNP   Multiple Vitamins-Minerals (MULTIVITAMIN WITH MINERALS) tablet Take 1 tablet by mouth daily 9/8/23   Maddie Valentin APRN - CNP   vitamin B-1 (THIAMINE) 100 MG tablet Take 1 tablet by mouth daily 9/9/23   Maddie Valentin APRN - CNP       Allergies:    Sulfa antibiotics    Social History:    The patient currently lives at home. He returned home for about one day from [facility]. Was there for 2 months according to EHR  Tobacco:   reports that he quit smoking about 57 years ago. His smoking use included cigarettes. He started smoking about 59 years ago. He has a 0.5 pack-year smoking history. He has never used smokeless tobacco.  Alcohol:   reports current alcohol use of about 70.0 standard drinks of alcohol per week.  Illicit Drugs: none known     Family History:      Problem Relation Age of Onset    Cancer Mother     Heart Disease Father        Review of Systems:   + nausea + vomiting + loose stools w/ incontinence + dyspnea + fatigue + anxiety     14 point review of systems is negative except as specifically addressed above.    Physical Examination:  /77   Pulse (!) 101   Temp 97.5 °F (36.4 °C) (Temporal)   Resp 20   Wt 61 kg (134 lb 6 oz)   SpO2 96%   BMI 16.36 kg/m²   General appearance: ill appearing male, NC O2 in place   Head: Normocephalic, without obvious abnormality, atraumatic, bilateral temporal atrophy  Eyes: conjunctivae/corneas clear. PERRL, EOM's intact.   Ears/Nose: normal  CODE STATUS.  We did have a discussion regarding concern for protein calorie malnutrition and that it may be difficult for the patient to maintain adequate caloric intake not only due to dysphagia but his underlying lung disease.  I did tell her that this would need further discussion based on his clinical course.  She is understanding and receptive to that.  Opportunities for questions and emotional support provided.  Discussed the above with the nurse caring for him as well as hospitalist BRENDEN.    Palliative team will follow as needed.  Recommendations:     Palliative Care-GOC continue current level of support Code status: Full code Ongoing conversation w/ patient and family   Acute/chronic hypoxemic respiratory failure suspected 2/2 COVID pneumonia w/ known pulmonary fibrosis-Pulmonology consulted, baricitinib ordered in addition to systemic steroids and empiric abx  Atrial fibrillation-converted to NSR, Cardiology has seen and made recommendations  Dysphagia w/ protein calorie malnutrition/weight loss-patient/daughter agreeable for another attempt at Dobhoff placement  Anxiety-agree w/ Ativan prior to Dobhoff, agree w/ Seroquel as well     Thank you for consulting palliative care and allowing us to participate in the care of the patient.      CounselingTopics: Goals of care, Code Status, Disease process education, pt/family support                                     Total Time Spent with patient assessment, interview of independent historian/HCS, workup/treatment review, discussion with medical team, review of current and home medications, review of care everywhere and placement of orders/preparation of this note: 85 minutes      Electronically signed by Nicki Dick PA-C on 9/20/2024 at 1:49 PM    (Please note that portions of this note were completed with a voice recognition program.  Efforts were made to edit the dictations but occasionally words are mis-transcribed.)

## 2024-09-20 NOTE — PROGRESS NOTES
Occupational Therapy    Attempted to see pt this afternoon to improve pt overall strength and endurance.  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 However pt then refused to get up and wanting a sprite.  Pt educated on improved swallow with getting out of bed and having body erect. Pt still refuses OT evaluation tasks.    Electronically signed by Nicki Park OT on 9/20/2024 at 2:23 PM

## 2024-09-20 NOTE — PROGRESS NOTES
Facility/Department: [facility] ONCOLOGY UNIT  SWALLOW THERAPY  SPEECH THERAPY    NAME: Eze Rosa  : 1950  MRN: [id number]    ADMISSION DATE: 2024  ADMITTING DIAGNOSIS: has Idiopathic pulmonary fibrosis (HCC); Alcohol abuse; Benign prostatic hyperplasia with urinary retention; Hyponatremia; Encounter for Morataya catheter replacement; Urinary retention; Moderate malnutrition (HCC); Acute on chronic hypoxic respiratory failure (HCC); Tachypnea, tachyypneic to 40 bpm in ED; Acute respiratory distress, Tachypneic to 40 bpm; RSV infection; Abdominal pain; Palliative care patient; Dyspnea and respiratory abnormalities; Sepsis (HCC); History of urinary retention; COVID-19; and Hypertension on their problem list.    Date of Treat: 2024  Evaluating Therapist: PAULA Omalley    Current Diet level:  NPO    Pain:  Pain Location: Throat  Pain Descriptors: Sore    Reason for Referral  Eze Rosa was referred for a bedside swallow evaluation to assess the efficiency of his swallow function, identify signs and symptoms of aspiration and make recommendations regarding safe dietary consistencies, effective compensatory strategies, and safe eating environment.    Impression  Re-assessed patient's swallowing function. Patient exhibited decreased oral prep and sluggish, moderate-severely decreased laryngeal elevation for swallow airway protection. Patient exhibited S/S penetration/aspiration with ice chip trials with delayed throat clears and wet vocal quality noted post swallows.     At this time, still suspect delayed epiglottic inversion and residue in the throat post swallows. Would recommend continuation NPO status; alternative means of nutrition. Recommend frequent oral care as patient with decreased management of secretions; question if patient would benefit from scopolamine. If patient receives mouth care prior to intake, okay for ice chips and swabs water for comfort. Will continue to follow.     Treatment

## 2024-09-20 NOTE — PROGRESS NOTES
Cardiology Progress Note Jay Garnica MD      Patient:  Eze Rosa  [id number]    Patient Active Problem List    Diagnosis Date Noted    COVID-19 09/18/2024     Priority: Low    Hypertension 09/18/2024     Priority: Low    History of urinary retention 07/20/2024     Priority: Low    Sepsis (HCC) 07/15/2024     Priority: Low    Palliative care patient 01/08/2024     Priority: Low    Dyspnea and respiratory abnormalities 01/08/2024     Priority: Low    RSV infection 12/30/2023     Priority: Low    Abdominal pain 12/30/2023     Priority: Low    Acute on chronic hypoxic respiratory failure (HCC) 12/29/2023     Priority: Low    Tachypnea, tachyypneic to 40 bpm in ED 12/29/2023     Priority: Low    Acute respiratory distress, Tachypneic to 40 bpm 12/29/2023     Priority: Low    Moderate malnutrition (HCC) 09/05/2023     Priority: Low    Encounter for Morataya catheter replacement 09/04/2023     Priority: Low    Urinary retention 09/04/2023     Priority: Low    Idiopathic pulmonary fibrosis (HCC) 09/03/2023     Priority: Low    Alcohol abuse 09/03/2023     Priority: Low    Benign prostatic hyperplasia with urinary retention 09/03/2023     Priority: Low    Hyponatremia 09/03/2023     Priority: Low       Admit Date:  9/18/2024    Admission Problem List: Present on Admission:   COVID-19   Benign prostatic hyperplasia with urinary retention   Sepsis (HCC)   Hypertension   Idiopathic pulmonary fibrosis (HCC)      Cardiac Specific Data:  Specialty Problems          Cardiology Problems    Hypertension         1.  Paroxysmal atrial fibrillation, not on anticoagulation, possibly noncompliant, normal LV systolic function.  2.  Idiopathic pulmonary fibrosis, moderate to severe by CT.  3.  History of prior alcoholism (denies alcohol use in the last 2 years).  4.  COVID-pneumonia presentation 9/18/2024.     Subjective:  Mr. Rosa reports no significant issues overnight.  No atrial fibrillation recurrence.    Objective:   /77   Pulse

## 2024-09-20 NOTE — PROGRESS NOTES
[facility]   Pharmacy Pharmacokinetic Monitoring Service - Vancomycin    Consulting Provider: Dr. Paris    Indication: sepsis  Target Concentration: Goal AUC/MONIK 400-600 mg*hr/L  Day of Therapy: 3  Additional Antimicrobials: Cefepime    Pertinent Laboratory Values:   Wt Readings from Last 1 Encounters:   09/20/24 61 kg (134 lb 6 oz)     Temp Readings from Last 1 Encounters:   09/20/24 97.7 °F (36.5 °C) (Temporal)     Estimated Creatinine Clearance: 117 mL/min (A) (based on SCr of 0.5 mg/dL (L)).  Recent Labs     09/19/24  0521 09/20/24  0411   CREATININE 0.5* 0.5*   BUN 20 16   WBC 31.6* 16.5*     Procalcitonin: 09/19/24 = 0.08    Pertinent Cultures:  Culture Date Source Results   09/18/24 Blood x 2 No growth to date   09/19/24 Throat Normal respiratory naun   MRSA Nasal Swab: showed MRSA positive result on 09/19/24    Recent vancomycin administrations                     vancomycin (VANCOCIN) 1000 mg in sodium chloride 0.9% 250 mL IVPB (mg) 1,000 mg New Bag 09/20/24 0107     1,000 mg New Bag 09/19/24 1655    vancomycin (VANCOCIN) 750 mg in sodium chloride 0.9 % 250 mL IVPB ([id number]) (mg) 750 mg New Bag 09/19/24 0844     750 mg New Bag  0003    vancomycin (VANCOCIN) 1500 mg in sodium chloride 0.9 % 250 mL IVPB (mg) 1,500 mg New Bag 09/18/24 1547                    Assessment:  Date/Time Current Dose Concentration Timing of Concentration (h) AUC   09/20/24 1000 mg IV q 8 hrs 17.1 7 h 5 m 603 / 618   Note: Serum concentrations collected for AUC dosing may appear elevated if collected in close proximity to the dose administered, this is not necessarily an indication of toxicity    Plan:  Current dosing regimen is supra-therapeutic  Change to Vancomycin 1250 mg IV every 12 hours  Repeat vancomycin concentration ordered for 09/21/24 @ 0930   Pharmacy will continue to monitor patient and adjust therapy as indicated    Thank you for the consult,  Tiara Og, [facility]  9/20/2024 8:57 AM

## 2024-09-20 NOTE — PROGRESS NOTES
X2 nurses at bedside to place dobhoff. Dobhoff placed without difficulty and immediately after placement pt screamed get it out & removed dobhoff himself. Pt then became anxious & yelling he could not catch his breath. Provider at bedside. O2 sat 94 on 5.

## 2024-09-20 NOTE — PLAN OF CARE
Problem: Safety - Adult  Goal: Free from fall injury  Outcome: Progressing     Problem: Skin/Tissue Integrity  Goal: Absence of new skin breakdown  Description: 1.  Monitor for areas of redness and/or skin breakdown  2.  Assess vascular access sites hourly  3.  Every 4-6 hours minimum:  Change oxygen saturation probe site  4.  Every 4-6 hours:  If on nasal continuous positive airway pressure, respiratory therapy assess nares and determine need for appliance change or resting period.  Outcome: Progressing     Problem: ABCDS Injury Assessment  Goal: Absence of physical injury  Outcome: Progressing     Problem: Safety - Medical Restraint  Goal: Remains free of injury from restraints (Restraint for Interference with Medical Device)  Description: INTERVENTIONS:  1. Determine that other, less restrictive measures have been tried or would not be effective before applying the restraint  2. Evaluate the patient's condition at the time of restraint application  3. Inform patient/family regarding the reason for restraint  4. Q2H: Monitor safety, psychosocial status, comfort, nutrition and hydration  Outcome: Progressing

## 2024-09-21 PROBLEM — E43 SEVERE MALNUTRITION (HCC): Chronic | Status: ACTIVE | Noted: 2024-09-20

## 2024-09-21 PROBLEM — E43 SEVERE MALNUTRITION (HCC): Status: ACTIVE | Noted: 2024-09-20

## 2024-09-21 LAB
ALBUMIN SERPL-MCNC: 2.3 G/DL (ref 3.5–5.2)
ALLENS TEST: ABNORMAL
ALP SERPL-CCNC: 87 U/L (ref 40–129)
ALT SERPL-CCNC: 10 U/L (ref 5–41)
ANION GAP SERPL CALCULATED.3IONS-SCNC: 13 MMOL/L (ref 7–19)
AST SERPL-CCNC: 17 U/L (ref 5–40)
BASE EXCESS ARTERIAL: 0.6 MMOL/L (ref -2–2)
BASOPHILS # BLD: 0 K/UL (ref 0–0.2)
BASOPHILS NFR BLD: 0.1 % (ref 0–1)
BILIRUB SERPL-MCNC: 0.6 MG/DL (ref 0.2–1.2)
BNP BLD-MCNC: 8485 PG/ML (ref 0–124)
BUN SERPL-MCNC: 14 MG/DL (ref 8–23)
CALCIUM SERPL-MCNC: 8.6 MG/DL (ref 8.8–10.2)
CARBOXYHEMOGLOBIN ARTERIAL: 1.3 % (ref 0–5)
CHLORIDE SERPL-SCNC: 101 MMOL/L (ref 98–111)
CO2 SERPL-SCNC: 21 MMOL/L (ref 22–29)
CREAT SERPL-MCNC: 0.4 MG/DL (ref 0.7–1.2)
EKG P AXIS: 64 DEGREES
EKG P-R INTERVAL: 162 MS
EKG Q-T INTERVAL: 326 MS
EKG QRS DURATION: 76 MS
EKG QTC CALCULATION (BAZETT): 397 MS
EKG T AXIS: 0 DEGREES
EOSINOPHIL # BLD: 0 K/UL (ref 0–0.6)
EOSINOPHIL NFR BLD: 0 % (ref 0–5)
ERYTHROCYTE [DISTWIDTH] IN BLOOD BY AUTOMATED COUNT: 13.1 % (ref 11.5–14.5)
GLUCOSE SERPL-MCNC: 134 MG/DL (ref 70–99)
HBA1C MFR BLD: 5.3 % (ref 4–5.6)
HCO3 ARTERIAL: 24.5 MMOL/L (ref 22–26)
HCT VFR BLD AUTO: 42.2 % (ref 42–52)
HEMOGLOBIN, ART, EXTENDED: 12.8 G/DL (ref 14–18)
HGB BLD-MCNC: 13.2 G/DL (ref 14–18)
IMM GRANULOCYTES # BLD: 0.1 K/UL
LYMPHOCYTES # BLD: 0.5 K/UL (ref 1.1–4.5)
LYMPHOCYTES NFR BLD: 3.4 % (ref 20–40)
MCH RBC QN AUTO: 30.3 PG (ref 27–31)
MCHC RBC AUTO-ENTMCNC: 31.3 G/DL (ref 33–37)
MCV RBC AUTO: 97 FL (ref 80–94)
METHEMOGLOBIN ARTERIAL: 0.7 %
MONOCYTES # BLD: 0.9 K/UL (ref 0–0.9)
MONOCYTES NFR BLD: 5.7 % (ref 0–10)
NEUTROPHILS # BLD: 14 K/UL (ref 1.5–7.5)
NEUTS SEG NFR BLD: 90.3 % (ref 50–65)
O2 CONTENT ARTERIAL: 17 ML/DL
O2 DELIVERY DEVICE: ABNORMAL
O2 SAT, ARTERIAL: 94.1 %
O2 THERAPY: ABNORMAL
ORGANISM: ABNORMAL
OXYGEN FLOW: 6
PCO2 ARTERIAL: 36 MMHG (ref 35–45)
PH ARTERIAL: 7.44 (ref 7.35–7.45)
PLATELET # BLD AUTO: 252 K/UL (ref 130–400)
PMV BLD AUTO: 10.5 FL (ref 9.4–12.4)
PO2 ARTERIAL: 73 MMHG (ref 80–100)
POTASSIUM BLD-SCNC: 3 MMOL/L
POTASSIUM SERPL-SCNC: 3.9 MMOL/L (ref 3.5–5)
PROCALCITONIN: 0.05 NG/ML (ref 0–0.09)
PROT SERPL-MCNC: 5.9 G/DL (ref 6.4–8.3)
RBC # BLD AUTO: 4.35 M/UL (ref 4.7–6.1)
S PYO THROAT QL CULT: ABNORMAL
S PYO THROAT QL CULT: ABNORMAL
SAMPLE SOURCE: ABNORMAL
SODIUM SERPL-SCNC: 135 MMOL/L (ref 136–145)
SPONT RATE(BPM): 16
VANCOMYCIN TROUGH SERPL-MCNC: 18.6 UG/ML (ref 10–20)
WBC # BLD AUTO: 15.5 K/UL (ref 4.8–10.8)

## 2024-09-21 PROCEDURE — 2580000003 HC RX 258: Performed by: EMERGENCY MEDICINE

## 2024-09-21 PROCEDURE — 93010 ELECTROCARDIOGRAM REPORT: CPT | Performed by: INTERNAL MEDICINE

## 2024-09-21 PROCEDURE — 2580000003 HC RX 258

## 2024-09-21 PROCEDURE — 94640 AIRWAY INHALATION TREATMENT: CPT

## 2024-09-21 PROCEDURE — 6360000002 HC RX W HCPCS: Performed by: INTERNAL MEDICINE

## 2024-09-21 PROCEDURE — 84145 PROCALCITONIN (PCT): CPT

## 2024-09-21 PROCEDURE — 80202 ASSAY OF VANCOMYCIN: CPT

## 2024-09-21 PROCEDURE — 85025 COMPLETE CBC W/AUTO DIFF WBC: CPT

## 2024-09-21 PROCEDURE — 36415 COLL VENOUS BLD VENIPUNCTURE: CPT

## 2024-09-21 PROCEDURE — 2500000003 HC RX 250 WO HCPCS

## 2024-09-21 PROCEDURE — 94761 N-INVAS EAR/PLS OXIMETRY MLT: CPT

## 2024-09-21 PROCEDURE — 83036 HEMOGLOBIN GLYCOSYLATED A1C: CPT

## 2024-09-21 PROCEDURE — 2700000000 HC OXYGEN THERAPY PER DAY

## 2024-09-21 PROCEDURE — 80053 COMPREHEN METABOLIC PANEL: CPT

## 2024-09-21 PROCEDURE — 83880 ASSAY OF NATRIURETIC PEPTIDE: CPT

## 2024-09-21 PROCEDURE — 2580000003 HC RX 258: Performed by: INTERNAL MEDICINE

## 2024-09-21 PROCEDURE — 6360000002 HC RX W HCPCS

## 2024-09-21 PROCEDURE — 6370000000 HC RX 637 (ALT 250 FOR IP)

## 2024-09-21 PROCEDURE — 6360000002 HC RX W HCPCS: Performed by: EMERGENCY MEDICINE

## 2024-09-21 PROCEDURE — 82803 BLOOD GASES ANY COMBINATION: CPT

## 2024-09-21 PROCEDURE — 2140000000 HC CCU INTERMEDIATE R&B

## 2024-09-21 PROCEDURE — 6370000000 HC RX 637 (ALT 250 FOR IP): Performed by: INTERNAL MEDICINE

## 2024-09-21 PROCEDURE — 36600 WITHDRAWAL OF ARTERIAL BLOOD: CPT

## 2024-09-21 RX ORDER — METOPROLOL TARTRATE 1 MG/ML
2.5 INJECTION, SOLUTION INTRAVENOUS ONCE
Status: COMPLETED | OUTPATIENT
Start: 2024-09-21 | End: 2024-09-21

## 2024-09-21 RX ORDER — DIGOXIN 0.25 MG/ML
125 INJECTION INTRAMUSCULAR; INTRAVENOUS ONCE
Status: DISCONTINUED | OUTPATIENT
Start: 2024-09-21 | End: 2024-09-22

## 2024-09-21 RX ORDER — DILTIAZEM HYDROCHLORIDE 5 MG/ML
5 INJECTION INTRAVENOUS ONCE
Status: DISCONTINUED | OUTPATIENT
Start: 2024-09-21 | End: 2024-09-21

## 2024-09-21 RX ORDER — TRAMADOL HYDROCHLORIDE 50 MG/1
50 TABLET ORAL EVERY 6 HOURS PRN
Status: DISCONTINUED | OUTPATIENT
Start: 2024-09-21 | End: 2024-09-21

## 2024-09-21 RX ORDER — BUMETANIDE 0.25 MG/ML
1 INJECTION, SOLUTION INTRAMUSCULAR; INTRAVENOUS 2 TIMES DAILY
Status: COMPLETED | OUTPATIENT
Start: 2024-09-21 | End: 2024-09-22

## 2024-09-21 RX ORDER — QUETIAPINE FUMARATE 25 MG/1
12.5 TABLET, FILM COATED ORAL 2 TIMES DAILY PRN
Status: DISCONTINUED | OUTPATIENT
Start: 2024-09-21 | End: 2024-10-18 | Stop reason: HOSPADM

## 2024-09-21 RX ADMIN — BUDESONIDE AND FORMOTEROL FUMARATE DIHYDRATE 2 PUFF: 160; 4.5 AEROSOL RESPIRATORY (INHALATION) at 07:38

## 2024-09-21 RX ADMIN — BUMETANIDE 1 MG: 0.25 INJECTION INTRAMUSCULAR; INTRAVENOUS at 17:29

## 2024-09-21 RX ADMIN — ENOXAPARIN SODIUM 60 MG: 100 INJECTION SUBCUTANEOUS at 09:04

## 2024-09-21 RX ADMIN — WATER 40 MG: 1 INJECTION INTRAMUSCULAR; INTRAVENOUS; SUBCUTANEOUS at 17:29

## 2024-09-21 RX ADMIN — GUAIFENESIN 600 MG: 600 TABLET ORAL at 09:06

## 2024-09-21 RX ADMIN — METOPROLOL SUCCINATE 25 MG: 25 TABLET, EXTENDED RELEASE ORAL at 21:40

## 2024-09-21 RX ADMIN — SODIUM CHLORIDE, PRESERVATIVE FREE 10 ML: 5 INJECTION INTRAVENOUS at 09:26

## 2024-09-21 RX ADMIN — SODIUM CHLORIDE, PRESERVATIVE FREE 40 MG: 5 INJECTION INTRAVENOUS at 08:57

## 2024-09-21 RX ADMIN — Medication 2000 UNITS: at 09:05

## 2024-09-21 RX ADMIN — CEFEPIME 2000 MG: 2 INJECTION, POWDER, FOR SOLUTION INTRAVENOUS at 22:37

## 2024-09-21 RX ADMIN — SODIUM CHLORIDE, PRESERVATIVE FREE 20 MG: 5 INJECTION INTRAVENOUS at 09:01

## 2024-09-21 RX ADMIN — BARICITINIB 4 MG: 2 TABLET, FILM COATED ORAL at 09:06

## 2024-09-21 RX ADMIN — VANCOMYCIN HYDROCHLORIDE 1250 MG: 10 INJECTION, POWDER, LYOPHILIZED, FOR SOLUTION INTRAVENOUS at 11:51

## 2024-09-21 RX ADMIN — METOPROLOL SUCCINATE 25 MG: 25 TABLET, EXTENDED RELEASE ORAL at 09:05

## 2024-09-21 RX ADMIN — ENOXAPARIN SODIUM 60 MG: 100 INJECTION SUBCUTANEOUS at 21:38

## 2024-09-21 RX ADMIN — CEFEPIME 2000 MG: 2 INJECTION, POWDER, FOR SOLUTION INTRAVENOUS at 07:38

## 2024-09-21 RX ADMIN — SODIUM CHLORIDE, PRESERVATIVE FREE 10 ML: 5 INJECTION INTRAVENOUS at 09:25

## 2024-09-21 RX ADMIN — TRAMADOL HYDROCHLORIDE 50 MG: 50 TABLET ORAL at 09:06

## 2024-09-21 RX ADMIN — SODIUM CHLORIDE, PRESERVATIVE FREE 40 MG: 5 INJECTION INTRAVENOUS at 01:21

## 2024-09-21 RX ADMIN — METOPROLOL TARTRATE 2.5 MG: 5 INJECTION INTRAVENOUS at 09:24

## 2024-09-21 RX ADMIN — BUMETANIDE 1 MG: 0.25 INJECTION INTRAMUSCULAR; INTRAVENOUS at 09:00

## 2024-09-21 RX ADMIN — VANCOMYCIN HYDROCHLORIDE 1250 MG: 10 INJECTION, POWDER, LYOPHILIZED, FOR SOLUTION INTRAVENOUS at 22:35

## 2024-09-21 RX ADMIN — SODIUM CHLORIDE, PRESERVATIVE FREE 40 MG: 5 INJECTION INTRAVENOUS at 21:39

## 2024-09-21 RX ADMIN — BUDESONIDE AND FORMOTEROL FUMARATE DIHYDRATE 2 PUFF: 160; 4.5 AEROSOL RESPIRATORY (INHALATION) at 18:48

## 2024-09-21 RX ADMIN — CEFEPIME 2000 MG: 2 INJECTION, POWDER, FOR SOLUTION INTRAVENOUS at 16:03

## 2024-09-21 RX ADMIN — SODIUM CHLORIDE, PRESERVATIVE FREE 20 MG: 5 INJECTION INTRAVENOUS at 21:39

## 2024-09-21 RX ADMIN — FINASTERIDE 5 MG: 5 TABLET, FILM COATED ORAL at 09:05

## 2024-09-21 RX ADMIN — QUETIAPINE FUMARATE 25 MG: 25 TABLET ORAL at 09:06

## 2024-09-21 RX ADMIN — WATER 40 MG: 1 INJECTION INTRAMUSCULAR; INTRAVENOUS; SUBCUTANEOUS at 06:22

## 2024-09-21 ASSESSMENT — PAIN SCALES - GENERAL: PAINLEVEL_OUTOF10: 5

## 2024-09-21 NOTE — PLAN OF CARE
Will consider EGD if dysphagia persists once mental status and respiratory function improves. Continue PPI IV bid for now. And NGT for meds and feeds.

## 2024-09-21 NOTE — PROGRESS NOTES
[facility]    Progress Note    Patient:  Eze Rosa  YOB: 1950  Date of Service: 9/21/2024  MRN: [id number]   Acct: [id number]   Primary Care Physician: Ross Masterson MD  Advance Directive: Full Code  Admit Date: 9/18/2024       Hospital Day: 3    Portions of this note have been copied forward, however, updated to reflect the most current clinical status of this patient.     CHIEF COMPLAINT: AMS    CUMULATIVE HOSPITAL COURSE:     This patient is a 73-year-old male with PMH pulmonary fibrosis, emphysema, chronic hypoxic respiratory failure on 2L oxygen at baseline, atrial fibrillation not on anticoagulation due to [id number] score of 1, BPH with urinary retention who presents to [facility] ED on 9/18 for evaluation of AMS and shortness of breath after being discharged from SNF 1 day prior.  Workup reveals bibasilar pneumonia R>L per CXR, COVID-positive on respiratory panel, head CT no acute intracranial process with acute bilateral maxillary sinusitis, CT AP negative for acute findings. CTA due to elevated D-dimer shows no PE, stable diffuse moderate to severe pulmonary fibrosis, and interval development of esophagitis versus mass.  Lab work reveals lactic 6.3 with repeat 5.8, troponin 26 with repeat 26, CRP 11.55, LFTs normal, neutrophilic leukocytosis with WBC 26.3, and ABGs showing metabolic alkalosis with pO2 64.  He was requiring 4-5L in ED.  Initially tachypneic and tachycardic.  Patient received broad-spectrum antibiotics with cefepime and vancomycin, as well as sepsis fluid bolus in ED.  Admitted to hospitalist for further evaluation management.      After admission, patient had noted hypotension and went into atrial fibrillation with RVR; given additional 1L IVF and digoxin 125mcg x1 with successful conversion to NSR.  9/21 he went back into afib with rates up to 170 but converted with lopressor IV.  Patient has intermittently complained of chest pain, however EKG shows no ischemia and  sounds and rhonchi present. No wheezing.      Comments: Diminished bases bilaterally  Abdominal:      General: Abdomen is flat. Bowel sounds are normal.      Palpations: Abdomen is soft.   Musculoskeletal:      Right lower leg: No edema.      Left lower leg: No edema.   Skin:     General: Skin is warm and dry.      Coloration: Skin is pale.   Neurological:      General: No focal deficit present.      Motor: Weakness (Generalized) present.      Comments: Oriented to person only        Medications:      sodium chloride      sodium chloride        bumetanide  1 mg IntraVENous BID    digoxin  125 mcg IntraVENous Once    pantoprazole (PROTONIX) 40 mg in sodium chloride (PF) 0.9 % 10 mL injection  40 mg IntraVENous Q12H    vancomycin  1,250 mg IntraVENous Q12H    metoprolol succinate  25 mg Oral BID    cefepime  2,000 mg IntraVENous Q8H    sodium chloride flush  5-40 mL IntraVENous 2 times per day    famotidine (PEPCID) injection  20 mg IntraVENous BID    enoxaparin  1 mg/kg SubCUTAneous BID    baricitinib  4 mg Oral Daily    vancomycin (VANCOCIN) intermittent dosing (placeholder)   Other RX Placeholder    budesonide-formoterol  2 puff Inhalation BID    finasteride  5 mg Oral Daily    [Held by provider] tamsulosin  0.8 mg Oral Daily    sodium chloride flush  5-40 mL IntraVENous 2 times per day    guaiFENesin  600 mg Oral BID    methylPREDNISolone  40 mg IntraVENous Q12H    Vitamin D  2,000 Units Oral Daily     phenol, sodium chloride flush, sodium chloride, albuterol sulfate HFA, sodium chloride flush, sodium chloride, ondansetron **OR** ondansetron, polyethylene glycol, acetaminophen **OR** acetaminophen, magic butt cream, benzonatate  Diet NPO Exceptions are: Ice Chips     Labs and Other Data:     Recent Labs     09/19/24  0521 09/20/24  0411 09/21/24  0637   WBC 31.6* 16.5* 15.5*   HGB 12.7* 10.4* 13.2*    255 252     Recent Labs     09/19/24  0521 09/19/24  0837 09/20/24  0411 09/20/24  1456 09/21/24  0637

## 2024-09-21 NOTE — PLAN OF CARE
Problem: Safety - Adult  Goal: Free from fall injury  Outcome: Progressing     Problem: Skin/Tissue Integrity  Goal: Absence of new skin breakdown  Description: 1.  Monitor for areas of redness and/or skin breakdown  2.  Assess vascular access sites hourly  3.  Every 4-6 hours minimum:  Change oxygen saturation probe site  4.  Every 4-6 hours:  If on nasal continuous positive airway pressure, respiratory therapy assess nares and determine need for appliance change or resting period.  Outcome: Progressing     Problem: ABCDS Injury Assessment  Goal: Absence of physical injury  Outcome: Progressing     Problem: Safety - Medical Restraint  Goal: Remains free of injury from restraints (Restraint for Interference with Medical Device)  Description: INTERVENTIONS:  1. Determine that other, less restrictive measures have been tried or would not be effective before applying the restraint  2. Evaluate the patient's condition at the time of restraint application  3. Inform patient/family regarding the reason for restraint  4. Q2H: Monitor safety, psychosocial status, comfort, nutrition and hydration  Outcome: Progressing     Problem: Discharge Planning  Goal: Discharge to home or other facility with appropriate resources  Outcome: Progressing  Flowsheets (Taken 9/21/2024 1152)  Discharge to home or other facility with appropriate resources: Identify barriers to discharge with patient and caregiver

## 2024-09-21 NOTE — CONSULTS
Comprehensive Nutrition Assessment    Type and Reason for Visit:  Initial, Consult    Nutrition Recommendations/Plan:   Jevity 1.2; initiate at 25mL/hr and advance by 20mL q8hrs until goal rate 65mL/hr is achieved. Flush with 25mL/hr free water via pump.   HgbA1c requested.      Malnutrition Assessment:  Malnutrition Status:  Severe malnutrition (09/21/24 0826)    Context:  Chronic Illness     Findings of the 6 clinical characteristics of malnutrition:  Energy Intake:  75% or less estimated energy requirements for 1 month or longer  Weight Loss:  Greater than 20% over 1 year     Body Fat Loss:  Severe body fat loss Orbital, Buccal region   Muscle Mass Loss:  Severe muscle mass loss Temples (temporalis), Clavicles (pectoralis & deltoids)  Fluid Accumulation:  No significant fluid accumulation     Strength:  Not Performed    Nutrition Assessment:    Consult received for TF order and mgmt. Pt presents severely malnourished AEB wt loss >20% in 1 year, inadequate energy intake, severe muscle wasting, and severe fat loss. Pt recommended to remain NPO by SLP. GOC discussions ongoing with palliative care. BG ranging 120-247mg/dL on Prednisone, requested new A1c to evaluate long-term BG control. Orders placed for Jevity 1.2 at 65mL/hr goal rate with slow advancement to monitor tolerance. Will follow closely.    Nutrition Related Findings:    -247mg/dL; HqaY7c=3.7% (1/12/24)--requested new A1c Wound Type: Pressure Injury (coccyx/buttocks)       Current Nutrition Intake & Therapies:    Average Meal Intake: NPO  Average Supplements Intake: NPO  Diet NPO  ADULT TUBE FEEDING; Nasogastric; Other Tube Feeding (specify); Jevity 1.2; Continuous; 25; Yes; 20; Q 8 hours; 65; 25; Q 1 hour  Current Tube Feeding (TF) Orders:  Feeding Route: Nasogastric  Formula: Other Tube Feeding (Comment) (Jevity 1.2)  Schedule: Continuous  Feeding Regimen: Jevity 1.2 at 65mL/hr  Additives/Modulars: None  Water Flushes:

## 2024-09-21 NOTE — PROGRESS NOTES
[facility]   Pharmacy Pharmacokinetic Monitoring Service - Vancomycin    Consulting Provider: Dr. Paris   Indication: sepsis  Target Concentration: Goal AUC/MONIK 400-600 mg*hr/L  Day of Therapy: 4  Additional Antimicrobials: cefepime    Pertinent Laboratory Values:   Wt Readings from Last 1 Encounters:   09/20/24 61 kg (134 lb 6 oz)     Temp Readings from Last 1 Encounters:   09/21/24 97.3 °F (36.3 °C)     Estimated Creatinine Clearance: 146 mL/min (A) (based on SCr of 0.4 mg/dL (L)).  Recent Labs     09/20/24  0411 09/21/24  0637   CREATININE 0.5* 0.4*   BUN 16 14   WBC 16.5* 15.5*     Procalcitonin: 0.05    Pertinent Cultures:  Culture Date Source Results   09/18/24 Blood No growth to date   MRSA Nasal Swab: showed MRSA positive result on 09/19/24    Recent vancomycin administrations                     vancomycin (VANCOCIN) 1,250 mg in sodium chloride 0.9 % 250 mL IVPB (mg) 1,250 mg New Bag 09/20/24 2334     1,250 mg New Bag  1032    vancomycin (VANCOCIN) 1000 mg in sodium chloride 0.9% 250 mL IVPB (mg) 1,000 mg New Bag 09/20/24 0107     1,000 mg New Bag 09/19/24 1655    vancomycin (VANCOCIN) 750 mg in sodium chloride 0.9 % 250 mL IVPB (Oevb0Inn) (mg) 750 mg New Bag 09/19/24 0844     750 mg New Bag  0003    vancomycin (VANCOCIN) 1500 mg in sodium chloride 0.9 % 250 mL IVPB (mg) 1,500 mg New Bag 09/18/24 1547                    Assessment:  Date/Time Current Dose Concentration Timing of Concentration (h) AUC   09/21/24 @ 0905 1250mg IV every 12 hours 18.6 9h 31m 506   Note: Serum concentrations collected for AUC dosing may appear elevated if collected in close proximity to the dose administered, this is not necessarily an indication of toxicity    Plan:  Current dosing regimen is therapeutic  Continue current dose  Repeat vancomycin concentration not ordered    Pharmacy will continue to monitor patient and adjust therapy as indicated    Thank you for the consult,    MICHAEL BONNER, PHARM D,

## 2024-09-21 NOTE — PLAN OF CARE
Problem: Safety - Adult  Goal: Free from fall injury  9/21/2024 0311 by Merissa Martinez RN  Outcome: Progressing  9/20/2024 1434 by Charley Mera RN  Outcome: Progressing  9/20/2024 1432 by Charley Mera RN  Outcome: Progressing     Problem: Skin/Tissue Integrity  Goal: Absence of new skin breakdown  Description: 1.  Monitor for areas of redness and/or skin breakdown  2.  Assess vascular access sites hourly  3.  Every 4-6 hours minimum:  Change oxygen saturation probe site  4.  Every 4-6 hours:  If on nasal continuous positive airway pressure, respiratory therapy assess nares and determine need for appliance change or resting period.  9/21/2024 0311 by Merissa Martinez RN  Outcome: Progressing  9/20/2024 1434 by Charley Mera RN  Outcome: Progressing  9/20/2024 1432 by Charley Mera RN  Outcome: Progressing     Problem: ABCDS Injury Assessment  Goal: Absence of physical injury  9/21/2024 0311 by Merissa Martinez RN  Outcome: Progressing  9/20/2024 1434 by Charley Mera RN  Outcome: Progressing  9/20/2024 1432 by Charley Mera RN  Outcome: Progressing     Problem: Safety - Medical Restraint  Goal: Remains free of injury from restraints (Restraint for Interference with Medical Device)  Description: INTERVENTIONS:  1. Determine that other, less restrictive measures have been tried or would not be effective before applying the restraint  2. Evaluate the patient's condition at the time of restraint application  3. Inform patient/family regarding the reason for restraint  4. Q2H: Monitor safety, psychosocial status, comfort, nutrition and hydration  9/21/2024 0311 by Merissa Martinez RN  Outcome: Progressing  9/20/2024 1434 by Charley Mera RN  Outcome: Progressing  9/20/2024 1432 by Charley Mera RN  Outcome: Progressing     Problem: Discharge Planning  Goal: Discharge to home or other facility with appropriate resources  Outcome: Progressing

## 2024-09-22 LAB
ALBUMIN SERPL-MCNC: 3.1 G/DL (ref 3.5–5.2)
ALP SERPL-CCNC: 89 U/L (ref 40–129)
ALT SERPL-CCNC: 11 U/L (ref 5–41)
ANION GAP SERPL CALCULATED.3IONS-SCNC: 19 MMOL/L (ref 7–19)
AST SERPL-CCNC: 16 U/L (ref 5–40)
BASOPHILS # BLD: 0 K/UL (ref 0–0.2)
BASOPHILS NFR BLD: 0.1 % (ref 0–1)
BILIRUB SERPL-MCNC: 0.6 MG/DL (ref 0.2–1.2)
BUN SERPL-MCNC: 20 MG/DL (ref 8–23)
CALCIUM SERPL-MCNC: 8.4 MG/DL (ref 8.8–10.2)
CHLORIDE SERPL-SCNC: 97 MMOL/L (ref 98–111)
CO2 SERPL-SCNC: 24 MMOL/L (ref 22–29)
CREAT SERPL-MCNC: 0.6 MG/DL (ref 0.7–1.2)
EOSINOPHIL # BLD: 0 K/UL (ref 0–0.6)
EOSINOPHIL NFR BLD: 0 % (ref 0–5)
ERYTHROCYTE [DISTWIDTH] IN BLOOD BY AUTOMATED COUNT: 13.2 % (ref 11.5–14.5)
GLUCOSE SERPL-MCNC: 154 MG/DL (ref 70–99)
HCT VFR BLD AUTO: 46.5 % (ref 42–52)
HGB BLD-MCNC: 14.1 G/DL (ref 14–18)
IMM GRANULOCYTES # BLD: 0 K/UL
LYMPHOCYTES # BLD: 0.8 K/UL (ref 1.1–4.5)
LYMPHOCYTES NFR BLD: 6.2 % (ref 20–40)
MCH RBC QN AUTO: 30 PG (ref 27–31)
MCHC RBC AUTO-ENTMCNC: 30.3 G/DL (ref 33–37)
MCV RBC AUTO: 98.9 FL (ref 80–94)
MONOCYTES # BLD: 0.6 K/UL (ref 0–0.9)
MONOCYTES NFR BLD: 4.7 % (ref 0–10)
NEUTROPHILS # BLD: 10.8 K/UL (ref 1.5–7.5)
NEUTS SEG NFR BLD: 88.8 % (ref 50–65)
PLATELET # BLD AUTO: 332 K/UL (ref 130–400)
PMV BLD AUTO: 10.5 FL (ref 9.4–12.4)
POTASSIUM SERPL-SCNC: 3.4 MMOL/L (ref 3.5–5)
PROT SERPL-MCNC: 6.1 G/DL (ref 6.4–8.3)
RBC # BLD AUTO: 4.7 M/UL (ref 4.7–6.1)
SODIUM SERPL-SCNC: 140 MMOL/L (ref 136–145)
WBC # BLD AUTO: 12.2 K/UL (ref 4.8–10.8)

## 2024-09-22 PROCEDURE — 2500000003 HC RX 250 WO HCPCS

## 2024-09-22 PROCEDURE — 2580000003 HC RX 258: Performed by: INTERNAL MEDICINE

## 2024-09-22 PROCEDURE — 6360000002 HC RX W HCPCS

## 2024-09-22 PROCEDURE — 2580000003 HC RX 258

## 2024-09-22 PROCEDURE — 6370000000 HC RX 637 (ALT 250 FOR IP)

## 2024-09-22 PROCEDURE — 36415 COLL VENOUS BLD VENIPUNCTURE: CPT

## 2024-09-22 PROCEDURE — 2700000000 HC OXYGEN THERAPY PER DAY

## 2024-09-22 PROCEDURE — 94640 AIRWAY INHALATION TREATMENT: CPT

## 2024-09-22 PROCEDURE — 2580000003 HC RX 258: Performed by: EMERGENCY MEDICINE

## 2024-09-22 PROCEDURE — 6360000002 HC RX W HCPCS: Performed by: INTERNAL MEDICINE

## 2024-09-22 PROCEDURE — 2500000003 HC RX 250 WO HCPCS: Performed by: INTERNAL MEDICINE

## 2024-09-22 PROCEDURE — 6370000000 HC RX 637 (ALT 250 FOR IP): Performed by: INTERNAL MEDICINE

## 2024-09-22 PROCEDURE — 94761 N-INVAS EAR/PLS OXIMETRY MLT: CPT

## 2024-09-22 PROCEDURE — 85025 COMPLETE CBC W/AUTO DIFF WBC: CPT

## 2024-09-22 PROCEDURE — 94760 N-INVAS EAR/PLS OXIMETRY 1: CPT

## 2024-09-22 PROCEDURE — 80053 COMPREHEN METABOLIC PANEL: CPT

## 2024-09-22 PROCEDURE — 2140000000 HC CCU INTERMEDIATE R&B

## 2024-09-22 PROCEDURE — 6360000002 HC RX W HCPCS: Performed by: EMERGENCY MEDICINE

## 2024-09-22 RX ORDER — DIGOXIN 0.25 MG/ML
125 INJECTION INTRAMUSCULAR; INTRAVENOUS ONCE
Status: COMPLETED | OUTPATIENT
Start: 2024-09-22 | End: 2024-09-22

## 2024-09-22 RX ORDER — DIGOXIN 0.25 MG/ML
125 INJECTION INTRAMUSCULAR; INTRAVENOUS DAILY
Status: DISCONTINUED | OUTPATIENT
Start: 2024-09-22 | End: 2024-09-22

## 2024-09-22 RX ORDER — METOPROLOL TARTRATE 1 MG/ML
5 INJECTION, SOLUTION INTRAVENOUS ONCE
Status: COMPLETED | OUTPATIENT
Start: 2024-09-22 | End: 2024-09-22

## 2024-09-22 RX ADMIN — BUDESONIDE AND FORMOTEROL FUMARATE DIHYDRATE 2 PUFF: 160; 4.5 AEROSOL RESPIRATORY (INHALATION) at 18:47

## 2024-09-22 RX ADMIN — BUDESONIDE AND FORMOTEROL FUMARATE DIHYDRATE 2 PUFF: 160; 4.5 AEROSOL RESPIRATORY (INHALATION) at 06:46

## 2024-09-22 RX ADMIN — VANCOMYCIN HYDROCHLORIDE 1250 MG: 10 INJECTION, POWDER, LYOPHILIZED, FOR SOLUTION INTRAVENOUS at 22:03

## 2024-09-22 RX ADMIN — CEFEPIME 2000 MG: 2 INJECTION, POWDER, FOR SOLUTION INTRAVENOUS at 06:04

## 2024-09-22 RX ADMIN — VANCOMYCIN HYDROCHLORIDE 1250 MG: 10 INJECTION, POWDER, LYOPHILIZED, FOR SOLUTION INTRAVENOUS at 12:03

## 2024-09-22 RX ADMIN — SODIUM CHLORIDE, PRESERVATIVE FREE 20 MG: 5 INJECTION INTRAVENOUS at 22:02

## 2024-09-22 RX ADMIN — ENOXAPARIN SODIUM 60 MG: 100 INJECTION SUBCUTANEOUS at 22:02

## 2024-09-22 RX ADMIN — SODIUM CHLORIDE, PRESERVATIVE FREE 10 ML: 5 INJECTION INTRAVENOUS at 12:42

## 2024-09-22 RX ADMIN — Medication 2000 UNITS: at 12:17

## 2024-09-22 RX ADMIN — WATER 40 MG: 1 INJECTION INTRAMUSCULAR; INTRAVENOUS; SUBCUTANEOUS at 17:22

## 2024-09-22 RX ADMIN — GUAIFENESIN 600 MG: 600 TABLET ORAL at 12:17

## 2024-09-22 RX ADMIN — FINASTERIDE 5 MG: 5 TABLET, FILM COATED ORAL at 12:17

## 2024-09-22 RX ADMIN — SODIUM CHLORIDE, PRESERVATIVE FREE 40 MG: 5 INJECTION INTRAVENOUS at 12:06

## 2024-09-22 RX ADMIN — SODIUM CHLORIDE, PRESERVATIVE FREE 20 MG: 5 INJECTION INTRAVENOUS at 12:04

## 2024-09-22 RX ADMIN — ENOXAPARIN SODIUM 60 MG: 100 INJECTION SUBCUTANEOUS at 12:17

## 2024-09-22 RX ADMIN — DIGOXIN 125 MCG: 0.25 INJECTION INTRAMUSCULAR; INTRAVENOUS at 01:32

## 2024-09-22 RX ADMIN — METOPROLOL SUCCINATE 25 MG: 25 TABLET, EXTENDED RELEASE ORAL at 22:02

## 2024-09-22 RX ADMIN — METOPROLOL SUCCINATE 25 MG: 25 TABLET, EXTENDED RELEASE ORAL at 12:17

## 2024-09-22 RX ADMIN — BUMETANIDE 1 MG: 0.25 INJECTION INTRAMUSCULAR; INTRAVENOUS at 12:09

## 2024-09-22 RX ADMIN — WATER 40 MG: 1 INJECTION INTRAMUSCULAR; INTRAVENOUS; SUBCUTANEOUS at 06:02

## 2024-09-22 RX ADMIN — BARICITINIB 4 MG: 2 TABLET, FILM COATED ORAL at 12:17

## 2024-09-22 RX ADMIN — METOPROLOL TARTRATE 5 MG: 1 INJECTION, SOLUTION INTRAVENOUS at 02:34

## 2024-09-22 RX ADMIN — SODIUM CHLORIDE, PRESERVATIVE FREE 40 MG: 5 INJECTION INTRAVENOUS at 22:02

## 2024-09-22 RX ADMIN — CEFEPIME 2000 MG: 2 INJECTION, POWDER, FOR SOLUTION INTRAVENOUS at 15:37

## 2024-09-22 NOTE — PROGRESS NOTES
Per SLP note, pt is ok to have ice chips after oral care. Oral care performed, and single ice chip given to pt. As soon as nurse left room, pt started coughing continuously. This nurse went to check on patient and hooked up continuous pulse ox in room. Pt O2 sat was 79 on 2L O2 via nasal canula with good waveform on monitor. Pt color decreased. Call light pressed and O2 turned up to 6L NC. Within seconds, pt's color returned, coughing subsided, and O2 was 100%. Weaned pt back to 2L NC and he is currently at 94 O2 sat and asking for more ice chips. Provider notified.

## 2024-09-22 NOTE — PLAN OF CARE
Problem: Safety - Adult  Goal: Free from fall injury  9/22/2024 0411 by Merissa Martinez RN  Outcome: Progressing  9/21/2024 1724 by Jessica Vera RN  Outcome: Progressing     Problem: Skin/Tissue Integrity  Goal: Absence of new skin breakdown  Description: 1.  Monitor for areas of redness and/or skin breakdown  2.  Assess vascular access sites hourly  3.  Every 4-6 hours minimum:  Change oxygen saturation probe site  4.  Every 4-6 hours:  If on nasal continuous positive airway pressure, respiratory therapy assess nares and determine need for appliance change or resting period.  9/22/2024 0411 by Merissa Martinez RN  Outcome: Progressing  9/21/2024 1724 by Jessica Vera RN  Outcome: Progressing     Problem: ABCDS Injury Assessment  Goal: Absence of physical injury  9/22/2024 0411 by Merissa Martinez RN  Outcome: Progressing  9/21/2024 1724 by Jessica Vera RN  Outcome: Progressing     Problem: Safety - Medical Restraint  Goal: Remains free of injury from restraints (Restraint for Interference with Medical Device)  Description: INTERVENTIONS:  1. Determine that other, less restrictive measures have been tried or would not be effective before applying the restraint  2. Evaluate the patient's condition at the time of restraint application  3. Inform patient/family regarding the reason for restraint  4. Q2H: Monitor safety, psychosocial status, comfort, nutrition and hydration  9/22/2024 0411 by Merissa Martinez RN  Outcome: Progressing  9/21/2024 1724 by Jessica Vera RN  Outcome: Progressing     Problem: Discharge Planning  Goal: Discharge to home or other facility with appropriate resources  9/22/2024 0411 by Merissa Martinez RN  Outcome: Progressing  9/21/2024 1724 by Jessica Vera RN  Outcome: Progressing  Flowsheets (Taken 9/21/2024 1152)  Discharge to home or other facility with appropriate resources: Identify barriers to discharge with patient and caregiver

## 2024-09-22 NOTE — PROGRESS NOTES
[facility]      Patient:  Eze Rosa  YOB: 1950  Date of Service: 9/22/2024  MRN: [id number]   Acct: [id number]   Primary Care Physician: Ross Masterson MD  Advance Directive: Full Code  Admit Date: 9/18/2024       Hospital Day: 4  Portions of this note have been copied forward, however, changed to reflect the most current clinical status of this patient.  CHIEF COMPLAINT  altered mental status     Cumulative hospital course   73-year-old male with idiopathic pulmonary fibrosis, emphysema, chronic hypoxic respiratory failure on supplemental oxygen 2 LNC at baseline, chronic right pleural effusion with VATS biopsy, Mclean's esophagus with GERD, atrial fib, BPH with urinary retention, alcohol use sober since November 2023, with complaints of altered mental status and shortness of air.  Of note recently admitted in July due to urosepsis at that time Morataya catheter remained placed, received IV antibiotic course, and was discharged to [facility] stable condition.  He returns to [facility] ER due to shortness of air and altered mental status.  He was discharged from SNF facility 1 day prior to admission.  Workup in ER CTA pulmonary moderate to severe pulmonary fibrosis and interval development of esophagitis versus mass, CXR bibasilar pneumonia right greater than left, COVID-positive, CT head no acute intracranial abnormality with acute bilateral maxillary sinusitis, lactic acid 6 with repeat 5.8, CRP 11.55, WBC 26.3, ABG metabolic alkalosis.  Initiated on cefepime and vancomycin.  After admission patient noted to be hypotensive and A-fib RVR additional IVF 1 L given and digoxin 125 mcg x 1 with conversion to normal sinus rhythm.  9/21 went back in A-fib RVR converted with IV Lopressor.  Patient had intermittent complaints of chest pain EKG no ischemia or troponins remain flat.  Echo normal EF 60% with normal diastolic function. placed on therapeutic Lovenox.  Cardiology consulted patient placed on

## 2024-09-22 NOTE — PLAN OF CARE
Problem: Safety - Adult  Goal: Free from fall injury  Outcome: Progressing     Problem: Skin/Tissue Integrity  Goal: Absence of new skin breakdown  Description: 1.  Monitor for areas of redness and/or skin breakdown  2.  Assess vascular access sites hourly  3.  Every 4-6 hours minimum:  Change oxygen saturation probe site  4.  Every 4-6 hours:  If on nasal continuous positive airway pressure, respiratory therapy assess nares and determine need for appliance change or resting period.  Outcome: Progressing     Problem: ABCDS Injury Assessment  Goal: Absence of physical injury  Outcome: Progressing     Problem: Safety - Medical Restraint  Goal: Remains free of injury from restraints (Restraint for Interference with Medical Device)  Description: INTERVENTIONS:  1. Determine that other, less restrictive measures have been tried or would not be effective before applying the restraint  2. Evaluate the patient's condition at the time of restraint application  3. Inform patient/family regarding the reason for restraint  4. Q2H: Monitor safety, psychosocial status, comfort, nutrition and hydration  Outcome: Progressing     Problem: Discharge Planning  Goal: Discharge to home or other facility with appropriate resources  Outcome: Progressing  Flowsheets (Taken 9/22/2024 1235)  Discharge to home or other facility with appropriate resources: Identify barriers to discharge with patient and caregiver

## 2024-09-23 LAB
ALBUMIN SERPL-MCNC: 3.5 G/DL (ref 3.5–5.2)
ALP SERPL-CCNC: 93 U/L (ref 40–129)
ALT SERPL-CCNC: 12 U/L (ref 5–41)
ANION GAP SERPL CALCULATED.3IONS-SCNC: 13 MMOL/L (ref 7–19)
AST SERPL-CCNC: 14 U/L (ref 5–40)
BACTERIA BLD CULT ORG #2: NORMAL
BACTERIA BLD CULT: NORMAL
BASOPHILS # BLD: 0 K/UL (ref 0–0.2)
BASOPHILS NFR BLD: 0.1 % (ref 0–1)
BILIRUB SERPL-MCNC: 0.7 MG/DL (ref 0.2–1.2)
BUN SERPL-MCNC: 25 MG/DL (ref 8–23)
CALCIUM SERPL-MCNC: 9.1 MG/DL (ref 8.8–10.2)
CHLORIDE SERPL-SCNC: 96 MMOL/L (ref 98–111)
CO2 SERPL-SCNC: 32 MMOL/L (ref 22–29)
CREAT SERPL-MCNC: 0.7 MG/DL (ref 0.7–1.2)
EOSINOPHIL # BLD: 0 K/UL (ref 0–0.6)
EOSINOPHIL NFR BLD: 0 % (ref 0–5)
ERYTHROCYTE [DISTWIDTH] IN BLOOD BY AUTOMATED COUNT: 12.9 % (ref 11.5–14.5)
GLUCOSE SERPL-MCNC: 156 MG/DL (ref 70–99)
HCT VFR BLD AUTO: 44.1 % (ref 42–52)
HGB BLD-MCNC: 14.2 G/DL (ref 14–18)
IMM GRANULOCYTES # BLD: 0 K/UL
LYMPHOCYTES # BLD: 0.6 K/UL (ref 1.1–4.5)
LYMPHOCYTES NFR BLD: 8.2 % (ref 20–40)
MCH RBC QN AUTO: 29.6 PG (ref 27–31)
MCHC RBC AUTO-ENTMCNC: 32.2 G/DL (ref 33–37)
MCV RBC AUTO: 91.9 FL (ref 80–94)
MONOCYTES # BLD: 0.5 K/UL (ref 0–0.9)
MONOCYTES NFR BLD: 6.5 % (ref 0–10)
NEUTROPHILS # BLD: 6.6 K/UL (ref 1.5–7.5)
NEUTS SEG NFR BLD: 84.8 % (ref 50–65)
PLATELET # BLD AUTO: 300 K/UL (ref 130–400)
PMV BLD AUTO: 10.3 FL (ref 9.4–12.4)
POTASSIUM SERPL-SCNC: 3.1 MMOL/L (ref 3.5–5)
PROT SERPL-MCNC: 7.1 G/DL (ref 6.4–8.3)
RBC # BLD AUTO: 4.8 M/UL (ref 4.7–6.1)
SODIUM SERPL-SCNC: 141 MMOL/L (ref 136–145)
WBC # BLD AUTO: 7.8 K/UL (ref 4.8–10.8)

## 2024-09-23 PROCEDURE — 2580000003 HC RX 258: Performed by: INTERNAL MEDICINE

## 2024-09-23 PROCEDURE — 6360000002 HC RX W HCPCS: Performed by: INTERNAL MEDICINE

## 2024-09-23 PROCEDURE — 92507 TX SP LANG VOICE COMM INDIV: CPT

## 2024-09-23 PROCEDURE — 2580000003 HC RX 258: Performed by: EMERGENCY MEDICINE

## 2024-09-23 PROCEDURE — 94640 AIRWAY INHALATION TREATMENT: CPT

## 2024-09-23 PROCEDURE — 36415 COLL VENOUS BLD VENIPUNCTURE: CPT

## 2024-09-23 PROCEDURE — 6370000000 HC RX 637 (ALT 250 FOR IP): Performed by: INTERNAL MEDICINE

## 2024-09-23 PROCEDURE — 6360000002 HC RX W HCPCS: Performed by: EMERGENCY MEDICINE

## 2024-09-23 PROCEDURE — 2500000003 HC RX 250 WO HCPCS

## 2024-09-23 PROCEDURE — 2700000000 HC OXYGEN THERAPY PER DAY

## 2024-09-23 PROCEDURE — 92526 ORAL FUNCTION THERAPY: CPT

## 2024-09-23 PROCEDURE — 2580000003 HC RX 258

## 2024-09-23 PROCEDURE — 85025 COMPLETE CBC W/AUTO DIFF WBC: CPT

## 2024-09-23 PROCEDURE — 6370000000 HC RX 637 (ALT 250 FOR IP)

## 2024-09-23 PROCEDURE — 80053 COMPREHEN METABOLIC PANEL: CPT

## 2024-09-23 PROCEDURE — 6360000002 HC RX W HCPCS

## 2024-09-23 PROCEDURE — 2140000000 HC CCU INTERMEDIATE R&B

## 2024-09-23 PROCEDURE — 94761 N-INVAS EAR/PLS OXIMETRY MLT: CPT

## 2024-09-23 RX ORDER — POTASSIUM CHLORIDE 1500 MG/1
40 TABLET, EXTENDED RELEASE ORAL PRN
Status: DISCONTINUED | OUTPATIENT
Start: 2024-09-23 | End: 2024-10-18 | Stop reason: HOSPADM

## 2024-09-23 RX ORDER — POTASSIUM CHLORIDE 7.45 MG/ML
10 INJECTION INTRAVENOUS PRN
Status: DISCONTINUED | OUTPATIENT
Start: 2024-09-23 | End: 2024-10-18 | Stop reason: HOSPADM

## 2024-09-23 RX ADMIN — BUDESONIDE AND FORMOTEROL FUMARATE DIHYDRATE 2 PUFF: 160; 4.5 AEROSOL RESPIRATORY (INHALATION) at 18:16

## 2024-09-23 RX ADMIN — POTASSIUM BICARBONATE 40 MEQ: 782 TABLET, EFFERVESCENT ORAL at 09:37

## 2024-09-23 RX ADMIN — GUAIFENESIN 600 MG: 600 TABLET ORAL at 20:13

## 2024-09-23 RX ADMIN — SODIUM CHLORIDE, PRESERVATIVE FREE 20 MG: 5 INJECTION INTRAVENOUS at 20:14

## 2024-09-23 RX ADMIN — CEFEPIME 2000 MG: 2 INJECTION, POWDER, FOR SOLUTION INTRAVENOUS at 07:22

## 2024-09-23 RX ADMIN — METOPROLOL SUCCINATE 25 MG: 25 TABLET, EXTENDED RELEASE ORAL at 09:37

## 2024-09-23 RX ADMIN — CEFEPIME 2000 MG: 2 INJECTION, POWDER, FOR SOLUTION INTRAVENOUS at 15:11

## 2024-09-23 RX ADMIN — BUDESONIDE AND FORMOTEROL FUMARATE DIHYDRATE 2 PUFF: 160; 4.5 AEROSOL RESPIRATORY (INHALATION) at 07:15

## 2024-09-23 RX ADMIN — CEFEPIME 2000 MG: 2 INJECTION, POWDER, FOR SOLUTION INTRAVENOUS at 23:32

## 2024-09-23 RX ADMIN — METOPROLOL SUCCINATE 25 MG: 25 TABLET, EXTENDED RELEASE ORAL at 20:13

## 2024-09-23 RX ADMIN — ENOXAPARIN SODIUM 60 MG: 100 INJECTION SUBCUTANEOUS at 22:04

## 2024-09-23 RX ADMIN — SODIUM CHLORIDE, PRESERVATIVE FREE 20 MG: 5 INJECTION INTRAVENOUS at 09:37

## 2024-09-23 RX ADMIN — CEFEPIME 2000 MG: 2 INJECTION, POWDER, FOR SOLUTION INTRAVENOUS at 00:05

## 2024-09-23 RX ADMIN — VANCOMYCIN HYDROCHLORIDE 1250 MG: 10 INJECTION, POWDER, LYOPHILIZED, FOR SOLUTION INTRAVENOUS at 10:55

## 2024-09-23 RX ADMIN — SODIUM CHLORIDE, PRESERVATIVE FREE 10 ML: 5 INJECTION INTRAVENOUS at 23:34

## 2024-09-23 RX ADMIN — VANCOMYCIN HYDROCHLORIDE 1250 MG: 10 INJECTION, POWDER, LYOPHILIZED, FOR SOLUTION INTRAVENOUS at 20:31

## 2024-09-23 RX ADMIN — ENOXAPARIN SODIUM 60 MG: 100 INJECTION SUBCUTANEOUS at 09:36

## 2024-09-23 RX ADMIN — SODIUM CHLORIDE, PRESERVATIVE FREE 40 MG: 5 INJECTION INTRAVENOUS at 20:14

## 2024-09-23 RX ADMIN — BARICITINIB 4 MG: 2 TABLET, FILM COATED ORAL at 09:40

## 2024-09-23 RX ADMIN — SODIUM CHLORIDE, PRESERVATIVE FREE 40 MG: 5 INJECTION INTRAVENOUS at 09:36

## 2024-09-23 RX ADMIN — GUAIFENESIN 600 MG: 600 TABLET ORAL at 09:37

## 2024-09-23 RX ADMIN — WATER 40 MG: 1 INJECTION INTRAMUSCULAR; INTRAVENOUS; SUBCUTANEOUS at 06:39

## 2024-09-23 RX ADMIN — FINASTERIDE 5 MG: 5 TABLET, FILM COATED ORAL at 09:42

## 2024-09-23 RX ADMIN — Medication 2000 UNITS: at 09:37

## 2024-09-23 RX ADMIN — WATER 40 MG: 1 INJECTION INTRAMUSCULAR; INTRAVENOUS; SUBCUTANEOUS at 17:07

## 2024-09-23 RX ADMIN — SODIUM CHLORIDE, PRESERVATIVE FREE 10 ML: 5 INJECTION INTRAVENOUS at 10:03

## 2024-09-23 NOTE — PROGRESS NOTES
Comprehensive Nutrition Assessment    Type and Reason for Visit:  Reassess    Nutrition Recommendations/Plan:   Monitor meal intakes.     Malnutrition Assessment:  Malnutrition Status:  Severe malnutrition (09/21/24 0826)    Context:  Chronic Illness     Findings of the 6 clinical characteristics of malnutrition:  Energy Intake:  75% or less estimated energy requirements for 1 month or longer  Weight Loss:  Greater than 20% over 1 year     Body Fat Loss:  Severe body fat loss Orbital, Buccal region   Muscle Mass Loss:  Severe muscle mass loss Temples (temporalis), Clavicles (pectoralis & deltoids)  Fluid Accumulation:  No significant fluid accumulation     Strength:  Not Performed    Nutrition Assessment:    Per documentation, pt was receiving Jevity 1.2 at 25 mL/hr; residuals= 100, 107 mL. Aware SLP ordered PO diet today. A1c 5.3%, continue liberalized diet. Will continue to monitor.    Nutrition Related Findings:    BM 9/20. K+ 3.1, BUN 25, BNP 8,485, A1c 5.3%, glu 134-156 (Solu-medrol). Wound Type: Pressure Injury (coccyx/buttocks)       Current Nutrition Intake & Therapies:    Average Meal Intake: Unable to assess  Average Supplements Intake: None Ordered  ADULT DIET; Full Liquid; Moderately Thick (Honey)    Anthropometric Measures:  Height: 193 cm (6' 3.98\")  Ideal Body Weight (IBW): 202 lbs (92 kg)    Admission Body Weight: 61 kg (134 lb 6 oz)  Current Body Weight: 59.2 kg (130 lb 8.2 oz), 64.6 % IBW. Weight Source: Bed Scale  Current BMI (kg/m2): 15.9  Usual Body Weight: 79.9 kg (176 lb 2 oz) (1/5/24)  % Weight Change (Calculated): -25.9  BMI Categories: Underweight (BMI less than 22) age over 65    Estimated Daily Nutrient Needs:  Energy Requirements Based On: Kcal/kg  Weight Used for Energy Requirements: Current  Energy (kcal/day): 4528-8531 (30-35 kcal/kg)  Weight Used for Protein Requirements: Current  Protein (g/day): 89 (1.5 g/kg)  Method Used for Fluid Requirements: 1 ml/kcal  Fluid (ml/day):

## 2024-09-23 NOTE — PLAN OF CARE
Problem: Safety - Adult  Goal: Free from fall injury  9/23/2024 0234 by Merissa Martinez RN  Outcome: Progressing  9/22/2024 1828 by Jessica Vera RN  Outcome: Progressing     Problem: Skin/Tissue Integrity  Goal: Absence of new skin breakdown  Description: 1.  Monitor for areas of redness and/or skin breakdown  2.  Assess vascular access sites hourly  3.  Every 4-6 hours minimum:  Change oxygen saturation probe site  4.  Every 4-6 hours:  If on nasal continuous positive airway pressure, respiratory therapy assess nares and determine need for appliance change or resting period.  9/23/2024 0234 by Merissa Martinez RN  Outcome: Progressing  9/22/2024 1828 by Jessica Vera RN  Outcome: Progressing     Problem: ABCDS Injury Assessment  Goal: Absence of physical injury  9/23/2024 0234 by Merissa Martinez RN  Outcome: Progressing  9/22/2024 1828 by Jessica Vera RN  Outcome: Progressing     Problem: Safety - Medical Restraint  Goal: Remains free of injury from restraints (Restraint for Interference with Medical Device)  Description: INTERVENTIONS:  1. Determine that other, less restrictive measures have been tried or would not be effective before applying the restraint  2. Evaluate the patient's condition at the time of restraint application  3. Inform patient/family regarding the reason for restraint  4. Q2H: Monitor safety, psychosocial status, comfort, nutrition and hydration  9/23/2024 0234 by Merissa Martinez RN  Outcome: Progressing  9/22/2024 1828 by Jessica Vera RN  Outcome: Progressing     Problem: Discharge Planning  Goal: Discharge to home or other facility with appropriate resources  9/23/2024 0234 by Merissa Martinez RN  Outcome: Progressing  9/22/2024 1828 by Jessica Vera RN  Outcome: Progressing  Flowsheets (Taken 9/22/2024 1235)  Discharge to home or other facility with appropriate resources: Identify barriers to discharge with patient and caregiver

## 2024-09-23 NOTE — PROGRESS NOTES
Physical Therapy  Name: Eze Rosa  MRN:  [id number]  Date of service:  9/23/2024    Attempted PT eval but RN states pt is in restraints at this time    Electronically signed by Renuka Pereira PT on 9/23/2024 at 10:27 AM

## 2024-09-23 NOTE — PROGRESS NOTES
Facility/Department: [facility] PROGRESSIVE CARE   SWALLOW THERAPY  SPEECH THERAPY     NAME: Eze Rosa  : 1950  MRN: [id number]    ADMISSION DATE: 2024  ADMITTING DIAGNOSIS: has Idiopathic pulmonary fibrosis (HCC); Alcohol abuse; Benign prostatic hyperplasia with urinary retention; Hyponatremia; Encounter for Morataya catheter replacement; Urinary retention; Moderate malnutrition (HCC); Acute on chronic hypoxic respiratory failure (HCC); Tachypnea, tachyypneic to 40 bpm in ED; Acute respiratory distress, Tachypneic to 40 bpm; RSV infection; Abdominal pain; Palliative care patient; Dyspnea and respiratory abnormalities; Sepsis (HCC); History of urinary retention; COVID-19; Hypertension; Mclean's esophagus with dysplasia; GERD (gastroesophageal reflux disease); Paroxysmal atrial fibrillation (HCC); Dysphagia; Anxiety; Severe malnutrition (HCC); Pulmonary fibrosis (HCC); and COVID on their problem list.    Date of Treat: 2024  Evaluating Therapist: PAULA Omalley    Current Diet level:  NPO    Pain:  Pain Assessment  Pain Assessment: None - Denies Pain  Pain Level: 5  Pain Location: Throat  Pain Descriptors: Sore  Non-Pharmaceutical Pain Intervention(s): Rest, Repositioned  Response to Pain Intervention: Pain improved but above pain goal  Side Effects: No reported side effects    Reason for Referral  Eze Rosa was referred for a bedside swallow evaluation to assess the efficiency of his swallow function, identify signs and symptoms of aspiration and make recommendations regarding safe dietary consistencies, effective compensatory strategies, and safe eating environment.    Impression  Re-assessed patient's swallowing function. Patient exhibited decreased oral prep, inconsistently fast oral transit and suspected swallow delay with thinner liquid consistencies, slow oral transit of even blended food consistency, and sluggish, moderate-severely decreased laryngeal elevation for swallow airway protection.

## 2024-09-24 ENCOUNTER — APPOINTMENT (OUTPATIENT)
Dept: GENERAL RADIOLOGY | Age: 74
End: 2024-09-24
Payer: MEDICARE

## 2024-09-24 LAB
ALBUMIN SERPL-MCNC: 2.8 G/DL (ref 3.5–5.2)
ALP SERPL-CCNC: 75 U/L (ref 40–129)
ALT SERPL-CCNC: 11 U/L (ref 5–41)
ANION GAP SERPL CALCULATED.3IONS-SCNC: 8 MMOL/L (ref 7–19)
AST SERPL-CCNC: 13 U/L (ref 5–40)
BASOPHILS # BLD: 0 K/UL (ref 0–0.2)
BASOPHILS NFR BLD: 0.1 % (ref 0–1)
BILIRUB SERPL-MCNC: 0.6 MG/DL (ref 0.2–1.2)
BUN SERPL-MCNC: 21 MG/DL (ref 8–23)
CALCIUM SERPL-MCNC: 8.7 MG/DL (ref 8.8–10.2)
CHLORIDE SERPL-SCNC: 101 MMOL/L (ref 98–111)
CO2 SERPL-SCNC: 33 MMOL/L (ref 22–29)
CREAT SERPL-MCNC: 0.4 MG/DL (ref 0.7–1.2)
EOSINOPHIL # BLD: 0 K/UL (ref 0–0.6)
EOSINOPHIL NFR BLD: 0 % (ref 0–5)
ERYTHROCYTE [DISTWIDTH] IN BLOOD BY AUTOMATED COUNT: 12.8 % (ref 11.5–14.5)
GLUCOSE SERPL-MCNC: 151 MG/DL (ref 70–99)
HCT VFR BLD AUTO: 36.2 % (ref 42–52)
HGB BLD-MCNC: 11.4 G/DL (ref 14–18)
IMM GRANULOCYTES # BLD: 0 K/UL
LYMPHOCYTES # BLD: 0.6 K/UL (ref 1.1–4.5)
LYMPHOCYTES NFR BLD: 6.4 % (ref 20–40)
MCH RBC QN AUTO: 29.4 PG (ref 27–31)
MCHC RBC AUTO-ENTMCNC: 31.5 G/DL (ref 33–37)
MCV RBC AUTO: 93.3 FL (ref 80–94)
MONOCYTES # BLD: 0.6 K/UL (ref 0–0.9)
MONOCYTES NFR BLD: 6.7 % (ref 0–10)
NEUTROPHILS # BLD: 8.2 K/UL (ref 1.5–7.5)
NEUTS SEG NFR BLD: 86.4 % (ref 50–65)
PLATELET # BLD AUTO: 232 K/UL (ref 130–400)
PMV BLD AUTO: 11 FL (ref 9.4–12.4)
POTASSIUM SERPL-SCNC: 3.4 MMOL/L (ref 3.5–5)
PROT SERPL-MCNC: 5.5 G/DL (ref 6.4–8.3)
RBC # BLD AUTO: 3.88 M/UL (ref 4.7–6.1)
SODIUM SERPL-SCNC: 142 MMOL/L (ref 136–145)
WBC # BLD AUTO: 9.5 K/UL (ref 4.8–10.8)

## 2024-09-24 PROCEDURE — 6370000000 HC RX 637 (ALT 250 FOR IP)

## 2024-09-24 PROCEDURE — 74220 X-RAY XM ESOPHAGUS 1CNTRST: CPT

## 2024-09-24 PROCEDURE — 97161 PT EVAL LOW COMPLEX 20 MIN: CPT

## 2024-09-24 PROCEDURE — 6360000002 HC RX W HCPCS

## 2024-09-24 PROCEDURE — 6360000002 HC RX W HCPCS: Performed by: INTERNAL MEDICINE

## 2024-09-24 PROCEDURE — 2580000003 HC RX 258: Performed by: INTERNAL MEDICINE

## 2024-09-24 PROCEDURE — 2580000003 HC RX 258: Performed by: EMERGENCY MEDICINE

## 2024-09-24 PROCEDURE — 80053 COMPREHEN METABOLIC PANEL: CPT

## 2024-09-24 PROCEDURE — 2500000003 HC RX 250 WO HCPCS

## 2024-09-24 PROCEDURE — 2700000000 HC OXYGEN THERAPY PER DAY

## 2024-09-24 PROCEDURE — 94640 AIRWAY INHALATION TREATMENT: CPT

## 2024-09-24 PROCEDURE — 2580000003 HC RX 258

## 2024-09-24 PROCEDURE — 6360000002 HC RX W HCPCS: Performed by: EMERGENCY MEDICINE

## 2024-09-24 PROCEDURE — 94761 N-INVAS EAR/PLS OXIMETRY MLT: CPT

## 2024-09-24 PROCEDURE — 97530 THERAPEUTIC ACTIVITIES: CPT

## 2024-09-24 PROCEDURE — 85025 COMPLETE CBC W/AUTO DIFF WBC: CPT

## 2024-09-24 PROCEDURE — 36415 COLL VENOUS BLD VENIPUNCTURE: CPT

## 2024-09-24 PROCEDURE — 6370000000 HC RX 637 (ALT 250 FOR IP): Performed by: INTERNAL MEDICINE

## 2024-09-24 PROCEDURE — 2140000000 HC CCU INTERMEDIATE R&B

## 2024-09-24 RX ORDER — IPRATROPIUM BROMIDE AND ALBUTEROL SULFATE 2.5; .5 MG/3ML; MG/3ML
1 SOLUTION RESPIRATORY (INHALATION) EVERY 4 HOURS PRN
Status: DISCONTINUED | OUTPATIENT
Start: 2024-09-24 | End: 2024-10-18 | Stop reason: HOSPADM

## 2024-09-24 RX ORDER — ACETYLCYSTEINE 200 MG/ML
600 SOLUTION ORAL; RESPIRATORY (INHALATION)
Status: DISCONTINUED | OUTPATIENT
Start: 2024-09-24 | End: 2024-09-26

## 2024-09-24 RX ORDER — LORAZEPAM 2 MG/ML
1 INJECTION INTRAMUSCULAR ONCE
Status: COMPLETED | OUTPATIENT
Start: 2024-09-24 | End: 2024-09-24

## 2024-09-24 RX ADMIN — Medication 2000 UNITS: at 07:32

## 2024-09-24 RX ADMIN — METOPROLOL SUCCINATE 25 MG: 25 TABLET, EXTENDED RELEASE ORAL at 07:33

## 2024-09-24 RX ADMIN — ENOXAPARIN SODIUM 60 MG: 100 INJECTION SUBCUTANEOUS at 19:51

## 2024-09-24 RX ADMIN — SODIUM CHLORIDE, PRESERVATIVE FREE 20 MG: 5 INJECTION INTRAVENOUS at 07:38

## 2024-09-24 RX ADMIN — FINASTERIDE 5 MG: 5 TABLET, FILM COATED ORAL at 07:33

## 2024-09-24 RX ADMIN — SODIUM CHLORIDE, PRESERVATIVE FREE 10 ML: 5 INJECTION INTRAVENOUS at 20:00

## 2024-09-24 RX ADMIN — LORAZEPAM 1 MG: 2 INJECTION INTRAMUSCULAR; INTRAVENOUS at 17:36

## 2024-09-24 RX ADMIN — WATER 40 MG: 1 INJECTION INTRAMUSCULAR; INTRAVENOUS; SUBCUTANEOUS at 17:09

## 2024-09-24 RX ADMIN — CEFEPIME 2000 MG: 2 INJECTION, POWDER, FOR SOLUTION INTRAVENOUS at 22:55

## 2024-09-24 RX ADMIN — Medication 2 PUFF: at 17:35

## 2024-09-24 RX ADMIN — CEFEPIME 2000 MG: 2 INJECTION, POWDER, FOR SOLUTION INTRAVENOUS at 14:19

## 2024-09-24 RX ADMIN — BUDESONIDE AND FORMOTEROL FUMARATE DIHYDRATE 2 PUFF: 160; 4.5 AEROSOL RESPIRATORY (INHALATION) at 17:36

## 2024-09-24 RX ADMIN — POTASSIUM CHLORIDE 40 MEQ: 1500 TABLET, EXTENDED RELEASE ORAL at 07:32

## 2024-09-24 RX ADMIN — CEFEPIME 2000 MG: 2 INJECTION, POWDER, FOR SOLUTION INTRAVENOUS at 07:35

## 2024-09-24 RX ADMIN — BUDESONIDE AND FORMOTEROL FUMARATE DIHYDRATE 2 PUFF: 160; 4.5 AEROSOL RESPIRATORY (INHALATION) at 08:55

## 2024-09-24 RX ADMIN — WATER 40 MG: 1 INJECTION INTRAMUSCULAR; INTRAVENOUS; SUBCUTANEOUS at 04:33

## 2024-09-24 RX ADMIN — VANCOMYCIN HYDROCHLORIDE 1250 MG: 10 INJECTION, POWDER, LYOPHILIZED, FOR SOLUTION INTRAVENOUS at 10:25

## 2024-09-24 RX ADMIN — SODIUM CHLORIDE, PRESERVATIVE FREE 40 MG: 5 INJECTION INTRAVENOUS at 19:50

## 2024-09-24 RX ADMIN — VANCOMYCIN HYDROCHLORIDE 1250 MG: 10 INJECTION, POWDER, LYOPHILIZED, FOR SOLUTION INTRAVENOUS at 20:00

## 2024-09-24 RX ADMIN — GUAIFENESIN 600 MG: 600 TABLET ORAL at 07:33

## 2024-09-24 RX ADMIN — SODIUM CHLORIDE, PRESERVATIVE FREE 20 MG: 5 INJECTION INTRAVENOUS at 19:50

## 2024-09-24 RX ADMIN — SODIUM CHLORIDE, PRESERVATIVE FREE 40 MG: 5 INJECTION INTRAVENOUS at 07:34

## 2024-09-24 RX ADMIN — SODIUM CHLORIDE, PRESERVATIVE FREE 10 ML: 5 INJECTION INTRAVENOUS at 07:39

## 2024-09-24 RX ADMIN — BARICITINIB 4 MG: 2 TABLET, FILM COATED ORAL at 07:32

## 2024-09-24 RX ADMIN — ENOXAPARIN SODIUM 60 MG: 100 INJECTION SUBCUTANEOUS at 07:33

## 2024-09-24 NOTE — PROGRESS NOTES
[facility]      Patient:  Eze Rosa  YOB: 1950  Date of Service: 9/24/2024  MRN: [id number]   Acct: [id number]   Primary Care Physician: Ross Masterson MD  Advance Directive: Full Code  Admit Date: 9/18/2024       Hospital Day: 6  Portions of this note have been copied forward, however, changed to reflect the most current clinical status of this patient.  CHIEF COMPLAINT  altered mental status     Subjective: able to tolerate honey thick breakfast     Cumulative hospital course   73-year-old male with idiopathic pulmonary fibrosis, emphysema, chronic hypoxic respiratory failure on supplemental oxygen 2 LNC at baseline, chronic right pleural effusion with VATS biopsy, Mclean's esophagus with GERD, atrial fib, BPH with urinary retention, alcohol use sober since November 2023, with complaints of altered mental status and shortness of air.  Of note recently admitted in July due to urosepsis at that time Morataya catheter remained placed, received IV antibiotic course, and was discharged to [facility] stable condition.  He returns to [facility] ER due to shortness of air and altered mental status.  He was discharged from SNF facility 1 day prior to admission.  Workup in ER CTA pulmonary moderate to severe pulmonary fibrosis and interval development of esophagitis versus mass, CXR bibasilar pneumonia right greater than left, COVID-positive, CT head no acute intracranial abnormality with acute bilateral maxillary sinusitis, lactic acid 6 with repeat 5.8, CRP 11.55, WBC 26.3, ABG metabolic alkalosis.  Initiated on cefepime and vancomycin.  After admission patient noted to be hypotensive and A-fib RVR additional IVF 1 L given and digoxin 125 mcg x 1 with conversion to normal sinus rhythm.  9/21 went back in A-fib RVR converted with IV Lopressor.  Patient had intermittent complaints of chest pain EKG no ischemia or troponins remain flat.  Echo normal EF 60% with normal diastolic function. placed on  of the right-lower field reveals decreased breath sounds. Examination of the left-lower field reveals decreased breath sounds. Decreased breath sounds present.   Abdominal:      General: Bowel sounds are normal. There is no distension.      Palpations: Abdomen is soft.      Tenderness: There is no abdominal tenderness. There is no guarding or rebound.   Musculoskeletal:         General: No swelling. Normal range of motion.      Cervical back: Normal range of motion and neck supple. No rigidity or tenderness.      Right lower leg: No edema.      Left lower leg: No edema.   Skin:     General: Skin is warm and dry.      Capillary Refill: Capillary refill takes less than 2 seconds.      Coloration: Skin is pale.   Neurological:      General: No focal deficit present.      Mental Status: He is alert.      Cranial Nerves: No cranial nerve deficit.      Motor: Weakness (generalized) present.      Comments: Orientd to person, place, time  Disoriented to situation    Psychiatric:         Mood and Affect: Mood normal.         Behavior: Behavior normal.         Medications:      sodium chloride        pantoprazole (PROTONIX) 40 mg in sodium chloride (PF) 0.9 % 10 mL injection  40 mg IntraVENous Q12H    vancomycin  1,250 mg IntraVENous Q12H    metoprolol succinate  25 mg Oral BID    cefepime  2,000 mg IntraVENous Q8H    sodium chloride flush  5-40 mL IntraVENous 2 times per day    famotidine (PEPCID) injection  20 mg IntraVENous BID    enoxaparin  1 mg/kg SubCUTAneous BID    baricitinib  4 mg Oral Daily    vancomycin (VANCOCIN) intermittent dosing (placeholder)   Other RX Placeholder    budesonide-formoterol  2 puff Inhalation BID    finasteride  5 mg Oral Daily    [Held by provider] tamsulosin  0.8 mg Oral Daily    guaiFENesin  600 mg Oral BID    methylPREDNISolone  40 mg IntraVENous Q12H    Vitamin D  2,000 Units Oral Daily       Lab and other Data:     Recent Labs     09/22/24  0151 09/23/24  0244 09/24/24  0146   WBC 12.2*

## 2024-09-24 NOTE — PLAN OF CARE
Problem: Safety - Adult  Goal: Free from fall injury  Outcome: Progressing     Problem: ABCDS Injury Assessment  Goal: Absence of physical injury  Outcome: Progressing     Problem: Safety - Medical Restraint  Goal: Remains free of injury from restraints (Restraint for Interference with Medical Device)  Description: INTERVENTIONS:  1. Determine that other, less restrictive measures have been tried or would not be effective before applying the restraint  2. Evaluate the patient's condition at the time of restraint application  3. Inform patient/family regarding the reason for restraint  4. Q2H: Monitor safety, psychosocial status, comfort, nutrition and hydration  Outcome: Progressing     Problem: Discharge Planning  Goal: Discharge to home or other facility with appropriate resources  Outcome: Progressing     Problem: Nutrition Deficit:  Goal: Optimize nutritional status  Outcome: Progressing  Flowsheets (Taken 9/23/2024 1306 by Angella Sagastume, MS, RD, LD)  Nutrient intake appropriate for improving, restoring, or maintaining nutritional needs:   Assess nutritional status and recommend course of action   Monitor oral intake, labs, and treatment plans   Recommend appropriate diets, oral nutritional supplements, and vitamin/mineral supplements

## 2024-09-24 NOTE — PROGRESS NOTES
Frequent coughing noted after supper, pt c/o SOB, resp rates >38, O2 sat 92%~93% on 2L. Increased oxygen to 4L per NC. Notified BRENDEN Wagner. New order received. RT arrived at bedside for breathing tx.    Keep pt NPO now.

## 2024-09-24 NOTE — PROGRESS NOTES
DIS Nurse Note  SUBJECTIVE: Patient assessed secondary to elevated Deterioration Index Score.      Deterioration Index Score:  Predictive Model Details          70 (Warning)  Factor Value    Calculated 9/24/2024 17:49 39% Respiratory rate 38    Deterioration Index Model 16% Age 73 years old     16% Supplemental oxygen Nasal cannula     13% Neurological exam X     9% Corrine coma scale 14     2% Hematocrit abnormal (36.2 %)     1% WBC count 9.5 K/uL     1% Potassium abnormal (3.4 mmol/L)     1% Sodium 142 mmol/L     1% Systolic 120     0% Temperature 96.8 °F (36 °C)     0% Pulse 79     0% Pulse oximetry 96 %        Vital Signs:  Vitals:    09/24/24 0753 09/24/24 1149 09/24/24 1715 09/24/24 1745   BP: (!) 100/46 120/75     Pulse: 55 79     Resp: 16 16 (!) 38    Temp: 97.9 °F (36.6 °C) 96.8 °F (36 °C)     TempSrc: Temporal Temporal     SpO2: 94% 99%  96%   Weight:       Height:            Provider Notified: Reviewed patient status  & DIS score with Provider BRENDEN Wagner    ASSESSMENT:      Frequent coughing noted after supper, pt c/o SOB, resp rates >38, O2 sat 92%~93% on 2L          Plan of Care: Keep pt NPO, RT to give breathing treatment.    Electronically signed by Pablo Joya RN

## 2024-09-24 NOTE — PROGRESS NOTES
Occupational Therapy  Attempted evaluation but patient unavailable due to leaving for a test. Electronically signed by Patty Alexander OT on 9/24/2024 at 4:13 PM

## 2024-09-25 ENCOUNTER — ANESTHESIA EVENT (OUTPATIENT)
Dept: ENDOSCOPY | Age: 74
End: 2024-09-25
Payer: MEDICARE

## 2024-09-25 ENCOUNTER — ANESTHESIA (OUTPATIENT)
Dept: ENDOSCOPY | Age: 74
End: 2024-09-25
Payer: MEDICARE

## 2024-09-25 PROBLEM — K22.70 BARRETT'S ESOPHAGUS: Status: ACTIVE | Noted: 2024-09-18

## 2024-09-25 LAB
ALBUMIN SERPL-MCNC: 2.9 G/DL (ref 3.5–5.2)
ALP SERPL-CCNC: 81 U/L (ref 40–129)
ALT SERPL-CCNC: 11 U/L (ref 5–41)
ANION GAP SERPL CALCULATED.3IONS-SCNC: 9 MMOL/L (ref 7–19)
AST SERPL-CCNC: 13 U/L (ref 5–40)
BASOPHILS # BLD: 0 K/UL (ref 0–0.2)
BASOPHILS NFR BLD: 0.1 % (ref 0–1)
BILIRUB SERPL-MCNC: 0.6 MG/DL (ref 0.2–1.2)
BUN SERPL-MCNC: 14 MG/DL (ref 8–23)
CALCIUM SERPL-MCNC: 8.4 MG/DL (ref 8.8–10.2)
CHLORIDE SERPL-SCNC: 98 MMOL/L (ref 98–111)
CO2 SERPL-SCNC: 30 MMOL/L (ref 22–29)
CREAT SERPL-MCNC: 0.4 MG/DL (ref 0.7–1.2)
EOSINOPHIL # BLD: 0 K/UL (ref 0–0.6)
EOSINOPHIL NFR BLD: 0 % (ref 0–5)
ERYTHROCYTE [DISTWIDTH] IN BLOOD BY AUTOMATED COUNT: 12.5 % (ref 11.5–14.5)
GLUCOSE SERPL-MCNC: 155 MG/DL (ref 70–99)
HCT VFR BLD AUTO: 35.8 % (ref 42–52)
HGB BLD-MCNC: 11.6 G/DL (ref 14–18)
IMM GRANULOCYTES # BLD: 0.1 K/UL
LYMPHOCYTES # BLD: 0.5 K/UL (ref 1.1–4.5)
LYMPHOCYTES NFR BLD: 4.8 % (ref 20–40)
MCH RBC QN AUTO: 30.5 PG (ref 27–31)
MCHC RBC AUTO-ENTMCNC: 32.4 G/DL (ref 33–37)
MCV RBC AUTO: 94.2 FL (ref 80–94)
MONOCYTES # BLD: 0.7 K/UL (ref 0–0.9)
MONOCYTES NFR BLD: 6.2 % (ref 0–10)
NEUTROPHILS # BLD: 9.2 K/UL (ref 1.5–7.5)
NEUTS SEG NFR BLD: 88.4 % (ref 50–65)
PLATELET # BLD AUTO: 206 K/UL (ref 130–400)
PMV BLD AUTO: 10.9 FL (ref 9.4–12.4)
POTASSIUM SERPL-SCNC: 3.7 MMOL/L (ref 3.5–5)
PROT SERPL-MCNC: 5.3 G/DL (ref 6.4–8.3)
RBC # BLD AUTO: 3.8 M/UL (ref 4.7–6.1)
SODIUM SERPL-SCNC: 137 MMOL/L (ref 136–145)
WBC # BLD AUTO: 10.5 K/UL (ref 4.8–10.8)

## 2024-09-25 PROCEDURE — 2709999900 HC NON-CHARGEABLE SUPPLY: Performed by: INTERNAL MEDICINE

## 2024-09-25 PROCEDURE — 2140000000 HC CCU INTERMEDIATE R&B

## 2024-09-25 PROCEDURE — 7100000000 HC PACU RECOVERY - FIRST 15 MIN: Performed by: INTERNAL MEDICINE

## 2024-09-25 PROCEDURE — 99233 SBSQ HOSP IP/OBS HIGH 50: CPT | Performed by: PHYSICIAN ASSISTANT

## 2024-09-25 PROCEDURE — 6360000002 HC RX W HCPCS

## 2024-09-25 PROCEDURE — 94640 AIRWAY INHALATION TREATMENT: CPT

## 2024-09-25 PROCEDURE — 97530 THERAPEUTIC ACTIVITIES: CPT

## 2024-09-25 PROCEDURE — 80053 COMPREHEN METABOLIC PANEL: CPT

## 2024-09-25 PROCEDURE — 6360000002 HC RX W HCPCS: Performed by: INTERNAL MEDICINE

## 2024-09-25 PROCEDURE — 2580000003 HC RX 258

## 2024-09-25 PROCEDURE — 99222 1ST HOSP IP/OBS MODERATE 55: CPT | Performed by: INTERNAL MEDICINE

## 2024-09-25 PROCEDURE — 97535 SELF CARE MNGMENT TRAINING: CPT

## 2024-09-25 PROCEDURE — 2580000003 HC RX 258: Performed by: EMERGENCY MEDICINE

## 2024-09-25 PROCEDURE — 2580000003 HC RX 258: Performed by: INTERNAL MEDICINE

## 2024-09-25 PROCEDURE — 2500000003 HC RX 250 WO HCPCS

## 2024-09-25 PROCEDURE — 7100000001 HC PACU RECOVERY - ADDTL 15 MIN: Performed by: INTERNAL MEDICINE

## 2024-09-25 PROCEDURE — 2500000003 HC RX 250 WO HCPCS: Performed by: INTERNAL MEDICINE

## 2024-09-25 PROCEDURE — 97161 PT EVAL LOW COMPLEX 20 MIN: CPT

## 2024-09-25 PROCEDURE — 3700000000 HC ANESTHESIA ATTENDED CARE: Performed by: INTERNAL MEDICINE

## 2024-09-25 PROCEDURE — 94761 N-INVAS EAR/PLS OXIMETRY MLT: CPT

## 2024-09-25 PROCEDURE — 6370000000 HC RX 637 (ALT 250 FOR IP): Performed by: INTERNAL MEDICINE

## 2024-09-25 PROCEDURE — 2580000003 HC RX 258: Performed by: NURSE ANESTHETIST, CERTIFIED REGISTERED

## 2024-09-25 PROCEDURE — 3700000001 HC ADD 15 MINUTES (ANESTHESIA): Performed by: INTERNAL MEDICINE

## 2024-09-25 PROCEDURE — 2700000000 HC OXYGEN THERAPY PER DAY

## 2024-09-25 PROCEDURE — 88305 TISSUE EXAM BY PATHOLOGIST: CPT

## 2024-09-25 PROCEDURE — 43239 EGD BIOPSY SINGLE/MULTIPLE: CPT | Performed by: INTERNAL MEDICINE

## 2024-09-25 PROCEDURE — 6360000002 HC RX W HCPCS: Performed by: NURSE ANESTHETIST, CERTIFIED REGISTERED

## 2024-09-25 PROCEDURE — 0DB38ZX EXCISION OF LOWER ESOPHAGUS, VIA NATURAL OR ARTIFICIAL OPENING ENDOSCOPIC, DIAGNOSTIC: ICD-10-PCS | Performed by: INTERNAL MEDICINE

## 2024-09-25 PROCEDURE — 3609012400 HC EGD TRANSORAL BIOPSY SINGLE/MULTIPLE: Performed by: INTERNAL MEDICINE

## 2024-09-25 PROCEDURE — 6360000002 HC RX W HCPCS: Performed by: EMERGENCY MEDICINE

## 2024-09-25 PROCEDURE — 2500000003 HC RX 250 WO HCPCS: Performed by: NURSE ANESTHETIST, CERTIFIED REGISTERED

## 2024-09-25 PROCEDURE — 6370000000 HC RX 637 (ALT 250 FOR IP)

## 2024-09-25 PROCEDURE — 85025 COMPLETE CBC W/AUTO DIFF WBC: CPT

## 2024-09-25 PROCEDURE — 36415 COLL VENOUS BLD VENIPUNCTURE: CPT

## 2024-09-25 RX ORDER — LIDOCAINE HYDROCHLORIDE 10 MG/ML
INJECTION, SOLUTION INFILTRATION; PERINEURAL
Status: DISCONTINUED | OUTPATIENT
Start: 2024-09-25 | End: 2024-09-25 | Stop reason: SDUPTHER

## 2024-09-25 RX ORDER — SODIUM CHLORIDE, SODIUM LACTATE, POTASSIUM CHLORIDE, CALCIUM CHLORIDE 600; 310; 30; 20 MG/100ML; MG/100ML; MG/100ML; MG/100ML
INJECTION, SOLUTION INTRAVENOUS
Status: DISCONTINUED | OUTPATIENT
Start: 2024-09-25 | End: 2024-09-25 | Stop reason: SDUPTHER

## 2024-09-25 RX ORDER — PROPOFOL 10 MG/ML
INJECTION, EMULSION INTRAVENOUS
Status: DISCONTINUED | OUTPATIENT
Start: 2024-09-25 | End: 2024-09-25 | Stop reason: SDUPTHER

## 2024-09-25 RX ADMIN — SODIUM CHLORIDE, SODIUM LACTATE, POTASSIUM CHLORIDE, AND CALCIUM CHLORIDE: 600; 310; 30; 20 INJECTION, SOLUTION INTRAVENOUS at 15:49

## 2024-09-25 RX ADMIN — ENOXAPARIN SODIUM 60 MG: 100 INJECTION SUBCUTANEOUS at 07:24

## 2024-09-25 RX ADMIN — SODIUM CHLORIDE, PRESERVATIVE FREE 20 MG: 5 INJECTION INTRAVENOUS at 07:24

## 2024-09-25 RX ADMIN — GUAIFENESIN 600 MG: 600 TABLET ORAL at 20:51

## 2024-09-25 RX ADMIN — SODIUM CHLORIDE, PRESERVATIVE FREE 10 ML: 5 INJECTION INTRAVENOUS at 20:52

## 2024-09-25 RX ADMIN — VANCOMYCIN HYDROCHLORIDE 1250 MG: 10 INJECTION, POWDER, LYOPHILIZED, FOR SOLUTION INTRAVENOUS at 09:33

## 2024-09-25 RX ADMIN — SODIUM CHLORIDE, PRESERVATIVE FREE 40 MG: 5 INJECTION INTRAVENOUS at 20:51

## 2024-09-25 RX ADMIN — ACETYLCYSTEINE 600 MG: 200 SOLUTION ORAL; RESPIRATORY (INHALATION) at 18:26

## 2024-09-25 RX ADMIN — SODIUM CHLORIDE, PRESERVATIVE FREE 40 MG: 5 INJECTION INTRAVENOUS at 07:24

## 2024-09-25 RX ADMIN — ACETYLCYSTEINE 600 MG: 200 SOLUTION ORAL; RESPIRATORY (INHALATION) at 07:10

## 2024-09-25 RX ADMIN — BUDESONIDE AND FORMOTEROL FUMARATE DIHYDRATE 2 PUFF: 160; 4.5 AEROSOL RESPIRATORY (INHALATION) at 06:30

## 2024-09-25 RX ADMIN — IPRATROPIUM BROMIDE AND ALBUTEROL SULFATE 1 DOSE: 2.5; .5 SOLUTION RESPIRATORY (INHALATION) at 18:26

## 2024-09-25 RX ADMIN — CEFEPIME 2000 MG: 2 INJECTION, POWDER, FOR SOLUTION INTRAVENOUS at 14:11

## 2024-09-25 RX ADMIN — WATER 40 MG: 1 INJECTION INTRAMUSCULAR; INTRAVENOUS; SUBCUTANEOUS at 04:13

## 2024-09-25 RX ADMIN — QUETIAPINE FUMARATE 12.5 MG: 25 TABLET ORAL at 20:51

## 2024-09-25 RX ADMIN — SODIUM CHLORIDE, PRESERVATIVE FREE 20 MG: 5 INJECTION INTRAVENOUS at 20:51

## 2024-09-25 RX ADMIN — CEFEPIME 2000 MG: 2 INJECTION, POWDER, FOR SOLUTION INTRAVENOUS at 07:29

## 2024-09-25 RX ADMIN — BUDESONIDE AND FORMOTEROL FUMARATE DIHYDRATE 2 PUFF: 160; 4.5 AEROSOL RESPIRATORY (INHALATION) at 18:29

## 2024-09-25 RX ADMIN — PROPOFOL 240 MG: 10 INJECTION, EMULSION INTRAVENOUS at 15:56

## 2024-09-25 RX ADMIN — ENOXAPARIN SODIUM 60 MG: 100 INJECTION SUBCUTANEOUS at 20:52

## 2024-09-25 RX ADMIN — WATER 40 MG: 1 INJECTION INTRAMUSCULAR; INTRAVENOUS; SUBCUTANEOUS at 16:58

## 2024-09-25 RX ADMIN — LIDOCAINE HYDROCHLORIDE 40 MG: 10 INJECTION, SOLUTION INFILTRATION; PERINEURAL at 15:56

## 2024-09-25 RX ADMIN — IPRATROPIUM BROMIDE AND ALBUTEROL SULFATE 1 DOSE: 2.5; .5 SOLUTION RESPIRATORY (INHALATION) at 07:10

## 2024-09-25 ASSESSMENT — ENCOUNTER SYMPTOMS: SHORTNESS OF BREATH: 1

## 2024-09-25 ASSESSMENT — PAIN - FUNCTIONAL ASSESSMENT
PAIN_FUNCTIONAL_ASSESSMENT: NONE - DENIES PAIN
PAIN_FUNCTIONAL_ASSESSMENT: FACE, LEGS, ACTIVITY, CRY, AND CONSOLABILITY (FLACC)
PAIN_FUNCTIONAL_ASSESSMENT: NONE - DENIES PAIN

## 2024-09-25 NOTE — PLAN OF CARE
Problem: Safety - Adult  Goal: Free from fall injury  Outcome: Progressing     Problem: Skin/Tissue Integrity  Goal: Absence of new skin breakdown  Description: 1.  Monitor for areas of redness and/or skin breakdown  2.  Assess vascular access sites hourly  3.  Every 4-6 hours minimum:  Change oxygen saturation probe site  4.  Every 4-6 hours:  If on nasal continuous positive airway pressure, respiratory therapy assess nares and determine need for appliance change or resting period.  Outcome: Progressing     Problem: ABCDS Injury Assessment  Goal: Absence of physical injury  Outcome: Progressing     Problem: Safety - Medical Restraint  Goal: Remains free of injury from restraints (Restraint for Interference with Medical Device)  Description: INTERVENTIONS:  1. Determine that other, less restrictive measures have been tried or would not be effective before applying the restraint  2. Evaluate the patient's condition at the time of restraint application  3. Inform patient/family regarding the reason for restraint  4. Q2H: Monitor safety, psychosocial status, comfort, nutrition and hydration  Outcome: Progressing     Problem: Discharge Planning  Goal: Discharge to home or other facility with appropriate resources  Outcome: Progressing     Problem: Nutrition Deficit:  Goal: Optimize nutritional status  Outcome: Progressing

## 2024-09-25 NOTE — BRIEF OP NOTE
Brief Postoperative Note      Patient: Eze Rosa  YOB: 1950  MRN: [id number]    Date of Procedure: 9/25/2024    Pre-Op Diagnosis Codes:      * Dysphagia [R13.10]     * Mclean's esophagus [K22.70]    Post-Op Diagnosis: severe chronic diffuse erosive esophagitis,                             underlying long Mclean's segment, no tumor mass or stricture       Procedure(s):  ESOPHAGOGASTRODUODENOSCOPY BIOPSY    Surgeon(s):  Giovany Araujo MD    Assistant:  * No surgical staff found *    Anesthesia: Monitor Anesthesia Care    Estimated Blood Loss (mL): minimal    Complications: none    Specimens:   ID Type Source Tests Collected by Time Destination   A : distal esophagus bx Tissue Esophagus SURGICAL PATHOLOGY Giovany Araujo MD 9/25/2024 1607        Implants:  * No implants in log *      Drains:   Urinary Catheter 09/19/24 2 Way;Coude (Active)   Catheter Indications Urinary retention (acute or chronic), continuous bladder irrigation or bladder outlet obstruction 09/23/24 2118   Site Assessment No urethral drainage 09/25/24 1618   Urine Color Yellow 09/25/24 1618   Urine Appearance Clear 09/25/24 1618   Collection Container Standard 09/25/24 1618   Securement Method Securing device (Describe) 09/25/24 1618   Catheter Care  Soap and water 09/25/24 0422   Catheter Best Practices  Catheter secured to thigh;Bag not on floor;Bag below bladder;Tamper seal intact;Lack of dependent loop in tubing 09/25/24 1618   Status Draining 09/25/24 1618   Output (mL) 1000 mL 09/25/24 0422       [REMOVED] NG/OG/NJ/NE Tube Nasogastric Left nostril (Removed)   Surrounding Skin Clean, dry & intact 09/23/24 1600   Securement device Adhesive based vasquez 09/23/24 1600   Status Continuous feeding 09/23/24 1600   Placement Verified External Catheter Length 09/23/24 1600   NG/OG/NJ/NE External Measurement (cm) 64 cm 09/23/24 1600   Tube feeding/verify rate (mL/hr) 25 mL/hr 09/22/24 2130   Tube Feeding Intake (mL) 426 ml  09/22/24 1856   Free Water/Flush (mL) 471 mL 09/22/24 1856   Residual Volume (ml) 107 ml 09/22/24 1227       [REMOVED] Urinary Catheter 09/18/24 (Removed)   Site Assessment No urethral drainage 09/18/24 2223   Urine Color Violetta 09/18/24 2223   Urine Appearance Cloudy 09/18/24 2223   Collection Container Standard 09/18/24 2223   Securement Method Securing device (Describe) 09/18/24 2223   Catheter Care  Soap and water 09/19/24 0503   Catheter Best Practices  Drainage tube clipped to bed;Catheter secured to thigh;Bag below bladder;Bag not on floor 09/18/24 2223   Status Draining 09/18/24 2223       Findings:  Infection Present At Time Of Surgery (PATOS) (choose all levels that have infection present):  No infection present  Other Findings: dictated  Plan:  Protonix 40 mg po BID, Carafate suspension 1 gm QID, elevate HOB using hospital bed, follow-up in GI clinic 1 month, recommend follow-up EGD in 2 months.  Electronically signed by Giovany Araujo MD on 9/25/2024 at 4:36 PM

## 2024-09-25 NOTE — PROGRESS NOTES
Palliative Care Progress Note  9/25/2024 12:44 PM    Patient:  Eze Rosa  YOB: 1950  Primary Care Physician: Ross Masterson MD  Advance Directive: Full Code  Admit Date: 9/18/2024       Hospital Day: 7  Portions of this note have been copied forward, however, changed to reflect the most current clinical status of this patient.    CHIEF COMPLAINT/REASON FOR CONSULTATION Goals of care, family support, Code status, symptom management     SUBJECTIVE:  Mr. Rosa is asking for water, something to eat. Had concern for choking yesterday and is now NPO again.    HPI:  The patient is a 73 y.o. male with PMH pulmonary fibrosis, paroxysmal atrial fibrillation, BPH, previous alcoholism, chronic respiratory failure, Mclean's esophagus dependent on supplemental O2, previous COVID/RSV, chronic lieberman, BPH,  who presented to [facility] ED on 09/18/2024 complaining of shortness of breath, nausea, vomiting, abdominal pain, loose stools with incontinence and confusion.Work up included CTA pulmonary protocol which was negative for pulmonary embolus, revealed stable moderate to severe diffuse pulmonary fibrosis, stable trace dependent right pleural fluid, interval development of vague wall thickening portions of the esophagus.  CT abdomen and pelvis reported no acute abnormality, sigmoid diverticulosis without indication of diverticulitis, moderate stool seen in the rectum without focal abnormality, mildly distended gallbladder without stones or focal abnormality, mild distention of the stomach with fluid in the lumen, no indication of gastric outlet obstruction, small bilateral pleural effusions.  CT head reported no acute intracranial process with acute bilateral maxillary sinusitis.  Chest x-ray reported small bilateral pleural effusions with bibasilar atelectasis and/or pneumonia worse on the right side than the left.  He was positive for COVID-19.  WBC 26.3, D-dimer 2.43, lactic acid 6.3.  He was admitted to  yesterday evening. Esophagram concerning for esophagitis/reflux. GI re-consulted for consideration of EGD/further recommendations. I attempted to follow up with the patient regarding current clinical concerns/goals of care and code status. He is very fixated on getting something to drink. Explained to him risks of aspiration and concern for choking from his nursing team. He is unable to fully participate in that conversation. I placed a call to his daughter, Rebeca, who is assisting w/ decisions as her mother/patient's spouse has significant health concerns at this time. I reviewed all of the above to include the conversation I had with her dad today. She states she also tried to talk to him over the weekend and he was fixated on something to drink while she was there and unable to fully participate in the conversation she was trying to have with him. She plans to further discuss w/ her mother this afternoon and will come to the hospital later this evening to assist the patient w/ decision making. We reviewed alternate means of nutrition to include repeat Dobhoff placement, PEG placement. We reviewed food for comfort. We also reviewed code status. She is thankful for the update and the care her father has received so far. I will follow up with her in AM. Opportunity for questions/emotional support provided.     Palliative team will follow as needed.      Recommendations:   Palliative Care-C continue current level of support, ongoing conversations about nutritional support/food for comfort Code status: Full code Ongoing conversation w/ patient and family   Acute/chronic hypoxemic respiratory failure suspected 2/2 COVID pneumonia w/ known pulmonary fibrosis-Pulmonology consulted, baricitinib ordered in addition to systemic steroids and empiric abx, stable/improved since admission   Atrial fibrillation RVR-Echo EF 60%, normal diastolic function, Toprol XL, Eliquis recommended per Cardiology   Dysphagia w/ protein

## 2024-09-25 NOTE — PROGRESS NOTES
Occupational Therapy Initial Assessment  Date: 2024   Patient Name: Eze Rosa  MRN: [id number]     : 1950    Date of Service: 2024    Discharge Recommendations:  Continue to assess pending progress       Assessment   Assessment: Min A x 2 sit to stand,  Recommend ongoing OT to address pt deficits, pt NPO at this time due to weakness and aspiration of secretions at this time.  REQUIRES OT FOLLOW-UP: Yes  Activity Tolerance  Activity Tolerance: Patient Tolerated treatment well              Patient Diagnosis(es): The primary encounter diagnosis was COVID. Diagnoses of Pneumonia due to infectious organism, unspecified laterality, unspecified part of lung, Septicemia (HCC), Altered mental status, unspecified altered mental status type, and Chest pain, unspecified type were also pertinent to this visit.    Past Medical History:   Past Medical History:   Diagnosis Date    Alcohol abuse 2023    Mclean's esophagus with dysplasia 2024    Benign prostatic hyperplasia with urinary retention 2023    COVID-19 2024    GERD (gastroesophageal reflux disease)     Idiopathic pulmonary fibrosis (HCC) 2023    Palliative care patient 2024    Paroxysmal atrial fibrillation (HCC) 2024    Pneumonia     Psychiatric problem         Past Surgical History:   Past Surgical History:   Procedure Laterality Date    ABDOMEN SURGERY      COLONOSCOPY                Restrictions  Restrictions/Precautions  Restrictions/Precautions: Fall Risk, Isolation    Subjective      Vital Signs  Temp: 97.9 °F (36.6 °C)  Temp Source: Temporal  Pulse: 78  Heart Rate Source: Monitor  Respirations: 20  BP: 122/80  MAP (Calculated): 94  MAP (mmHg): 93  BP Location: Left upper arm  BP Method: Automatic  Patient Position: Supine  Oxygen Therapy  SpO2: 98 %  Pulse Oximeter Device Mode: Continuous  O2 Device: Nasal cannula  O2 Flow Rate (L/min): 3 L/min    Social/Functional History  Social/Functional

## 2024-09-25 NOTE — PROGRESS NOTES
[facility]      Patient:  Eze Rosa  YOB: 1950  Date of Service: 9/25/2024  MRN: [id number]   Acct: [id number]   Primary Care Physician: Ross Masterson MD  Advance Directive: Full Code  Admit Date: 9/18/2024       Hospital Day: 7  Portions of this note have been copied forward, however, changed to reflect the most current clinical status of this patient.  CHIEF COMPLAINT  altered mental status     Subjective: overnight dysphagia with hypoxia, discussed with patient NPO discussed with patient plan of care needs reinforcement     Cumulative hospital course   73-year-old male with idiopathic pulmonary fibrosis, emphysema, chronic hypoxic respiratory failure on supplemental oxygen 2 LNC at baseline, chronic right pleural effusion with VATS biopsy, Mclean's esophagus with GERD, atrial fib, BPH with urinary retention, alcohol use sober since November 2023, with complaints of altered mental status and shortness of air.  Of note recently admitted in July due to urosepsis at that time Morataya catheter remained placed, received IV antibiotic course, and was discharged to [facility] stable condition.  He returns to [facility] ER due to shortness of air and altered mental status.  He was discharged from SNF facility 1 day prior to admission.  Workup in ER CTA pulmonary moderate to severe pulmonary fibrosis and interval development of esophagitis versus mass, CXR bibasilar pneumonia right greater than left, COVID-positive, CT head no acute intracranial abnormality with acute bilateral maxillary sinusitis, lactic acid 6 with repeat 5.8, CRP 11.55, WBC 26.3, ABG metabolic alkalosis.  Initiated on cefepime and vancomycin.  After admission patient noted to be hypotensive and A-fib RVR additional IVF 1 L given and digoxin 125 mcg x 1 with conversion to normal sinus rhythm.  9/21 went back in A-fib RVR converted with IV Lopressor.  Patient had intermittent complaints of chest pain EKG no ischemia or troponins  diagnosis includes esophagitis and mass.  Endoscopy or barium esophagram would better assess. 5.  For findings in the abdomen/pelvis, please refer to separate report.  All CT scans are performed using dose optimization techniques as appropriate to the performed exam and include at least one of the following: Automated exposure control, adjustment of the mA and/or kV according to size, and the use of iterative reconstruction technique.  ______________________________________ Electronically signed by: ZITA MORENO M.D. Date:     09/18/2024 Time:    15:47     CT ABDOMEN PELVIS W IV CONTRAST Additional Contrast? None    Result Date: 9/18/2024  1.  No acute abnormality of the abdomen and pelvis. 2.  Sigmoid diverticulosis without radiographic indication of diverticulitis. 3.  Moderate stool seen in the rectum without focal abnormality. 4.  Mildly distended gallbladder without stones or focal abnormality. 5.  Mild distension of the stomach with fluid in the lumen.  No indication of gastric outlet obstruction.  The duodenum is unremarkable. 6.   A small bilateral pleural effusions with mild associated consolidation, atelectasis versus infiltrate. 7.  Other chronic and non emergent the the the findings as above.  All CT scans are performed using dose optimization techniques as appropriate to the performed exam and include at least one of the following: Automated exposure control, adjustment of the mA and/or kV according to size, and the use of iterative reconstruction technique.  ______________________________________ Electronically signed by: HELEN MEYER M.D. Date:     09/18/2024 Time:    15:47     CT HEAD WO CONTRAST    Result Date: 9/18/2024  No acute intracranial process.  Acute bilateral maxillary sinusitis.  Mild atrophy and chronic small vessel white matter change.  All CT scans are performed using dose optimization techniques as appropriate to the performed exam and include at least one of the following: Automated

## 2024-09-25 NOTE — CARE COORDINATION
Kiera attempted to contact Pt daughter Rebeca, and Pt spouse Chelo. Kiera left voicemail for a returned call to discuss skilled or possibly home health services. Kiera will call back later this afternoon.  Electronically signed by ARLYN RODRIGUEZ on 9/25/2024 at 9:38 AM      Kiera spoke to Rebeca Pt drt to discuss d/c plans. Pt drt goal would be for pt to return home with HH. Due to the length of stay pt drt would like ref[facility]   [facility]  PH: [phone]  F: [phone]    [facility] PH: [phone] F:[phone]    [facility]  Ph: [phone]  Fax: [phone]  Kiera sent referrals awaiting bed offers . Will not be pre-cert   Electronically signed by ARLYN RODRIGUEZ on 9/25/2024 at 10:01 AM

## 2024-09-25 NOTE — PROGRESS NOTES
BACK ON BED        Balance  Sitting - Dynamic: Good;-  Standing - Dynamic: Fair;-          OutComes Score                                                  AM-PAC - Mobility              Tinneti Score       Goals  Short Term Goals  Time Frame for Short Term Goals: 14 DAYS  Short Term Goal 1: BED MOB SBA  Short Term Goal 2: TRANSFERS CGA  Short Term Goal 3: AMB 50' RW CGA       Education  Patient Education  Education Given To: Patient  Education Provided: Role of Therapy;Plan of Care  Barriers to Learning: Cognition      Therapy Time   Individual Concurrent Group Co-treatment   Time In           Time Out           Minutes                   Nicki Cifuentes, PT

## 2024-09-26 LAB
ALBUMIN SERPL-MCNC: 3.1 G/DL (ref 3.5–5.2)
ALP SERPL-CCNC: 89 U/L (ref 40–129)
ALT SERPL-CCNC: 13 U/L (ref 5–41)
ANION GAP SERPL CALCULATED.3IONS-SCNC: 9 MMOL/L (ref 7–19)
AST SERPL-CCNC: 14 U/L (ref 5–40)
BASOPHILS # BLD: 0 K/UL (ref 0–0.2)
BASOPHILS NFR BLD: 0.1 % (ref 0–1)
BILIRUB SERPL-MCNC: 0.7 MG/DL (ref 0.2–1.2)
BUN SERPL-MCNC: 11 MG/DL (ref 8–23)
CALCIUM SERPL-MCNC: 8.6 MG/DL (ref 8.8–10.2)
CHLORIDE SERPL-SCNC: 92 MMOL/L (ref 98–111)
CO2 SERPL-SCNC: 34 MMOL/L (ref 22–29)
CREAT SERPL-MCNC: 0.4 MG/DL (ref 0.7–1.2)
EOSINOPHIL # BLD: 0 K/UL (ref 0–0.6)
EOSINOPHIL NFR BLD: 0 % (ref 0–5)
ERYTHROCYTE [DISTWIDTH] IN BLOOD BY AUTOMATED COUNT: 12.6 % (ref 11.5–14.5)
GLUCOSE BLD-MCNC: 124 MG/DL (ref 70–99)
GLUCOSE SERPL-MCNC: 171 MG/DL (ref 70–99)
HCT VFR BLD AUTO: 38.7 % (ref 42–52)
HGB BLD-MCNC: 12.6 G/DL (ref 14–18)
IMM GRANULOCYTES # BLD: 0.1 K/UL
LYMPHOCYTES # BLD: 0.4 K/UL (ref 1.1–4.5)
LYMPHOCYTES NFR BLD: 3.2 % (ref 20–40)
MCH RBC QN AUTO: 29.3 PG (ref 27–31)
MCHC RBC AUTO-ENTMCNC: 32.6 G/DL (ref 33–37)
MCV RBC AUTO: 90 FL (ref 80–94)
MONOCYTES # BLD: 0.8 K/UL (ref 0–0.9)
MONOCYTES NFR BLD: 6.4 % (ref 0–10)
NEUTROPHILS # BLD: 11.5 K/UL (ref 1.5–7.5)
NEUTS SEG NFR BLD: 89.8 % (ref 50–65)
PERFORMED ON: ABNORMAL
PLATELET # BLD AUTO: 205 K/UL (ref 130–400)
PMV BLD AUTO: 10.4 FL (ref 9.4–12.4)
POTASSIUM SERPL-SCNC: 3.1 MMOL/L (ref 3.5–5)
PROT SERPL-MCNC: 5.9 G/DL (ref 6.4–8.3)
RBC # BLD AUTO: 4.3 M/UL (ref 4.7–6.1)
SODIUM SERPL-SCNC: 135 MMOL/L (ref 136–145)
WBC # BLD AUTO: 12.8 K/UL (ref 4.8–10.8)

## 2024-09-26 PROCEDURE — 2580000003 HC RX 258: Performed by: INTERNAL MEDICINE

## 2024-09-26 PROCEDURE — G0316 PR PROLONG INPT EVAL ADD15 M: HCPCS | Performed by: PHYSICIAN ASSISTANT

## 2024-09-26 PROCEDURE — 6370000000 HC RX 637 (ALT 250 FOR IP): Performed by: INTERNAL MEDICINE

## 2024-09-26 PROCEDURE — 94761 N-INVAS EAR/PLS OXIMETRY MLT: CPT

## 2024-09-26 PROCEDURE — 85025 COMPLETE CBC W/AUTO DIFF WBC: CPT

## 2024-09-26 PROCEDURE — C1751 CATH, INF, PER/CENT/MIDLINE: HCPCS

## 2024-09-26 PROCEDURE — 36415 COLL VENOUS BLD VENIPUNCTURE: CPT

## 2024-09-26 PROCEDURE — 2700000000 HC OXYGEN THERAPY PER DAY

## 2024-09-26 PROCEDURE — 6360000002 HC RX W HCPCS: Performed by: INTERNAL MEDICINE

## 2024-09-26 PROCEDURE — 94640 AIRWAY INHALATION TREATMENT: CPT

## 2024-09-26 PROCEDURE — 76937 US GUIDE VASCULAR ACCESS: CPT

## 2024-09-26 PROCEDURE — 6370000000 HC RX 637 (ALT 250 FOR IP)

## 2024-09-26 PROCEDURE — 99233 SBSQ HOSP IP/OBS HIGH 50: CPT | Performed by: PHYSICIAN ASSISTANT

## 2024-09-26 PROCEDURE — 36410 VNPNXR 3YR/> PHY/QHP DX/THER: CPT

## 2024-09-26 PROCEDURE — 80053 COMPREHEN METABOLIC PANEL: CPT

## 2024-09-26 PROCEDURE — 82962 GLUCOSE BLOOD TEST: CPT

## 2024-09-26 PROCEDURE — 2140000000 HC CCU INTERMEDIATE R&B

## 2024-09-26 PROCEDURE — 05HB33Z INSERTION OF INFUSION DEVICE INTO RIGHT BASILIC VEIN, PERCUTANEOUS APPROACH: ICD-10-PCS | Performed by: INTERNAL MEDICINE

## 2024-09-26 PROCEDURE — 6370000000 HC RX 637 (ALT 250 FOR IP): Performed by: PHYSICIAN ASSISTANT

## 2024-09-26 RX ORDER — SUCRALFATE 1 G/1
1 TABLET ORAL
Status: DISCONTINUED | OUTPATIENT
Start: 2024-09-26 | End: 2024-10-18 | Stop reason: HOSPADM

## 2024-09-26 RX ORDER — LORAZEPAM 2 MG/ML
0.5 CONCENTRATE ORAL EVERY 6 HOURS PRN
Status: DISCONTINUED | OUTPATIENT
Start: 2024-09-26 | End: 2024-10-18 | Stop reason: HOSPADM

## 2024-09-26 RX ORDER — MORPHINE SULFATE 20 MG/ML
5 SOLUTION ORAL EVERY 4 HOURS PRN
Status: DISCONTINUED | OUTPATIENT
Start: 2024-09-26 | End: 2024-10-18 | Stop reason: HOSPADM

## 2024-09-26 RX ORDER — ENOXAPARIN SODIUM 100 MG/ML
1 INJECTION SUBCUTANEOUS 2 TIMES DAILY
Status: DISCONTINUED | OUTPATIENT
Start: 2024-09-26 | End: 2024-09-30

## 2024-09-26 RX ADMIN — SODIUM CHLORIDE, PRESERVATIVE FREE 10 ML: 5 INJECTION INTRAVENOUS at 19:54

## 2024-09-26 RX ADMIN — SUCRALFATE 1 G: 1 TABLET ORAL at 15:26

## 2024-09-26 RX ADMIN — QUETIAPINE FUMARATE 12.5 MG: 25 TABLET ORAL at 19:53

## 2024-09-26 RX ADMIN — ENOXAPARIN SODIUM 50 MG: 100 INJECTION SUBCUTANEOUS at 19:54

## 2024-09-26 RX ADMIN — Medication 0.5 MG: at 18:30

## 2024-09-26 RX ADMIN — BUDESONIDE AND FORMOTEROL FUMARATE DIHYDRATE 2 PUFF: 160; 4.5 AEROSOL RESPIRATORY (INHALATION) at 18:52

## 2024-09-26 RX ADMIN — FINASTERIDE 5 MG: 5 TABLET, FILM COATED ORAL at 08:17

## 2024-09-26 RX ADMIN — SODIUM CHLORIDE, PRESERVATIVE FREE 40 MG: 5 INJECTION INTRAVENOUS at 08:16

## 2024-09-26 RX ADMIN — SODIUM CHLORIDE, PRESERVATIVE FREE 10 ML: 5 INJECTION INTRAVENOUS at 08:17

## 2024-09-26 RX ADMIN — SUCRALFATE 1 G: 1 TABLET ORAL at 12:03

## 2024-09-26 RX ADMIN — METOPROLOL SUCCINATE 25 MG: 25 TABLET, EXTENDED RELEASE ORAL at 08:17

## 2024-09-26 RX ADMIN — GUAIFENESIN 600 MG: 600 TABLET ORAL at 19:53

## 2024-09-26 RX ADMIN — Medication 2000 UNITS: at 08:17

## 2024-09-26 RX ADMIN — Medication 5 MG: at 15:26

## 2024-09-26 RX ADMIN — ENOXAPARIN SODIUM 60 MG: 100 INJECTION SUBCUTANEOUS at 08:17

## 2024-09-26 RX ADMIN — POTASSIUM BICARBONATE 40 MEQ: 782 TABLET, EFFERVESCENT ORAL at 05:53

## 2024-09-26 RX ADMIN — SODIUM CHLORIDE, PRESERVATIVE FREE 40 MG: 5 INJECTION INTRAVENOUS at 19:53

## 2024-09-26 RX ADMIN — BARICITINIB 4 MG: 2 TABLET, FILM COATED ORAL at 08:17

## 2024-09-26 RX ADMIN — WATER 40 MG: 1 INJECTION INTRAMUSCULAR; INTRAVENOUS; SUBCUTANEOUS at 15:26

## 2024-09-26 RX ADMIN — WATER 40 MG: 1 INJECTION INTRAMUSCULAR; INTRAVENOUS; SUBCUTANEOUS at 05:53

## 2024-09-26 RX ADMIN — SUCRALFATE 1 G: 1 TABLET ORAL at 19:53

## 2024-09-26 RX ADMIN — GUAIFENESIN 600 MG: 600 TABLET ORAL at 08:17

## 2024-09-26 RX ADMIN — BUDESONIDE AND FORMOTEROL FUMARATE DIHYDRATE 2 PUFF: 160; 4.5 AEROSOL RESPIRATORY (INHALATION) at 07:39

## 2024-09-26 ASSESSMENT — PAIN DESCRIPTION - DESCRIPTORS: DESCRIPTORS: DISCOMFORT

## 2024-09-26 ASSESSMENT — PAIN SCALES - GENERAL
PAINLEVEL_OUTOF10: 4
PAINLEVEL_OUTOF10: 0

## 2024-09-26 ASSESSMENT — PAIN DESCRIPTION - LOCATION: LOCATION: GENERALIZED

## 2024-09-26 ASSESSMENT — PAIN - FUNCTIONAL ASSESSMENT: PAIN_FUNCTIONAL_ASSESSMENT: PREVENTS OR INTERFERES SOME ACTIVE ACTIVITIES AND ADLS

## 2024-09-26 ASSESSMENT — PAIN DESCRIPTION - ORIENTATION: ORIENTATION: MID

## 2024-09-26 NOTE — ACP (ADVANCE CARE PLANNING)
Advance Care Planning      Palliative Medicine Provider   Advance Care Planning (ACP) Conversation      Date of Conversation: 09/26/24  The patient and/or authorized decision maker consented to a voluntary Advance Care Planning conversation.   Individuals present for the conversation:   Patient with decision making capacity and Daughter Rebeca Rosa    Legal Healthcare Agent(s):    Primary Decision Maker: Chelo Mtz - Spouse - [phone]    Secondary Decision Maker: Rebeca Rosa - Child - [phone]    ACP documents available in EMR prior to discussion:  -None    Primary Palliative Diagnosis(es):  Pulmonary Fibrosis  Severe protein calorie malnutrition  Cachexia  Dysphagia   Failure to thrive     Conversation Summary:  Reviewed clinical concerns w/ patient and his daughter. He has had hospice care-briefly in the past per her report. They had to stop and transition to placemen due to inability for her to have additional caregiver support in the home. They are interested in a transition back to hospice at this time if they can find a way to get additional caregiver support in the home. We moved forward w/ code status discussion at this point and they both confirm desire for change to DNR. The patient indicates he does not want a feeding tube and wants to have food/drink for comfort.     Resuscitation Status:    Code Status: DNR      I spent 40 minutes providing ACP services with the patient and/or surrogate decision maker during follow up in a voluntary, in-person conversation discussing the patient's wishes and goals as detailed in the above note.       Nicki Dick PA-C

## 2024-09-26 NOTE — PROGRESS NOTES
Palliative Care Progress Note  9/26/2024 12:11 PM    Patient:  Eze Rosa  YOB: 1950  Primary Care Physician: Ross Masterson MD  Advance Directive: DNR  Admit Date: 9/18/2024       Hospital Day: 8  Portions of this note have been copied forward, however, changed to reflect the most current clinical status of this patient.    CHIEF COMPLAINT/REASON FOR CONSULTATION Goals of care, family support, Code status, symptom management     SUBJECTIVE:  Mr. Rosa is repeatedly asking for something to drink. States he does not want a feeding tube and wants to be allowed to eat/drink.     HPI:  The patient is a 73 y.o. male with PMH pulmonary fibrosis, paroxysmal atrial fibrillation, BPH, previous alcoholism, chronic respiratory failure, Mclean's esophagus dependent on supplemental O2, previous COVID/RSV, chronic lieberman, BPH,  who presented to [facility] ED on 09/18/2024 complaining of shortness of breath, nausea, vomiting, abdominal pain, loose stools with incontinence and confusion.Work up included CTA pulmonary protocol which was negative for pulmonary embolus, revealed stable moderate to severe diffuse pulmonary fibrosis, stable trace dependent right pleural fluid, interval development of vague wall thickening portions of the esophagus.  CT abdomen and pelvis reported no acute abnormality, sigmoid diverticulosis without indication of diverticulitis, moderate stool seen in the rectum without focal abnormality, mildly distended gallbladder without stones or focal abnormality, mild distention of the stomach with fluid in the lumen, no indication of gastric outlet obstruction, small bilateral pleural effusions.  CT head reported no acute intracranial process with acute bilateral maxillary sinusitis.  Chest x-ray reported small bilateral pleural effusions with bibasilar atelectasis and/or pneumonia worse on the right side than the left.  He was positive for COVID-19.  WBC 26.3, D-dimer 2.43, lactic acid 6.3.  He was  daughter early this morning to get an update from her visit with him yesterday evening.  She tells me she was unable to come but that her  came and noted the patient was extremely weak.  She states that her dad continually asks for something to eat and drink and this has been his primary focus.  She does confirm that he has received hospice services in the past and that this is something they would be interested in again but would need additional caregiver support as she is helping to take care of her mother who has glioblastoma and has children at home that also require care and transportation.  She is unable to be home 24/7 to care for her father and states that they are financially unable to private pay for room and board at a nursing facility to allow hospice care.  She would like help in figuring out how to get him hospice in the outpatient setting with additional support.  We discussed that if we do move forward with transition to hospice we will also need to further discuss his CODE STATUS and a change to DNR.  She is receptive to this.  I then presented to the patient's bedside and had the above discussion with him as well.  I discussed hospice philosophy and focus on comfort while trying to prevent any further hospitalization.  We discussed allowing food and drink for comfort as well as a transition to a DO NOT RESUSCITATE and we discussed the meaning of a DO NOT RESUSCITATE.  He states that he is in agreement with his daughter and is okay to involve hospice and continues to ask for something to eat and drink.  He loves to drink Sprite and I discussed this with his nurse if we can try to provide that for his meals.  I spoke with seventh floor case management and also notified hospice chaplain that we will need social work support to help figure out a discharge plan so the patient can receive hospice services.  I then followed up with his daughter and reviewed that he is in agreement for hospice care

## 2024-09-26 NOTE — CONSULTS
[facility] Vascular Access Team:  PowerGlide Midline/Extended Dwell IV Catheter  Insertion Procedure Note      Patient: Eze Rosa  YOB: 1950   MRN: [id number]  Room: [address] [place] [address]     Attending Physician - John Rincon MD  Ordering Physician - John Rincon MD    Diagnosis:   Septicemia (HCC) [A41.9]  Altered mental status, unspecified altered mental status type [R41.82]  Pneumonia due to infectious organism, unspecified laterality, unspecified part of lung [J18.9]  COVID [U07.1]  COVID-19 [U07.1]       Procedure(s):   Insertion of a 20 gauge 10 cm Single Lumen PowerGlide Catheter    Indication(s):  No IV access, Poor Venous Access    Date of Procedure: 9/26/2024     Performed by: Hardy Ventura RN    Complications: None      Findings:   1. Successful insertion of PowerGlide Catheter.  2. PowerGlide is ready to be used. Please change tubing prior to using the new line. Make sure to label, date and use alcohol caps on new tubing and alcohol caps on unused ports.   3. Patient may be changed to a unit draw for lab work.        Detailed Description of Procedure:   The Right Basilic vein was visualized using the ultrasound and deemed a suitable vessel. The area was prepped with Chlorhexidine and draped in sterile fashion using partial barrier draping. The vein was accessed using US guidance. The 20 gauge 10 cm Single Lumen PowerGlide catheter was advanced into the vein using ANTT. Approximately 0 cm exposed. All lumens had brisk blood return and was flushed with 10 cc of NS. The catheter was secured using a securement device. A 3M CHG Tegaderm was placed over the securement device and insertion site. Dressing was dated and initialed with external measurement marked. Patient did tolerate procedure well.        Electronically signed by Hardy Ventura RN on 9/26/2024 at 10:30 AM

## 2024-09-26 NOTE — CARE COORDINATION
Kiera followed up with admission coordinators from collaporative agencies. [facility] states they cannot offer at this time. [place] [facility] cannot offer a bed at this time as well. [facility] Ph: [phone] Fax: [phone]  Is following pt for behaviors and agitation. Pt will have to finish the quarantine before admission to SNF.  Electronically signed by ARLYN RODRIGUEZ on 9/26/2024 at 8:29 AM

## 2024-09-26 NOTE — PROGRESS NOTES
[facility]      Patient:  Eze Rosa  YOB: 1950  Date of Service: 9/26/2024  MRN: [id number]   Acct: [id number]   Primary Care Physician: Ross Masterson MD  Advance Directive: Full Code  Admit Date: 9/18/2024       Hospital Day: 8  Portions of this note have been copied forward, however, changed to reflect the most current clinical status of this patient.  CHIEF COMPLAINT  altered mental status     Subjective: resting comfortably in bed eating breakfast, provided re-inforcement of aspiration precautions     Cumulative hospital course   73-year-old male with idiopathic pulmonary fibrosis, emphysema, chronic hypoxic respiratory failure on supplemental oxygen 2 LNC at baseline, chronic right pleural effusion with VATS biopsy, Mclean's esophagus with GERD, atrial fib, BPH with urinary retention, alcohol use sober since November 2023, with complaints of altered mental status and shortness of air.  Of note recently admitted in July due to urosepsis at that time Morataya catheter remained placed, received IV antibiotic course, and was discharged to [facility] stable condition.  He returns to [facility] ER due to shortness of air and altered mental status.  He was discharged from SNF facility 1 day prior to admission.  Workup in ER CTA pulmonary moderate to severe pulmonary fibrosis and interval development of esophagitis versus mass, CXR bibasilar pneumonia right greater than left, COVID-positive, CT head no acute intracranial abnormality with acute bilateral maxillary sinusitis, lactic acid 6 with repeat 5.8, CRP 11.55, WBC 26.3, ABG metabolic alkalosis.  Initiated on cefepime and vancomycin.  After admission patient noted to be hypotensive and A-fib RVR additional IVF 1 L given and digoxin 125 mcg x 1 with conversion to normal sinus rhythm.  9/21 went back in A-fib RVR converted with IV Lopressor.  Patient had intermittent complaints of chest pain EKG no ischemia or troponins remain flat.  Echo normal  SubCUTAneous BID    baricitinib  4 mg Oral Daily    budesonide-formoterol  2 puff Inhalation BID    finasteride  5 mg Oral Daily    [Held by provider] tamsulosin  0.8 mg Oral Daily    guaiFENesin  600 mg Oral BID    methylPREDNISolone  40 mg IntraVENous Q12H    Vitamin D  2,000 Units Oral Daily       Lab and other Data:     Recent Labs     09/24/24  0146 09/25/24  0120 09/26/24  0139   WBC 9.5 10.5 12.8*   HGB 11.4* 11.6* 12.6*    206 205     Recent Labs     09/24/24  0146 09/25/24  0120 09/26/24  0139    137 135*   K 3.4* 3.7 3.1*    98 92*   CO2 33* 30* 34*   BUN 21 14 11   CREATININE 0.4* 0.4* 0.4*   GLUCOSE 151* 155* 171*     Recent Labs     09/24/24  0146 09/25/24 0120 09/26/24  0139   AST 13 13 14   ALT 11 11 13   BILITOT 0.6 0.6 0.7   ALKPHOS 75 81 89     RAD:   XR CHEST PORTABLE    Result Date: 9/20/2024  Cardiomediastinal contours appear enlarged.  Bilateral pleural effusions appear increased.  Interstitial and airspace infiltrate is present throughout both lungs.  Dense opacity at the right and left lung base.  Opacity along the peripheral aspect of the right and left lung has increased.  This likely represents a combination of atelectasis, pulmonary edema, pneumonia and pleural effusions.  No pneumothorax. Feeding tube appears well positioned.   ______________________________________ Electronically signed by: YOANA LIU M.D. Date:     09/20/2024 Time:    16:05     CTA PULMONARY W CONTRAST    Result Date: 9/18/2024   1.  No pulmonary embolus is identified. 2.  Stable moderate to severe diffuse pulmonary fibrosis, most pronounced of the mid and lower lungs, but no honeycombing to suggest UIP. 3.  Stable trace dependent right pleural fluid, with pleural thickening and pleural calcification, consistent with chronicity.  No left pleural calcification.  Findings might be secondary to a prior right hemothorax or pyothorax.  Asbestos exposure cannot be excluded. 4.  Apparent interval

## 2024-09-26 NOTE — CONSULTS
[address] [place] [address]                              CONSULTATION      PATIENT NAME: NERY TABOR                 : 1950  MED REC NO: [id number]                          ROOM: 0713  ACCOUNT NO: [id number]                       ADMIT DATE: 2024  PROVIDER: Giovany Araujo MD    GASTROENTEROLOGY CONSULTATION    CONSULT DATE: 2024      ASSESSMENT:    1. Dysphagia for solids and liquids with increased risk of aspiration.  2. Long-standing gastroesophageal reflux disease for more than 1-2 decades resulting in diagnosed Mclean esophagus 6 years ago or more.  3. Paroxysmal atrial fibrillation, maintained on Eliquis.  4. Chronic respiratory failure, secondary to idiopathic pulmonary fibrosis and emphysema which is compensated on nasal oxygen.  5. COVID infection treated with broad-spectrum antibiotics for both pneumonia and presumed sepsis, completing a 1-week course of antibiotics.    RECOMMENDATIONS:    1. Withhold anticoagulant therapy to go ahead with EGD this afternoon to evaluate abnormal esophagram and history of Mclean esophagus, now with dysphagia.  2. The patient is offered percutaneous endoscopic gastrostomy placement but this is refused at this time.  3. Other diagnostic and therapeutic recommendations are contingent on endoscopic findings and clinical course.    HISTORY OF PRESENT ILLNESS:  This chronically-ill 73-year-old white male has chronic hypoxemic respiratory failure due to idiopathic pulmonary fibrosis and emphysema which are compensated on 2 L of nasal oxygen per cannula.  There has been a chronic right pleural effusion with VATS biopsy in the past.  He has atrial fibrillation requiring maintenance Eliquis, which is being withheld and provided with bridging Lovenox.  This hospitalization was prompted by COVID infection.  He was empirically treated for both pneumonia and sepsis, completing his 7th day of  indication, alternatives, and risks.          ALBERTO COTE MD      D:  09/25/2024 15:18:46     T:  09/25/2024 20:40:12     CWS/AQS  Job #:  [id number]     Doc#:  [id number]

## 2024-09-26 NOTE — PROCEDURES
[facility]              [address] [place] [address]                             PROCEDURE NOTE      PATIENT NAME: NERY TABOR                 : 1950  MED REC NO: [id number]                          ROOM: 0713  ACCOUNT NO: [id number]                       ADMIT DATE: 2024  PROVIDER: Giovany Araujo MD      DATE OF PROCEDURE:  2024    SURGEON:  Giovany Araujo MD    PROCEDURE PERFORMED:  Esophagogastroduodenoscopy with photos and distal esophageal biopsy.    INDICATION:  A 73-year-old white male who has been chronically ill with prior alcoholism, paroxysmal atrial fibrillation, idiopathic pulmonary fibrosis, and GERD for many years.  He was treated for COVID pneumonia and sepsis during this hospitalization.  He is seen in GI consultation because of nausea, vomiting, and regurgitation with possible hematemesis.  EGD is done to evaluate for known Mclean esophagus and rule out intervening malignancy.    PREMEDICATION:  Propofol per anesthetist.    DESCRIPTION OF PROCEDURE:  The Olympus video endoscope was passed by mouth.  The entire length of the esophagus was diseased with severe diffuse mucosal inflammation and superficial erosions with overlying patchy exudate.  The GE junction was indistinct.  There was no obvious tumor, nodularity, stricture, or esophageal mass.  There was underlying long segment of Mclean esophagus, which could not be biopsied because of the severe friability and risk of bleeding at this time.  The diaphragmatic indentation was estimated at 38 cm from the incisor teeth.  The stomach was insufflated with air.  The scope was retroflexed to show a normal cardia and fundus without tumor.  There was a small amount of retained bilious fluid in the stomach, but no food.  The pylorus was patent.  The duodenum was normal.  Photographs were taken of the diseased esophagus.  There was a single small biopsy obtained from the distal

## 2024-09-26 NOTE — PROGRESS NOTES
Comprehensive Nutrition Assessment    Type and Reason for Visit:  Reassess    Nutrition Recommendations/Plan:   Add Gelatein ONS BID.     Malnutrition Assessment:  Malnutrition Status:  Severe malnutrition (09/21/24 0826)    Context:  Chronic Illness     Findings of the 6 clinical characteristics of malnutrition:  Energy Intake:  75% or less estimated energy requirements for 1 month or longer  Weight Loss:  Greater than 20% over 1 year     Body Fat Loss:  Severe body fat loss Orbital, Buccal region   Muscle Mass Loss:  Severe muscle mass loss Temples (temporalis), Clavicles (pectoralis & deltoids)  Fluid Accumulation:  No significant fluid accumulation     Strength:  Not Performed    Nutrition Assessment:    Pt is s/p EGD- revealed severe chronic erosive esophagitis. Aware pt had episode of coughing w/intake. Per Palliative note, pt desires comfort feeds. Pt received Clear Liquid diet w/moderately thick liquids, documented % intake; advanced to Full Liquid during lunch. Added Gelatein ONS BID to support adequate intake. Aware of hospice referral.    Nutrition Related Findings:    Diarrhea 9/24. Na 135, K+ 3.1, creat 0.4, glu 151-171 (Solu-medrol). Wound Type: Pressure Injury (coccyx/buttocks)       Current Nutrition Intake & Therapies:    Average Meal Intake: 51-75%, %  Average Supplements Intake: None Ordered  ADULT ORAL NUTRITION SUPPLEMENT; Lunch, Dinner; Fortified Gelatin Oral Supplement  ADULT DIET; Full Liquid; Moderately Thick (Honey)    Anthropometric Measures:  Height: 193 cm (6' 3.98\")  Ideal Body Weight (IBW): 202 lbs (92 kg)    Admission Body Weight: 61 kg (134 lb 6 oz)  Current Body Weight: 53.6 kg (118 lb 2.7 oz), 58.5 % IBW. Weight Source: Bed Scale  Current BMI (kg/m2): 14.4  Usual Body Weight: 79.9 kg (176 lb 2 oz) (1/5/24)  % Weight Change (Calculated): -32.9  BMI Categories: Underweight (BMI less than 22) age over 65    Estimated Daily Nutrient Needs:  Energy Requirements Based On:

## 2024-09-26 NOTE — CARE COORDINATION
Hospice referral noted. Patient has had hospice services prior and will be entering into 3rd benefit period per medicare verification requiring a face to face encounter. Astrid June APRN to complete face to face visit with patient in [facility] room this evening around 5:30 pm.

## 2024-09-27 LAB
ALBUMIN SERPL-MCNC: 3.1 G/DL (ref 3.5–5.2)
ALP SERPL-CCNC: 87 U/L (ref 40–129)
ALT SERPL-CCNC: 12 U/L (ref 5–41)
ANION GAP SERPL CALCULATED.3IONS-SCNC: 8 MMOL/L (ref 7–19)
AST SERPL-CCNC: 12 U/L (ref 5–40)
BASOPHILS # BLD: 0 K/UL (ref 0–0.2)
BASOPHILS NFR BLD: 0.1 % (ref 0–1)
BILIRUB SERPL-MCNC: 0.5 MG/DL (ref 0.2–1.2)
BUN SERPL-MCNC: 13 MG/DL (ref 8–23)
CALCIUM SERPL-MCNC: 8.7 MG/DL (ref 8.8–10.2)
CHLORIDE SERPL-SCNC: 92 MMOL/L (ref 98–111)
CO2 SERPL-SCNC: 37 MMOL/L (ref 22–29)
CREAT SERPL-MCNC: 0.5 MG/DL (ref 0.7–1.2)
EOSINOPHIL # BLD: 0 K/UL (ref 0–0.6)
EOSINOPHIL NFR BLD: 0.1 % (ref 0–5)
ERYTHROCYTE [DISTWIDTH] IN BLOOD BY AUTOMATED COUNT: 13 % (ref 11.5–14.5)
GLUCOSE SERPL-MCNC: 154 MG/DL (ref 70–99)
HCT VFR BLD AUTO: 35.9 % (ref 42–52)
HGB BLD-MCNC: 11.5 G/DL (ref 14–18)
IMM GRANULOCYTES # BLD: 0 K/UL
LYMPHOCYTES # BLD: 0.4 K/UL (ref 1.1–4.5)
LYMPHOCYTES NFR BLD: 3.1 % (ref 20–40)
MCH RBC QN AUTO: 29.5 PG (ref 27–31)
MCHC RBC AUTO-ENTMCNC: 32 G/DL (ref 33–37)
MCV RBC AUTO: 92.1 FL (ref 80–94)
MONOCYTES # BLD: 0.7 K/UL (ref 0–0.9)
MONOCYTES NFR BLD: 6.3 % (ref 0–10)
NEUTROPHILS # BLD: 10.6 K/UL (ref 1.5–7.5)
NEUTS SEG NFR BLD: 90.1 % (ref 50–65)
PLATELET # BLD AUTO: 162 K/UL (ref 130–400)
PMV BLD AUTO: 11.4 FL (ref 9.4–12.4)
POTASSIUM SERPL-SCNC: 4 MMOL/L (ref 3.5–5)
PROT SERPL-MCNC: 5.4 G/DL (ref 6.4–8.3)
RBC # BLD AUTO: 3.9 M/UL (ref 4.7–6.1)
SODIUM SERPL-SCNC: 137 MMOL/L (ref 136–145)
WBC # BLD AUTO: 11.8 K/UL (ref 4.8–10.8)

## 2024-09-27 PROCEDURE — 2580000003 HC RX 258: Performed by: INTERNAL MEDICINE

## 2024-09-27 PROCEDURE — 6370000000 HC RX 637 (ALT 250 FOR IP): Performed by: INTERNAL MEDICINE

## 2024-09-27 PROCEDURE — 6370000000 HC RX 637 (ALT 250 FOR IP)

## 2024-09-27 PROCEDURE — 2140000000 HC CCU INTERMEDIATE R&B

## 2024-09-27 PROCEDURE — 80053 COMPREHEN METABOLIC PANEL: CPT

## 2024-09-27 PROCEDURE — 99232 SBSQ HOSP IP/OBS MODERATE 35: CPT | Performed by: PHYSICIAN ASSISTANT

## 2024-09-27 PROCEDURE — 2700000000 HC OXYGEN THERAPY PER DAY

## 2024-09-27 PROCEDURE — 92507 TX SP LANG VOICE COMM INDIV: CPT

## 2024-09-27 PROCEDURE — 92526 ORAL FUNCTION THERAPY: CPT

## 2024-09-27 PROCEDURE — 85025 COMPLETE CBC W/AUTO DIFF WBC: CPT

## 2024-09-27 PROCEDURE — 94761 N-INVAS EAR/PLS OXIMETRY MLT: CPT

## 2024-09-27 PROCEDURE — 6360000002 HC RX W HCPCS: Performed by: INTERNAL MEDICINE

## 2024-09-27 PROCEDURE — 6370000000 HC RX 637 (ALT 250 FOR IP): Performed by: PHYSICIAN ASSISTANT

## 2024-09-27 PROCEDURE — 94640 AIRWAY INHALATION TREATMENT: CPT

## 2024-09-27 RX ADMIN — Medication 2000 UNITS: at 10:30

## 2024-09-27 RX ADMIN — WATER 40 MG: 1 INJECTION INTRAMUSCULAR; INTRAVENOUS; SUBCUTANEOUS at 05:20

## 2024-09-27 RX ADMIN — METOPROLOL SUCCINATE 25 MG: 25 TABLET, EXTENDED RELEASE ORAL at 22:38

## 2024-09-27 RX ADMIN — Medication 5 MG: at 12:13

## 2024-09-27 RX ADMIN — BARICITINIB 4 MG: 2 TABLET, FILM COATED ORAL at 10:30

## 2024-09-27 RX ADMIN — WATER 40 MG: 1 INJECTION INTRAMUSCULAR; INTRAVENOUS; SUBCUTANEOUS at 18:00

## 2024-09-27 RX ADMIN — SODIUM CHLORIDE, PRESERVATIVE FREE 40 MG: 5 INJECTION INTRAVENOUS at 21:30

## 2024-09-27 RX ADMIN — Medication 5 MG: at 22:38

## 2024-09-27 RX ADMIN — BUDESONIDE AND FORMOTEROL FUMARATE DIHYDRATE 2 PUFF: 160; 4.5 AEROSOL RESPIRATORY (INHALATION) at 07:01

## 2024-09-27 RX ADMIN — SODIUM CHLORIDE, PRESERVATIVE FREE 10 ML: 5 INJECTION INTRAVENOUS at 11:49

## 2024-09-27 RX ADMIN — SODIUM CHLORIDE, PRESERVATIVE FREE 10 ML: 5 INJECTION INTRAVENOUS at 22:37

## 2024-09-27 RX ADMIN — ENOXAPARIN SODIUM 50 MG: 100 INJECTION SUBCUTANEOUS at 21:00

## 2024-09-27 RX ADMIN — SUCRALFATE 1 G: 1 TABLET ORAL at 18:49

## 2024-09-27 RX ADMIN — GUAIFENESIN 600 MG: 600 TABLET ORAL at 10:30

## 2024-09-27 RX ADMIN — SUCRALFATE 1 G: 1 TABLET ORAL at 22:38

## 2024-09-27 RX ADMIN — METOPROLOL SUCCINATE 25 MG: 25 TABLET, EXTENDED RELEASE ORAL at 10:30

## 2024-09-27 RX ADMIN — GUAIFENESIN 600 MG: 600 TABLET ORAL at 22:38

## 2024-09-27 RX ADMIN — FINASTERIDE 5 MG: 5 TABLET, FILM COATED ORAL at 10:30

## 2024-09-27 RX ADMIN — ENOXAPARIN SODIUM 50 MG: 100 INJECTION SUBCUTANEOUS at 10:30

## 2024-09-27 RX ADMIN — SUCRALFATE 1 G: 1 TABLET ORAL at 05:20

## 2024-09-27 RX ADMIN — SODIUM CHLORIDE, PRESERVATIVE FREE 40 MG: 5 INJECTION INTRAVENOUS at 10:30

## 2024-09-27 ASSESSMENT — PAIN DESCRIPTION - ORIENTATION: ORIENTATION: MID

## 2024-09-27 ASSESSMENT — PAIN - FUNCTIONAL ASSESSMENT: PAIN_FUNCTIONAL_ASSESSMENT: PREVENTS OR INTERFERES SOME ACTIVE ACTIVITIES AND ADLS

## 2024-09-27 ASSESSMENT — PAIN DESCRIPTION - DESCRIPTORS: DESCRIPTORS: ACHING

## 2024-09-27 ASSESSMENT — PAIN SCALES - GENERAL
PAINLEVEL_OUTOF10: 7
PAINLEVEL_OUTOF10: 0

## 2024-09-27 ASSESSMENT — PAIN DESCRIPTION - LOCATION: LOCATION: CHEST

## 2024-09-27 NOTE — PROGRESS NOTES
Facility/Department: [facility] PROGRESSIVE CARE  SWALLOW THERAPY  SPEECH THERAPY     NAME: Eze Rosa  : 1950  MRN: [id number]    ADMISSION DATE: 2024  ADMITTING DIAGNOSIS: has Idiopathic pulmonary fibrosis (HCC); Alcohol abuse; Benign prostatic hyperplasia with urinary retention; Hyponatremia; Encounter for Morataya catheter replacement; Urinary retention; Moderate malnutrition (HCC); Acute on chronic hypoxic respiratory failure; Tachypnea, tachyypneic to 40 bpm in ED; Acute respiratory distress, Tachypneic to 40 bpm; RSV infection; Abdominal pain; Palliative care patient; Dyspnea and respiratory abnormalities; Sepsis (HCC); History of urinary retention; COVID-19; Hypertension; Mclean's esophagus with dysplasia; GERD (gastroesophageal reflux disease); Paroxysmal atrial fibrillation (HCC); Dysphagia; Anxiety; Severe malnutrition (HCC); Pulmonary fibrosis (HCC); COVID; and Mclean's esophagus on their problem list.    Date of Treat: 2024  Evaluating Therapist: PAULA Omalley    Pain:  Pain Assessment  Pain Assessment: None - Denies Pain  Pain Level: 0  Patient's Stated Pain Goal: 0 - No pain  Pain Location: Generalized  Pain Orientation: Mid  Pain Descriptors: Discomfort  Functional Pain Assessment: Prevents or interferes some active activities and ADLs  Non-Pharmaceutical Pain Intervention(s): Rest  Response to Pain Intervention: Patient satisfied  Side Effects: No reported side effects  Faces, Legs, Activity, Cry, and Consolability (FLACC)  Face (F): no particular expression or smile  Legs (L): normal position or relaxed  Activity (A): lying quietly, normal position, moves easily  Cry (C): moans or whimpers, occasional complaint  Consolability (C): content, relaxed  FLACC Score : 1    Reason for Referral  Eze Rosa was referred for a bedside swallow evaluation to assess the efficiency of his swallow function, identify signs and symptoms of aspiration and make recommendations regarding safe dietary    Goal 5: Re-assessment of speech production.     General  Chart Reviewed: Yes  Behavior/Cognition: Alert;Confused;Cooperative  O2 Device: Nasal Cannula  Communication Observation: (Re-assessed patient's speech production. Patient exhibits slow, decreased lingual movements with weak, hoarse vocal quality. SLP still ranked functional intelligibility of speech for unfamiliar listeners at 100% in utterances with background noise present.)  Follows Directions: Simple   Dentition: Adequate   Patient Positioning: Upright in bed  Consistencies Administered: Ice chips;Puree;Regular solid;Honey - straw;Nectar - straw       Oral Motor Examination   Labial ROM: (Decreased, bilaterally, during labial retraction trials and labial protrusion trials.)  Labial Strength: (Decreased during labial compression trials.)  Labial Coordination: (Slowed movements.)  Lingual ROM: (Adequate during lingual extension trials with full point achieved; decreased during lingual elevation trials without use of accessory jaw movement; adequate movements bilaterally.)  Lingual Strength: (Decreased.)  Lingual Coordination: (Slowed movements.)     Re-assessed patient's swallowing function with the following observations noted:     Oral Phase  Mastication: Ice chips;Regular solid (Patient exhibited decreased rotary jaw movement during oral prep of ice chip trials and regular solid consistency trials presented by SLP.)  Suspected Premature Bolus Loss: Ice chips;Puree;Regular solid;Honey - straw;Nectar -straw (Patient exhibited inconsistently fast oral transit of ice chip trials. Patient exhibited slow oral transit of puree consistency presentations, administered by SLP and regular solid consistency trials. Patient exhibited inconsistently fast oral transit of honey thick liquid presentations and nectar thick liquid trials presented via straw by SLP.)  Oral Phase - Comment: Min puree consistency residue and min regular solid consistency residue was noted

## 2024-09-27 NOTE — PROGRESS NOTES
as Hospitalist BRENDEN. The patient and his family have confirmed desire to pursue food/drink for comfort. They do want hospice services at discharge and to continue to focus on comfort while working on a discharge plan. Will continue ativan/morphine as needed. Feel we can stop continuous pulse ox monitoring at this point as it is likely agitating Mr. Rosa. Reviewed available medication for comfort as this may also allow patient to feel more at ease to allow clean up if incontinent stool. His nurse is continuing to provide education to help prevent skin break down.     Palliative team will follow as needed.    Recommendations:   Palliative Care-GOC unchanged, transition to outpatient hospice services, allow food/drink for comfort Code status: DNR  Acute/chronic hypoxemic respiratory failure suspected 2/2 COVID pneumonia w/ known pulmonary fibrosis w/ cachexia -OK to continue current level of support until discharge plan in place, supportive care   Atrial fibrillation RVR-Echo EF 60%, normal diastolic function, Toprol XL, Eliquis recommended per Cardiology   Dysphagia w/ severe protein calorie malnutrition/weight loss-supportive care, allow food for comfort   Severe esophagitis/long segment Mclean's esophagus -PPI, carafate   Anxiety-will have Ativan/morphine available as needed for comfort, family feel allowing food/drink will help w/ this as well, will also continue seroquel as needed     Thank you for consulting Palliative Care and allowing us to participate in the care of this patient.     Total Time Spent with patient assessment, interview of independent historian/HCS, workup/treatment review, discussion with medical team, review of current and home medications, and placement of orders/preparation of this note, coordination of care: 40 minutes                                 Electronically signed by Nicki Dick PA-C on 9/27/2024 at 2:55 PM    (Please note that portions of this note were completed with a

## 2024-09-27 NOTE — PLAN OF CARE
Problem: Safety - Adult  Goal: Free from fall injury  9/27/2024 0325 by Edelmira Garrison RN  Outcome: Progressing  9/26/2024 1341 by Christine Stanley RN  Outcome: Not Progressing  Flowsheets (Taken 9/26/2024 1341)  Free From Fall Injury: Instruct family/caregiver on patient safety     Problem: Skin/Tissue Integrity  Goal: Absence of new skin breakdown  Description: 1.  Monitor for areas of redness and/or skin breakdown  2.  Assess vascular access sites hourly  3.  Every 4-6 hours minimum:  Change oxygen saturation probe site  4.  Every 4-6 hours:  If on nasal continuous positive airway pressure, respiratory therapy assess nares and determine need for appliance change or resting period.  9/27/2024 0325 by Edelmira Garrison RN  Outcome: Progressing  9/26/2024 1341 by Christine Stanley RN  Outcome: Not Progressing     Problem: ABCDS Injury Assessment  Goal: Absence of physical injury  9/27/2024 0325 by Edelmira Garrison RN  Outcome: Progressing  9/26/2024 1341 by Christine Stanley RN  Outcome: Not Progressing  Flowsheets (Taken 9/26/2024 1341)  Absence of Physical Injury: Implement safety measures based on patient assessment     Problem: Safety - Medical Restraint  Goal: Remains free of injury from restraints (Restraint for Interference with Medical Device)  Description: INTERVENTIONS:  1. Determine that other, less restrictive measures have been tried or would not be effective before applying the restraint  2. Evaluate the patient's condition at the time of restraint application  3. Inform patient/family regarding the reason for restraint  4. Q2H: Monitor safety, psychosocial status, comfort, nutrition and hydration  9/27/2024 0325 by Edelmira Garrison RN  Outcome: Progressing  9/26/2024 1341 by Christine Stanley RN  Outcome: Not Progressing     Problem: Discharge Planning  Goal: Discharge to home or other facility with appropriate resources  9/27/2024 0325 by Edelmira Garrison RN  Outcome: Progressing  9/26/2024 1341 by Lizeth  Christine BEARDEN RN  Outcome: Not Progressing  Flowsheets (Taken 9/26/2024 0830)  Discharge to home or other facility with appropriate resources:   Identify barriers to discharge with patient and caregiver   Arrange for needed discharge resources and transportation as appropriate   Identify discharge learning needs (meds, wound care, etc)     Problem: Nutrition Deficit:  Goal: Optimize nutritional status  9/27/2024 0325 by Edelmira Garrison RN  Outcome: Progressing  9/26/2024 1341 by Christine Stanley RN  Outcome: Not Progressing     Problem: Pain  Goal: Verbalizes/displays adequate comfort level or baseline comfort level  9/27/2024 0325 by Edelmira Garrison RN  Outcome: Progressing  9/26/2024 1341 by Christine Stanley RN  Outcome: Not Progressing

## 2024-09-27 NOTE — PROGRESS NOTES
[facility]      Patient:  Eze Rosa  YOB: 1950  Date of Service: 9/27/2024  MRN: [id number]   Acct: [id number]   Primary Care Physician: Ross Masterson MD  Advance Directive: DNR  Admit Date: 9/18/2024       Hospital Day: 9  Portions of this note have been copied forward, however, changed to reflect the most current clinical status of this patient.  CHIEF COMPLAINT  altered mental status     Subjective: resting comfortably in bed eating breakfast, provided re-inforcement of aspiration precautions     Cumulative hospital course   73-year-old male with idiopathic pulmonary fibrosis, emphysema, chronic hypoxic respiratory failure on supplemental oxygen 2 LNC at baseline, chronic right pleural effusion with VATS biopsy, Mclean's esophagus with GERD, atrial fib, BPH with urinary retention, alcohol use sober since November 2023, with complaints of altered mental status and shortness of air.  Of note recently admitted in July due to urosepsis at that time Morataya catheter remained placed, received IV antibiotic course, and was discharged to [facility] stable condition.  He returns to [facility] ER due to shortness of air and altered mental status.  He was discharged from SNF facility 1 day prior to admission.  Workup in ER CTA pulmonary moderate to severe pulmonary fibrosis and interval development of esophagitis versus mass, CXR bibasilar pneumonia right greater than left, COVID-positive, CT head no acute intracranial abnormality with acute bilateral maxillary sinusitis, lactic acid 6 with repeat 5.8, CRP 11.55, WBC 26.3, ABG metabolic alkalosis.  Initiated on cefepime and vancomycin.  After admission patient noted to be hypotensive and A-fib RVR additional IVF 1 L given and digoxin 125 mcg x 1 with conversion to normal sinus rhythm.  9/21 went back in A-fib RVR converted with IV Lopressor.  Patient had intermittent complaints of chest pain EKG no ischemia or troponins remain flat.  Echo normal EF 60%  exposure cannot be excluded. 4.  Apparent interval development of vague wall thickening of portions of the esophagus.  Differential diagnosis includes esophagitis and mass.  Endoscopy or barium esophagram would better assess. 5.  For findings in the abdomen/pelvis, please refer to separate report.  All CT scans are performed using dose optimization techniques as appropriate to the performed exam and include at least one of the following: Automated exposure control, adjustment of the mA and/or kV according to size, and the use of iterative reconstruction technique.  ______________________________________ Electronically signed by: ZITA MORENO M.D. Date:     09/18/2024 Time:    15:47     CT ABDOMEN PELVIS W IV CONTRAST Additional Contrast? None    Result Date: 9/18/2024  1.  No acute abnormality of the abdomen and pelvis. 2.  Sigmoid diverticulosis without radiographic indication of diverticulitis. 3.  Moderate stool seen in the rectum without focal abnormality. 4.  Mildly distended gallbladder without stones or focal abnormality. 5.  Mild distension of the stomach with fluid in the lumen.  No indication of gastric outlet obstruction.  The duodenum is unremarkable. 6.   A small bilateral pleural effusions with mild associated consolidation, atelectasis versus infiltrate. 7.  Other chronic and non emergent the the the findings as above.  All CT scans are performed using dose optimization techniques as appropriate to the performed exam and include at least one of the following: Automated exposure control, adjustment of the mA and/or kV according to size, and the use of iterative reconstruction technique.  ______________________________________ Electronically signed by: HELEN MEYER M.D. Date:     09/18/2024 Time:    15:47     CT HEAD WO CONTRAST    Result Date: 9/18/2024  No acute intracranial process.  Acute bilateral maxillary sinusitis.  Mild atrophy and chronic small vessel white matter change.  All CT scans are

## 2024-09-27 NOTE — CARE COORDINATION
09/27/24 0915   IMM Letter   IMM Letter given to Patient/Family/Significant other/Guardian/POA/by: Jessica Be   IMM Letter date given: 09/27/24   IMM Letter time given: 0916     Patient declined waiting 4 hr period prior to discharge.  Second IMM given to patient and explained with patient verbalizing understanding.  All questions and concerns addressed     Signed letter placed in pt soft chart  Electronically signed by ARLYN RODRIGUEZ on 9/27/2024 at 9:16 AM

## 2024-09-27 NOTE — PLAN OF CARE
Problem: Safety - Adult  Goal: Free from fall injury  9/27/2024 0716 by Celestina Araujo RN  Outcome: Progressing  9/27/2024 0325 by Edelmira Garrison RN  Outcome: Progressing     Problem: Skin/Tissue Integrity  Goal: Absence of new skin breakdown  9/27/2024 0716 by Celestina Araujo RN  Outcome: Progressing  9/27/2024 0325 by Edelmira Garrison RN  Outcome: Progressing     Problem: ABCDS Injury Assessment  Goal: Absence of physical injury  9/27/2024 0716 by Celestina Araujo RN  Outcome: Progressing  9/27/2024 0325 by Edelmira Garrison RN  Outcome: Progressing     Problem: Safety - Medical Restraint  Goal: Remains free of injury from restraints (Restraint for Interference with Medical Device)  9/27/2024 0716 by Celestina Araujo RN  Outcome: Progressing  9/27/2024 0325 by Edelmira Garrison RN  Outcome: Progressing     Problem: Discharge Planning  Goal: Discharge to home or other facility with appropriate resources  9/27/2024 0716 by Celestina Araujo RN  Outcome: Progressing  9/27/2024 0325 by Edelmira Garrison RN  Outcome: Progressing     Problem: Nutrition Deficit:  Goal: Optimize nutritional status  9/27/2024 0716 by Celestina Araujo RN  Outcome: Progressing  9/27/2024 0325 by Edelmira Garrison RN  Outcome: Progressing     Problem: Pain  Goal: Verbalizes/displays adequate comfort level or baseline comfort level  9/27/2024 0716 by Celestina Araujo RN  Outcome: Progressing  9/27/2024 0325 by Edelmira Garrison RN  Outcome: Progressing     Problem: Neurosensory - Adult  Goal: Achieves stable or improved neurological status  Outcome: Progressing     Problem: Respiratory - Adult  Goal: Achieves optimal ventilation and oxygenation  Outcome: Progressing     Problem: Cardiovascular - Adult  Goal: Maintains optimal cardiac output and hemodynamic stability  Outcome: Progressing     Problem: Skin/Tissue Integrity - Adult  Goal: Skin integrity remains intact  Outcome: Progressing     Problem: Musculoskeletal - Adult  Goal: Return mobility to safest  level of function  Outcome: Progressing     Problem: Gastrointestinal - Adult  Goal: Minimal or absence of nausea and vomiting  Outcome: Progressing     Problem: Genitourinary - Adult  Goal: Absence of urinary retention  Outcome: Progressing     Problem: Infection - Adult  Goal: Absence of infection at discharge  Outcome: Progressing     Problem: Metabolic/Fluid and Electrolytes - Adult  Goal: Electrolytes maintained within normal limits  Outcome: Progressing     Problem: Hematologic - Adult  Goal: Maintains hematologic stability  Outcome: Progressing     Problem: Anxiety  Goal: Will report anxiety at manageable levels  Outcome: Progressing     Problem: Coping  Goal: Pt/Family able to verbalize concerns and demonstrate effective coping strategies  Outcome: Progressing     Problem: Death & Dying  Goal: Pt/Family communicate acceptance of impending death and feel psychological comfort and peace  Outcome: Progressing     Problem: Decision Making  Goal: Pt/Family able to effectively weigh alternatives and participate in decision making related to treatment and care  Outcome: Progressing     Problem: Behavior  Goal: Pt/Family maintain appropriate behavior and adhere to behavioral management agreement, if implemented  Outcome: Progressing

## 2024-09-27 NOTE — CARE COORDINATION
09/27/24 1120   Encounter Summary   Encounter Overview/Reason Initial Encounter;Loneliness/Social Isolation;Spiritual/Emotional Needs;Grief, Loss, and Adjustments  (Adj to isolation due to COVID and Hospice care.)   Service Provided For Patient   Referral/Consult From Physician;Hospice   Complexity of Encounter Moderate   Begin Time 1045   End Time  1130   Total Time Calculated 45 min   Spiritual/Emotional needs   Type Spiritual Support;Difficult news received   Grief, Loss, and Adjustments   Type End of Life;Adjustment to illness;Hospice   Advance Care Planning   Type   (DNR is in place.)   Assessment/Intervention/Outcome   Assessment Anxious;Stress overload;Compromised coping;Concerns with suffering  (The pt is C/O hunger and thirst. The nurse was notified as well as PC Provider.)   Intervention Active listening;End of Life Care;Nurtured Hope;Prayer (assurance of)/Burt;Sustaining Presence/Ministry of presence   Outcome Less anxious, Less agitated;Expressed feelings, needs, and concerns;Venting emotion   Plan and Referrals   Plan/Referrals Continue to visit, (comment)   Does the patient have a [facility] PCP? Yes    to follow up after discharge? No     Unable to asses due to restlessness and discomfort. No family present.

## 2024-09-28 LAB
ALBUMIN SERPL-MCNC: 2.9 G/DL (ref 3.5–5.2)
ALP SERPL-CCNC: 94 U/L (ref 40–129)
ALT SERPL-CCNC: 13 U/L (ref 5–41)
ANION GAP SERPL CALCULATED.3IONS-SCNC: 6 MMOL/L (ref 7–19)
AST SERPL-CCNC: 12 U/L (ref 5–40)
BASOPHILS # BLD: 0 K/UL (ref 0–0.2)
BASOPHILS NFR BLD: 0.1 % (ref 0–1)
BILIRUB SERPL-MCNC: 0.4 MG/DL (ref 0.2–1.2)
BUN SERPL-MCNC: 15 MG/DL (ref 8–23)
CALCIUM SERPL-MCNC: 8.8 MG/DL (ref 8.8–10.2)
CHLORIDE SERPL-SCNC: 90 MMOL/L (ref 98–111)
CO2 SERPL-SCNC: 39 MMOL/L (ref 22–29)
CREAT SERPL-MCNC: 0.6 MG/DL (ref 0.7–1.2)
EOSINOPHIL # BLD: 0 K/UL (ref 0–0.6)
EOSINOPHIL NFR BLD: 0.1 % (ref 0–5)
ERYTHROCYTE [DISTWIDTH] IN BLOOD BY AUTOMATED COUNT: 13 % (ref 11.5–14.5)
GLUCOSE SERPL-MCNC: 179 MG/DL (ref 70–99)
HCT VFR BLD AUTO: 38.1 % (ref 42–52)
HGB BLD-MCNC: 11.9 G/DL (ref 14–18)
IMM GRANULOCYTES # BLD: 0.1 K/UL
LYMPHOCYTES # BLD: 0.4 K/UL (ref 1.1–4.5)
LYMPHOCYTES NFR BLD: 4.4 % (ref 20–40)
MCH RBC QN AUTO: 29.4 PG (ref 27–31)
MCHC RBC AUTO-ENTMCNC: 31.2 G/DL (ref 33–37)
MCV RBC AUTO: 94.1 FL (ref 80–94)
MONOCYTES # BLD: 0.5 K/UL (ref 0–0.9)
MONOCYTES NFR BLD: 5.2 % (ref 0–10)
NEUTROPHILS # BLD: 8.7 K/UL (ref 1.5–7.5)
NEUTS SEG NFR BLD: 89.4 % (ref 50–65)
PLATELET # BLD AUTO: 248 K/UL (ref 130–400)
PMV BLD AUTO: 11.1 FL (ref 9.4–12.4)
POTASSIUM SERPL-SCNC: 4 MMOL/L (ref 3.5–5)
PROT SERPL-MCNC: 5.4 G/DL (ref 6.4–8.3)
RBC # BLD AUTO: 4.05 M/UL (ref 4.7–6.1)
SODIUM SERPL-SCNC: 135 MMOL/L (ref 136–145)
WBC # BLD AUTO: 9.7 K/UL (ref 4.8–10.8)

## 2024-09-28 PROCEDURE — 6370000000 HC RX 637 (ALT 250 FOR IP): Performed by: INTERNAL MEDICINE

## 2024-09-28 PROCEDURE — 6360000002 HC RX W HCPCS: Performed by: INTERNAL MEDICINE

## 2024-09-28 PROCEDURE — 2580000003 HC RX 258: Performed by: INTERNAL MEDICINE

## 2024-09-28 PROCEDURE — 2700000000 HC OXYGEN THERAPY PER DAY

## 2024-09-28 PROCEDURE — 94640 AIRWAY INHALATION TREATMENT: CPT

## 2024-09-28 PROCEDURE — 6370000000 HC RX 637 (ALT 250 FOR IP): Performed by: PHYSICIAN ASSISTANT

## 2024-09-28 PROCEDURE — 80053 COMPREHEN METABOLIC PANEL: CPT

## 2024-09-28 PROCEDURE — 85025 COMPLETE CBC W/AUTO DIFF WBC: CPT

## 2024-09-28 PROCEDURE — 2140000000 HC CCU INTERMEDIATE R&B

## 2024-09-28 PROCEDURE — 6370000000 HC RX 637 (ALT 250 FOR IP)

## 2024-09-28 PROCEDURE — 94761 N-INVAS EAR/PLS OXIMETRY MLT: CPT

## 2024-09-28 RX ADMIN — BUDESONIDE AND FORMOTEROL FUMARATE DIHYDRATE 2 PUFF: 160; 4.5 AEROSOL RESPIRATORY (INHALATION) at 06:33

## 2024-09-28 RX ADMIN — QUETIAPINE FUMARATE 12.5 MG: 25 TABLET ORAL at 19:58

## 2024-09-28 RX ADMIN — ENOXAPARIN SODIUM 50 MG: 100 INJECTION SUBCUTANEOUS at 09:20

## 2024-09-28 RX ADMIN — BARICITINIB 4 MG: 2 TABLET, FILM COATED ORAL at 09:20

## 2024-09-28 RX ADMIN — FINASTERIDE 5 MG: 5 TABLET, FILM COATED ORAL at 09:21

## 2024-09-28 RX ADMIN — METOPROLOL SUCCINATE 25 MG: 25 TABLET, EXTENDED RELEASE ORAL at 09:21

## 2024-09-28 RX ADMIN — SODIUM CHLORIDE, PRESERVATIVE FREE 40 MG: 5 INJECTION INTRAVENOUS at 19:58

## 2024-09-28 RX ADMIN — SUCRALFATE 1 G: 1 TABLET ORAL at 16:54

## 2024-09-28 RX ADMIN — GUAIFENESIN 600 MG: 600 TABLET ORAL at 09:20

## 2024-09-28 RX ADMIN — SUCRALFATE 1 G: 1 TABLET ORAL at 05:41

## 2024-09-28 RX ADMIN — Medication 5 MG: at 05:44

## 2024-09-28 RX ADMIN — BUDESONIDE AND FORMOTEROL FUMARATE DIHYDRATE 2 PUFF: 160; 4.5 AEROSOL RESPIRATORY (INHALATION) at 18:29

## 2024-09-28 RX ADMIN — WATER 40 MG: 1 INJECTION INTRAMUSCULAR; INTRAVENOUS; SUBCUTANEOUS at 05:39

## 2024-09-28 RX ADMIN — WATER 40 MG: 1 INJECTION INTRAMUSCULAR; INTRAVENOUS; SUBCUTANEOUS at 16:51

## 2024-09-28 RX ADMIN — GUAIFENESIN 600 MG: 600 TABLET ORAL at 19:58

## 2024-09-28 RX ADMIN — METOPROLOL SUCCINATE 25 MG: 25 TABLET, EXTENDED RELEASE ORAL at 19:58

## 2024-09-28 RX ADMIN — Medication 2000 UNITS: at 09:21

## 2024-09-28 RX ADMIN — Medication 5 MG: at 22:18

## 2024-09-28 RX ADMIN — Medication 5 MG: at 16:50

## 2024-09-28 RX ADMIN — Medication 5 MG: at 09:35

## 2024-09-28 RX ADMIN — SODIUM CHLORIDE, PRESERVATIVE FREE 40 MG: 5 INJECTION INTRAVENOUS at 09:20

## 2024-09-28 RX ADMIN — SUCRALFATE 1 G: 1 TABLET ORAL at 19:58

## 2024-09-28 RX ADMIN — ENOXAPARIN SODIUM 50 MG: 100 INJECTION SUBCUTANEOUS at 19:58

## 2024-09-28 ASSESSMENT — PAIN SCALES - GENERAL
PAINLEVEL_OUTOF10: 6
PAINLEVEL_OUTOF10: 8

## 2024-09-28 ASSESSMENT — PAIN DESCRIPTION - ORIENTATION: ORIENTATION: RIGHT

## 2024-09-28 ASSESSMENT — PAIN DESCRIPTION - DESCRIPTORS: DESCRIPTORS: CRAMPING;ACHING

## 2024-09-28 ASSESSMENT — PAIN DESCRIPTION - LOCATION
LOCATION: GENERALIZED
LOCATION: GENERALIZED

## 2024-09-28 ASSESSMENT — PAIN - FUNCTIONAL ASSESSMENT: PAIN_FUNCTIONAL_ASSESSMENT: ACTIVITIES ARE NOT PREVENTED

## 2024-09-28 NOTE — PROGRESS NOTES
[facility]      Patient:  Eze Rosa  YOB: 1950  Date of Service: 9/28/2024  MRN: [id number]   Acct: [id number]   Primary Care Physician: Ross Masterson MD  Advance Directive: DNR  Admit Date: 9/18/2024       Hospital Day: 10  Portions of this note have been copied forward, however, changed to reflect the most current clinical status of this patient.  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  Of note recently admitted in July due to urosepsis at that time Morataya catheter remained placed, received IV antibiotic course, and was discharged to [facility] stable condition.  He returns to [facility] ER due to shortness of air and altered mental status.  He was discharged from SNF facility 1 day prior to admission.  Workup in ER CTA pulmonary moderate to severe pulmonary fibrosis and interval development of esophagitis versus mass, CXR bibasilar pneumonia right greater than left, COVID-positive, CT head no acute intracranial abnormality with acute bilateral maxillary sinusitis, lactic acid 6 with repeat 5.8, CRP 11.55, WBC 26.3, ABG metabolic alkalosis.  Initiated on cefepime and vancomycin.  After admission patient noted to be hypotensive and A-fib RVR additional IVF 1 L given and digoxin 125 mcg x 1 with conversion to normal sinus rhythm.  9/21 went back in A-fib RVR converted with IV Lopressor.  Patient had intermittent complaints of chest pain EKG no ischemia or troponins remain flat.  Echo normal EF 60% with normal  January 30, 2023         No Recipients      Patient: Christie Chacon   YOB: 2016   Date of Visit: 1/30/2023       Dear Dr. Lynn Recipients:    Thank you for referring Christie Chacon to me for evaluation. Below are my notes for this visit with her.    If you have questions, please do not hesitate to call me. I look forward to following your patient along with you.      Sincerely,        Donnell Scott MD        CC:   No Recipients    chronicity.  No left pleural calcification.  Findings might be secondary to a prior right hemothorax or pyothorax.  Asbestos exposure cannot be excluded. 4.  Apparent interval development of vague wall thickening of portions of the esophagus.  Differential diagnosis includes esophagitis and mass.  Endoscopy or barium esophagram would better assess. 5.  For findings in the abdomen/pelvis, please refer to separate report.  All CT scans are performed using dose optimization techniques as appropriate to the performed exam and include at least one of the following: Automated exposure control, adjustment of the mA and/or kV according to size, and the use of iterative reconstruction technique.  ______________________________________ Electronically signed by: ZITA OMRENO M.D. Date:     09/18/2024 Time:    15:47     CT ABDOMEN PELVIS W IV CONTRAST Additional Contrast? None    Result Date: 9/18/2024  1.  No acute abnormality of the abdomen and pelvis. 2.  Sigmoid diverticulosis without radiographic indication of diverticulitis. 3.  Moderate stool seen in the rectum without focal abnormality. 4.  Mildly distended gallbladder without stones or focal abnormality. 5.  Mild distension of the stomach with fluid in the lumen.  No indication of gastric outlet obstruction.  The duodenum is unremarkable. 6.   A small bilateral pleural effusions with mild associated consolidation, atelectasis versus infiltrate. 7.  Other chronic and non emergent the the the findings as above.  All CT scans are performed using dose optimization techniques as appropriate to the performed exam and include at least one of the following: Automated exposure control, adjustment of the mA and/or kV according to size, and the use of iterative reconstruction technique.  ______________________________________ Electronically signed by: HELEN MEYER M.D. Date:     09/18/2024 Time:    15:47     CT HEAD WO CONTRAST    Result Date: 9/18/2024  No acute intracranial

## 2024-09-28 NOTE — PLAN OF CARE
Problem: Safety - Adult  Goal: Free from fall injury  Outcome: Progressing     Problem: Skin/Tissue Integrity  Goal: Absence of new skin breakdown  Description: 1.  Monitor for areas of redness and/or skin breakdown  2.  Assess vascular access sites hourly  3.  Every 4-6 hours minimum:  Change oxygen saturation probe site  4.  Every 4-6 hours:  If on nasal continuous positive airway pressure, respiratory therapy assess nares and determine need for appliance change or resting period.  Outcome: Progressing     Problem: ABCDS Injury Assessment  Goal: Absence of physical injury  Outcome: Progressing     Problem: Safety - Medical Restraint  Goal: Remains free of injury from restraints (Restraint for Interference with Medical Device)  Description: INTERVENTIONS:  1. Determine that other, less restrictive measures have been tried or would not be effective before applying the restraint  2. Evaluate the patient's condition at the time of restraint application  3. Inform patient/family regarding the reason for restraint  4. Q2H: Monitor safety, psychosocial status, comfort, nutrition and hydration  Outcome: Progressing     Problem: Discharge Planning  Goal: Discharge to home or other facility with appropriate resources  Outcome: Progressing     Problem: Nutrition Deficit:  Goal: Optimize nutritional status  Outcome: Progressing     Problem: Pain  Goal: Verbalizes/displays adequate comfort level or baseline comfort level  Outcome: Progressing     Problem: Neurosensory - Adult  Goal: Achieves stable or improved neurological status  Outcome: Progressing     Problem: Respiratory - Adult  Goal: Achieves optimal ventilation and oxygenation  Outcome: Progressing     Problem: Cardiovascular - Adult  Goal: Maintains optimal cardiac output and hemodynamic stability  Outcome: Progressing     Problem: Skin/Tissue Integrity - Adult  Goal: Skin integrity remains intact  Outcome: Progressing     Problem: Musculoskeletal - Adult  Goal:

## 2024-09-28 NOTE — PROGRESS NOTES
Patient continues to be non-agreeable to turn and expressive extreme pain and discomfort upon turning and perineal care. Electronically signed by Celestina Araujo RN on 9/27/2024 at 8:00 PM

## 2024-09-29 PROCEDURE — 6370000000 HC RX 637 (ALT 250 FOR IP): Performed by: INTERNAL MEDICINE

## 2024-09-29 PROCEDURE — 6360000002 HC RX W HCPCS: Performed by: INTERNAL MEDICINE

## 2024-09-29 PROCEDURE — 2580000003 HC RX 258: Performed by: INTERNAL MEDICINE

## 2024-09-29 PROCEDURE — 2700000000 HC OXYGEN THERAPY PER DAY

## 2024-09-29 PROCEDURE — 6370000000 HC RX 637 (ALT 250 FOR IP): Performed by: PHYSICIAN ASSISTANT

## 2024-09-29 PROCEDURE — 94760 N-INVAS EAR/PLS OXIMETRY 1: CPT

## 2024-09-29 PROCEDURE — 2140000000 HC CCU INTERMEDIATE R&B

## 2024-09-29 PROCEDURE — 94761 N-INVAS EAR/PLS OXIMETRY MLT: CPT

## 2024-09-29 PROCEDURE — 6370000000 HC RX 637 (ALT 250 FOR IP)

## 2024-09-29 PROCEDURE — 94640 AIRWAY INHALATION TREATMENT: CPT

## 2024-09-29 RX ADMIN — SODIUM CHLORIDE, PRESERVATIVE FREE 40 MG: 5 INJECTION INTRAVENOUS at 08:54

## 2024-09-29 RX ADMIN — SUCRALFATE 1 G: 1 TABLET ORAL at 22:18

## 2024-09-29 RX ADMIN — GUAIFENESIN 600 MG: 600 TABLET ORAL at 22:18

## 2024-09-29 RX ADMIN — SODIUM CHLORIDE, PRESERVATIVE FREE 40 MG: 5 INJECTION INTRAVENOUS at 22:18

## 2024-09-29 RX ADMIN — FINASTERIDE 5 MG: 5 TABLET, FILM COATED ORAL at 08:54

## 2024-09-29 RX ADMIN — SUCRALFATE 1 G: 1 TABLET ORAL at 11:54

## 2024-09-29 RX ADMIN — BUDESONIDE AND FORMOTEROL FUMARATE DIHYDRATE 2 PUFF: 160; 4.5 AEROSOL RESPIRATORY (INHALATION) at 19:20

## 2024-09-29 RX ADMIN — SUCRALFATE 1 G: 1 TABLET ORAL at 08:54

## 2024-09-29 RX ADMIN — SODIUM CHLORIDE, PRESERVATIVE FREE 10 ML: 5 INJECTION INTRAVENOUS at 22:25

## 2024-09-29 RX ADMIN — BARICITINIB 4 MG: 2 TABLET, FILM COATED ORAL at 08:54

## 2024-09-29 RX ADMIN — Medication 2000 UNITS: at 08:54

## 2024-09-29 RX ADMIN — BUDESONIDE AND FORMOTEROL FUMARATE DIHYDRATE 2 PUFF: 160; 4.5 AEROSOL RESPIRATORY (INHALATION) at 06:37

## 2024-09-29 RX ADMIN — GUAIFENESIN 600 MG: 600 TABLET ORAL at 08:54

## 2024-09-29 RX ADMIN — ENOXAPARIN SODIUM 50 MG: 100 INJECTION SUBCUTANEOUS at 22:18

## 2024-09-29 RX ADMIN — Medication 5 MG: at 11:54

## 2024-09-29 RX ADMIN — ENOXAPARIN SODIUM 50 MG: 100 INJECTION SUBCUTANEOUS at 08:55

## 2024-09-29 RX ADMIN — METOPROLOL SUCCINATE 25 MG: 25 TABLET, EXTENDED RELEASE ORAL at 22:18

## 2024-09-29 RX ADMIN — METOPROLOL SUCCINATE 25 MG: 25 TABLET, EXTENDED RELEASE ORAL at 08:54

## 2024-09-29 NOTE — PLAN OF CARE
Problem: Safety - Adult  Goal: Free from fall injury  9/29/2024 1208 by Olena Castaneda RN  Outcome: Progressing  9/28/2024 2307 by Daniella Archer RN  Outcome: Progressing     Problem: Skin/Tissue Integrity  Goal: Absence of new skin breakdown  Description: 1.  Monitor for areas of redness and/or skin breakdown  2.  Assess vascular access sites hourly  3.  Every 4-6 hours minimum:  Change oxygen saturation probe site  4.  Every 4-6 hours:  If on nasal continuous positive airway pressure, respiratory therapy assess nares and determine need for appliance change or resting period.  9/29/2024 1208 by Olena Castaneda RN  Outcome: Progressing  9/28/2024 2307 by Daniella Archer RN  Outcome: Progressing     Problem: ABCDS Injury Assessment  Goal: Absence of physical injury  9/29/2024 1208 by Olena Castaneda RN  Outcome: Progressing  9/28/2024 2307 by Daniella Archer RN  Outcome: Progressing     Problem: Safety - Medical Restraint  Goal: Remains free of injury from restraints (Restraint for Interference with Medical Device)  Description: INTERVENTIONS:  1. Determine that other, less restrictive measures have been tried or would not be effective before applying the restraint  2. Evaluate the patient's condition at the time of restraint application  3. Inform patient/family regarding the reason for restraint  4. Q2H: Monitor safety, psychosocial status, comfort, nutrition and hydration  9/29/2024 1208 by Olena Castaneda RN  Outcome: Progressing  9/28/2024 2307 by Daniella Archer RN  Outcome: Progressing     Problem: Discharge Planning  Goal: Discharge to home or other facility with appropriate resources  9/29/2024 1208 by Olena Castaneda RN  Outcome: Progressing  9/28/2024 2307 by Daniella Archer RN  Outcome: Progressing     Problem: Nutrition Deficit:  Goal: Optimize nutritional status  9/29/2024 1208 by Olena Castaneda RN  Outcome: Progressing  9/28/2024 2307 by Daniella Archer RN  Outcome:  patient/family as appropriate  5. Emphasize sustaining relationships within family system and community, or anila/spiritual traditions  6. Initiate Spiritual Care, Psychosocial Clinical Specialist, consult as needed  9/29/2024 1208 by Olena Castaneda RN  Outcome: Progressing  9/28/2024 2307 by Daniella Archer RN  Outcome: Progressing     Problem: Decision Making  Goal: Pt/Family able to effectively weigh alternatives and participate in decision making related to treatment and care  Description: INTERVENTIONS:  1. Determine when there are differences between patient's view, family's view, and healthcare provider's view of condition  2. Facilitate patient and family articulation of goals for care  3. Help patient and family identify pros/cons of alternative solutions  4. Provide information as requested by patient/family  5. Respect patient/family right to receive or not to receive information  6. Serve as a liaison between patient and family and health care team  7. Initiate Consults from Ethics, Palliative Care or initiate Family Care Conference as is appropriate  9/29/2024 1208 by Olena Castaneda RN  Outcome: Progressing  9/28/2024 2307 by Daniella Archer RN  Outcome: Progressing     Problem: Behavior  Goal: Pt/Family maintain appropriate behavior and adhere to behavioral management agreement, if implemented  Description: INTERVENTIONS:  1. Assess patient/family's coping skills and  non-compliant behavior (including use of illegal substances)  2. Notify security of behavior or suspected illegal substances which indicate the need for search of the family and/or belongings  3. Encourage verbalization of thoughts and concerns in a socially appropriate manner  4. Utilize positive, consistent limit setting strategies supporting safety of patient, staff and others  5. Encourage participation in the decision making process about the behavioral management agreement  6. If a visitor's behavior poses a threat to

## 2024-09-29 NOTE — PROGRESS NOTES
[facility]      Patient:  Eze Rosa  YOB: 1950  Date of Service: 9/29/2024  MRN: [id number]   Acct: [id number]   Primary Care Physician: Ross Masterson MD  Advance Directive: DNR  Admit Date: 9/18/2024       Hospital Day: 11  Portions of this note have been copied forward, however, changed to reflect the most current clinical status of this patient.  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  Of note recently admitted in July due to urosepsis at that time Morataya catheter remained placed, received IV antibiotic course, and was discharged to [facility] stable condition.  He returns to [facility] ER due to shortness of air and altered mental status.  He was discharged from SNF facility 1 day prior to admission.  Workup in ER CTA pulmonary moderate to severe pulmonary fibrosis and interval development of esophagitis versus mass, CXR bibasilar pneumonia right greater than left, COVID-positive, CT head no acute intracranial abnormality with acute bilateral maxillary sinusitis, lactic acid 6 with repeat 5.8, CRP 11.55, WBC 26.3, ABG metabolic alkalosis.  Initiated on cefepime and vancomycin.  After admission patient noted to be hypotensive and A-fib RVR additional IVF 1 L given and digoxin 125 mcg x 1 with conversion to normal sinus rhythm.  9/21 went back in A-fib RVR converted with IV Lopressor.  Patient had intermittent complaints of chest pain EKG no ischemia or troponins remain flat.  Echo normal EF 60% with normal diastolic function. placed on therapeutic  and include at least one of the following: Automated exposure control, adjustment of the mA and/or kV according to size, and the use of iterative reconstruction technique.  ______________________________________ Electronically signed by: LIZABETH SANCHEZ M.D. Date:     09/18/2024 Time:    15:40     XR CHEST PORTABLE    Result Date: 9/18/2024  Small bilateral pleural effusions with bibasilar atelectasis and/or pneumonia, right greater than left.  Chronic bilateral interstitial lung markings.   ______________________________________ Electronically signed by: LIZABETH SANCHEZ M.D. Date:     09/18/2024 Time:    14:25        Assessment/Plan   Principal Problem:    Sepsis due to bibasilar pneumonia  / COVID-19   CTA pulmonary    Lactic acid 6.3,5.8,1.4   MRSA detected    IV antibiotics Cefepime & Vancomycin -completed    Baricitinib    IV Steroids   Supplemental oxygen wean as tolerated,currently 2L NC     IS/Acapella    Blood cultures NGTD   Active Problems:   Idiopathic pulmonary fibrosis    Chronic follows [employer] pulmonary  Monitor for hemoptysis with initiation of anticoagulation stable H/H     Palliative care patient   Palliative following     Paroxysmal atrial fibrillation    Cardiology signed off    -Continue Toprol XL via dobhoff  -Digoxin 125mcg x1 on 9/19 with conversion back to NSR  -Lopressor2.5mg x1 on 9/21 with conversion   -[id number] score 2   Lovenox 1 mg/kg transition to Eliquis prior to DC   Monitor on telemetry   Mclean's esophagus with dysplasia / GERD (gastroesophageal reflux disease)  / Dysphagia / Severe malnutrition   GI re-consulted, 9/24 esophagram esophagitis with continued dysphagia, EGD severe chronic diffuse erosive esophagitis, underlying long Mclean's segment, no tumor mass or stricture. PPI, Carafate, aspiration precautions ordered. Follow up with GI in 1 month with recommendation for repeat EGD in 2 months  Palliative care following, DNR status updated and hospice consulted  Social

## 2024-09-29 NOTE — PLAN OF CARE
Problem: Safety - Adult  Goal: Free from fall injury  9/28/2024 2307 by Daniella Archer RN  Outcome: Progressing  9/28/2024 1140 by Olena Castaneda RN  Outcome: Progressing     Problem: Skin/Tissue Integrity  Goal: Absence of new skin breakdown  Description: 1.  Monitor for areas of redness and/or skin breakdown  2.  Assess vascular access sites hourly  3.  Every 4-6 hours minimum:  Change oxygen saturation probe site  4.  Every 4-6 hours:  If on nasal continuous positive airway pressure, respiratory therapy assess nares and determine need for appliance change or resting period.  9/28/2024 2307 by Daniella Archer RN  Outcome: Progressing  9/28/2024 1140 by Olena Castaneda RN  Outcome: Progressing     Problem: ABCDS Injury Assessment  Goal: Absence of physical injury  9/28/2024 2307 by Daniella Archer RN  Outcome: Progressing  9/28/2024 1140 by Olena Castaneda RN  Outcome: Progressing     Problem: Safety - Medical Restraint  Goal: Remains free of injury from restraints (Restraint for Interference with Medical Device)  Description: INTERVENTIONS:  1. Determine that other, less restrictive measures have been tried or would not be effective before applying the restraint  2. Evaluate the patient's condition at the time of restraint application  3. Inform patient/family regarding the reason for restraint  4. Q2H: Monitor safety, psychosocial status, comfort, nutrition and hydration  9/28/2024 2307 by Daniella Archer RN  Outcome: Progressing  9/28/2024 1140 by Olena Castaneda RN  Outcome: Progressing     Problem: Discharge Planning  Goal: Discharge to home or other facility with appropriate resources  9/28/2024 2307 by Daniella Archer RN  Outcome: Progressing  9/28/2024 1140 by Olena Castaneda RN  Outcome: Progressing     Problem: Nutrition Deficit:  Goal: Optimize nutritional status  9/28/2024 2307 by Daniella Archer RN  Outcome: Progressing  9/28/2024 1140 by Olena Castaneda RN  Outcome:  to verbalize concerns and demonstrate effective coping strategies  Description: INTERVENTIONS:  1. Assist patient/family to identify coping skills, available support systems and cultural and spiritual values  2. Provide emotional support, including active listening and acknowledgement of concerns of patient and caregivers  3. Reduce environmental stimuli, as able  4. Instruct patient/family in relaxation techniques, as appropriate  5. Assess for spiritual pain/suffering and initiate Spiritual Care, Psychosocial Clinical Specialist consults as needed  9/28/2024 2307 by Daniella Archer RN  Outcome: Progressing  9/28/2024 1140 by Olena Castaneda RN  Outcome: Progressing     Problem: Death & Dying  Goal: Pt/Family communicate acceptance of impending death and feel psychological comfort and peace  Description: INTERVENTIONS:  1. Assess patient/family anxiety and grief process related to end of life issues  2. Provide emotional and spiritual support  3. Provide information about the patient's health status with consideration of family and cultural values  4. Communicate willingness to discuss death and facilitate grief process  with patient/family as appropriate  5. Emphasize sustaining relationships within family system and community, or anila/spiritual traditions  6. Initiate Spiritual Care, Psychosocial Clinical Specialist, consult as needed  9/28/2024 2307 by Daniella Archer RN  Outcome: Progressing  9/28/2024 1140 by Olena Castaneda RN  Outcome: Progressing

## 2024-09-29 NOTE — PLAN OF CARE
Problem: Safety - Adult  Goal: Free from fall injury  9/29/2024 1217 by Olnea Castaneda RN  Outcome: Progressing  9/29/2024 1208 by Olena Castaneda RN  Outcome: Progressing  9/28/2024 2307 by Daniella Archer RN  Outcome: Progressing     Problem: Skin/Tissue Integrity  Goal: Absence of new skin breakdown  Description: 1.  Monitor for areas of redness and/or skin breakdown  2.  Assess vascular access sites hourly  3.  Every 4-6 hours minimum:  Change oxygen saturation probe site  4.  Every 4-6 hours:  If on nasal continuous positive airway pressure, respiratory therapy assess nares and determine need for appliance change or resting period.  9/29/2024 1217 by Olena Castaneda RN  Outcome: Progressing  9/29/2024 1208 by Olena Castaneda RN  Outcome: Progressing  9/28/2024 2307 by Daniella Archer RN  Outcome: Progressing     Problem: ABCDS Injury Assessment  Goal: Absence of physical injury  9/29/2024 1217 by Olena Castaneda RN  Outcome: Progressing  9/29/2024 1208 by Olena Castaneda RN  Outcome: Progressing  9/28/2024 2307 by Daniella Archer RN  Outcome: Progressing     Problem: Safety - Medical Restraint  Goal: Remains free of injury from restraints (Restraint for Interference with Medical Device)  Description: INTERVENTIONS:  1. Determine that other, less restrictive measures have been tried or would not be effective before applying the restraint  2. Evaluate the patient's condition at the time of restraint application  3. Inform patient/family regarding the reason for restraint  4. Q2H: Monitor safety, psychosocial status, comfort, nutrition and hydration  9/29/2024 1217 by Olena Castaneda RN  Outcome: Progressing  9/29/2024 1208 by Olena Castaneda RN  Outcome: Progressing  9/28/2024 2307 by Daniella Archer RN  Outcome: Progressing     Problem: Discharge Planning  Goal: Discharge to home or other facility with appropriate resources  9/29/2024 1217 by Olena Castaneda RN  Outcome:

## 2024-09-30 LAB
ALBUMIN SERPL-MCNC: 2.8 G/DL (ref 3.5–5.2)
ALP SERPL-CCNC: 94 U/L (ref 40–129)
ALT SERPL-CCNC: 12 U/L (ref 5–41)
ANION GAP SERPL CALCULATED.3IONS-SCNC: 4 MMOL/L (ref 7–19)
AST SERPL-CCNC: 13 U/L (ref 5–40)
BASOPHILS # BLD: 0 K/UL (ref 0–0.2)
BASOPHILS NFR BLD: 0.1 % (ref 0–1)
BILIRUB SERPL-MCNC: 0.5 MG/DL (ref 0.2–1.2)
BUN SERPL-MCNC: 17 MG/DL (ref 8–23)
CALCIUM SERPL-MCNC: 8.2 MG/DL (ref 8.8–10.2)
CHLORIDE SERPL-SCNC: 95 MMOL/L (ref 98–111)
CO2 SERPL-SCNC: 38 MMOL/L (ref 22–29)
CREAT SERPL-MCNC: 0.4 MG/DL (ref 0.7–1.2)
EOSINOPHIL # BLD: 0.2 K/UL (ref 0–0.6)
EOSINOPHIL NFR BLD: 1.5 % (ref 0–5)
ERYTHROCYTE [DISTWIDTH] IN BLOOD BY AUTOMATED COUNT: 13.1 % (ref 11.5–14.5)
GLUCOSE SERPL-MCNC: 105 MG/DL (ref 70–99)
HCT VFR BLD AUTO: 38.7 % (ref 42–52)
HGB BLD-MCNC: 12.5 G/DL (ref 14–18)
IMM GRANULOCYTES # BLD: 0.1 K/UL
LYMPHOCYTES # BLD: 1.5 K/UL (ref 1.1–4.5)
LYMPHOCYTES NFR BLD: 9.7 % (ref 20–40)
MCH RBC QN AUTO: 30 PG (ref 27–31)
MCHC RBC AUTO-ENTMCNC: 32.3 G/DL (ref 33–37)
MCV RBC AUTO: 93 FL (ref 80–94)
MONOCYTES # BLD: 1.3 K/UL (ref 0–0.9)
MONOCYTES NFR BLD: 8.9 % (ref 0–10)
NEUTROPHILS # BLD: 11.8 K/UL (ref 1.5–7.5)
NEUTS SEG NFR BLD: 79.2 % (ref 50–65)
PLATELET # BLD AUTO: 269 K/UL (ref 130–400)
PMV BLD AUTO: 10.8 FL (ref 9.4–12.4)
POTASSIUM SERPL-SCNC: 3.3 MMOL/L (ref 3.5–5)
PROT SERPL-MCNC: 5.2 G/DL (ref 6.4–8.3)
RBC # BLD AUTO: 4.16 M/UL (ref 4.7–6.1)
SODIUM SERPL-SCNC: 137 MMOL/L (ref 136–145)
WBC # BLD AUTO: 14.9 K/UL (ref 4.8–10.8)

## 2024-09-30 PROCEDURE — 6370000000 HC RX 637 (ALT 250 FOR IP): Performed by: INTERNAL MEDICINE

## 2024-09-30 PROCEDURE — 6360000002 HC RX W HCPCS: Performed by: INTERNAL MEDICINE

## 2024-09-30 PROCEDURE — 6370000000 HC RX 637 (ALT 250 FOR IP)

## 2024-09-30 PROCEDURE — 2580000003 HC RX 258: Performed by: INTERNAL MEDICINE

## 2024-09-30 PROCEDURE — 6370000000 HC RX 637 (ALT 250 FOR IP): Performed by: PHYSICIAN ASSISTANT

## 2024-09-30 PROCEDURE — 2140000000 HC CCU INTERMEDIATE R&B

## 2024-09-30 PROCEDURE — 85025 COMPLETE CBC W/AUTO DIFF WBC: CPT

## 2024-09-30 PROCEDURE — 94761 N-INVAS EAR/PLS OXIMETRY MLT: CPT

## 2024-09-30 PROCEDURE — 36415 COLL VENOUS BLD VENIPUNCTURE: CPT

## 2024-09-30 PROCEDURE — 99232 SBSQ HOSP IP/OBS MODERATE 35: CPT | Performed by: PHYSICIAN ASSISTANT

## 2024-09-30 PROCEDURE — 94640 AIRWAY INHALATION TREATMENT: CPT

## 2024-09-30 PROCEDURE — 80053 COMPREHEN METABOLIC PANEL: CPT

## 2024-09-30 PROCEDURE — 2700000000 HC OXYGEN THERAPY PER DAY

## 2024-09-30 PROCEDURE — 6370000000 HC RX 637 (ALT 250 FOR IP): Performed by: HOSPITALIST

## 2024-09-30 PROCEDURE — 94760 N-INVAS EAR/PLS OXIMETRY 1: CPT

## 2024-09-30 PROCEDURE — 99222 1ST HOSP IP/OBS MODERATE 55: CPT | Performed by: INTERNAL MEDICINE

## 2024-09-30 RX ORDER — GLYCOPYRROLATE 1 MG/1
1 TABLET ORAL 3 TIMES DAILY
Status: DISCONTINUED | OUTPATIENT
Start: 2024-09-30 | End: 2024-10-18 | Stop reason: HOSPADM

## 2024-09-30 RX ORDER — ENOXAPARIN SODIUM 100 MG/ML
1 INJECTION SUBCUTANEOUS 2 TIMES DAILY
Status: DISCONTINUED | OUTPATIENT
Start: 2024-09-30 | End: 2024-10-05

## 2024-09-30 RX ORDER — OXYCODONE HYDROCHLORIDE 5 MG/1
5 TABLET ORAL ONCE
Status: COMPLETED | OUTPATIENT
Start: 2024-09-30 | End: 2024-09-30

## 2024-09-30 RX ADMIN — SUCRALFATE 1 G: 1 TABLET ORAL at 08:54

## 2024-09-30 RX ADMIN — BUDESONIDE AND FORMOTEROL FUMARATE DIHYDRATE 2 PUFF: 160; 4.5 AEROSOL RESPIRATORY (INHALATION) at 07:09

## 2024-09-30 RX ADMIN — GUAIFENESIN 600 MG: 600 TABLET ORAL at 08:54

## 2024-09-30 RX ADMIN — SUCRALFATE 1 G: 1 TABLET ORAL at 22:05

## 2024-09-30 RX ADMIN — POTASSIUM BICARBONATE 40 MEQ: 782 TABLET, EFFERVESCENT ORAL at 09:13

## 2024-09-30 RX ADMIN — SODIUM CHLORIDE, PRESERVATIVE FREE 10 ML: 5 INJECTION INTRAVENOUS at 22:06

## 2024-09-30 RX ADMIN — GUAIFENESIN 600 MG: 600 TABLET ORAL at 22:05

## 2024-09-30 RX ADMIN — METOPROLOL SUCCINATE 25 MG: 25 TABLET, EXTENDED RELEASE ORAL at 22:05

## 2024-09-30 RX ADMIN — SODIUM CHLORIDE, PRESERVATIVE FREE 40 MG: 5 INJECTION INTRAVENOUS at 08:53

## 2024-09-30 RX ADMIN — Medication 2000 UNITS: at 08:53

## 2024-09-30 RX ADMIN — GLYCOPYRROLATE 1 MG: 1 TABLET ORAL at 22:05

## 2024-09-30 RX ADMIN — GLYCOPYRROLATE 1 MG: 1 TABLET ORAL at 16:21

## 2024-09-30 RX ADMIN — BUDESONIDE AND FORMOTEROL FUMARATE DIHYDRATE 2 PUFF: 160; 4.5 AEROSOL RESPIRATORY (INHALATION) at 19:48

## 2024-09-30 RX ADMIN — Medication 5 MG: at 16:30

## 2024-09-30 RX ADMIN — ENOXAPARIN SODIUM 50 MG: 100 INJECTION SUBCUTANEOUS at 08:53

## 2024-09-30 RX ADMIN — METOPROLOL SUCCINATE 25 MG: 25 TABLET, EXTENDED RELEASE ORAL at 08:53

## 2024-09-30 RX ADMIN — Medication 0.5 MG: at 14:07

## 2024-09-30 RX ADMIN — SODIUM CHLORIDE, PRESERVATIVE FREE 40 MG: 5 INJECTION INTRAVENOUS at 22:04

## 2024-09-30 RX ADMIN — ENOXAPARIN SODIUM 60 MG: 100 INJECTION SUBCUTANEOUS at 22:04

## 2024-09-30 RX ADMIN — OXYCODONE HYDROCHLORIDE 5 MG: 5 TABLET ORAL at 22:57

## 2024-09-30 RX ADMIN — BARICITINIB 4 MG: 2 TABLET, FILM COATED ORAL at 08:59

## 2024-09-30 RX ADMIN — FINASTERIDE 5 MG: 5 TABLET, FILM COATED ORAL at 08:59

## 2024-09-30 RX ADMIN — Medication 5 MG: at 09:12

## 2024-09-30 RX ADMIN — SUCRALFATE 1 G: 1 TABLET ORAL at 16:21

## 2024-09-30 ASSESSMENT — PAIN DESCRIPTION - DESCRIPTORS: DESCRIPTORS: ACHING;DISCOMFORT;SORE

## 2024-09-30 ASSESSMENT — PAIN - FUNCTIONAL ASSESSMENT: PAIN_FUNCTIONAL_ASSESSMENT: ACTIVITIES ARE NOT PREVENTED

## 2024-09-30 ASSESSMENT — PAIN SCALES - GENERAL: PAINLEVEL_OUTOF10: 7

## 2024-09-30 ASSESSMENT — PAIN DESCRIPTION - LOCATION: LOCATION: GENERALIZED

## 2024-09-30 NOTE — CARE COORDINATION
09/30/24 1106   Encounter Summary   Encounter Overview/Reason Follow-up;Spiritual/Emotional Needs;Grief, Loss, and Adjustments  (Adj to Hospice status.)   Referral/Consult From Physician;Palliative Care   Last Encounter  09/20/24   Complexity of Encounter Low   Begin Time 1030   End Time  1115   Total Time Calculated 45 min   Spiritual/Emotional needs   Type Difficult news received   Assessment/Intervention/Outcome   Assessment Anxious;Impaired resilience;Tearful;Fearful;Concerns with suffering  (Confused.)   Intervention Active listening;Nurtured Hope;Discussed belief system/Amish practices/anila;Sustaining Presence/Ministry of presence   Outcome Expressed feelings, needs, and concerns;Less anxious, Less agitated;Venting emotion   Plan and Referrals   Plan/Referrals Continue to visit, (comment)   Does the patient have a [facility] PCP? Yes    to follow up after discharge? No

## 2024-09-30 NOTE — CONSULTS
Pulmonary and Critical Care Consult Note    [facility]    MR[facility] [id number]    Acct# [id number]  9/30/2024   6:45 PM CDT    Referring Provider:John Rincon MD      Chief Complaint: Shortness of breath    Requesting physician: Hospitalist    Reason for consult: COVID-19, respiratory failure, pulmonary fibrosis      HPI: We have been consulted to see this 73 y.o. year old male born on 1950.  The patient presented initially to the hospital on the 18th.  At that time he was more short of breath than his usual.  At his baseline he has chronic hypoxic respiratory failure due to pulmonary fibrosis.  He is on 2 L/min oxygen flow at home.  He is under the care of [facility] pulmonary.  Initial workup showed no evidence of pulmonary emboli on CT pulmonary angiogram.  The patient did have pulmonary fibrosis on the CT.  Further workup was positive for COVID.  I was asked to see him regarding the above.  At this time the patient had been treated for COVID appropriately.  He denies any respiratory complaints other than his baseline shortness of breath.  He is back to his baseline oxygen requirement.  He is actually DNR at this time.  Hospice and palliative care are following.      Past Medical History      Past Medical History:   Diagnosis Date    Alcohol abuse 09/03/2023    Mclean's esophagus with dysplasia 09/20/2024    Benign prostatic hyperplasia with urinary retention 09/03/2023    COVID-19 09/18/2024    GERD (gastroesophageal reflux disease)     Idiopathic pulmonary fibrosis (HCC) 09/03/2023    Palliative care patient 01/08/2024    Paroxysmal atrial fibrillation (HCC) 09/20/2024    Pneumonia     Psychiatric problem      SurgicalHistory  Past Surgical History:   Procedure Laterality Date    ABDOMEN SURGERY      COLONOSCOPY      UPPER GASTROINTESTINAL ENDOSCOPY N/A 09/25/2024

## 2024-09-30 NOTE — CASE COMMUNICATION
Spoke with pt's daughter Rebeca, she is wanting her father to have Hospice but can not pay for room and board at nursing facility and unable to hire enough private pay caregivers for 24/7 care.  If pt is to go to a SNF his Medicare will not pay if pt is not have a skill such as PT/OT.  She is going to check with a assisted living facility to see how much care she would need to hire for him to have hospice at that facility.  She will get back with this CM with that information. Electronically signed by Mayuri Crocker RN on 9/30/2024 at 10:08 AM

## 2024-09-30 NOTE — CARE COORDINATION
Left VM with pt's daughter Rebeca for her to return call to this CM to discuss options for Hospice vs SNF. Electronically signed by Mayuri Crocker RN on 9/30/2024 at 9:42 AM

## 2024-09-30 NOTE — PROGRESS NOTES
[facility]      Patient:  Eze Rosa  YOB: 1950  Date of Service: 9/30/2024  MRN: [id number]   Acct: [id number]   Primary Care Physician: Ross Masterson MD  Advance Directive: DNR  Admit Date: 9/18/2024       Hospital Day: 12  Portions of this note have been copied forward, however, changed to reflect the most current clinical status of this patient.    CHIEF COMPLAINT  altered mental status     Subjective: Currently awake and alert. Reports diffuse body aching. He appears anxious, but currently denies feeling anxious. 3LNC. VSS. Afebrile. No issues or events overnight.     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 ED on 9/18/2024  with complaints of altered mental status and shortness of air.  Of note, recently admitted in July due sepsis secondary to UTI and at that time Morataya catheter remained in place, he received IV antibiotic course, and [facility].  He returned to [facility] ED on the day of admission due to SOA and AMS.  He was discharged from SNF facility 1 day prior to admission.      Further ED work-up revealed-CTA pulmonary moderate to severe pulmonary fibrosis and interval development of esophagitis versus mass, CXR bibasilar pneumonia right greater than left, COVID-positive, CT head no acute intracranial abnormality with acute bilateral maxillary sinusitis, lactic acid 6 with repeat 5.8, CRP 11.55, WBC 26.3, ABG metabolic alkalosis. He was initiated on cefepime and vancomycin upon admission.  After admission patient noted to be hypotensive and A-fib RVR additional IVF 1 L given and digoxin 125 mcg x 1 with conversion to normal sinus rhythm.   positioned.   ______________________________________ Electronically signed by: YOANA LIU M.D. Date:     09/20/2024 Time:    16:05     CTA PULMONARY W CONTRAST    Result Date: 9/18/2024   1.  No pulmonary embolus is identified. 2.  Stable moderate to severe diffuse pulmonary fibrosis, most pronounced of the mid and lower lungs, but no honeycombing to suggest UIP. 3.  Stable trace dependent right pleural fluid, with pleural thickening and pleural calcification, consistent with chronicity.  No left pleural calcification.  Findings might be secondary to a prior right hemothorax or pyothorax.  Asbestos exposure cannot be excluded. 4.  Apparent interval development of vague wall thickening of portions of the esophagus.  Differential diagnosis includes esophagitis and mass.  Endoscopy or barium esophagram would better assess. 5.  For findings in the abdomen/pelvis, please refer to separate report.  All CT scans are performed using dose optimization techniques as appropriate to the performed exam and include at least one of the following: Automated exposure control, adjustment of the mA and/or kV according to size, and the use of iterative reconstruction technique.  ______________________________________ Electronically signed by: ZITA MORENO M.D. Date:     09/18/2024 Time:    15:47     CT ABDOMEN PELVIS W IV CONTRAST Additional Contrast? None    Result Date: 9/18/2024  1.  No acute abnormality of the abdomen and pelvis. 2.  Sigmoid diverticulosis without radiographic indication of diverticulitis. 3.  Moderate stool seen in the rectum without focal abnormality. 4.  Mildly distended gallbladder without stones or focal abnormality. 5.  Mild distension of the stomach with fluid in the lumen.  No indication of gastric outlet obstruction.  The duodenum is unremarkable. 6.   A small bilateral pleural effusions with mild associated consolidation, atelectasis versus infiltrate. 7.  Other chronic and non emergent the the the

## 2024-10-01 LAB
ALBUMIN SERPL-MCNC: 2.8 G/DL (ref 3.5–5.2)
ALP SERPL-CCNC: 111 U/L (ref 40–129)
ALT SERPL-CCNC: 14 U/L (ref 5–41)
ANION GAP SERPL CALCULATED.3IONS-SCNC: 11 MMOL/L (ref 7–19)
AST SERPL-CCNC: 17 U/L (ref 5–40)
BASOPHILS # BLD: 0 K/UL (ref 0–0.2)
BASOPHILS NFR BLD: 0.1 % (ref 0–1)
BILIRUB SERPL-MCNC: 0.4 MG/DL (ref 0.2–1.2)
BUN SERPL-MCNC: 15 MG/DL (ref 8–23)
CALCIUM SERPL-MCNC: 8 MG/DL (ref 8.8–10.2)
CHLORIDE SERPL-SCNC: 94 MMOL/L (ref 98–111)
CO2 SERPL-SCNC: 29 MMOL/L (ref 22–29)
CREAT SERPL-MCNC: 0.4 MG/DL (ref 0.7–1.2)
EOSINOPHIL # BLD: 0.4 K/UL (ref 0–0.6)
EOSINOPHIL NFR BLD: 2.8 % (ref 0–5)
ERYTHROCYTE [DISTWIDTH] IN BLOOD BY AUTOMATED COUNT: 13.2 % (ref 11.5–14.5)
GLUCOSE SERPL-MCNC: 140 MG/DL (ref 70–99)
HCT VFR BLD AUTO: 36.2 % (ref 42–52)
HGB BLD-MCNC: 11.7 G/DL (ref 14–18)
IMM GRANULOCYTES # BLD: 0.1 K/UL
LYMPHOCYTES # BLD: 1.2 K/UL (ref 1.1–4.5)
LYMPHOCYTES NFR BLD: 8.1 % (ref 20–40)
MCH RBC QN AUTO: 30.2 PG (ref 27–31)
MCHC RBC AUTO-ENTMCNC: 32.3 G/DL (ref 33–37)
MCV RBC AUTO: 93.5 FL (ref 80–94)
MONOCYTES # BLD: 1.3 K/UL (ref 0–0.9)
MONOCYTES NFR BLD: 8.4 % (ref 0–10)
NEUTROPHILS # BLD: 12.2 K/UL (ref 1.5–7.5)
NEUTS SEG NFR BLD: 80.1 % (ref 50–65)
PLATELET # BLD AUTO: 292 K/UL (ref 130–400)
PMV BLD AUTO: 10.3 FL (ref 9.4–12.4)
POTASSIUM SERPL-SCNC: 3.2 MMOL/L (ref 3.5–5)
PROT SERPL-MCNC: 5.2 G/DL (ref 6.4–8.3)
RBC # BLD AUTO: 3.87 M/UL (ref 4.7–6.1)
SODIUM SERPL-SCNC: 134 MMOL/L (ref 136–145)
WBC # BLD AUTO: 15.3 K/UL (ref 4.8–10.8)

## 2024-10-01 PROCEDURE — 6370000000 HC RX 637 (ALT 250 FOR IP): Performed by: INTERNAL MEDICINE

## 2024-10-01 PROCEDURE — 2140000000 HC CCU INTERMEDIATE R&B

## 2024-10-01 PROCEDURE — 6360000002 HC RX W HCPCS: Performed by: INTERNAL MEDICINE

## 2024-10-01 PROCEDURE — 6370000000 HC RX 637 (ALT 250 FOR IP)

## 2024-10-01 PROCEDURE — 2580000003 HC RX 258: Performed by: INTERNAL MEDICINE

## 2024-10-01 PROCEDURE — 36415 COLL VENOUS BLD VENIPUNCTURE: CPT

## 2024-10-01 PROCEDURE — 94640 AIRWAY INHALATION TREATMENT: CPT

## 2024-10-01 PROCEDURE — 2700000000 HC OXYGEN THERAPY PER DAY

## 2024-10-01 PROCEDURE — 99231 SBSQ HOSP IP/OBS SF/LOW 25: CPT | Performed by: PHYSICIAN ASSISTANT

## 2024-10-01 PROCEDURE — 6370000000 HC RX 637 (ALT 250 FOR IP): Performed by: PHYSICIAN ASSISTANT

## 2024-10-01 PROCEDURE — 80053 COMPREHEN METABOLIC PANEL: CPT

## 2024-10-01 PROCEDURE — 85025 COMPLETE CBC W/AUTO DIFF WBC: CPT

## 2024-10-01 RX ADMIN — Medication: at 10:12

## 2024-10-01 RX ADMIN — BUDESONIDE AND FORMOTEROL FUMARATE DIHYDRATE 2 PUFF: 160; 4.5 AEROSOL RESPIRATORY (INHALATION) at 19:35

## 2024-10-01 RX ADMIN — SODIUM CHLORIDE, PRESERVATIVE FREE 10 ML: 5 INJECTION INTRAVENOUS at 10:07

## 2024-10-01 RX ADMIN — GLYCOPYRROLATE 1 MG: 1 TABLET ORAL at 14:09

## 2024-10-01 RX ADMIN — SUCRALFATE 1 G: 1 TABLET ORAL at 06:08

## 2024-10-01 RX ADMIN — POTASSIUM CHLORIDE 40 MEQ: 1500 TABLET, EXTENDED RELEASE ORAL at 16:45

## 2024-10-01 RX ADMIN — METOPROLOL SUCCINATE 25 MG: 25 TABLET, EXTENDED RELEASE ORAL at 22:53

## 2024-10-01 RX ADMIN — SODIUM CHLORIDE, PRESERVATIVE FREE 40 MG: 5 INJECTION INTRAVENOUS at 10:07

## 2024-10-01 RX ADMIN — GLYCOPYRROLATE 1 MG: 1 TABLET ORAL at 22:52

## 2024-10-01 RX ADMIN — GUAIFENESIN 600 MG: 600 TABLET ORAL at 10:07

## 2024-10-01 RX ADMIN — FINASTERIDE 5 MG: 5 TABLET, FILM COATED ORAL at 10:06

## 2024-10-01 RX ADMIN — METOPROLOL SUCCINATE 25 MG: 25 TABLET, EXTENDED RELEASE ORAL at 10:06

## 2024-10-01 RX ADMIN — SUCRALFATE 1 G: 1 TABLET ORAL at 16:45

## 2024-10-01 RX ADMIN — Medication 2000 UNITS: at 10:06

## 2024-10-01 RX ADMIN — GLYCOPYRROLATE 1 MG: 1 TABLET ORAL at 10:06

## 2024-10-01 RX ADMIN — QUETIAPINE FUMARATE 12.5 MG: 25 TABLET ORAL at 22:53

## 2024-10-01 RX ADMIN — ENOXAPARIN SODIUM 60 MG: 100 INJECTION SUBCUTANEOUS at 10:06

## 2024-10-01 RX ADMIN — GUAIFENESIN 600 MG: 600 TABLET ORAL at 22:53

## 2024-10-01 RX ADMIN — SODIUM CHLORIDE, PRESERVATIVE FREE 40 MG: 5 INJECTION INTRAVENOUS at 21:00

## 2024-10-01 RX ADMIN — SODIUM CHLORIDE, PRESERVATIVE FREE 10 ML: 5 INJECTION INTRAVENOUS at 22:53

## 2024-10-01 RX ADMIN — Medication 5 MG: at 22:55

## 2024-10-01 RX ADMIN — ENOXAPARIN SODIUM 60 MG: 100 INJECTION SUBCUTANEOUS at 22:52

## 2024-10-01 RX ADMIN — SUCRALFATE 1 G: 1 TABLET ORAL at 10:06

## 2024-10-01 RX ADMIN — BUDESONIDE AND FORMOTEROL FUMARATE DIHYDRATE 2 PUFF: 160; 4.5 AEROSOL RESPIRATORY (INHALATION) at 06:22

## 2024-10-01 RX ADMIN — Medication 0.5 MG: at 22:53

## 2024-10-01 RX ADMIN — BARICITINIB 4 MG: 2 TABLET, FILM COATED ORAL at 10:07

## 2024-10-01 RX ADMIN — SUCRALFATE 1 G: 1 TABLET ORAL at 22:52

## 2024-10-01 ASSESSMENT — PAIN - FUNCTIONAL ASSESSMENT: PAIN_FUNCTIONAL_ASSESSMENT: ACTIVITIES ARE NOT PREVENTED

## 2024-10-01 ASSESSMENT — PAIN SCALES - GENERAL: PAINLEVEL_OUTOF10: 8

## 2024-10-01 ASSESSMENT — PAIN DESCRIPTION - LOCATION: LOCATION: GENERALIZED

## 2024-10-01 ASSESSMENT — PAIN DESCRIPTION - DESCRIPTORS: DESCRIPTORS: DISCOMFORT;ACHING;SORE

## 2024-10-01 NOTE — PLAN OF CARE
Problem: Safety - Adult  Goal: Free from fall injury  10/1/2024 1524 by Rebeca Khan RN  Outcome: Progressing  10/1/2024 0500 by Mayuri Zimmerman RN  Outcome: Progressing     Problem: Skin/Tissue Integrity  Goal: Absence of new skin breakdown  Description: 1.  Monitor for areas of redness and/or skin breakdown  2.  Assess vascular access sites hourly  3.  Every 4-6 hours minimum:  Change oxygen saturation probe site  4.  Every 4-6 hours:  If on nasal continuous positive airway pressure, respiratory therapy assess nares and determine need for appliance change or resting period.  10/1/2024 1524 by Rebeca Khan RN  Outcome: Progressing  10/1/2024 0500 by Mayuri Zimmerman RN  Outcome: Progressing     Problem: ABCDS Injury Assessment  Goal: Absence of physical injury  10/1/2024 1524 by Rebeca Khan RN  Outcome: Progressing  10/1/2024 0500 by Mayuri Zimmerman RN  Outcome: Progressing     Problem: Safety - Medical Restraint  Goal: Remains free of injury from restraints (Restraint for Interference with Medical Device)  Description: INTERVENTIONS:  1. Determine that other, less restrictive measures have been tried or would not be effective before applying the restraint  2. Evaluate the patient's condition at the time of restraint application  3. Inform patient/family regarding the reason for restraint  4. Q2H: Monitor safety, psychosocial status, comfort, nutrition and hydration  10/1/2024 1524 by Rebeca Khan RN  Outcome: Progressing  10/1/2024 0500 by Mayuri Zimmerman RN  Outcome: Progressing     Problem: Discharge Planning  Goal: Discharge to home or other facility with appropriate resources  10/1/2024 1524 by Rebeca Khan RN  Outcome: Progressing  Flowsheets (Taken 10/1/2024 0959)  Discharge to home or other facility with appropriate resources: Identify barriers to discharge with patient and caregiver  10/1/2024 0500 by Mayuri Zimmerman RN  Outcome: Progressing     Problem: Nutrition Deficit:  Goal: Optimize  when there are differences between patient's view, family's view, and healthcare provider's view of condition  2. Facilitate patient and family articulation of goals for care  3. Help patient and family identify pros/cons of alternative solutions  4. Provide information as requested by patient/family  5. Respect patient/family right to receive or not to receive information  6. Serve as a liaison between patient and family and health care team  7. Initiate Consults from Ethics, Palliative Care or initiate Family Care Conference as is appropriate  10/1/2024 1524 by Rebeca Khan RN  Outcome: Progressing  10/1/2024 0500 by Mayuri Zimmerman RN  Outcome: Progressing     Problem: Behavior  Goal: Pt/Family maintain appropriate behavior and adhere to behavioral management agreement, if implemented  Description: INTERVENTIONS:  1. Assess patient/family's coping skills and  non-compliant behavior (including use of illegal substances)  2. Notify security of behavior or suspected illegal substances which indicate the need for search of the family and/or belongings  3. Encourage verbalization of thoughts and concerns in a socially appropriate manner  4. Utilize positive, consistent limit setting strategies supporting safety of patient, staff and others  5. Encourage participation in the decision making process about the behavioral management agreement  6. If a visitor's behavior poses a threat to safety call refer to organization policy.  7. Initiate consult with , Psychosocial CNS, Spiritual Care as appropriate  10/1/2024 1524 by Rebeca Khan RN  Outcome: Progressing  10/1/2024 0500 by Mayuri Zimmerman RN  Outcome: Progressing

## 2024-10-01 NOTE — PROGRESS NOTES
Palliative Care Progress Note  10/1/2024 2:00 PM    Patient:  Eze Rosa  YOB: 1950  Primary Care Physician: Ross Masterson MD  Advance Directive: DNR  Admit Date: 9/18/2024       Hospital Day: 13  Portions of this note have been copied forward, however, changed to reflect the most current clinical status of this patient.    CHIEF COMPLAINT/REASON FOR CONSULTATION Goals of care, family support, Code status, symptom management     SUBJECTIVE:  Mr. Rosa is having some discomfort to coccyx area at times. Otherwise no new complaints. Continues to ask for Sprite as often as possible    HPI:  The patient is a 73 y.o. male with PMH pulmonary fibrosis, paroxysmal atrial fibrillation, BPH, previous alcoholism, chronic respiratory failure, Mclean's esophagus dependent on supplemental O2, previous COVID/RSV, chronic lieberman, BPH,  who presented to [facility] ED on 09/18/2024 complaining of shortness of breath, nausea, vomiting, abdominal pain, loose stools with incontinence and confusion.Work up included CTA pulmonary protocol which was negative for pulmonary embolus, revealed stable moderate to severe diffuse pulmonary fibrosis, stable trace dependent right pleural fluid, interval development of vague wall thickening portions of the esophagus.  CT abdomen and pelvis reported no acute abnormality, sigmoid diverticulosis without indication of diverticulitis, moderate stool seen in the rectum without focal abnormality, mildly distended gallbladder without stones or focal abnormality, mild distention of the stomach with fluid in the lumen, no indication of gastric outlet obstruction, small bilateral pleural effusions.  CT head reported no acute intracranial process with acute bilateral maxillary sinusitis.  Chest x-ray reported small bilateral pleural effusions with bibasilar atelectasis and/or pneumonia worse on the right side than the left.  He was positive for COVID-19.  WBC 26.3, D-dimer 2.43, lactic acid 6.3.

## 2024-10-01 NOTE — PROGRESS NOTES
trouble swallowing.    Respiratory:  Positive for shortness of breath.    Musculoskeletal:  Positive for arthralgias and myalgias.          Objective:   VITALS:  BP 94/77   Pulse 73   Temp 97.3 °F (36.3 °C)   Resp 20   Ht 1.905 m (6' 3\")   Wt 58.1 kg (128 lb)   SpO2 97%   BMI 16.00 kg/m²   24HR INTAKE/OUTPUT:    Intake/Output Summary (Last 24 hours) at 10/1/2024 1552  Last data filed at 10/1/2024 1518  Gross per 24 hour   Intake 492 ml   Output 850 ml   Net -358 ml       Physical Exam  Vitals reviewed.   Constitutional:       Appearance: He is ill-appearing (chronically).      Comments: Frail chronically ill appearing   HENT:      Head: Normocephalic.      Mouth/Throat:      Mouth: Mucous membranes are moist.      Pharynx: Oropharynx is clear.   Eyes:      Pupils: Pupils are equal, round, and reactive to light.   Cardiovascular:      Rate and Rhythm: Normal rate and regular rhythm.      Pulses: Normal pulses.      Heart sounds: Normal heart sounds. No murmur heard.  Pulmonary:      Effort: Tachypnea present. No respiratory distress.      Breath sounds: Decreased air movement present. Examination of the right-upper field reveals decreased breath sounds. Examination of the left-upper field reveals decreased breath sounds. Examination of the right-middle field reveals decreased breath sounds. Examination of the left-middle field reveals decreased breath sounds. Examination of the right-lower field reveals decreased breath sounds. Examination of the left-lower field reveals decreased breath sounds. Decreased breath sounds present.      Comments: Diminished lung sounds throughout  Abdominal:      General: Bowel sounds are normal. There is no distension.      Palpations: Abdomen is soft.      Tenderness: There is no abdominal tenderness.   Musculoskeletal:         General: No swelling. Normal range of motion.      Cervical back: Normal range of motion and neck supple. No rigidity or tenderness.      Right lower  All CT scans are performed using dose optimization techniques as appropriate to the performed exam and include at least one of the following: Automated exposure control, adjustment of the mA and/or kV according to size, and the use of iterative reconstruction technique.  ______________________________________ Electronically signed by: HELEN MEYER M.D. Date:     09/18/2024 Time:    15:47     CT HEAD WO CONTRAST    Result Date: 9/18/2024  No acute intracranial process.  Acute bilateral maxillary sinusitis.  Mild atrophy and chronic small vessel white matter change.  All CT scans are performed using dose optimization techniques as appropriate to the performed exam and include at least one of the following: Automated exposure control, adjustment of the mA and/or kV according to size, and the use of iterative reconstruction technique.  ______________________________________ Electronically signed by: LIZABETH SANCHEZ M.D. Date:     09/18/2024 Time:    15:40     XR CHEST PORTABLE    Result Date: 9/18/2024  Small bilateral pleural effusions with bibasilar atelectasis and/or pneumonia, right greater than left.  Chronic bilateral interstitial lung markings.   ______________________________________ Electronically signed by: LIZABETH SANCHEZ M.D. Date:     09/18/2024 Time:    14:25        Assessment/Plan   Principal Problem:    Sepsis due to bibasilar pneumonia /COVID-19   -CTA pulmonary negative for PE   -MRSA detected    -IV antibiotics Cefepime & Vancomycin -completed   -Baricitinib-course to be completed on 10/2  -Supplemental oxygen wean as tolerated,currently 3L NC     -IS/Acapella-as able    -Blood cultures NGTD     Active Problems:   Idiopathic pulmonary fibrosis    -Chronic follows [facility]   -Continue Symbicort  -Monitor for hemoptysis with initiation of anticoagulation         Palliative care patient   -Palliative following    -Hospice following      Paroxysmal atrial fibrillation    -Cardiology signed off

## 2024-10-01 NOTE — PROGRESS NOTES
Patient had large, soft BM. Patient was cleaned with soap and water. During cleaning, patient complained of pain at sacrum. Area on assessment is red and irritated. Magic butt cream applied.

## 2024-10-01 NOTE — PROGRESS NOTES
Comprehensive Nutrition Assessment    Type and Reason for Visit:  Reassess    Nutrition Recommendations/Plan:   Modify Gelatein ONS to breakfast.  Add Magic Cup ONS to lunch and dinner.     Malnutrition Assessment:  Malnutrition Status:  Severe malnutrition (09/21/24 0826)    Context:  Chronic Illness     Findings of the 6 clinical characteristics of malnutrition:  Energy Intake:  75% or less estimated energy requirements for 1 month or longer  Weight Loss:  Greater than 20% over 1 year     Body Fat Loss:  Severe body fat loss Orbital, Buccal region   Muscle Mass Loss:  Severe muscle mass loss Temples (temporalis), Clavicles (pectoralis & deltoids)  Fluid Accumulation:  No significant fluid accumulation     Strength:  Not Performed    Nutrition Assessment:    SLP advanced diet to Minced and Moist with moderately thick liquids. Documented intake: %, 1-25%, 51-75% of meals; %, 26-50% of Gelatein ONS. Per RN, pt often requests snacks between meals, such as pudding and applesauce. Will modify ONS to Gelatein at breakfast, and Magic Cup at lunch and dinner. Noted current wt up 9# from previous day's wt.    Nutrition Related Findings:    BM 9/30. K+ 3.3, glu 105-179. Wound Type: Pressure Injury (coccyx/buttocks)       Current Nutrition Intake & Therapies:    Average Meal Intake: %, 1-25%, 51-75%  Average Supplements Intake: %, 26-50%  ADULT DIET; Dysphagia - Minced and Moist; Moderately Thick (Honey)  ADULT ORAL NUTRITION SUPPLEMENT; Breakfast; Fortified Gelatin Oral Supplement  ADULT ORAL NUTRITION SUPPLEMENT; Lunch, Dinner; Frozen Oral Supplement    Anthropometric Measures:  Height: 190.5 cm (6' 3\")  Ideal Body Weight (IBW): 196 lbs (89 kg)    Admission Body Weight: 61 kg (134 lb 6 oz)  Current Body Weight: 58.1 kg (128 lb 1.4 oz), 65.4 % IBW. Weight Source: Bed Scale  Current BMI (kg/m2): 16  Usual Body Weight: 79.9 kg (176 lb 2 oz) (1/5/24)  % Weight Change (Calculated): -27.3  BMI

## 2024-10-02 LAB
ALBUMIN SERPL-MCNC: 2.8 G/DL (ref 3.5–5.2)
ALP SERPL-CCNC: 108 U/L (ref 40–129)
ALT SERPL-CCNC: 13 U/L (ref 5–41)
ANION GAP SERPL CALCULATED.3IONS-SCNC: 12 MMOL/L (ref 7–19)
AST SERPL-CCNC: 18 U/L (ref 5–40)
BASOPHILS # BLD: 0 K/UL (ref 0–0.2)
BASOPHILS NFR BLD: 0.1 % (ref 0–1)
BILIRUB SERPL-MCNC: 0.4 MG/DL (ref 0.2–1.2)
BUN SERPL-MCNC: 16 MG/DL (ref 8–23)
CALCIUM SERPL-MCNC: 8.1 MG/DL (ref 8.8–10.2)
CHLORIDE SERPL-SCNC: 96 MMOL/L (ref 98–111)
CO2 SERPL-SCNC: 29 MMOL/L (ref 22–29)
CREAT SERPL-MCNC: 0.5 MG/DL (ref 0.7–1.2)
EOSINOPHIL # BLD: 0.3 K/UL (ref 0–0.6)
EOSINOPHIL NFR BLD: 2.3 % (ref 0–5)
ERYTHROCYTE [DISTWIDTH] IN BLOOD BY AUTOMATED COUNT: 13.1 % (ref 11.5–14.5)
GLUCOSE SERPL-MCNC: 123 MG/DL (ref 70–99)
HCT VFR BLD AUTO: 34.2 % (ref 42–52)
HGB BLD-MCNC: 11 G/DL (ref 14–18)
IMM GRANULOCYTES # BLD: 0.1 K/UL
LYMPHOCYTES # BLD: 1.3 K/UL (ref 1.1–4.5)
LYMPHOCYTES NFR BLD: 10.9 % (ref 20–40)
MCH RBC QN AUTO: 30 PG (ref 27–31)
MCHC RBC AUTO-ENTMCNC: 32.2 G/DL (ref 33–37)
MCV RBC AUTO: 93.2 FL (ref 80–94)
MONOCYTES # BLD: 1 K/UL (ref 0–0.9)
MONOCYTES NFR BLD: 8.5 % (ref 0–10)
NEUTROPHILS # BLD: 9.1 K/UL (ref 1.5–7.5)
NEUTS SEG NFR BLD: 77.7 % (ref 50–65)
PLATELET # BLD AUTO: 218 K/UL (ref 130–400)
PMV BLD AUTO: 11.6 FL (ref 9.4–12.4)
POTASSIUM SERPL-SCNC: 3.8 MMOL/L (ref 3.5–5)
PROT SERPL-MCNC: 4.9 G/DL (ref 6.4–8.3)
RBC # BLD AUTO: 3.67 M/UL (ref 4.7–6.1)
SODIUM SERPL-SCNC: 137 MMOL/L (ref 136–145)
WBC # BLD AUTO: 11.7 K/UL (ref 4.8–10.8)

## 2024-10-02 PROCEDURE — 36415 COLL VENOUS BLD VENIPUNCTURE: CPT

## 2024-10-02 PROCEDURE — 2140000000 HC CCU INTERMEDIATE R&B

## 2024-10-02 PROCEDURE — 6360000002 HC RX W HCPCS: Performed by: INTERNAL MEDICINE

## 2024-10-02 PROCEDURE — 6370000000 HC RX 637 (ALT 250 FOR IP): Performed by: PHYSICIAN ASSISTANT

## 2024-10-02 PROCEDURE — 6370000000 HC RX 637 (ALT 250 FOR IP): Performed by: INTERNAL MEDICINE

## 2024-10-02 PROCEDURE — 85025 COMPLETE CBC W/AUTO DIFF WBC: CPT

## 2024-10-02 PROCEDURE — 2700000000 HC OXYGEN THERAPY PER DAY

## 2024-10-02 PROCEDURE — 80053 COMPREHEN METABOLIC PANEL: CPT

## 2024-10-02 PROCEDURE — 94640 AIRWAY INHALATION TREATMENT: CPT

## 2024-10-02 PROCEDURE — 2580000003 HC RX 258: Performed by: INTERNAL MEDICINE

## 2024-10-02 PROCEDURE — 94761 N-INVAS EAR/PLS OXIMETRY MLT: CPT

## 2024-10-02 PROCEDURE — 6370000000 HC RX 637 (ALT 250 FOR IP)

## 2024-10-02 RX ADMIN — SODIUM CHLORIDE, PRESERVATIVE FREE 10 ML: 5 INJECTION INTRAVENOUS at 09:11

## 2024-10-02 RX ADMIN — GLYCOPYRROLATE 1 MG: 1 TABLET ORAL at 09:08

## 2024-10-02 RX ADMIN — SUCRALFATE 1 G: 1 TABLET ORAL at 21:33

## 2024-10-02 RX ADMIN — BUDESONIDE AND FORMOTEROL FUMARATE DIHYDRATE 2 PUFF: 160; 4.5 AEROSOL RESPIRATORY (INHALATION) at 07:33

## 2024-10-02 RX ADMIN — Medication 5 MG: at 09:17

## 2024-10-02 RX ADMIN — METOPROLOL SUCCINATE 25 MG: 25 TABLET, EXTENDED RELEASE ORAL at 21:34

## 2024-10-02 RX ADMIN — ENOXAPARIN SODIUM 60 MG: 100 INJECTION SUBCUTANEOUS at 21:33

## 2024-10-02 RX ADMIN — SODIUM CHLORIDE, PRESERVATIVE FREE 40 MG: 5 INJECTION INTRAVENOUS at 09:09

## 2024-10-02 RX ADMIN — GUAIFENESIN 600 MG: 600 TABLET ORAL at 21:34

## 2024-10-02 RX ADMIN — SUCRALFATE 1 G: 1 TABLET ORAL at 09:08

## 2024-10-02 RX ADMIN — FINASTERIDE 5 MG: 5 TABLET, FILM COATED ORAL at 09:08

## 2024-10-02 RX ADMIN — BUDESONIDE AND FORMOTEROL FUMARATE DIHYDRATE 2 PUFF: 160; 4.5 AEROSOL RESPIRATORY (INHALATION) at 19:08

## 2024-10-02 RX ADMIN — METOPROLOL SUCCINATE 25 MG: 25 TABLET, EXTENDED RELEASE ORAL at 09:09

## 2024-10-02 RX ADMIN — GUAIFENESIN 600 MG: 600 TABLET ORAL at 09:08

## 2024-10-02 RX ADMIN — GLYCOPYRROLATE 1 MG: 1 TABLET ORAL at 14:05

## 2024-10-02 RX ADMIN — SODIUM CHLORIDE, PRESERVATIVE FREE 40 MG: 5 INJECTION INTRAVENOUS at 21:33

## 2024-10-02 RX ADMIN — GLYCOPYRROLATE 1 MG: 1 TABLET ORAL at 21:33

## 2024-10-02 RX ADMIN — BARICITINIB 4 MG: 2 TABLET, FILM COATED ORAL at 09:09

## 2024-10-02 RX ADMIN — SUCRALFATE 1 G: 1 TABLET ORAL at 16:36

## 2024-10-02 RX ADMIN — ENOXAPARIN SODIUM 60 MG: 100 INJECTION SUBCUTANEOUS at 09:08

## 2024-10-02 RX ADMIN — Medication 2000 UNITS: at 09:08

## 2024-10-02 RX ADMIN — SODIUM CHLORIDE, PRESERVATIVE FREE 10 ML: 5 INJECTION INTRAVENOUS at 21:45

## 2024-10-02 RX ADMIN — QUETIAPINE FUMARATE 12.5 MG: 25 TABLET ORAL at 21:34

## 2024-10-02 ASSESSMENT — PAIN SCALES - GENERAL
PAINLEVEL_OUTOF10: 6
PAINLEVEL_OUTOF10: 9

## 2024-10-02 ASSESSMENT — PAIN DESCRIPTION - ORIENTATION: ORIENTATION: DISTAL

## 2024-10-02 ASSESSMENT — PAIN DESCRIPTION - LOCATION: LOCATION: GENERALIZED

## 2024-10-02 ASSESSMENT — PAIN - FUNCTIONAL ASSESSMENT: PAIN_FUNCTIONAL_ASSESSMENT: PREVENTS OR INTERFERES SOME ACTIVE ACTIVITIES AND ADLS

## 2024-10-02 ASSESSMENT — PAIN DESCRIPTION - DESCRIPTORS: DESCRIPTORS: ACHING

## 2024-10-02 NOTE — PROGRESS NOTES
On assessment, patient had a small BM. Patient did not want to be cleaned up. He stated, \"Leave me alone.\" Patient requested all lights off and door shut at the time. When asked if he was in pain, patient stated that he was hurting everywhere. Morphine administered (see MAR). Patient then cleaned with soap/water with a partial linen change. Patient left in bed with all needs met, bed alarm on, and call-light in reach.

## 2024-10-02 NOTE — PLAN OF CARE
Problem: Safety - Adult  Goal: Free from fall injury  Outcome: Progressing     Problem: Skin/Tissue Integrity  Goal: Absence of new skin breakdown  Description: 1.  Monitor for areas of redness and/or skin breakdown  2.  Assess vascular access sites hourly  3.  Every 4-6 hours minimum:  Change oxygen saturation probe site  4.  Every 4-6 hours:  If on nasal continuous positive airway pressure, respiratory therapy assess nares and determine need for appliance change or resting period.  Outcome: Progressing     Problem: ABCDS Injury Assessment  Goal: Absence of physical injury  Outcome: Progressing     Problem: Safety - Medical Restraint  Goal: Remains free of injury from restraints (Restraint for Interference with Medical Device)  Description: INTERVENTIONS:  1. Determine that other, less restrictive measures have been tried or would not be effective before applying the restraint  2. Evaluate the patient's condition at the time of restraint application  3. Inform patient/family regarding the reason for restraint  4. Q2H: Monitor safety, psychosocial status, comfort, nutrition and hydration  Outcome: Progressing     Problem: Discharge Planning  Goal: Discharge to home or other facility with appropriate resources  Outcome: Progressing     Problem: Nutrition Deficit:  Goal: Optimize nutritional status  Outcome: Progressing     Problem: Pain  Goal: Verbalizes/displays adequate comfort level or baseline comfort level  Outcome: Progressing     Problem: Neurosensory - Adult  Goal: Achieves stable or improved neurological status  Outcome: Progressing     Problem: Respiratory - Adult  Goal: Achieves optimal ventilation and oxygenation  Outcome: Progressing     Problem: Cardiovascular - Adult  Goal: Maintains optimal cardiac output and hemodynamic stability  Outcome: Progressing     Problem: Skin/Tissue Integrity - Adult  Goal: Skin integrity remains intact  Outcome: Progressing

## 2024-10-02 NOTE — PROGRESS NOTES
lower leg: No edema.   Skin:     General: Skin is warm and dry.      Capillary Refill: Capillary refill takes less than 2 seconds.   Neurological:      General: No focal deficit present.      Mental Status: He is alert.      Cranial Nerves: No cranial nerve deficit.      Motor: Weakness (diffuse and profound) present.      Comments: Orientd to person, place, time  Disoriented to situation          Medications:      sodium chloride        enoxaparin  1 mg/kg SubCUTAneous BID    glycopyrrolate  1 mg Oral TID    sucralfate  1 g Oral 4x Daily AC & HS    pantoprazole (PROTONIX) 40 mg in sodium chloride (PF) 0.9 % 10 mL injection  40 mg IntraVENous Q12H    metoprolol succinate  25 mg Oral BID    sodium chloride flush  5-40 mL IntraVENous 2 times per day    baricitinib  4 mg Oral Daily    budesonide-formoterol  2 puff Inhalation BID    finasteride  5 mg Oral Daily    [Held by provider] tamsulosin  0.8 mg Oral Daily    guaiFENesin  600 mg Oral BID    Vitamin D  2,000 Units Oral Daily       Lab and other Data:     Recent Labs     09/30/24  0751 10/01/24  1301 10/02/24  0209   WBC 14.9* 15.3* 11.7*   HGB 12.5* 11.7* 11.0*    292 218     Recent Labs     09/30/24  0751 10/01/24  1301 10/02/24  0209    134* 137   K 3.3* 3.2* 3.8   CL 95* 94* 96*   CO2 38* 29 29   BUN 17 15 16   CREATININE 0.4* 0.4* 0.5*   GLUCOSE 105* 140* 123*     Recent Labs     09/30/24  0751 10/01/24  1301 10/02/24  0209   AST 13 17 18   ALT 12 14 13   BILITOT 0.5 0.4 0.4   ALKPHOS 94 111 108     RAD:   XR CHEST PORTABLE    Result Date: 9/20/2024  Cardiomediastinal contours appear enlarged.  Bilateral pleural effusions appear increased.  Interstitial and airspace infiltrate is present throughout both lungs.  Dense opacity at the right and left lung base.  Opacity along the peripheral aspect of the right and left lung has increased.  This likely represents a combination of atelectasis, pulmonary edema, pneumonia and pleural effusions.  No  Paroxysmal atrial fibrillation    -Cardiology signed off    -Continue Toprol XL   -Digoxin 125mcg x1 on 9/19 with conversion back to NSR  -Lopressor2.5mg x1 on 9/21 with conversion   -[id number] score 2   -Lovenox 1 mg/kg transition to Eliquis prior to DC   -Monitor on telemetry     Mclean's esophagus with dysplasia / GERD (gastroesophageal reflux disease)  / Dysphagia / Severe malnutrition   -GI re-consulted, 9/24 esophagram esophagitis with continued dysphagia, EGD severe chronic diffuse erosive esophagitis, underlying long Mclean's segment, no tumor mass or stricture. PPI, Carafate, aspiration precautions ordered. -Follow up with GI in 1 month with recommendation for repeat EGD in 2 months  -Palliative care following, DNR status updated and hospice consulted  - following-SNF with hospice   -CTA pulmonary wall thickening of esophagus, esophagitis vs mass   -Honey thickened liquid   -Aspiration precautions    -SLP following    -Dietician following    -Protonix BID     Hypokalemia   -Today's K-3.8   -Replacement protocol as warranted   -Daily labs      Benign prostatic hyperplasia with urinary retention   -Chronic Morataya exchanged 9/19   -Monitor I&O   -continue Proscar     Hypertension   -Monitor vital signs    -toprol Continued      Anxiety   -Has been calm and cooperative since 9/20   -Seroquel as needed for anxiety/agitation    -Ativan available for anxiety per hospice ordered    DVT Prophylaxis: Lovenox     GI prophylaxis:  protonix    Disposition: TBD-case management involved    BRENDEN Levy, 10/2/2024 4:46 PM

## 2024-10-02 NOTE — PLAN OF CARE
Problem: Safety - Adult  Goal: Free from fall injury  10/2/2024 1109 by Rebeca Khan RN  Outcome: Progressing  10/2/2024 0601 by Mayuri Zimmerman RN  Outcome: Progressing     Problem: Skin/Tissue Integrity  Goal: Absence of new skin breakdown  Description: 1.  Monitor for areas of redness and/or skin breakdown  2.  Assess vascular access sites hourly  3.  Every 4-6 hours minimum:  Change oxygen saturation probe site  4.  Every 4-6 hours:  If on nasal continuous positive airway pressure, respiratory therapy assess nares and determine need for appliance change or resting period.  10/2/2024 1109 by Rebeca Khan RN  Outcome: Progressing  10/2/2024 0601 by Mayuri Zimmerman RN  Outcome: Progressing     Problem: ABCDS Injury Assessment  Goal: Absence of physical injury  10/2/2024 1109 by Rebeca Khan RN  Outcome: Progressing  10/2/2024 0601 by Mayuri Zimmerman RN  Outcome: Progressing     Problem: Safety - Medical Restraint  Goal: Remains free of injury from restraints (Restraint for Interference with Medical Device)  Description: INTERVENTIONS:  1. Determine that other, less restrictive measures have been tried or would not be effective before applying the restraint  2. Evaluate the patient's condition at the time of restraint application  3. Inform patient/family regarding the reason for restraint  4. Q2H: Monitor safety, psychosocial status, comfort, nutrition and hydration  10/2/2024 1109 by Rebeca Khan RN  Outcome: Progressing  10/2/2024 0601 by Mayuri Zimmerman RN  Outcome: Progressing     Problem: Discharge Planning  Goal: Discharge to home or other facility with appropriate resources  10/2/2024 1109 by Rebeca Khan RN  Outcome: Progressing  Flowsheets (Taken 10/2/2024 0906)  Discharge to home or other facility with appropriate resources: Identify barriers to discharge with patient and caregiver  10/2/2024 0601 by Mayuri Zimmerman RN  Outcome: Progressing     Problem: Nutrition Deficit:  Goal: Optimize

## 2024-10-03 PROCEDURE — 94761 N-INVAS EAR/PLS OXIMETRY MLT: CPT

## 2024-10-03 PROCEDURE — 2140000000 HC CCU INTERMEDIATE R&B

## 2024-10-03 PROCEDURE — 6370000000 HC RX 637 (ALT 250 FOR IP): Performed by: INTERNAL MEDICINE

## 2024-10-03 PROCEDURE — 94760 N-INVAS EAR/PLS OXIMETRY 1: CPT

## 2024-10-03 PROCEDURE — 6360000002 HC RX W HCPCS: Performed by: INTERNAL MEDICINE

## 2024-10-03 PROCEDURE — 2700000000 HC OXYGEN THERAPY PER DAY

## 2024-10-03 PROCEDURE — 94640 AIRWAY INHALATION TREATMENT: CPT

## 2024-10-03 PROCEDURE — 6370000000 HC RX 637 (ALT 250 FOR IP)

## 2024-10-03 PROCEDURE — 2580000003 HC RX 258: Performed by: INTERNAL MEDICINE

## 2024-10-03 PROCEDURE — 6370000000 HC RX 637 (ALT 250 FOR IP): Performed by: PHYSICIAN ASSISTANT

## 2024-10-03 RX ADMIN — GLYCOPYRROLATE 1 MG: 1 TABLET ORAL at 08:38

## 2024-10-03 RX ADMIN — ENOXAPARIN SODIUM 60 MG: 100 INJECTION SUBCUTANEOUS at 08:33

## 2024-10-03 RX ADMIN — GLYCOPYRROLATE 1 MG: 1 TABLET ORAL at 12:55

## 2024-10-03 RX ADMIN — QUETIAPINE FUMARATE 12.5 MG: 25 TABLET ORAL at 19:33

## 2024-10-03 RX ADMIN — SUCRALFATE 1 G: 1 TABLET ORAL at 04:18

## 2024-10-03 RX ADMIN — GLYCOPYRROLATE 1 MG: 1 TABLET ORAL at 19:33

## 2024-10-03 RX ADMIN — METOPROLOL SUCCINATE 25 MG: 25 TABLET, EXTENDED RELEASE ORAL at 08:38

## 2024-10-03 RX ADMIN — SUCRALFATE 1 G: 1 TABLET ORAL at 12:55

## 2024-10-03 RX ADMIN — BUDESONIDE AND FORMOTEROL FUMARATE DIHYDRATE 2 PUFF: 160; 4.5 AEROSOL RESPIRATORY (INHALATION) at 18:27

## 2024-10-03 RX ADMIN — SUCRALFATE 1 G: 1 TABLET ORAL at 17:07

## 2024-10-03 RX ADMIN — SODIUM CHLORIDE, PRESERVATIVE FREE 10 ML: 5 INJECTION INTRAVENOUS at 08:38

## 2024-10-03 RX ADMIN — FINASTERIDE 5 MG: 5 TABLET, FILM COATED ORAL at 08:38

## 2024-10-03 RX ADMIN — Medication 2000 UNITS: at 08:38

## 2024-10-03 RX ADMIN — GUAIFENESIN 600 MG: 600 TABLET ORAL at 08:38

## 2024-10-03 RX ADMIN — METOPROLOL SUCCINATE 25 MG: 25 TABLET, EXTENDED RELEASE ORAL at 19:33

## 2024-10-03 RX ADMIN — SUCRALFATE 1 G: 1 TABLET ORAL at 19:33

## 2024-10-03 RX ADMIN — GUAIFENESIN 600 MG: 600 TABLET ORAL at 19:33

## 2024-10-03 RX ADMIN — ENOXAPARIN SODIUM 60 MG: 100 INJECTION SUBCUTANEOUS at 19:32

## 2024-10-03 RX ADMIN — SODIUM CHLORIDE, PRESERVATIVE FREE 40 MG: 5 INJECTION INTRAVENOUS at 08:33

## 2024-10-03 RX ADMIN — SODIUM CHLORIDE, PRESERVATIVE FREE 10 ML: 5 INJECTION INTRAVENOUS at 19:43

## 2024-10-03 RX ADMIN — SODIUM CHLORIDE, PRESERVATIVE FREE 40 MG: 5 INJECTION INTRAVENOUS at 19:32

## 2024-10-03 RX ADMIN — BUDESONIDE AND FORMOTEROL FUMARATE DIHYDRATE 2 PUFF: 160; 4.5 AEROSOL RESPIRATORY (INHALATION) at 06:54

## 2024-10-03 ASSESSMENT — PAIN SCALES - GENERAL: PAINLEVEL_OUTOF10: 0

## 2024-10-03 NOTE — PROGRESS NOTES
DC plans noted. The Hospice number has been provided to the dtr with a request to call Hospice when the pts skilled days are utilized. Hospice will assist if indicated.

## 2024-10-03 NOTE — PROGRESS NOTES
[facility]      Patient:  Eze Rosa  YOB: 1950  Date of Service: 10/3/2024  MRN: [id number]   Acct: [id number]   Primary Care Physician: Ross Masterson MD  Advance Directive: DNR  Admit Date: 9/18/2024       Hospital Day: 15  Portions of this note have been copied forward, however, changed to reflect the most current clinical status of this patient.    CHIEF COMPLAINT  altered mental status     Subjective: Resting comfortably in bed currently on 4LNC nasal cannula.  Afebrile. no issues or events overnight.    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  Of note, recently admitted in July due sepsis secondary to UTI and at that time Morataya catheter remained in place, he received IV antibiotic course, and was discharged to [facility] stable condition.  He returned to [facility] ED on the day of admission due to SOA and AMS.  He was discharged from SNF facility 1 day prior to admission.      Further ED work-up revealed-CTA pulmonary moderate to severe pulmonary fibrosis and interval development of esophagitis versus mass, CXR bibasilar pneumonia right greater than left, COVID-positive, CT head no acute intracranial abnormality with acute bilateral maxillary sinusitis, lactic acid 6 with repeat 5.8, CRP 11.55, WBC 26.3, ABG metabolic alkalosis. He was initiated on cefepime and vancomycin upon admission.  After admission patient noted to be hypotensive and A-fib RVR additional IVF 1 L given and digoxin 125 mcg x 1 with conversion to normal sinus rhythm.      9/21 went back in A-fib RVR converted with IV Lopressor.   Positive for trouble swallowing.    Respiratory:  Positive for shortness of breath.    Musculoskeletal:  Positive for arthralgias and myalgias.          Objective:   VITALS:  BP (!) 100/55   Pulse 73   Temp 98.2 °F (36.8 °C) (Temporal)   Resp 18   Ht 1.905 m (6' 3\")   Wt 55.9 kg (123 lb 3 oz)   SpO2 97%   BMI 15.40 kg/m²   24HR INTAKE/OUTPUT:    Intake/Output Summary (Last 24 hours) at 10/3/2024 1208  Last data filed at 10/3/2024 0944  Gross per 24 hour   Intake 360 ml   Output 1150 ml   Net -790 ml       Physical Exam  Vitals reviewed.   Constitutional:       Appearance: He is ill-appearing (chronically).      Comments: Frail chronically ill appearing   HENT:      Head: Normocephalic.      Mouth/Throat:      Mouth: Mucous membranes are moist.      Pharynx: Oropharynx is clear.   Eyes:      Pupils: Pupils are equal, round, and reactive to light.   Cardiovascular:      Rate and Rhythm: Normal rate and regular rhythm.      Pulses: Normal pulses.      Heart sounds: Normal heart sounds. No murmur heard.  Pulmonary:      Effort: Tachypnea present. No respiratory distress.      Breath sounds: Decreased air movement present. Examination of the right-upper field reveals decreased breath sounds. Examination of the left-upper field reveals decreased breath sounds. Examination of the right-middle field reveals decreased breath sounds. Examination of the left-middle field reveals decreased breath sounds. Examination of the right-lower field reveals decreased breath sounds. Examination of the left-lower field reveals decreased breath sounds. Decreased breath sounds present.      Comments: Diminished lung sounds throughout  Abdominal:      General: Bowel sounds are normal. There is no distension.      Palpations: Abdomen is soft.      Tenderness: There is no abdominal tenderness.   Musculoskeletal:         General: No swelling. Normal range of motion.      Cervical back: Normal range of motion and neck supple. No rigidity

## 2024-10-03 NOTE — PLAN OF CARE
Deficit:  Goal: Optimize nutritional status  10/3/2024 0049 by Daniella Archer RN  Outcome: Progressing  10/2/2024 1109 by Rebeca Khan RN  Outcome: Progressing     Problem: Pain  Goal: Verbalizes/displays adequate comfort level or baseline comfort level  10/3/2024 0049 by Daniella Archer RN  Outcome: Progressing  10/2/2024 1109 by Rebeca Khan RN  Outcome: Progressing     Problem: Neurosensory - Adult  Goal: Achieves stable or improved neurological status  10/3/2024 0049 by Daniella Archer RN  Outcome: Progressing  Flowsheets (Taken 10/2/2024 2130)  Achieves stable or improved neurological status: Assess for and report changes in neurological status  10/2/2024 1109 by Rebeca Khan RN  Outcome: Progressing  Flowsheets (Taken 10/2/2024 0906)  Achieves stable or improved neurological status: Assess for and report changes in neurological status     Problem: Respiratory - Adult  Goal: Achieves optimal ventilation and oxygenation  10/3/2024 0049 by Daniella Archer RN  Outcome: Progressing  Flowsheets (Taken 10/2/2024 2130)  Achieves optimal ventilation and oxygenation:   Assess for changes in respiratory status   Respiratory therapy support as indicated  10/2/2024 1109 by Rebeca Khan RN  Outcome: Progressing  Flowsheets (Taken 10/2/2024 0906)  Achieves optimal ventilation and oxygenation: Assess for changes in respiratory status     Problem: Cardiovascular - Adult  Goal: Maintains optimal cardiac output and hemodynamic stability  10/3/2024 0049 by Daniella Archer RN  Outcome: Progressing  Flowsheets (Taken 10/2/2024 2130)  Maintains optimal cardiac output and hemodynamic stability: Monitor blood pressure and heart rate  10/2/2024 1109 by Rebeca Khan RN  Outcome: Progressing  Flowsheets (Taken 10/2/2024 0906)  Maintains optimal cardiac output and hemodynamic stability: Monitor blood pressure and heart rate     Problem: Skin/Tissue Integrity - Adult  Goal: Skin integrity remains  grief process  with patient/family as appropriate  5. Emphasize sustaining relationships within family system and community, or anila/spiritual traditions  6. Initiate Spiritual Care, Psychosocial Clinical Specialist, consult as needed  10/3/2024 0049 by Daniella Archer RN  Outcome: Progressing  10/2/2024 1109 by Rebeca Khan RN  Outcome: Progressing     Problem: Decision Making  Goal: Pt/Family able to effectively weigh alternatives and participate in decision making related to treatment and care  Description: INTERVENTIONS:  1. Determine when there are differences between patient's view, family's view, and healthcare provider's view of condition  2. Facilitate patient and family articulation of goals for care  3. Help patient and family identify pros/cons of alternative solutions  4. Provide information as requested by patient/family  5. Respect patient/family right to receive or not to receive information  6. Serve as a liaison between patient and family and health care team  7. Initiate Consults from Ethics, Palliative Care or initiate Family Care Conference as is appropriate  10/3/2024 0049 by Daniella Archer RN  Outcome: Progressing  10/2/2024 1109 by Rebeca Khan RN  Outcome: Progressing     Problem: Behavior  Goal: Pt/Family maintain appropriate behavior and adhere to behavioral management agreement, if implemented  Description: INTERVENTIONS:  1. Assess patient/family's coping skills and  non-compliant behavior (including use of illegal substances)  2. Notify security of behavior or suspected illegal substances which indicate the need for search of the family and/or belongings  3. Encourage verbalization of thoughts and concerns in a socially appropriate manner  4. Utilize positive, consistent limit setting strategies supporting safety of patient, staff and others  5. Encourage participation in the decision making process about the behavioral management agreement  6. If a visitor's behavior

## 2024-10-03 NOTE — PROGRESS NOTES
Physical Therapy  Name: Eze Rosa  MRN:  [id number]  Date of service:  10/3/2024    Attempt this pm.  Patient declined EOB sitting and bed ex's.  Physical Therapy will continue to follow and progress as able.    Electronically signed by Beatriz Razo PTA on 10/3/2024 at 3:59 PM

## 2024-10-03 NOTE — CARE COORDINATION
Spoke with pt's daughter Rebeca and she states that since he does not have a payor source for Hospice at facility she would like for him to go skilled.      Spoke with [facility] as they are still following for admission.  They will speak with daughter and get back with this CM.  Awaiting final decision.    [facility] - No  [facility]  [facility]  Ph: [phone]  Fax: [phone]  Electronically signed by aMyuri Crocker RN on 10/3/2024 at 8:36 AM

## 2024-10-04 PROCEDURE — 2580000003 HC RX 258: Performed by: INTERNAL MEDICINE

## 2024-10-04 PROCEDURE — 94761 N-INVAS EAR/PLS OXIMETRY MLT: CPT

## 2024-10-04 PROCEDURE — 2700000000 HC OXYGEN THERAPY PER DAY

## 2024-10-04 PROCEDURE — 2140000000 HC CCU INTERMEDIATE R&B

## 2024-10-04 PROCEDURE — 6360000002 HC RX W HCPCS: Performed by: INTERNAL MEDICINE

## 2024-10-04 PROCEDURE — 97110 THERAPEUTIC EXERCISES: CPT

## 2024-10-04 PROCEDURE — 6370000000 HC RX 637 (ALT 250 FOR IP): Performed by: PHYSICIAN ASSISTANT

## 2024-10-04 PROCEDURE — 94640 AIRWAY INHALATION TREATMENT: CPT

## 2024-10-04 PROCEDURE — 6370000000 HC RX 637 (ALT 250 FOR IP)

## 2024-10-04 PROCEDURE — 6370000000 HC RX 637 (ALT 250 FOR IP): Performed by: INTERNAL MEDICINE

## 2024-10-04 RX ADMIN — ENOXAPARIN SODIUM 60 MG: 100 INJECTION SUBCUTANEOUS at 08:20

## 2024-10-04 RX ADMIN — GUAIFENESIN 600 MG: 600 TABLET ORAL at 22:05

## 2024-10-04 RX ADMIN — SUCRALFATE 1 G: 1 TABLET ORAL at 22:06

## 2024-10-04 RX ADMIN — GLYCOPYRROLATE 1 MG: 1 TABLET ORAL at 08:25

## 2024-10-04 RX ADMIN — SUCRALFATE 1 G: 1 TABLET ORAL at 16:37

## 2024-10-04 RX ADMIN — SODIUM CHLORIDE, PRESERVATIVE FREE 40 MG: 5 INJECTION INTRAVENOUS at 22:06

## 2024-10-04 RX ADMIN — SODIUM CHLORIDE, PRESERVATIVE FREE 40 MG: 5 INJECTION INTRAVENOUS at 08:22

## 2024-10-04 RX ADMIN — METOPROLOL SUCCINATE 25 MG: 25 TABLET, EXTENDED RELEASE ORAL at 08:25

## 2024-10-04 RX ADMIN — Medication 2000 UNITS: at 08:25

## 2024-10-04 RX ADMIN — GUAIFENESIN 600 MG: 600 TABLET ORAL at 08:25

## 2024-10-04 RX ADMIN — SUCRALFATE 1 G: 1 TABLET ORAL at 12:55

## 2024-10-04 RX ADMIN — GLYCOPYRROLATE 1 MG: 1 TABLET ORAL at 12:55

## 2024-10-04 RX ADMIN — FINASTERIDE 5 MG: 5 TABLET, FILM COATED ORAL at 08:25

## 2024-10-04 RX ADMIN — SODIUM CHLORIDE, PRESERVATIVE FREE 10 ML: 5 INJECTION INTRAVENOUS at 08:27

## 2024-10-04 RX ADMIN — GLYCOPYRROLATE 1 MG: 1 TABLET ORAL at 22:05

## 2024-10-04 RX ADMIN — SUCRALFATE 1 G: 1 TABLET ORAL at 08:27

## 2024-10-04 RX ADMIN — BUDESONIDE AND FORMOTEROL FUMARATE DIHYDRATE 2 PUFF: 160; 4.5 AEROSOL RESPIRATORY (INHALATION) at 19:08

## 2024-10-04 RX ADMIN — BUDESONIDE AND FORMOTEROL FUMARATE DIHYDRATE 2 PUFF: 160; 4.5 AEROSOL RESPIRATORY (INHALATION) at 07:57

## 2024-10-04 RX ADMIN — ENOXAPARIN SODIUM 60 MG: 100 INJECTION SUBCUTANEOUS at 22:05

## 2024-10-04 ASSESSMENT — PAIN SCALES - GENERAL: PAINLEVEL_OUTOF10: 0

## 2024-10-04 NOTE — PLAN OF CARE
and cultural values  4. Communicate willingness to discuss death and facilitate grief process  with patient/family as appropriate  5. Emphasize sustaining relationships within family system and community, or anila/spiritual traditions  6. Initiate Spiritual Care, Psychosocial Clinical Specialist, consult as needed  Outcome: Progressing     Problem: Decision Making  Goal: Pt/Family able to effectively weigh alternatives and participate in decision making related to treatment and care  Description: INTERVENTIONS:  1. Determine when there are differences between patient's view, family's view, and healthcare provider's view of condition  2. Facilitate patient and family articulation of goals for care  3. Help patient and family identify pros/cons of alternative solutions  4. Provide information as requested by patient/family  5. Respect patient/family right to receive or not to receive information  6. Serve as a liaison between patient and family and health care team  7. Initiate Consults from Ethics, Palliative Care or initiate Family Care Conference as is appropriate  Outcome: Progressing     Problem: Behavior  Goal: Pt/Family maintain appropriate behavior and adhere to behavioral management agreement, if implemented  Description: INTERVENTIONS:  1. Assess patient/family's coping skills and  non-compliant behavior (including use of illegal substances)  2. Notify security of behavior or suspected illegal substances which indicate the need for search of the family and/or belongings  3. Encourage verbalization of thoughts and concerns in a socially appropriate manner  4. Utilize positive, consistent limit setting strategies supporting safety of patient, staff and others  5. Encourage participation in the decision making process about the behavioral management agreement  6. If a visitor's behavior poses a threat to safety call refer to organization policy.  7. Initiate consult with , Psychosocial CNS,

## 2024-10-04 NOTE — PROGRESS NOTES
[facility]      Patient:  Eze Rosa  YOB: 1950  Date of Service: 10/4/2024  MRN: [id number]   Acct: [id number]   Primary Care Physician: Ross Masterson MD  Advance Directive: DNR  Admit Date: 9/18/2024       Hospital Day: 16  Portions of this note have been copied forward, however, changed to reflect the most current clinical status of this patient.    CHIEF COMPLAINT  altered mental status     Subjective: Resting comfortably in bed currently on 2.5LNC nasal cannula.  Afebrile. no issues or events overnight.    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  Of note, recently admitted in July due sepsis secondary to UTI and at that time Morataya catheter remained in place, he received IV antibiotic course, and was discharged to [facility] stable condition.  He returned to [facility] ED on the day of admission due to SOA and AMS.  He was discharged from SNF facility 1 day prior to admission.      Further ED work-up revealed-CTA pulmonary moderate to severe pulmonary fibrosis and interval development of esophagitis versus mass, CXR bibasilar pneumonia right greater than left, COVID-positive, CT head no acute intracranial abnormality with acute bilateral maxillary sinusitis, lactic acid 6 with repeat 5.8, CRP 11.55, WBC 26.3, ABG metabolic alkalosis. He was initiated on cefepime and vancomycin upon admission.  After admission patient noted to be hypotensive and A-fib RVR additional IVF 1 L given and digoxin 125 mcg x 1 with conversion to normal sinus rhythm.      9/21 went back in A-fib RVR converted with IV Lopressor.   the performed exam and include at least one of the following: Automated exposure control, adjustment of the mA and/or kV according to size, and the use of iterative reconstruction technique.  ______________________________________ Electronically signed by: HELEN MEYER M.D. Date:     09/18/2024 Time:    15:47     CT HEAD WO CONTRAST    Result Date: 9/18/2024  No acute intracranial process.  Acute bilateral maxillary sinusitis.  Mild atrophy and chronic small vessel white matter change.  All CT scans are performed using dose optimization techniques as appropriate to the performed exam and include at least one of the following: Automated exposure control, adjustment of the mA and/or kV according to size, and the use of iterative reconstruction technique.  ______________________________________ Electronically signed by: LIZABETH SANCHEZ M.D. Date:     09/18/2024 Time:    15:40     XR CHEST PORTABLE    Result Date: 9/18/2024  Small bilateral pleural effusions with bibasilar atelectasis and/or pneumonia, right greater than left.  Chronic bilateral interstitial lung markings.   ______________________________________ Electronically signed by: LIZABETH SANCHEZ M.D. Date:     09/18/2024 Time:    14:25        Assessment/Plan   Principal Problem:    Sepsis due to bibasilar pneumonia /COVID-19   -CTA pulmonary negative for PE   -MRSA detected    -IV antibiotics Cefepime & Vancomycin -completed   -Baricitinib-course to be completed on 10/2  -Supplemental oxygen wean as tolerated,currently 2.5L NC     -IS/Acapella-as able    -Blood cultures NGTD     Active Problems:   Idiopathic pulmonary fibrosis    -Chronic follows [facility]   -Continue Symbicort  -Monitor for hemoptysis with initiation of anticoagulation         Palliative care patient   -Palliative following    -Hospice following      Paroxysmal atrial fibrillation    -Cardiology signed off    -Continue Toprol XL   -Digoxin 125mcg x1 on 9/19 with conversion back to

## 2024-10-04 NOTE — PROGRESS NOTES
Physical Therapy  Name: Eze Rosa  MRN:  [id number]  Date of service:  10/4/2024    Patient refusing EOB sitting and moving out of bed.  He did perform JOAN ankle pumps x 5, joan heelslides x 5, and joan SLR x 5. Physical Therapy will continue to follow and progress as able.    Electronically signed by Beatriz Razo PTA on 10/4/2024 at 3:40 PM

## 2024-10-05 PROCEDURE — 2580000003 HC RX 258: Performed by: INTERNAL MEDICINE

## 2024-10-05 PROCEDURE — 94640 AIRWAY INHALATION TREATMENT: CPT

## 2024-10-05 PROCEDURE — 2700000000 HC OXYGEN THERAPY PER DAY

## 2024-10-05 PROCEDURE — 6360000002 HC RX W HCPCS: Performed by: INTERNAL MEDICINE

## 2024-10-05 PROCEDURE — 6370000000 HC RX 637 (ALT 250 FOR IP)

## 2024-10-05 PROCEDURE — 6370000000 HC RX 637 (ALT 250 FOR IP): Performed by: INTERNAL MEDICINE

## 2024-10-05 PROCEDURE — 6370000000 HC RX 637 (ALT 250 FOR IP): Performed by: PHYSICIAN ASSISTANT

## 2024-10-05 PROCEDURE — 2140000000 HC CCU INTERMEDIATE R&B

## 2024-10-05 RX ORDER — ENOXAPARIN SODIUM 100 MG/ML
1 INJECTION SUBCUTANEOUS 2 TIMES DAILY
Status: DISCONTINUED | OUTPATIENT
Start: 2024-10-05 | End: 2024-10-11

## 2024-10-05 RX ADMIN — SODIUM CHLORIDE, PRESERVATIVE FREE 10 ML: 5 INJECTION INTRAVENOUS at 10:31

## 2024-10-05 RX ADMIN — SUCRALFATE 1 G: 1 TABLET ORAL at 22:01

## 2024-10-05 RX ADMIN — METOPROLOL SUCCINATE 25 MG: 25 TABLET, EXTENDED RELEASE ORAL at 10:29

## 2024-10-05 RX ADMIN — SODIUM CHLORIDE, PRESERVATIVE FREE 40 MG: 5 INJECTION INTRAVENOUS at 10:29

## 2024-10-05 RX ADMIN — Medication 5 MG: at 18:51

## 2024-10-05 RX ADMIN — GLYCOPYRROLATE 1 MG: 1 TABLET ORAL at 13:22

## 2024-10-05 RX ADMIN — SUCRALFATE 1 G: 1 TABLET ORAL at 06:32

## 2024-10-05 RX ADMIN — BUDESONIDE AND FORMOTEROL FUMARATE DIHYDRATE 2 PUFF: 160; 4.5 AEROSOL RESPIRATORY (INHALATION) at 07:02

## 2024-10-05 RX ADMIN — ENOXAPARIN SODIUM 50 MG: 100 INJECTION SUBCUTANEOUS at 22:02

## 2024-10-05 RX ADMIN — FINASTERIDE 5 MG: 5 TABLET, FILM COATED ORAL at 10:29

## 2024-10-05 RX ADMIN — GLYCOPYRROLATE 1 MG: 1 TABLET ORAL at 22:02

## 2024-10-05 RX ADMIN — SODIUM CHLORIDE, PRESERVATIVE FREE 40 MG: 5 INJECTION INTRAVENOUS at 22:02

## 2024-10-05 RX ADMIN — GLYCOPYRROLATE 1 MG: 1 TABLET ORAL at 10:30

## 2024-10-05 RX ADMIN — SUCRALFATE 1 G: 1 TABLET ORAL at 18:52

## 2024-10-05 RX ADMIN — GUAIFENESIN 600 MG: 600 TABLET ORAL at 22:01

## 2024-10-05 RX ADMIN — METOPROLOL SUCCINATE 25 MG: 25 TABLET, EXTENDED RELEASE ORAL at 22:02

## 2024-10-05 RX ADMIN — GUAIFENESIN 600 MG: 600 TABLET ORAL at 10:28

## 2024-10-05 RX ADMIN — ENOXAPARIN SODIUM 60 MG: 100 INJECTION SUBCUTANEOUS at 10:29

## 2024-10-05 RX ADMIN — SUCRALFATE 1 G: 1 TABLET ORAL at 10:28

## 2024-10-05 RX ADMIN — Medication 2000 UNITS: at 10:29

## 2024-10-05 RX ADMIN — Medication 5 MG: at 10:30

## 2024-10-05 ASSESSMENT — PAIN DESCRIPTION - DESCRIPTORS
DESCRIPTORS: ACHING;CRAMPING
DESCRIPTORS: ACHING;CRAMPING

## 2024-10-05 ASSESSMENT — PAIN SCALES - GENERAL
PAINLEVEL_OUTOF10: 7
PAINLEVEL_OUTOF10: 8
PAINLEVEL_OUTOF10: 7

## 2024-10-05 ASSESSMENT — PAIN DESCRIPTION - LOCATION
LOCATION: GENERALIZED
LOCATION: GENERALIZED

## 2024-10-05 ASSESSMENT — PAIN - FUNCTIONAL ASSESSMENT
PAIN_FUNCTIONAL_ASSESSMENT: PREVENTS OR INTERFERES WITH MANY ACTIVE NOT PASSIVE ACTIVITIES
PAIN_FUNCTIONAL_ASSESSMENT: PREVENTS OR INTERFERES WITH ALL ACTIVE AND SOME PASSIVE ACTIVITIES

## 2024-10-05 ASSESSMENT — PAIN SCALES - WONG BAKER: WONGBAKER_NUMERICALRESPONSE: HURTS EVEN MORE

## 2024-10-05 NOTE — PLAN OF CARE
patient/family as appropriate  5. Emphasize sustaining relationships within family system and community, or anila/spiritual traditions  6. Initiate Spiritual Care, Psychosocial Clinical Specialist, consult as needed  Outcome: Progressing     Problem: Decision Making  Goal: Pt/Family able to effectively weigh alternatives and participate in decision making related to treatment and care  Description: INTERVENTIONS:  1. Determine when there are differences between patient's view, family's view, and healthcare provider's view of condition  2. Facilitate patient and family articulation of goals for care  3. Help patient and family identify pros/cons of alternative solutions  4. Provide information as requested by patient/family  5. Respect patient/family right to receive or not to receive information  6. Serve as a liaison between patient and family and health care team  7. Initiate Consults from Ethics, Palliative Care or initiate Family Care Conference as is appropriate  Outcome: Progressing     Problem: Behavior  Goal: Pt/Family maintain appropriate behavior and adhere to behavioral management agreement, if implemented  Description: INTERVENTIONS:  1. Assess patient/family's coping skills and  non-compliant behavior (including use of illegal substances)  2. Notify security of behavior or suspected illegal substances which indicate the need for search of the family and/or belongings  3. Encourage verbalization of thoughts and concerns in a socially appropriate manner  4. Utilize positive, consistent limit setting strategies supporting safety of patient, staff and others  5. Encourage participation in the decision making process about the behavioral management agreement  6. If a visitor's behavior poses a threat to safety call refer to organization policy.  7. Initiate consult with , Psychosocial CNS, Spiritual Care as appropriate  Outcome: Progressing

## 2024-10-05 NOTE — PROGRESS NOTES
[facility]      Patient:  Eze Rosa  YOB: 1950  Date of Service: 10/5/2024  MRN: [id number]   Acct: [id number]   Primary Care Physician: Ross Masterson MD  Advance Directive: DNR  Admit Date: 9/18/2024       Hospital Day: 17  Portions of this note have been copied forward, however, changed to reflect the most current clinical status of this patient.    CHIEF COMPLAINT  altered mental status     Subjective: Resting comfortably in bed currently on 4LNC nasal cannula.  Afebrile. no issues or events overnight.    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  Of note, recently admitted in July due sepsis secondary to UTI and at that time Morataya catheter remained in place, he received IV antibiotic course, and was discharged to [facility] stable condition.  He returned to Health system ED on the day of admission due to SOA and AMS.  He was discharged from SNF facility 1 day prior to admission.      Further ED work-up revealed-CTA pulmonary moderate to severe pulmonary fibrosis and interval development of esophagitis versus mass, CXR bibasilar pneumonia right greater than left, COVID-positive, CT head no acute intracranial abnormality with acute bilateral maxillary sinusitis, lactic acid 6 with repeat 5.8, CRP 11.55, WBC 26.3, ABG metabolic alkalosis. He was initiated on cefepime and vancomycin upon admission.  After admission patient noted to be hypotensive and A-fib RVR additional IVF 1 L given and digoxin 125 mcg x 1 with conversion to normal sinus rhythm.      9/21 went back in A-fib RVR converted with IV Lopressor.

## 2024-10-05 NOTE — PLAN OF CARE
Problem: Safety - Adult  Goal: Free from fall injury  10/5/2024 0706 by Celestina Araujo RN  Outcome: Progressing  10/5/2024 0333 by Merissa Martinez RN  Outcome: Progressing     Problem: Skin/Tissue Integrity  Goal: Absence of new skin breakdown  10/5/2024 0706 by Celestina Araujo RN  Outcome: Progressing  10/5/2024 0333 by Merissa Martinez RN  Outcome: Progressing     Problem: ABCDS Injury Assessment  Goal: Absence of physical injury  10/5/2024 0706 by Celestina Araujo RN  Outcome: Progressing  10/5/2024 0333 by Merissa Martinez RN  Outcome: Progressing     Problem: Safety - Medical Restraint  Goal: Remains free of injury from restraints (Restraint for Interference with Medical Device)  10/5/2024 0706 by Celestina Araujo RN  Outcome: Progressing  10/5/2024 0333 by Merissa Martinez RN  Outcome: Progressing     Problem: Discharge Planning  Goal: Discharge to home or other facility with appropriate resources  10/5/2024 0706 by Celestina Araujo RN  Outcome: Progressing  10/5/2024 0333 by Merissa Martinez RN  Outcome: Progressing     Problem: Nutrition Deficit:  Goal: Optimize nutritional status  10/5/2024 0706 by Celestina Araujo RN  Outcome: Progressing  10/5/2024 0333 by Merissa Martinez RN  Outcome: Progressing     Problem: Pain  Goal: Verbalizes/displays adequate comfort level or baseline comfort level  10/5/2024 0706 by Celestina Araujo RN  Outcome: Progressing  10/5/2024 0333 by Merissa Martinez RN  Outcome: Progressing     Problem: Neurosensory - Adult  Goal: Achieves stable or improved neurological status  10/5/2024 0706 by Celestina Araujo RN  Outcome: Progressing  10/5/2024 0333 by Merissa Martinez RN  Outcome: Progressing     Problem: Respiratory - Adult  Goal: Achieves optimal ventilation and oxygenation  10/5/2024 0706 by Celestina Araujo RN  Outcome: Progressing  10/5/2024 0333 by Merissa Martinez RN  Outcome: Progressing     Problem: Cardiovascular - Adult  Goal: Maintains

## 2024-10-06 PROCEDURE — 6370000000 HC RX 637 (ALT 250 FOR IP): Performed by: INTERNAL MEDICINE

## 2024-10-06 PROCEDURE — 6370000000 HC RX 637 (ALT 250 FOR IP): Performed by: PHYSICIAN ASSISTANT

## 2024-10-06 PROCEDURE — 6370000000 HC RX 637 (ALT 250 FOR IP)

## 2024-10-06 PROCEDURE — 6360000002 HC RX W HCPCS: Performed by: INTERNAL MEDICINE

## 2024-10-06 PROCEDURE — 2580000003 HC RX 258: Performed by: INTERNAL MEDICINE

## 2024-10-06 PROCEDURE — 2700000000 HC OXYGEN THERAPY PER DAY

## 2024-10-06 PROCEDURE — 2140000000 HC CCU INTERMEDIATE R&B

## 2024-10-06 PROCEDURE — 94640 AIRWAY INHALATION TREATMENT: CPT

## 2024-10-06 RX ADMIN — GLYCOPYRROLATE 1 MG: 1 TABLET ORAL at 10:34

## 2024-10-06 RX ADMIN — GLYCOPYRROLATE 1 MG: 1 TABLET ORAL at 14:49

## 2024-10-06 RX ADMIN — BUDESONIDE AND FORMOTEROL FUMARATE DIHYDRATE 2 PUFF: 160; 4.5 AEROSOL RESPIRATORY (INHALATION) at 07:21

## 2024-10-06 RX ADMIN — Medication 5 MG: at 04:09

## 2024-10-06 RX ADMIN — GUAIFENESIN 600 MG: 600 TABLET ORAL at 08:11

## 2024-10-06 RX ADMIN — METOPROLOL SUCCINATE 25 MG: 25 TABLET, EXTENDED RELEASE ORAL at 21:36

## 2024-10-06 RX ADMIN — Medication 5 MG: at 10:43

## 2024-10-06 RX ADMIN — SODIUM CHLORIDE, PRESERVATIVE FREE 10 ML: 5 INJECTION INTRAVENOUS at 08:20

## 2024-10-06 RX ADMIN — QUETIAPINE FUMARATE 12.5 MG: 25 TABLET ORAL at 21:35

## 2024-10-06 RX ADMIN — METOPROLOL SUCCINATE 25 MG: 25 TABLET, EXTENDED RELEASE ORAL at 08:11

## 2024-10-06 RX ADMIN — Medication 5 MG: at 21:36

## 2024-10-06 RX ADMIN — SUCRALFATE 1 G: 1 TABLET ORAL at 21:36

## 2024-10-06 RX ADMIN — GUAIFENESIN 600 MG: 600 TABLET ORAL at 21:36

## 2024-10-06 RX ADMIN — Medication 0.5 MG: at 14:49

## 2024-10-06 RX ADMIN — SUCRALFATE 1 G: 1 TABLET ORAL at 17:21

## 2024-10-06 RX ADMIN — BUDESONIDE AND FORMOTEROL FUMARATE DIHYDRATE 2 PUFF: 160; 4.5 AEROSOL RESPIRATORY (INHALATION) at 18:37

## 2024-10-06 RX ADMIN — ENOXAPARIN SODIUM 50 MG: 100 INJECTION SUBCUTANEOUS at 21:36

## 2024-10-06 RX ADMIN — SUCRALFATE 1 G: 1 TABLET ORAL at 05:59

## 2024-10-06 RX ADMIN — SUCRALFATE 1 G: 1 TABLET ORAL at 10:34

## 2024-10-06 RX ADMIN — Medication 2000 UNITS: at 08:11

## 2024-10-06 RX ADMIN — ENOXAPARIN SODIUM 50 MG: 100 INJECTION SUBCUTANEOUS at 08:11

## 2024-10-06 RX ADMIN — SODIUM CHLORIDE, PRESERVATIVE FREE 40 MG: 5 INJECTION INTRAVENOUS at 21:36

## 2024-10-06 RX ADMIN — SODIUM CHLORIDE, PRESERVATIVE FREE 40 MG: 5 INJECTION INTRAVENOUS at 08:12

## 2024-10-06 RX ADMIN — FINASTERIDE 5 MG: 5 TABLET, FILM COATED ORAL at 08:11

## 2024-10-06 RX ADMIN — GLYCOPYRROLATE 1 MG: 1 TABLET ORAL at 21:36

## 2024-10-06 ASSESSMENT — PAIN SCALES - GENERAL
PAINLEVEL_OUTOF10: 6
PAINLEVEL_OUTOF10: 6

## 2024-10-06 ASSESSMENT — PAIN DESCRIPTION - LOCATION
LOCATION: GENERALIZED
LOCATION: GENERALIZED

## 2024-10-06 NOTE — PROGRESS NOTES
[facility]      Patient:  Eze Rosa  YOB: 1950  Date of Service: 10/6/2024  MRN: [id number]   Acct: [id number]   Primary Care Physician: Ross Masterson MD  Advance Directive: DNR  Admit Date: 9/18/2024       Hospital Day: 18  Portions of this note have been copied forward, however, changed to reflect the most current clinical status of this patient.    CHIEF COMPLAINT  altered mental status     Subjective: Resting comfortably in bed currently on 5LNC nasal cannula. Oral intake  has improved. Afebrile. no issues or events overnight.    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  Of note, recently admitted in July due sepsis secondary to UTI and at that time Morataya catheter remained in place, he received IV antibiotic course, and was discharged to [facility] stable condition.  He returned to [facility] ED on the day of admission due to SOA and AMS.  He was discharged from SNF facility 1 day prior to admission.      Further ED work-up revealed-CTA pulmonary moderate to severe pulmonary fibrosis and interval development of esophagitis versus mass, CXR bibasilar pneumonia right greater than left, COVID-positive, CT head no acute intracranial abnormality with acute bilateral maxillary sinusitis, lactic acid 6 with repeat 5.8, CRP 11.55, WBC 26.3, ABG metabolic alkalosis. He was initiated on cefepime and vancomycin upon admission.  After admission patient noted to be hypotensive and A-fib RVR additional IVF 1 L given and digoxin 125 mcg x 1 with conversion to normal sinus rhythm.      9/21 went back in A-fib RVR converted  transition to Eliquis prior to DC   -Monitor on telemetry     Mclean's esophagus with dysplasia / GERD (gastroesophageal reflux disease)  / Dysphagia / Severe malnutrition   -GI re-consulted, 9/24 esophagram esophagitis with continued dysphagia, EGD severe chronic diffuse erosive esophagitis, underlying long Mclean's segment, no tumor mass or stricture. PPI, Carafate, aspiration precautions ordered. -Follow up with GI in 1 month with recommendation for repeat EGD in 2 months  -Palliative care following, DNR status updated and hospice consulted  - following-SNF with skilled care. Hospice will follow  -CTA pulmonary wall thickening of esophagus, esophagitis vs mass   -Honey thickened liquid   -Aspiration precautions    -SLP following    -Dietician following    -Protonix BID        Benign prostatic hyperplasia with urinary retention   -Chronic Morataya exchanged 9/19   -Monitor I&O   -continue Proscar     Hypertension   -Monitor vital signs    -toprol Continued      Anxiety   -Has been calm and cooperative since 9/20   -Seroquel as needed for anxiety/agitation    -Ativan available for anxiety     DVT Prophylaxis: Lovenox     GI prophylaxis:  protonix    Disposition: TBD-case management involved    BRENDEN Levy, 10/6/2024 2:02 PM

## 2024-10-06 NOTE — PLAN OF CARE
Problem: Safety - Adult  Goal: Free from fall injury  Outcome: Progressing     Problem: Skin/Tissue Integrity  Goal: Absence of new skin breakdown  Description: 1.  Monitor for areas of redness and/or skin breakdown  2.  Assess vascular access sites hourly  3.  Every 4-6 hours minimum:  Change oxygen saturation probe site  4.  Every 4-6 hours:  If on nasal continuous positive airway pressure, respiratory therapy assess nares and determine need for appliance change or resting period.  Outcome: Progressing     Problem: ABCDS Injury Assessment  Goal: Absence of physical injury  Outcome: Progressing     Problem: Safety - Medical Restraint  Goal: Remains free of injury from restraints (Restraint for Interference with Medical Device)  Description: INTERVENTIONS:  1. Determine that other, less restrictive measures have been tried or would not be effective before applying the restraint  2. Evaluate the patient's condition at the time of restraint application  3. Inform patient/family regarding the reason for restraint  4. Q2H: Monitor safety, psychosocial status, comfort, nutrition and hydration  Outcome: Progressing     Problem: Discharge Planning  Goal: Discharge to home or other facility with appropriate resources  Outcome: Progressing     Problem: Nutrition Deficit:  Goal: Optimize nutritional status  Outcome: Progressing     Problem: Pain  Goal: Verbalizes/displays adequate comfort level or baseline comfort level  Outcome: Progressing     Problem: Neurosensory - Adult  Goal: Achieves stable or improved neurological status  Outcome: Progressing     Problem: Respiratory - Adult  Goal: Achieves optimal ventilation and oxygenation  Outcome: Progressing     Problem: Cardiovascular - Adult  Goal: Maintains optimal cardiac output and hemodynamic stability  Outcome: Progressing     Problem: Skin/Tissue Integrity - Adult  Goal: Skin integrity remains intact  Outcome: Progressing     Problem: Musculoskeletal - Adult  Goal:  Return mobility to safest level of function  Outcome: Progressing     Problem: Gastrointestinal - Adult  Goal: Minimal or absence of nausea and vomiting  Outcome: Progressing     Problem: Genitourinary - Adult  Goal: Absence of urinary retention  Outcome: Progressing     Problem: Infection - Adult  Goal: Absence of infection at discharge  Outcome: Progressing     Problem: Metabolic/Fluid and Electrolytes - Adult  Goal: Electrolytes maintained within normal limits  Outcome: Progressing     Problem: Hematologic - Adult  Goal: Maintains hematologic stability  Outcome: Progressing     Problem: Anxiety  Goal: Will report anxiety at manageable levels  Description: INTERVENTIONS:  1. Administer medication as ordered  2. Teach and rehearse alternative coping skills  3. Provide emotional support with 1:1 interaction with staff  Outcome: Progressing     Problem: Coping  Goal: Pt/Family able to verbalize concerns and demonstrate effective coping strategies  Description: INTERVENTIONS:  1. Assist patient/family to identify coping skills, available support systems and cultural and spiritual values  2. Provide emotional support, including active listening and acknowledgement of concerns of patient and caregivers  3. Reduce environmental stimuli, as able  4. Instruct patient/family in relaxation techniques, as appropriate  5. Assess for spiritual pain/suffering and initiate Spiritual Care, Psychosocial Clinical Specialist consults as needed  Outcome: Progressing     Problem: Death & Dying  Goal: Pt/Family communicate acceptance of impending death and feel psychological comfort and peace  Description: INTERVENTIONS:  1. Assess patient/family anxiety and grief process related to end of life issues  2. Provide emotional and spiritual support  3. Provide information about the patient's health status with consideration of family and cultural values  4. Communicate willingness to discuss death and facilitate grief process  with

## 2024-10-07 LAB
ANION GAP SERPL CALCULATED.3IONS-SCNC: 8 MMOL/L (ref 7–19)
BASOPHILS # BLD: 0 K/UL (ref 0–0.2)
BASOPHILS NFR BLD: 0.2 % (ref 0–1)
BUN SERPL-MCNC: 12 MG/DL (ref 8–23)
CALCIUM SERPL-MCNC: 8 MG/DL (ref 8.8–10.2)
CHLORIDE SERPL-SCNC: 96 MMOL/L (ref 98–111)
CO2 SERPL-SCNC: 32 MMOL/L (ref 22–29)
CREAT SERPL-MCNC: 0.5 MG/DL (ref 0.7–1.2)
EOSINOPHIL # BLD: 0.1 K/UL (ref 0–0.6)
EOSINOPHIL NFR BLD: 2.4 % (ref 0–5)
ERYTHROCYTE [DISTWIDTH] IN BLOOD BY AUTOMATED COUNT: 13.4 % (ref 11.5–14.5)
GLUCOSE SERPL-MCNC: 96 MG/DL (ref 70–99)
HCT VFR BLD AUTO: 27.7 % (ref 42–52)
HGB BLD-MCNC: 9.1 G/DL (ref 14–18)
IMM GRANULOCYTES # BLD: 0 K/UL
LYMPHOCYTES # BLD: 0.6 K/UL (ref 1.1–4.5)
LYMPHOCYTES NFR BLD: 13.7 % (ref 20–40)
MAGNESIUM SERPL-MCNC: 1.3 MG/DL (ref 1.6–2.4)
MCH RBC QN AUTO: 30 PG (ref 27–31)
MCHC RBC AUTO-ENTMCNC: 32.9 G/DL (ref 33–37)
MCV RBC AUTO: 91.4 FL (ref 80–94)
MONOCYTES # BLD: 0.6 K/UL (ref 0–0.9)
MONOCYTES NFR BLD: 13.4 % (ref 0–10)
NEUTROPHILS # BLD: 2.9 K/UL (ref 1.5–7.5)
NEUTS SEG NFR BLD: 70.1 % (ref 50–65)
PLATELET # BLD AUTO: 171 K/UL (ref 130–400)
PMV BLD AUTO: 10.7 FL (ref 9.4–12.4)
POTASSIUM SERPL-SCNC: 3 MMOL/L (ref 3.5–5)
POTASSIUM SERPL-SCNC: 3.6 MMOL/L (ref 3.5–5)
RBC # BLD AUTO: 3.03 M/UL (ref 4.7–6.1)
SODIUM SERPL-SCNC: 136 MMOL/L (ref 136–145)
WBC # BLD AUTO: 4.2 K/UL (ref 4.8–10.8)

## 2024-10-07 PROCEDURE — 2580000003 HC RX 258: Performed by: INTERNAL MEDICINE

## 2024-10-07 PROCEDURE — 6370000000 HC RX 637 (ALT 250 FOR IP): Performed by: INTERNAL MEDICINE

## 2024-10-07 PROCEDURE — 6360000002 HC RX W HCPCS: Performed by: INTERNAL MEDICINE

## 2024-10-07 PROCEDURE — 94760 N-INVAS EAR/PLS OXIMETRY 1: CPT

## 2024-10-07 PROCEDURE — 2140000000 HC CCU INTERMEDIATE R&B

## 2024-10-07 PROCEDURE — 85025 COMPLETE CBC W/AUTO DIFF WBC: CPT

## 2024-10-07 PROCEDURE — 83735 ASSAY OF MAGNESIUM: CPT

## 2024-10-07 PROCEDURE — 80048 BASIC METABOLIC PNL TOTAL CA: CPT

## 2024-10-07 PROCEDURE — 84132 ASSAY OF SERUM POTASSIUM: CPT

## 2024-10-07 PROCEDURE — 2700000000 HC OXYGEN THERAPY PER DAY

## 2024-10-07 PROCEDURE — 94640 AIRWAY INHALATION TREATMENT: CPT

## 2024-10-07 PROCEDURE — 6370000000 HC RX 637 (ALT 250 FOR IP): Performed by: PHYSICIAN ASSISTANT

## 2024-10-07 PROCEDURE — 6370000000 HC RX 637 (ALT 250 FOR IP)

## 2024-10-07 PROCEDURE — 36415 COLL VENOUS BLD VENIPUNCTURE: CPT

## 2024-10-07 RX ADMIN — GLYCOPYRROLATE 1 MG: 1 TABLET ORAL at 08:05

## 2024-10-07 RX ADMIN — POTASSIUM CHLORIDE 10 MEQ: 10 INJECTION, SOLUTION INTRAVENOUS at 08:05

## 2024-10-07 RX ADMIN — SODIUM CHLORIDE, PRESERVATIVE FREE 40 MG: 5 INJECTION INTRAVENOUS at 08:06

## 2024-10-07 RX ADMIN — METOPROLOL SUCCINATE 25 MG: 25 TABLET, EXTENDED RELEASE ORAL at 08:06

## 2024-10-07 RX ADMIN — SODIUM CHLORIDE, PRESERVATIVE FREE 40 MG: 5 INJECTION INTRAVENOUS at 21:16

## 2024-10-07 RX ADMIN — POTASSIUM CHLORIDE 10 MEQ: 10 INJECTION, SOLUTION INTRAVENOUS at 10:31

## 2024-10-07 RX ADMIN — GLYCOPYRROLATE 1 MG: 1 TABLET ORAL at 21:16

## 2024-10-07 RX ADMIN — SUCRALFATE 1 G: 1 TABLET ORAL at 06:06

## 2024-10-07 RX ADMIN — ENOXAPARIN SODIUM 50 MG: 100 INJECTION SUBCUTANEOUS at 08:06

## 2024-10-07 RX ADMIN — SODIUM CHLORIDE, PRESERVATIVE FREE 10 ML: 5 INJECTION INTRAVENOUS at 21:16

## 2024-10-07 RX ADMIN — ENOXAPARIN SODIUM 50 MG: 100 INJECTION SUBCUTANEOUS at 21:15

## 2024-10-07 RX ADMIN — SUCRALFATE 1 G: 1 TABLET ORAL at 10:34

## 2024-10-07 RX ADMIN — ACETAMINOPHEN 650 MG: 325 TABLET ORAL at 18:18

## 2024-10-07 RX ADMIN — GLYCOPYRROLATE 1 MG: 1 TABLET ORAL at 13:53

## 2024-10-07 RX ADMIN — FINASTERIDE 5 MG: 5 TABLET, FILM COATED ORAL at 08:06

## 2024-10-07 RX ADMIN — POTASSIUM CHLORIDE 10 MEQ: 10 INJECTION, SOLUTION INTRAVENOUS at 06:07

## 2024-10-07 RX ADMIN — Medication 2000 UNITS: at 08:05

## 2024-10-07 RX ADMIN — POTASSIUM CHLORIDE 10 MEQ: 10 INJECTION, SOLUTION INTRAVENOUS at 10:18

## 2024-10-07 RX ADMIN — GUAIFENESIN 600 MG: 600 TABLET ORAL at 21:16

## 2024-10-07 RX ADMIN — QUETIAPINE FUMARATE 12.5 MG: 25 TABLET ORAL at 21:16

## 2024-10-07 RX ADMIN — BUDESONIDE AND FORMOTEROL FUMARATE DIHYDRATE 2 PUFF: 160; 4.5 AEROSOL RESPIRATORY (INHALATION) at 18:44

## 2024-10-07 RX ADMIN — GUAIFENESIN 600 MG: 600 TABLET ORAL at 08:06

## 2024-10-07 RX ADMIN — SUCRALFATE 1 G: 1 TABLET ORAL at 17:14

## 2024-10-07 RX ADMIN — POTASSIUM CHLORIDE 10 MEQ: 10 INJECTION, SOLUTION INTRAVENOUS at 10:32

## 2024-10-07 RX ADMIN — BUDESONIDE AND FORMOTEROL FUMARATE DIHYDRATE 2 PUFF: 160; 4.5 AEROSOL RESPIRATORY (INHALATION) at 06:33

## 2024-10-07 RX ADMIN — POTASSIUM CHLORIDE 10 MEQ: 10 INJECTION, SOLUTION INTRAVENOUS at 08:14

## 2024-10-07 RX ADMIN — SUCRALFATE 1 G: 1 TABLET ORAL at 21:16

## 2024-10-07 NOTE — CARE COORDINATION
Kiera called Pt eliecer Jose to discuss d/c plans and goals with her. Kiera left voice message for returned call.   Electronically signed by ARLYN RODRIGUEZ on 10/7/2024 at 2:04 PM

## 2024-10-07 NOTE — PROGRESS NOTES
Physical Therapy  Name: Eze Rosa  MRN:  [id number]  Date of service:  10/7/2024    Attempt made this pm.  Much encouragement given to attempt sitting EOB.  Patient denied.  Patient was asked several different ways to work with therapy and he continued to deny.    Electronically signed by Beatriz Razo PTA on 10/7/2024 at 4:30 PM

## 2024-10-07 NOTE — PROGRESS NOTES
[facility]      Patient:  Eze Rosa  YOB: 1950  Date of Service: 10/7/2024  MRN: [id number]   Acct: [id number]   Primary Care Physician: Ross Masterson MD  Advance Directive: DNR  Admit Date: 9/18/2024       Hospital Day: 19  Portions of this note have been copied forward, however, changed to reflect the most current clinical status of this patient.    CHIEF COMPLAINT  altered mental status     Subjective: Resting comfortably in bed currently on 5LNC nasal cannula. Oral intake  has improved. Encouraged to slow down when drinking and eating.  Afebrile. no issues or events overnight.    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  Of note, recently admitted in July, sepsis secondary to UTI and at that time Morataya catheter remains in place, he received IV antibiotic course, he was discharged to [facility] in stable condition.  He returned to [facility] ED on the day of admission due to shortness of air and altered mental status.  Patient was discharged from SNF facility 1 day prior to this admission.  Further ED workup revealed CTA pulmonary moderate to severe pulmonary fibrosis and interval development meant of esophagitis versus mass, chest x-ray bibasilar pneumonia right greater than left, COVID-positive, CT of the head no acute intercranial abnormalities with acute bilateral maxillary sinusitis, lactic acid 6 with repeat 5.8.  CRP 11.55, WBCs 26.3, ABGs metabolic alkalosis.  Patient was initiated on cefepime and vancomycin upon admission.  After admission patient noted to be

## 2024-10-08 LAB
ANION GAP SERPL CALCULATED.3IONS-SCNC: 10 MMOL/L (ref 7–19)
BASOPHILS # BLD: 0 K/UL (ref 0–0.2)
BASOPHILS NFR BLD: 0.3 % (ref 0–1)
BUN SERPL-MCNC: 9 MG/DL (ref 8–23)
CALCIUM SERPL-MCNC: 7.9 MG/DL (ref 8.8–10.2)
CHLORIDE SERPL-SCNC: 95 MMOL/L (ref 98–111)
CO2 SERPL-SCNC: 26 MMOL/L (ref 22–29)
CREAT SERPL-MCNC: 0.5 MG/DL (ref 0.7–1.2)
EOSINOPHIL # BLD: 0.1 K/UL (ref 0–0.6)
EOSINOPHIL NFR BLD: 1.2 % (ref 0–5)
ERYTHROCYTE [DISTWIDTH] IN BLOOD BY AUTOMATED COUNT: 13.2 % (ref 11.5–14.5)
GLUCOSE SERPL-MCNC: 106 MG/DL (ref 70–99)
HCT VFR BLD AUTO: 27.1 % (ref 42–52)
HGB BLD-MCNC: 9.2 G/DL (ref 14–18)
IMM GRANULOCYTES # BLD: 0.1 K/UL
LYMPHOCYTES # BLD: 0.5 K/UL (ref 1.1–4.5)
LYMPHOCYTES NFR BLD: 7.3 % (ref 20–40)
MCH RBC QN AUTO: 30.6 PG (ref 27–31)
MCHC RBC AUTO-ENTMCNC: 33.9 G/DL (ref 33–37)
MCV RBC AUTO: 90 FL (ref 80–94)
MONOCYTES # BLD: 0.7 K/UL (ref 0–0.9)
MONOCYTES NFR BLD: 10.1 % (ref 0–10)
NEUTROPHILS # BLD: 5.5 K/UL (ref 1.5–7.5)
NEUTS SEG NFR BLD: 80.2 % (ref 50–65)
PLATELET # BLD AUTO: 145 K/UL (ref 130–400)
PLATELET SLIDE REVIEW: ADEQUATE
PMV BLD AUTO: 11.1 FL (ref 9.4–12.4)
POTASSIUM SERPL-SCNC: 4 MMOL/L (ref 3.5–5)
RBC # BLD AUTO: 3.01 M/UL (ref 4.7–6.1)
SODIUM SERPL-SCNC: 131 MMOL/L (ref 136–145)
WBC # BLD AUTO: 6.9 K/UL (ref 4.8–10.8)

## 2024-10-08 PROCEDURE — 94640 AIRWAY INHALATION TREATMENT: CPT

## 2024-10-08 PROCEDURE — 6370000000 HC RX 637 (ALT 250 FOR IP): Performed by: PHYSICIAN ASSISTANT

## 2024-10-08 PROCEDURE — 2580000003 HC RX 258: Performed by: INTERNAL MEDICINE

## 2024-10-08 PROCEDURE — 6370000000 HC RX 637 (ALT 250 FOR IP): Performed by: INTERNAL MEDICINE

## 2024-10-08 PROCEDURE — 6370000000 HC RX 637 (ALT 250 FOR IP)

## 2024-10-08 PROCEDURE — 2700000000 HC OXYGEN THERAPY PER DAY

## 2024-10-08 PROCEDURE — 36415 COLL VENOUS BLD VENIPUNCTURE: CPT

## 2024-10-08 PROCEDURE — 6360000002 HC RX W HCPCS: Performed by: INTERNAL MEDICINE

## 2024-10-08 PROCEDURE — 85025 COMPLETE CBC W/AUTO DIFF WBC: CPT

## 2024-10-08 PROCEDURE — 80048 BASIC METABOLIC PNL TOTAL CA: CPT

## 2024-10-08 PROCEDURE — 2140000000 HC CCU INTERMEDIATE R&B

## 2024-10-08 RX ORDER — HALOPERIDOL 5 MG/ML
2 INJECTION INTRAMUSCULAR EVERY 6 HOURS PRN
Status: DISCONTINUED | OUTPATIENT
Start: 2024-10-08 | End: 2024-10-18 | Stop reason: HOSPADM

## 2024-10-08 RX ADMIN — BUDESONIDE AND FORMOTEROL FUMARATE DIHYDRATE 2 PUFF: 160; 4.5 AEROSOL RESPIRATORY (INHALATION) at 06:47

## 2024-10-08 RX ADMIN — HALOPERIDOL LACTATE 2 MG: 5 INJECTION, SOLUTION INTRAMUSCULAR at 23:09

## 2024-10-08 RX ADMIN — SODIUM CHLORIDE, PRESERVATIVE FREE 40 MG: 5 INJECTION INTRAVENOUS at 23:08

## 2024-10-08 RX ADMIN — SODIUM CHLORIDE, PRESERVATIVE FREE 10 ML: 5 INJECTION INTRAVENOUS at 23:46

## 2024-10-08 RX ADMIN — FINASTERIDE 5 MG: 5 TABLET, FILM COATED ORAL at 09:28

## 2024-10-08 RX ADMIN — Medication 5 MG: at 00:16

## 2024-10-08 RX ADMIN — SODIUM CHLORIDE, PRESERVATIVE FREE 40 MG: 5 INJECTION INTRAVENOUS at 09:29

## 2024-10-08 RX ADMIN — Medication 5 MG: at 12:12

## 2024-10-08 RX ADMIN — Medication 2000 UNITS: at 09:28

## 2024-10-08 RX ADMIN — ENOXAPARIN SODIUM 50 MG: 100 INJECTION SUBCUTANEOUS at 09:29

## 2024-10-08 RX ADMIN — ENOXAPARIN SODIUM 50 MG: 100 INJECTION SUBCUTANEOUS at 23:08

## 2024-10-08 RX ADMIN — Medication 0.5 MG: at 00:48

## 2024-10-08 RX ADMIN — Medication 0.5 MG: at 23:09

## 2024-10-08 RX ADMIN — HALOPERIDOL LACTATE 2 MG: 5 INJECTION, SOLUTION INTRAMUSCULAR at 01:45

## 2024-10-08 RX ADMIN — SUCRALFATE 1 G: 1 TABLET ORAL at 09:28

## 2024-10-08 RX ADMIN — Medication 5 MG: at 23:09

## 2024-10-08 RX ADMIN — GLYCOPYRROLATE 1 MG: 1 TABLET ORAL at 09:28

## 2024-10-08 RX ADMIN — BUDESONIDE AND FORMOTEROL FUMARATE DIHYDRATE 2 PUFF: 160; 4.5 AEROSOL RESPIRATORY (INHALATION) at 19:09

## 2024-10-08 RX ADMIN — SUCRALFATE 1 G: 1 TABLET ORAL at 17:06

## 2024-10-08 RX ADMIN — METOPROLOL SUCCINATE 25 MG: 25 TABLET, EXTENDED RELEASE ORAL at 09:29

## 2024-10-08 ASSESSMENT — PAIN SCALES - WONG BAKER: WONGBAKER_NUMERICALRESPONSE: HURTS EVEN MORE

## 2024-10-08 ASSESSMENT — PAIN SCALES - GENERAL
PAINLEVEL_OUTOF10: 6
PAINLEVEL_OUTOF10: 6

## 2024-10-08 NOTE — PLAN OF CARE
Problem: Safety - Adult  Goal: Free from fall injury  10/8/2024 1549 by Annie Parrish RN  Outcome: Progressing  10/8/2024 0151 by Kaia Amato RN  Outcome: Progressing     Problem: Skin/Tissue Integrity  Goal: Absence of new skin breakdown  Description: 1.  Monitor for areas of redness and/or skin breakdown  2.  Assess vascular access sites hourly  3.  Every 4-6 hours minimum:  Change oxygen saturation probe site  4.  Every 4-6 hours:  If on nasal continuous positive airway pressure, respiratory therapy assess nares and determine need for appliance change or resting period.  10/8/2024 1549 by Annie Parrish RN  Outcome: Progressing  10/8/2024 0151 by Kaia Amato RN  Outcome: Progressing     Problem: ABCDS Injury Assessment  Goal: Absence of physical injury  10/8/2024 1549 by Annie Parrish RN  Outcome: Progressing  10/8/2024 0151 by Kaia Amato RN  Outcome: Progressing     Problem: Safety - Medical Restraint  Goal: Remains free of injury from restraints (Restraint for Interference with Medical Device)  Description: INTERVENTIONS:  1. Determine that other, less restrictive measures have been tried or would not be effective before applying the restraint  2. Evaluate the patient's condition at the time of restraint application  3. Inform patient/family regarding the reason for restraint  4. Q2H: Monitor safety, psychosocial status, comfort, nutrition and hydration  10/8/2024 1549 by Annie Parrish RN  Outcome: Progressing  10/8/2024 0151 by Kaia Amato RN  Outcome: Progressing     Problem: Discharge Planning  Goal: Discharge to home or other facility with appropriate resources  10/8/2024 1549 by Annie Parrish RN  Outcome: Progressing  10/8/2024 0151 by Kaia Amato RN  Outcome: Progressing     Problem: Nutrition Deficit:  Goal: Optimize nutritional status  10/8/2024 1549 by Annie Parrish RN  Outcome: Progressing  10/8/2024 0151 by Kaia Amato  RN  Outcome: Progressing     Problem: Pain  Goal: Verbalizes/displays adequate comfort level or baseline comfort level  10/8/2024 1549 by Annie Parrish RN  Outcome: Progressing  10/8/2024 0151 by Kaia Amato RN  Outcome: Progressing     Problem: Neurosensory - Adult  Goal: Achieves stable or improved neurological status  10/8/2024 1549 by Annie Parrish RN  Outcome: Progressing  10/8/2024 0151 by Kaia Amato RN  Outcome: Progressing     Problem: Respiratory - Adult  Goal: Achieves optimal ventilation and oxygenation  10/8/2024 1549 by Annie Parrish RN  Outcome: Progressing  10/8/2024 0151 by Kaia Amato RN  Outcome: Progressing     Problem: Cardiovascular - Adult  Goal: Maintains optimal cardiac output and hemodynamic stability  10/8/2024 1549 by Annie Parrish RN  Outcome: Progressing  10/8/2024 0151 by Kaia Amato RN  Outcome: Progressing     Problem: Skin/Tissue Integrity - Adult  Goal: Skin integrity remains intact  10/8/2024 1549 by Annie Parrish RN  Outcome: Progressing  10/8/2024 0151 by Kaia Amato RN  Outcome: Progressing     Problem: Musculoskeletal - Adult  Goal: Return mobility to safest level of function  10/8/2024 1549 by Annie Parrish RN  Outcome: Progressing  10/8/2024 0151 by Kaia Amato RN  Outcome: Progressing     Problem: Gastrointestinal - Adult  Goal: Minimal or absence of nausea and vomiting  10/8/2024 1549 by Annie Parrish RN  Outcome: Progressing  10/8/2024 0151 by Kaia Amato RN  Outcome: Progressing     Problem: Genitourinary - Adult  Goal: Absence of urinary retention  10/8/2024 1549 by Annie Parrish RN  Outcome: Progressing  10/8/2024 0151 by Kaia Amato RN  Outcome: Progressing     Problem: Infection - Adult  Goal: Absence of infection at discharge  10/8/2024 1549 by Annie Parrish RN  Outcome: Progressing  10/8/2024 0151 by Kaia Amato RN  Outcome: Progressing     Problem:

## 2024-10-08 NOTE — PROGRESS NOTES
DIS Nurse Note  SUBJECTIVE: Patient assessed secondary to elevated Deterioration Index Score.      Deterioration Index Score:  Predictive Model Details          51 (Warning)  Factor Value    Calculated 10/8/2024 09:43 22% Age 73 years old    Deterioration Index Model 22% Supplemental oxygen Nasal cannula     18% Neurological exam X     13% Corrine coma scale 14     9% Hematocrit abnormal (27.1 %)     8% Systolic 98     6% Sodium abnormal (131 mmol/L)     3% Pulse oximetry 100 %     1% Pulse 88     0% Potassium 4.0 mmol/L     0% Temperature 97.6 °F (36.4 °C)     0% Respiratory rate 16     0% WBC count 6.9 K/uL        Vital Signs:  Vitals:    10/08/24 0020 10/08/24 0145 10/08/24 0605 10/08/24 0840   BP: 124/88  109/65 (!) 98/58   Pulse: (!) 132 (!) 111 (!) 101 88   Resp: 22  18 16   Temp: 99.3 °F (37.4 °C)  98.6 °F (37 °C) 97.6 °F (36.4 °C)   TempSrc: Temporal  Temporal Temporal   SpO2: (!) 87% 97% 98% 100%   Weight:       Height:            Provider Notified: Reviewed patient status  & DIS score with Provider Jerrell Hills APRN    ASSESSMENT:   confused at baseline, no significant change from previous status    Plan of Care: Continue rounding and patient monitoring.     Electronically signed by Annie Parrish RN

## 2024-10-08 NOTE — PROGRESS NOTES
-IS/Acapella-as able    -Blood cultures NGTD     Active Problems:   Idiopathic pulmonary fibrosis    -Chronic follows [facility]   -Continue Symbicort  -Monitor for hemoptysis with initiation of anticoagulation       Palliative care patient   -Palliative following    -Hospice following      Paroxysmal atrial fibrillation    -Cardiology signed off    -Continue Toprol XL   -Digoxin 125mcg x1 on 9/19 with conversion back to NSR  -Lopressor 2.5mg x1 on 9/21 with conversion   -[id number] score 2   -Lovenox 1 mg/kg transition to Eliquis prior to DC   -Monitor on telemetry     Mclean's esophagus with dysplasia / GERD (gastroesophageal reflux disease)  / Dysphagia / Severe malnutrition   -GI re-consulted, 9/24 esophagram esophagitis with continued dysphagia, EGD severe chronic diffuse erosive esophagitis, underlying long Mclean's segment, no tumor mass or stricture. PPI, Carafate, aspiration precautions ordered. -Follow up with GI in 1 month with recommendation for repeat EGD in 2 months  -Palliative care following, DNR status updated and hospice consulted  - following-SNF with skilled care. Hospice will follow  -CTA pulmonary wall thickening of esophagus, esophagitis vs mass   -Honey thickened liquid   -Aspiration precautions    -SLP following    -Dietician following    -Protonix BID      Benign prostatic hyperplasia with urinary retention   -Chronic Morataya exchanged 9/19   -Monitor I&O   -continue Proscar     Hypertension   -Monitor vital signs    -toprol Continued      Anxiety   -Has been calm and cooperative since 9/20   -Seroquel as needed for anxiety/agitation    -Ativan available for anxiety     DVT Prophylaxis: Lovenox     GI prophylaxis:  protonix    Disposition: TBD-case management involved    BRENDEN Carlisle - CNP, 10/8/2024 8:48 AM

## 2024-10-08 NOTE — PROGRESS NOTES
Occupational Therapy  Pt in bed upon arrival for therapy. Pt states \"I just want to be left alone. I don't want to do anything\". Will continue to follow as abl. Electronically signed by ANTHONY Patterson on 10/8/2024 at 11:19 AM

## 2024-10-09 LAB
ANION GAP SERPL CALCULATED.3IONS-SCNC: 8 MMOL/L (ref 7–19)
BASOPHILS # BLD: 0 K/UL (ref 0–0.2)
BASOPHILS NFR BLD: 0.2 % (ref 0–1)
BUN SERPL-MCNC: 7 MG/DL (ref 8–23)
CALCIUM SERPL-MCNC: 8.1 MG/DL (ref 8.8–10.2)
CHLORIDE SERPL-SCNC: 93 MMOL/L (ref 98–111)
CO2 SERPL-SCNC: 32 MMOL/L (ref 22–29)
CREAT SERPL-MCNC: 0.5 MG/DL (ref 0.7–1.2)
EOSINOPHIL # BLD: 0.1 K/UL (ref 0–0.6)
EOSINOPHIL NFR BLD: 0.9 % (ref 0–5)
ERYTHROCYTE [DISTWIDTH] IN BLOOD BY AUTOMATED COUNT: 13.6 % (ref 11.5–14.5)
GLUCOSE SERPL-MCNC: 147 MG/DL (ref 70–99)
HCT VFR BLD AUTO: 27.8 % (ref 42–52)
HGB BLD-MCNC: 9 G/DL (ref 14–18)
IMM GRANULOCYTES # BLD: 0 K/UL
LYMPHOCYTES # BLD: 0.7 K/UL (ref 1.1–4.5)
LYMPHOCYTES NFR BLD: 11.9 % (ref 20–40)
MCH RBC QN AUTO: 30.5 PG (ref 27–31)
MCHC RBC AUTO-ENTMCNC: 32.4 G/DL (ref 33–37)
MCV RBC AUTO: 94.2 FL (ref 80–94)
MONOCYTES # BLD: 0.6 K/UL (ref 0–0.9)
MONOCYTES NFR BLD: 11 % (ref 0–10)
NEUTROPHILS # BLD: 4.2 K/UL (ref 1.5–7.5)
NEUTS SEG NFR BLD: 75.6 % (ref 50–65)
PLATELET # BLD AUTO: 144 K/UL (ref 130–400)
PMV BLD AUTO: 10.8 FL (ref 9.4–12.4)
POTASSIUM SERPL-SCNC: 3.6 MMOL/L (ref 3.5–5)
RBC # BLD AUTO: 2.95 M/UL (ref 4.7–6.1)
SODIUM SERPL-SCNC: 133 MMOL/L (ref 136–145)
WBC # BLD AUTO: 5.5 K/UL (ref 4.8–10.8)

## 2024-10-09 PROCEDURE — 2140000000 HC CCU INTERMEDIATE R&B

## 2024-10-09 PROCEDURE — 6370000000 HC RX 637 (ALT 250 FOR IP): Performed by: INTERNAL MEDICINE

## 2024-10-09 PROCEDURE — 36415 COLL VENOUS BLD VENIPUNCTURE: CPT

## 2024-10-09 PROCEDURE — 6360000002 HC RX W HCPCS: Performed by: INTERNAL MEDICINE

## 2024-10-09 PROCEDURE — 2580000003 HC RX 258: Performed by: INTERNAL MEDICINE

## 2024-10-09 PROCEDURE — 80048 BASIC METABOLIC PNL TOTAL CA: CPT

## 2024-10-09 PROCEDURE — 94761 N-INVAS EAR/PLS OXIMETRY MLT: CPT

## 2024-10-09 PROCEDURE — 6370000000 HC RX 637 (ALT 250 FOR IP)

## 2024-10-09 PROCEDURE — 6370000000 HC RX 637 (ALT 250 FOR IP): Performed by: PHYSICIAN ASSISTANT

## 2024-10-09 PROCEDURE — 94640 AIRWAY INHALATION TREATMENT: CPT

## 2024-10-09 PROCEDURE — 94760 N-INVAS EAR/PLS OXIMETRY 1: CPT

## 2024-10-09 PROCEDURE — 2700000000 HC OXYGEN THERAPY PER DAY

## 2024-10-09 PROCEDURE — 85025 COMPLETE CBC W/AUTO DIFF WBC: CPT

## 2024-10-09 RX ORDER — PANTOPRAZOLE SODIUM 40 MG/1
40 TABLET, DELAYED RELEASE ORAL 2 TIMES DAILY
Status: DISCONTINUED | OUTPATIENT
Start: 2024-10-09 | End: 2024-10-18 | Stop reason: HOSPADM

## 2024-10-09 RX ADMIN — FINASTERIDE 5 MG: 5 TABLET, FILM COATED ORAL at 08:43

## 2024-10-09 RX ADMIN — ENOXAPARIN SODIUM 50 MG: 100 INJECTION SUBCUTANEOUS at 22:54

## 2024-10-09 RX ADMIN — GLYCOPYRROLATE 1 MG: 1 TABLET ORAL at 13:07

## 2024-10-09 RX ADMIN — GLYCOPYRROLATE 1 MG: 1 TABLET ORAL at 22:58

## 2024-10-09 RX ADMIN — SODIUM CHLORIDE, PRESERVATIVE FREE 40 MG: 5 INJECTION INTRAVENOUS at 08:44

## 2024-10-09 RX ADMIN — METOPROLOL SUCCINATE 25 MG: 25 TABLET, EXTENDED RELEASE ORAL at 22:54

## 2024-10-09 RX ADMIN — Medication 2000 UNITS: at 08:43

## 2024-10-09 RX ADMIN — SUCRALFATE 1 G: 1 TABLET ORAL at 13:07

## 2024-10-09 RX ADMIN — GUAIFENESIN 600 MG: 600 TABLET ORAL at 08:44

## 2024-10-09 RX ADMIN — Medication 0.5 MG: at 11:04

## 2024-10-09 RX ADMIN — PANTOPRAZOLE SODIUM 40 MG: 40 TABLET, DELAYED RELEASE ORAL at 22:54

## 2024-10-09 RX ADMIN — GLYCOPYRROLATE 1 MG: 1 TABLET ORAL at 08:43

## 2024-10-09 RX ADMIN — SODIUM CHLORIDE, PRESERVATIVE FREE 10 ML: 5 INJECTION INTRAVENOUS at 22:55

## 2024-10-09 RX ADMIN — SODIUM CHLORIDE, PRESERVATIVE FREE 10 ML: 5 INJECTION INTRAVENOUS at 08:44

## 2024-10-09 RX ADMIN — BUDESONIDE AND FORMOTEROL FUMARATE DIHYDRATE 2 PUFF: 160; 4.5 AEROSOL RESPIRATORY (INHALATION) at 07:12

## 2024-10-09 RX ADMIN — METOPROLOL SUCCINATE 25 MG: 25 TABLET, EXTENDED RELEASE ORAL at 08:43

## 2024-10-09 RX ADMIN — ENOXAPARIN SODIUM 50 MG: 100 INJECTION SUBCUTANEOUS at 08:43

## 2024-10-09 RX ADMIN — SUCRALFATE 1 G: 1 TABLET ORAL at 17:19

## 2024-10-09 RX ADMIN — Medication 0.5 MG: at 17:19

## 2024-10-09 RX ADMIN — SUCRALFATE 1 G: 1 TABLET ORAL at 22:54

## 2024-10-09 RX ADMIN — GUAIFENESIN 600 MG: 600 TABLET ORAL at 22:54

## 2024-10-09 RX ADMIN — BUDESONIDE AND FORMOTEROL FUMARATE DIHYDRATE 2 PUFF: 160; 4.5 AEROSOL RESPIRATORY (INHALATION) at 18:24

## 2024-10-09 RX ADMIN — Medication 5 MG: at 22:54

## 2024-10-09 ASSESSMENT — PAIN SCALES - WONG BAKER: WONGBAKER_NUMERICALRESPONSE: HURTS EVEN MORE

## 2024-10-09 NOTE — PROGRESS NOTES
[facility]      Patient:  Eze Rosa  YOB: 1950  Date of Service: 10/9/2024  MRN: [id number]   Acct: [id number]   Primary Care Physician: Ross Masterson MD  Advance Directive: DNR  Admit Date: 9/18/2024       Hospital Day: 21  Portions of this note have been copied forward, however, changed to reflect the most current clinical status of this patient.    CHIEF COMPLAINT  altered mental status     Subjective: Resting comfortably in bed currently on 5LNC nasal cannula. Oral intake  has improved. Encouraged to slow down when drinking and eating.  Afebrile. Noted aggravation overnight, little sleep.      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  Of note, recently admitted in July, sepsis secondary to UTI and at that time Morataya catheter remains in place, he received IV antibiotic course, he was discharged to [facility] in stable condition.  He returned to [facility] ED on the day of admission due to shortness of air and altered mental status.  Patient was discharged from SNF facility 1 day prior to this admission.  Further ED workup revealed CTA pulmonary moderate to severe pulmonary fibrosis and interval development meant of esophagitis versus mass, chest x-ray bibasilar pneumonia right greater than left, COVID-positive, CT of the head no acute intercranial abnormalities with acute bilateral maxillary sinusitis, lactic acid 6 with repeat 5.8.  CRP 11.55, WBCs 26.3, ABGs metabolic alkalosis.  Patient was initiated on cefepime and vancomycin upon admission.  After admission patient

## 2024-10-09 NOTE — CARE COORDINATION
Kiera spoke to pt t about current insurance issues with payment. Drt reports at this time she is unable to take care of her father at this time. Pt drt would like to have the pt skilled so that she can continue to work on getting medicaid pending.   [facility].   Electronically signed by ARLYN RODRIGUEZ on 10/9/2024 at 5:46 AM    Kiera spoke to akbar with [employer] who states pt drt is aware of the financial documents needed at this time. Kiera called Pt drt to see if she had the financial documents that [facility]. Left messaged for returned call.  Electronically signed by ARLYN RODRIGUEZ on 10/9/2024 at 7:48 AM    Kiera attempted to call pt eliecer for second time today.. sw left message for returned call.   Phone [phone]  Rebeca  Electronically signed by ARLYN RODRIGUEZ on 10/9/2024 at 1:13 PM    Kiera spoke to Rebeca states she will go to [facility]  Ph: [phone]  Fax: [phone]  To discuss financials. Kiera will notify admission team at [facility] of updates. Kiera suggested options like private pay and hospice services for the time being. Pt drt does not want pt to stay past day 20.   Electronically signed by ARLYN RODRIGUEZ on 10/9/2024 at 2:07 PM

## 2024-10-09 NOTE — PLAN OF CARE
Problem: Safety - Adult  Goal: Free from fall injury  10/8/2024 2241 by Mayuri Zimmerman RN  Outcome: Progressing  10/8/2024 1549 by Annie Parrish RN  Outcome: Progressing     Problem: Skin/Tissue Integrity  Goal: Absence of new skin breakdown  Description: 1.  Monitor for areas of redness and/or skin breakdown  2.  Assess vascular access sites hourly  3.  Every 4-6 hours minimum:  Change oxygen saturation probe site  4.  Every 4-6 hours:  If on nasal continuous positive airway pressure, respiratory therapy assess nares and determine need for appliance change or resting period.  10/8/2024 2241 by Mayuri Zimmerman RN  Outcome: Progressing  10/8/2024 1549 by Annie Parrish RN  Outcome: Progressing     Problem: ABCDS Injury Assessment  Goal: Absence of physical injury  10/8/2024 2241 by Mayuri Zimmerman RN  Outcome: Progressing  10/8/2024 1549 by Annie Parrish RN  Outcome: Progressing     Problem: Safety - Medical Restraint  Goal: Remains free of injury from restraints (Restraint for Interference with Medical Device)  Description: INTERVENTIONS:  1. Determine that other, less restrictive measures have been tried or would not be effective before applying the restraint  2. Evaluate the patient's condition at the time of restraint application  3. Inform patient/family regarding the reason for restraint  4. Q2H: Monitor safety, psychosocial status, comfort, nutrition and hydration  10/8/2024 2241 by Mayuri Zimmerman RN  Outcome: Progressing  10/8/2024 1549 by Annie Parrish RN  Outcome: Progressing     Problem: Discharge Planning  Goal: Discharge to home or other facility with appropriate resources  10/8/2024 2241 by Mayuri Zimmerman RN  Outcome: Progressing  10/8/2024 1549 by Annie Parrish RN  Outcome: Progressing     Problem: Nutrition Deficit:  Goal: Optimize nutritional status  10/8/2024 2241 by Mayuri Zimmerman RN  Outcome: Progressing  10/8/2024 1549 by Annie Parrish RN  Outcome: Progressing     Problem:  Worker, Psychosocial CNS, Spiritual Care as appropriate  10/8/2024 2241 by Mayuri Zimmerman, RN  Outcome: Progressing  10/8/2024 1549 by Annie Parrish, RN  Outcome: Progressing

## 2024-10-10 LAB
ANION GAP SERPL CALCULATED.3IONS-SCNC: 10 MMOL/L (ref 7–19)
BASOPHILS # BLD: 0 K/UL (ref 0–0.2)
BASOPHILS NFR BLD: 0.3 % (ref 0–1)
BUN SERPL-MCNC: 8 MG/DL (ref 8–23)
CALCIUM SERPL-MCNC: 8.4 MG/DL (ref 8.8–10.2)
CHLORIDE SERPL-SCNC: 93 MMOL/L (ref 98–111)
CO2 SERPL-SCNC: 31 MMOL/L (ref 22–29)
CREAT SERPL-MCNC: 0.5 MG/DL (ref 0.7–1.2)
EOSINOPHIL # BLD: 0.1 K/UL (ref 0–0.6)
EOSINOPHIL NFR BLD: 0.9 % (ref 0–5)
ERYTHROCYTE [DISTWIDTH] IN BLOOD BY AUTOMATED COUNT: 13.6 % (ref 11.5–14.5)
GLUCOSE SERPL-MCNC: 140 MG/DL (ref 70–99)
HCT VFR BLD AUTO: 31.3 % (ref 42–52)
HGB BLD-MCNC: 10.2 G/DL (ref 14–18)
IMM GRANULOCYTES # BLD: 0 K/UL
LYMPHOCYTES # BLD: 0.7 K/UL (ref 1.1–4.5)
LYMPHOCYTES NFR BLD: 9.9 % (ref 20–40)
MAGNESIUM SERPL-MCNC: 1.2 MG/DL (ref 1.6–2.4)
MCH RBC QN AUTO: 29.8 PG (ref 27–31)
MCHC RBC AUTO-ENTMCNC: 32.6 G/DL (ref 33–37)
MCV RBC AUTO: 91.5 FL (ref 80–94)
MONOCYTES # BLD: 0.7 K/UL (ref 0–0.9)
MONOCYTES NFR BLD: 10.3 % (ref 0–10)
NEUTROPHILS # BLD: 5.2 K/UL (ref 1.5–7.5)
NEUTS SEG NFR BLD: 78.1 % (ref 50–65)
PLATELET # BLD AUTO: 174 K/UL (ref 130–400)
PMV BLD AUTO: 10.3 FL (ref 9.4–12.4)
POTASSIUM SERPL-SCNC: 3.4 MMOL/L (ref 3.5–5)
RBC # BLD AUTO: 3.42 M/UL (ref 4.7–6.1)
SODIUM SERPL-SCNC: 134 MMOL/L (ref 136–145)
WBC # BLD AUTO: 6.6 K/UL (ref 4.8–10.8)

## 2024-10-10 PROCEDURE — 6360000002 HC RX W HCPCS: Performed by: INTERNAL MEDICINE

## 2024-10-10 PROCEDURE — 6370000000 HC RX 637 (ALT 250 FOR IP): Performed by: INTERNAL MEDICINE

## 2024-10-10 PROCEDURE — 2700000000 HC OXYGEN THERAPY PER DAY

## 2024-10-10 PROCEDURE — 6370000000 HC RX 637 (ALT 250 FOR IP)

## 2024-10-10 PROCEDURE — 85025 COMPLETE CBC W/AUTO DIFF WBC: CPT

## 2024-10-10 PROCEDURE — 2140000000 HC CCU INTERMEDIATE R&B

## 2024-10-10 PROCEDURE — 83735 ASSAY OF MAGNESIUM: CPT

## 2024-10-10 PROCEDURE — 94760 N-INVAS EAR/PLS OXIMETRY 1: CPT

## 2024-10-10 PROCEDURE — 6370000000 HC RX 637 (ALT 250 FOR IP): Performed by: PHYSICIAN ASSISTANT

## 2024-10-10 PROCEDURE — 2580000003 HC RX 258: Performed by: INTERNAL MEDICINE

## 2024-10-10 PROCEDURE — 94640 AIRWAY INHALATION TREATMENT: CPT

## 2024-10-10 PROCEDURE — 80048 BASIC METABOLIC PNL TOTAL CA: CPT

## 2024-10-10 PROCEDURE — 36415 COLL VENOUS BLD VENIPUNCTURE: CPT

## 2024-10-10 RX ORDER — MAGNESIUM SULFATE IN WATER 40 MG/ML
2000 INJECTION, SOLUTION INTRAVENOUS PRN
Status: DISCONTINUED | OUTPATIENT
Start: 2024-10-10 | End: 2024-10-18 | Stop reason: HOSPADM

## 2024-10-10 RX ADMIN — PANTOPRAZOLE SODIUM 40 MG: 40 TABLET, DELAYED RELEASE ORAL at 19:57

## 2024-10-10 RX ADMIN — SUCRALFATE 1 G: 1 TABLET ORAL at 09:50

## 2024-10-10 RX ADMIN — BUDESONIDE AND FORMOTEROL FUMARATE DIHYDRATE 2 PUFF: 160; 4.5 AEROSOL RESPIRATORY (INHALATION) at 06:12

## 2024-10-10 RX ADMIN — ENOXAPARIN SODIUM 50 MG: 100 INJECTION SUBCUTANEOUS at 19:57

## 2024-10-10 RX ADMIN — GLYCOPYRROLATE 1 MG: 1 TABLET ORAL at 09:50

## 2024-10-10 RX ADMIN — METOPROLOL SUCCINATE 25 MG: 25 TABLET, EXTENDED RELEASE ORAL at 09:50

## 2024-10-10 RX ADMIN — MAGNESIUM SULFATE HEPTAHYDRATE 2000 MG: 40 INJECTION, SOLUTION INTRAVENOUS at 06:11

## 2024-10-10 RX ADMIN — BUDESONIDE AND FORMOTEROL FUMARATE DIHYDRATE 2 PUFF: 160; 4.5 AEROSOL RESPIRATORY (INHALATION) at 19:27

## 2024-10-10 RX ADMIN — GLYCOPYRROLATE 1 MG: 1 TABLET ORAL at 19:57

## 2024-10-10 RX ADMIN — PANTOPRAZOLE SODIUM 40 MG: 40 TABLET, DELAYED RELEASE ORAL at 09:50

## 2024-10-10 RX ADMIN — GUAIFENESIN 600 MG: 600 TABLET ORAL at 19:57

## 2024-10-10 RX ADMIN — METOPROLOL SUCCINATE 25 MG: 25 TABLET, EXTENDED RELEASE ORAL at 19:57

## 2024-10-10 RX ADMIN — POTASSIUM BICARBONATE 40 MEQ: 782 TABLET, EFFERVESCENT ORAL at 06:06

## 2024-10-10 RX ADMIN — GUAIFENESIN 600 MG: 600 TABLET ORAL at 09:50

## 2024-10-10 RX ADMIN — FINASTERIDE 5 MG: 5 TABLET, FILM COATED ORAL at 09:50

## 2024-10-10 RX ADMIN — SODIUM CHLORIDE, PRESERVATIVE FREE 10 ML: 5 INJECTION INTRAVENOUS at 09:50

## 2024-10-10 RX ADMIN — SODIUM CHLORIDE, PRESERVATIVE FREE 10 ML: 5 INJECTION INTRAVENOUS at 19:57

## 2024-10-10 RX ADMIN — SUCRALFATE 1 G: 1 TABLET ORAL at 17:21

## 2024-10-10 RX ADMIN — Medication 2000 UNITS: at 09:50

## 2024-10-10 RX ADMIN — ENOXAPARIN SODIUM 50 MG: 100 INJECTION SUBCUTANEOUS at 09:50

## 2024-10-10 RX ADMIN — SUCRALFATE 1 G: 1 TABLET ORAL at 19:57

## 2024-10-10 NOTE — CARE COORDINATION
Labs     10/08/24  0138 10/09/24  0131 10/10/24  0128   WBC 6.9 5.5 6.6   HGB 9.2* 9.0* 10.2*    144 174               Recent Labs     10/08/24  0138 10/09/24  0131 10/10/24  0128   * 133* 134*   K 4.0 3.6 3.4*   CL 95* 93* 93*   CO2 26 32* 31*   BUN 9 7* 8   CREATININE 0.5* 0.5* 0.5*   GLUCOSE 106* 147* 140*         No results for input(s): \"AST\", \"ALT\", \"BILITOT\", \"ALKPHOS\" in the last 72 hours.     Invalid input(s): \"ALB\"     RAD:   XR CHEST PORTABLE     Result Date: 9/20/2024  Cardiomediastinal contours appear enlarged.  Bilateral pleural effusions appear increased.  Interstitial and airspace infiltrate is present throughout both lungs.  Dense opacity at the right and left lung base.  Opacity along the peripheral aspect of the right and left lung has increased.  This likely represents a combination of atelectasis, pulmonary edema, pneumonia and pleural effusions.  No pneumothorax. Feeding tube appears well positioned.   ______________________________________ Electronically signed by: YOAAN LIU M.D. Date:     09/20/2024 Time:    16:05      CTA PULMONARY W CONTRAST     Result Date: 9/18/2024   1.  No pulmonary embolus is identified. 2.  Stable moderate to severe diffuse pulmonary fibrosis, most pronounced of the mid and lower lungs, but no honeycombing to suggest UIP. 3.  Stable trace dependent right pleural fluid, with pleural thickening and pleural calcification, consistent with chronicity.  No left pleural calcification.  Findings might be secondary to a prior right hemothorax or pyothorax.  Asbestos exposure cannot be excluded. 4.  Apparent interval development of vague wall thickening of portions of the esophagus.  Differential diagnosis includes esophagitis and mass.  Endoscopy or barium esophagram would better assess. 5.  For findings in the abdomen/pelvis, please refer to separate report.  All CT scans are performed using dose optimization techniques as appropriate to the performed exam and  mass   -Honey thickened liquid   -Aspiration precautions               -SLP following               -Dietician following               -Protonix BID       Benign prostatic hyperplasia with urinary retention              -Chronic Morataya exchanged 9/19              -Monitor I&O              -continue Proscar      Hypertension              -Monitor vital signs               -toprol Continued       Anxiety              -Has been calm and cooperative since 9/20              -Seroquel as needed for anxiety/agitation               -Ativan available for anxiety      DVT Prophylaxis: Lovenox      GI prophylaxis:  protonix     Disposition: Plan to discharge home with daughter-case management involved     BRENDEN Carlisle - CNP, 10/10/2024 8:22 AM               call Rebeca gonzáles [phone] phone. For deliver and set up . Please deliver to pt home [address] [place] [address]  [facility] Ph: [phone]  Fa: [phone]  ARLYN Rodriguez .  Case Management Department  [facility]   Phone:[phone]  Fax: [phone]  Electronically signed by ARLYN RODRIGUEZ on 10/10/2024 at 2:38 PM

## 2024-10-10 NOTE — PROGRESS NOTES
acute abnormality of the abdomen and pelvis. 2.  Sigmoid diverticulosis without radiographic indication of diverticulitis. 3.  Moderate stool seen in the rectum without focal abnormality. 4.  Mildly distended gallbladder without stones or focal abnormality. 5.  Mild distension of the stomach with fluid in the lumen.  No indication of gastric outlet obstruction.  The duodenum is unremarkable. 6.   A small bilateral pleural effusions with mild associated consolidation, atelectasis versus infiltrate. 7.  Other chronic and non emergent the the the findings as above.  All CT scans are performed using dose optimization techniques as appropriate to the performed exam and include at least one of the following: Automated exposure control, adjustment of the mA and/or kV according to size, and the use of iterative reconstruction technique.  ______________________________________ Electronically signed by: HELEN MEYER M.D. Date:     09/18/2024 Time:    15:47     CT HEAD WO CONTRAST    Result Date: 9/18/2024  No acute intracranial process.  Acute bilateral maxillary sinusitis.  Mild atrophy and chronic small vessel white matter change.  All CT scans are performed using dose optimization techniques as appropriate to the performed exam and include at least one of the following: Automated exposure control, adjustment of the mA and/or kV according to size, and the use of iterative reconstruction technique.  ______________________________________ Electronically signed by: LIZABETH SANCHEZ M.D. Date:     09/18/2024 Time:    15:40     XR CHEST PORTABLE    Result Date: 9/18/2024  Small bilateral pleural effusions with bibasilar atelectasis and/or pneumonia, right greater than left.  Chronic bilateral interstitial lung markings.   ______________________________________ Electronically signed by: LIZABETH SANCHEZ M.D. Date:     09/18/2024 Time:    14:25        Assessment/Plan   Principal Problem:    Sepsis due to bibasilar pneumonia  /COVID-19   -CTA pulmonary negative for PE   -MRSA detected    -IV antibiotics Cefepime & Vancomycin -completed   -Baricitinib-course completed on 10/2  -Supplemental oxygen wean as tolerated,currently 4L NC               -IS/Acapella-as able    -Blood cultures NGTD     Active Problems:   Idiopathic pulmonary fibrosis    -Chronic follows Spiritism pulmonary   -Continue Symbicort  -Monitor for hemoptysis with initiation of anticoagulation       Palliative care patient   -Palliative following    -Hospice following      Paroxysmal atrial fibrillation    -Cardiology signed off    -Continue Toprol XL   -Digoxin 125mcg x1 on 9/19 with conversion back to NSR  -Lopressor 2.5mg x1 on 9/21 with conversion   -[id number]-USUo score 2   -Lovenox 1 mg/kg transition to Eliquis prior to DC   -Monitor on telemetry     Mclean's esophagus with dysplasia / GERD (gastroesophageal reflux disease)  / Dysphagia / Severe malnutrition   -GI re-consulted, 9/24 esophagram esophagitis with continued dysphagia, EGD severe chronic diffuse erosive esophagitis, underlying long Mclean's segment, no tumor mass or stricture. PPI, Carafate, aspiration precautions ordered. -Follow up with GI in 1 month with recommendation for repeat EGD in 2 months  -Palliative care following, DNR status updated and hospice consulted  - following-SNF with skilled care. Hospice will follow  -CTA pulmonary wall thickening of esophagus, esophagitis vs mass   -Honey thickened liquid   -Aspiration precautions    -SLP following    -Dietician following    -Protonix BID      Benign prostatic hyperplasia with urinary retention   -Chronic Morataya exchanged 9/19   -Monitor I&O   -continue Proscar     Hypertension   -Monitor vital signs    -toprol Continued      Anxiety   -Has been calm and cooperative since 9/20   -Seroquel as needed for anxiety/agitation    -Ativan available for anxiety     DVT Prophylaxis: Lovenox     GI prophylaxis:  protonix    Disposition: Plan to

## 2024-10-10 NOTE — PROGRESS NOTES
10/10/24 1126   Encounter Summary   Encounter Overview/Reason Spiritual/Emotional Needs;Follow-up   Service Provided For Patient   Referral/Consult From Palliative Care   Complexity of Encounter Moderate   Begin Time 1035   End Time  1050   Total Time Calculated 15 min   Spiritual/Emotional needs   Type Spiritual Support   Grief, Loss, and Adjustments   Type Adjustment to illness   Palliative Care   Type Palliative Care, Follow-up   Assessment/Intervention/Outcome   Assessment Concerns with suffering;Other (Comment)  (Pt didn't say much but agreed to prayer.)   Intervention Active listening;Prayer (assurance of)/[place];Sustaining Presence/Ministry of presence   Outcome Acceptance;Expressed Gratitude;Receptive   Plan and Referrals   Plan/Referrals Continue to visit, (comment);Continue Support (comment)   Does the patient have a [facility] PCP? Yes    to follow up after discharge? No         This  met with pt to initiate palliative care. Pt had a sitter in the room. Pt did not converse very much but agreed to prayer. This  provided sustaining presence, support, and prayer. Pt said \"thank you.\" He expressed gratitude for spiritual care.       Spiritual Health History and Assessment/Progress Note  [facility]    Spiritual/Emotional Needs, Follow-up,  , Adjustment to illness,      Name: Eze Rosa MRN: [id number]    Age: 73 y.o.     Sex: male   Language: English   Advent: None   COVID-19     Date: 10/10/2024            Total Time Calculated: 15 min              Spiritual Assessment continued in [facility] PROGRESSIVE CARE        Referral/Consult From: Palliative Care   Encounter Overview/Reason: Spiritual/Emotional Needs, Follow-up  Service Provided For: Patient    Jessica, Belief, Meaning:   Patient Other: Pt did not share.  Family/Friends No family/friends present      Importance and Influence:  Patient unable to assess at this time  Family/Friends No family/friends  present    Community:  Patient Other: pt did not share.  Family/Friends No family/friends present    Assessment and Plan of Care:     Patient Interventions include: Provided sacramental/Gnosticism ritual  Family/Friends Interventions include: No family/friends present    Patient Plan of Care: Spiritual Care available upon further referral  Family/Friends Plan of Care: No family/friends present    Electronically signed by Mary Behrens, Chaplain on 10/10/2024 at 11:30 AM

## 2024-10-10 NOTE — CARE COORDINATION
10/10/24 1440   IMM Letter   IMM Letter given to Patient/Family/Significant other/Guardian/POA/by: poonam larsen   IMM Letter date given: 10/10/24   IMM Letter time given: 1440     Patient declined waiting 4 hr period prior to discharge.  Second IMM given to patient and explained with patient verbalizing understanding.  All questions and concerns addressed     Signed letter placed in pt soft chart  Electronically signed by ARLYN RODRIGUEZ on 10/10/2024 at 2:41 PM

## 2024-10-10 NOTE — CARE COORDINATION
Kiera called pt eliecer to see if she went to [facility] to discuss financials. Kiera left voicemail for return call.  Electronically signed by ARLYN RODRIGUEZ on 10/10/2024 at 1:25 PM    Kiera spoke to pt eliecer Jose who reports at this time she would like her father to discharge home with her at [address] [place] [address]  Pt eliecer is requesting for a hospital bed, and wheelchair.   No preference on provider.   Will have equipment delivered to pt eliecer house.   Kiera updated md  sElectronically signed by ARLYN RODRIGUEZ on 10/10/2024 at 2:18 PM    Spoke with patient regarding MD orders for HH services.  Patient agreeable and has chosen [facility].  Referral Faxed.  [phone].  [phone].  Please notify - when patient discharges and fax DC Summary,  DC med list and any new HH orders.     The Patient  was provided with a choice of provider and agrees with the discharge plan. [x] Yes [ ] No    Freedom of choice list was provided with basic dialogue that supports the patient's individualized plan of care/goals, treatment preferences and shares the quality data associated with the providers. [x] Yes [ ] No     Electronically signed by ARLYN RODRIGUEZ on 10/10/2024 at 2:42 PM

## 2024-10-10 NOTE — PLAN OF CARE
Problem: Safety - Adult  Goal: Free from fall injury  Outcome: Progressing     Problem: Skin/Tissue Integrity  Goal: Absence of new skin breakdown  Description: 1.  Monitor for areas of redness and/or skin breakdown  2.  Assess vascular access sites hourly  3.  Every 4-6 hours minimum:  Change oxygen saturation probe site  4.  Every 4-6 hours:  If on nasal continuous positive airway pressure, respiratory therapy assess nares and determine need for appliance change or resting period.  Outcome: Progressing     Problem: ABCDS Injury Assessment  Goal: Absence of physical injury  Outcome: Progressing     Problem: Discharge Planning  Goal: Discharge to home or other facility with appropriate resources  Outcome: Progressing     Problem: Nutrition Deficit:  Goal: Optimize nutritional status  Outcome: Progressing     Problem: Pain  Goal: Verbalizes/displays adequate comfort level or baseline comfort level  Outcome: Progressing     Problem: Neurosensory - Adult  Goal: Achieves stable or improved neurological status  Outcome: Progressing

## 2024-10-11 ENCOUNTER — APPOINTMENT (OUTPATIENT)
Dept: GENERAL RADIOLOGY | Age: 74
End: 2024-10-11
Payer: MEDICARE

## 2024-10-11 PROBLEM — A41.9 SEPSIS (HCC): Status: RESOLVED | Noted: 2024-07-15 | Resolved: 2024-10-11

## 2024-10-11 LAB
ANION GAP SERPL CALCULATED.3IONS-SCNC: 7 MMOL/L (ref 7–19)
BASOPHILS # BLD: 0 K/UL (ref 0–0.2)
BASOPHILS NFR BLD: 0.4 % (ref 0–1)
BUN SERPL-MCNC: 10 MG/DL (ref 8–23)
CALCIUM SERPL-MCNC: 8.2 MG/DL (ref 8.8–10.2)
CHLORIDE SERPL-SCNC: 93 MMOL/L (ref 98–111)
CO2 SERPL-SCNC: 33 MMOL/L (ref 22–29)
CREAT SERPL-MCNC: 0.5 MG/DL (ref 0.7–1.2)
EOSINOPHIL # BLD: 0 K/UL (ref 0–0.6)
EOSINOPHIL NFR BLD: 0.9 % (ref 0–5)
ERYTHROCYTE [DISTWIDTH] IN BLOOD BY AUTOMATED COUNT: 13.7 % (ref 11.5–14.5)
GLUCOSE SERPL-MCNC: 117 MG/DL (ref 70–99)
HCT VFR BLD AUTO: 28.9 % (ref 42–52)
HGB BLD-MCNC: 9.5 G/DL (ref 14–18)
IMM GRANULOCYTES # BLD: 0 K/UL
LYMPHOCYTES # BLD: 0.8 K/UL (ref 1.1–4.5)
LYMPHOCYTES NFR BLD: 17.1 % (ref 20–40)
MAGNESIUM SERPL-MCNC: 1.6 MG/DL (ref 1.6–2.4)
MCH RBC QN AUTO: 30.5 PG (ref 27–31)
MCHC RBC AUTO-ENTMCNC: 32.9 G/DL (ref 33–37)
MCV RBC AUTO: 92.9 FL (ref 80–94)
MONOCYTES # BLD: 0.6 K/UL (ref 0–0.9)
MONOCYTES NFR BLD: 12.9 % (ref 0–10)
NEUTROPHILS # BLD: 3.1 K/UL (ref 1.5–7.5)
NEUTS SEG NFR BLD: 68.5 % (ref 50–65)
PLATELET # BLD AUTO: 165 K/UL (ref 130–400)
PMV BLD AUTO: 10.7 FL (ref 9.4–12.4)
POTASSIUM SERPL-SCNC: 3.1 MMOL/L (ref 3.5–5)
RBC # BLD AUTO: 3.11 M/UL (ref 4.7–6.1)
SODIUM SERPL-SCNC: 133 MMOL/L (ref 136–145)
WBC # BLD AUTO: 4.5 K/UL (ref 4.8–10.8)

## 2024-10-11 PROCEDURE — 6370000000 HC RX 637 (ALT 250 FOR IP): Performed by: INTERNAL MEDICINE

## 2024-10-11 PROCEDURE — 6370000000 HC RX 637 (ALT 250 FOR IP)

## 2024-10-11 PROCEDURE — 97530 THERAPEUTIC ACTIVITIES: CPT

## 2024-10-11 PROCEDURE — 6360000002 HC RX W HCPCS: Performed by: INTERNAL MEDICINE

## 2024-10-11 PROCEDURE — 36415 COLL VENOUS BLD VENIPUNCTURE: CPT

## 2024-10-11 PROCEDURE — 94761 N-INVAS EAR/PLS OXIMETRY MLT: CPT

## 2024-10-11 PROCEDURE — 2700000000 HC OXYGEN THERAPY PER DAY

## 2024-10-11 PROCEDURE — 2140000000 HC CCU INTERMEDIATE R&B

## 2024-10-11 PROCEDURE — 6370000000 HC RX 637 (ALT 250 FOR IP): Performed by: PHYSICIAN ASSISTANT

## 2024-10-11 PROCEDURE — 71045 X-RAY EXAM CHEST 1 VIEW: CPT

## 2024-10-11 PROCEDURE — 94640 AIRWAY INHALATION TREATMENT: CPT

## 2024-10-11 PROCEDURE — 85025 COMPLETE CBC W/AUTO DIFF WBC: CPT

## 2024-10-11 PROCEDURE — 2580000003 HC RX 258: Performed by: INTERNAL MEDICINE

## 2024-10-11 PROCEDURE — 83735 ASSAY OF MAGNESIUM: CPT

## 2024-10-11 PROCEDURE — 80048 BASIC METABOLIC PNL TOTAL CA: CPT

## 2024-10-11 RX ORDER — SUCRALFATE 1 G/1
1 TABLET ORAL
Qty: 120 TABLET | Refills: 3 | Status: SHIPPED | OUTPATIENT
Start: 2024-10-11

## 2024-10-11 RX ORDER — MORPHINE SULFATE 20 MG/ML
5 SOLUTION ORAL EVERY 4 HOURS PRN
Qty: 4.5 ML | Refills: 0 | Status: SHIPPED | OUTPATIENT
Start: 2024-10-11 | End: 2024-10-14

## 2024-10-11 RX ORDER — PANTOPRAZOLE SODIUM 40 MG/1
40 TABLET, DELAYED RELEASE ORAL 2 TIMES DAILY
Qty: 30 TABLET | Refills: 3 | Status: SHIPPED | OUTPATIENT
Start: 2024-10-11

## 2024-10-11 RX ORDER — GLYCOPYRROLATE 1 MG/1
1 TABLET ORAL 3 TIMES DAILY
Qty: 90 TABLET | Refills: 3 | Status: SHIPPED | OUTPATIENT
Start: 2024-10-11

## 2024-10-11 RX ORDER — QUETIAPINE FUMARATE 25 MG/1
12.5 TABLET, FILM COATED ORAL 2 TIMES DAILY PRN
Qty: 30 TABLET | Refills: 0 | Status: SHIPPED | OUTPATIENT
Start: 2024-10-11 | End: 2024-11-10

## 2024-10-11 RX ORDER — METOPROLOL SUCCINATE 25 MG/1
25 TABLET, EXTENDED RELEASE ORAL 2 TIMES DAILY
Qty: 30 TABLET | Refills: 3 | Status: SHIPPED | OUTPATIENT
Start: 2024-10-11

## 2024-10-11 RX ORDER — BENZONATATE 100 MG/1
100 CAPSULE ORAL 3 TIMES DAILY PRN
Qty: 21 CAPSULE | Refills: 0 | Status: SHIPPED | OUTPATIENT
Start: 2024-10-11 | End: 2024-10-18

## 2024-10-11 RX ORDER — CHOLECALCIFEROL (VITAMIN D3) 50 MCG
2000 TABLET ORAL DAILY
Qty: 30 TABLET | Refills: 0 | Status: SHIPPED | OUTPATIENT
Start: 2024-10-12 | End: 2024-11-11

## 2024-10-11 RX ORDER — ONDANSETRON 4 MG/1
4 TABLET, ORALLY DISINTEGRATING ORAL EVERY 8 HOURS PRN
Qty: 9 TABLET | Refills: 0 | Status: SHIPPED | OUTPATIENT
Start: 2024-10-11 | End: 2024-10-14

## 2024-10-11 RX ORDER — LORAZEPAM 2 MG/ML
0.5 CONCENTRATE ORAL EVERY 6 HOURS PRN
Qty: 3 ML | Refills: 0 | Status: SHIPPED | OUTPATIENT
Start: 2024-10-11 | End: 2024-10-14

## 2024-10-11 RX ORDER — GUAIFENESIN 600 MG/1
600 TABLET, EXTENDED RELEASE ORAL 2 TIMES DAILY
Qty: 14 TABLET | Refills: 0 | Status: SHIPPED | OUTPATIENT
Start: 2024-10-11 | End: 2024-10-18

## 2024-10-11 RX ORDER — POTASSIUM CHLORIDE 1500 MG/1
40 TABLET, EXTENDED RELEASE ORAL ONCE
Status: COMPLETED | OUTPATIENT
Start: 2024-10-11 | End: 2024-10-11

## 2024-10-11 RX ADMIN — METOPROLOL SUCCINATE 25 MG: 25 TABLET, EXTENDED RELEASE ORAL at 08:59

## 2024-10-11 RX ADMIN — POTASSIUM BICARBONATE 40 MEQ: 782 TABLET, EFFERVESCENT ORAL at 05:11

## 2024-10-11 RX ADMIN — POTASSIUM CHLORIDE 40 MEQ: 1500 TABLET, EXTENDED RELEASE ORAL at 13:47

## 2024-10-11 RX ADMIN — QUETIAPINE FUMARATE 12.5 MG: 25 TABLET ORAL at 19:46

## 2024-10-11 RX ADMIN — SUCRALFATE 1 G: 1 TABLET ORAL at 05:11

## 2024-10-11 RX ADMIN — APIXABAN 5 MG: 5 TABLET, FILM COATED ORAL at 19:45

## 2024-10-11 RX ADMIN — FINASTERIDE 5 MG: 5 TABLET, FILM COATED ORAL at 08:59

## 2024-10-11 RX ADMIN — GLYCOPYRROLATE 1 MG: 1 TABLET ORAL at 08:59

## 2024-10-11 RX ADMIN — METOPROLOL SUCCINATE 25 MG: 25 TABLET, EXTENDED RELEASE ORAL at 19:46

## 2024-10-11 RX ADMIN — PANTOPRAZOLE SODIUM 40 MG: 40 TABLET, DELAYED RELEASE ORAL at 19:46

## 2024-10-11 RX ADMIN — SODIUM CHLORIDE, PRESERVATIVE FREE 10 ML: 5 INJECTION INTRAVENOUS at 09:00

## 2024-10-11 RX ADMIN — Medication 2000 UNITS: at 08:59

## 2024-10-11 RX ADMIN — ENOXAPARIN SODIUM 50 MG: 100 INJECTION SUBCUTANEOUS at 09:00

## 2024-10-11 RX ADMIN — SUCRALFATE 1 G: 1 TABLET ORAL at 13:47

## 2024-10-11 RX ADMIN — MAGNESIUM SULFATE HEPTAHYDRATE 2000 MG: 40 INJECTION, SOLUTION INTRAVENOUS at 05:13

## 2024-10-11 RX ADMIN — Medication 5 MG: at 19:32

## 2024-10-11 RX ADMIN — BUDESONIDE AND FORMOTEROL FUMARATE DIHYDRATE 2 PUFF: 160; 4.5 AEROSOL RESPIRATORY (INHALATION) at 07:04

## 2024-10-11 RX ADMIN — GLYCOPYRROLATE 1 MG: 1 TABLET ORAL at 13:47

## 2024-10-11 RX ADMIN — SUCRALFATE 1 G: 1 TABLET ORAL at 19:33

## 2024-10-11 RX ADMIN — IPRATROPIUM BROMIDE AND ALBUTEROL SULFATE 1 DOSE: 2.5; .5 SOLUTION RESPIRATORY (INHALATION) at 05:15

## 2024-10-11 RX ADMIN — PANTOPRAZOLE SODIUM 40 MG: 40 TABLET, DELAYED RELEASE ORAL at 08:59

## 2024-10-11 RX ADMIN — GLYCOPYRROLATE 1 MG: 1 TABLET ORAL at 19:41

## 2024-10-11 RX ADMIN — GUAIFENESIN 600 MG: 600 TABLET ORAL at 08:59

## 2024-10-11 ASSESSMENT — PAIN - FUNCTIONAL ASSESSMENT: PAIN_FUNCTIONAL_ASSESSMENT: PREVENTS OR INTERFERES WITH MANY ACTIVE NOT PASSIVE ACTIVITIES

## 2024-10-11 ASSESSMENT — PAIN SCALES - GENERAL
PAINLEVEL_OUTOF10: 10
PAINLEVEL_OUTOF10: 0

## 2024-10-11 ASSESSMENT — PAIN DESCRIPTION - LOCATION: LOCATION: GENERALIZED

## 2024-10-11 ASSESSMENT — PAIN DESCRIPTION - DESCRIPTORS: DESCRIPTORS: ACHING;CRAMPING

## 2024-10-11 NOTE — PROGRESS NOTES
Nutrition Assessment     Type and Reason for Visit: Reassess    Nutrition Recommendations/Plan:   Continue POC.     Malnutrition Assessment:  Malnutrition Status: Severe malnutrition    Nutrition Assessment:  Documented intake remains variable, averaging 1-25%, 51-75%. Aware of d/c summary.    Estimated Daily Nutrient Needs:  Energy (kcal):  8109-4921 (30-35 kcal/kg) Weight Used for Energy Requirements: Current     Protein (g):  77 (1.5 g/kg) Weight Used for Protein Requirements: Current        Fluid (ml/day):  3356-6818 Method Used for Fluid Requirements: 1 ml/kcal    Nutrition Related Findings:   BM 10/8. Na 133, K+ 3.1, glu 106-147. Wound Type: Pressure Injury (coccyx/buttocks)    Current Nutrition Therapies:    ADULT DIET; Dysphagia - Minced and Moist; Moderately Thick (Honey)  ADULT ORAL NUTRITION SUPPLEMENT; Breakfast; Fortified Gelatin Oral Supplement  ADULT ORAL NUTRITION SUPPLEMENT; Lunch, Dinner; Frozen Oral Supplement    Anthropometric Measures:  Height: 190.5 cm (6' 3\")  Current Body Wt: 51.1 kg (112 lb 10.5 oz)   BMI: 14.1    Nutrition Diagnosis:   Severe malnutrition related to swallowing difficulty, inadequate protein-energy intake as evidenced by Criteria as identified in malnutrition assessment    Nutrition Interventions:   Food and/or Nutrient Delivery: Continue Current Diet, Continue Oral Nutrition Supplement  Nutrition Education/Counseling: No recommendation at this time  Coordination of Nutrition Care: Continue to monitor while inpatient, Speech Therapy    Goals:  Previous Goal Met: Progressing toward Goal(s)  Goals: PO intake 75% or greater, Meet at least 75% of estimated needs    Nutrition Monitoring and Evaluation:   Behavioral-Environmental Outcomes: None Identified  Food/Nutrient Intake Outcomes: Food and Nutrient Intake, Supplement Intake  Physical Signs/Symptoms Outcomes: Biochemical Data, Chewing or Swallowing, Fluid Status or Edema, Nutrition Focused Physical Findings,

## 2024-10-11 NOTE — DISCHARGE SUMMARY
Eze Rosa  :  1950  MRN:  [id number]    Admit date:  2024 Discharge date:  10/11/2024    Discharging Physician:  Dr Rincon    Advance Directive: DNR    Consults: PHARMACY TO DOSE VANCOMYCIN  PALLIATIVE CARE EVAL  IP CONSULT TO PHARMACY  IP CONSULT TO CARDIOLOGY  IP CONSULT TO GI  IP CONSULT TO PULMONOLOGY  IP CONSULT TO PALLIATIVE CARE  IP CONSULT TO DIETITIAN  IP CONSULT TO GI  IP CONSULT TO VASCULAR ACCESS TEAM  IP CONSULT TO HOSPICE  IP CONSULT TO HOME CARE NEEDS     Primary Care Physician:  Ross Masterson MD    Discharge Diagnoses:  Principal Problem:    COVID-19  Active Problems:    Idiopathic pulmonary fibrosis (HCC)    Benign prostatic hyperplasia with urinary retention    Palliative care patient    Hypertension    Mclean's esophagus with dysplasia    GERD (gastroesophageal reflux disease)    Paroxysmal atrial fibrillation (HCC)    Dysphagia    Anxiety    Severe malnutrition (HCC)  Resolved Problems:    Sepsis (HCC)      Portions of this note have been copied forward, however, changed to reflect the most current clinical status of this patient.  Hospital Course:   The patient is a 73-year-old male with a past medical history of PAF, emphysema, chronic hypoxic respiratory failure on supplemental oxygen 2 L nasal cannula at baseline, chronic right pleural effusion with VATS biopsy, Mclean's esophagus with GERD, atrial fibrillation, BPH with urinary retention, alcohol use sober since 2023 who presented to [facility] ED on 2024 with complaints of altered mental status and shortness of air.  Of note, recently admitted in July, sepsis secondary to UTI and at that time Morataya catheter remains in place, he received IV antibiotic course, he was discharged to [facility] in stable condition.  He returned to [facility] ED on the day of admission due to shortness of air and altered mental status.  Patient was discharged from SNF facility 1 day prior to this admission.  Further ED workup revealed CTA  aortic atherosclerotic calcifications.  No aortic or iliac aneurysm.  Celiac, superior mesenteric, and inferior mesenteric arteries are grossly patent.  Limited assessment of the portal and hepatic veins and IVC is unremarkable within limitations of the phase of IV contrast. Pelvis: No mass.  There is a decompressed with a Morataya catheter in place.  The pelvic structures visualized are within normal limits. Bones/Soft Tissues: No fracture or lytic lesion.  Visualized abdominal wall soft tissues are unremarkable. No residual hernia      1.  No acute abnormality of the abdomen and pelvis. 2.  Sigmoid diverticulosis without radiographic indication of diverticulitis. 3.  Moderate stool seen in the rectum without focal abnormality. 4.  Mildly distended gallbladder without stones or focal abnormality. 5.  Mild distension of the stomach with fluid in the lumen.  No indication of gastric outlet obstruction.  The duodenum is unremarkable. 6.   A small bilateral pleural effusions with mild associated consolidation, atelectasis versus infiltrate. 7.  Other chronic and non emergent the the the findings as above.  All CT scans are performed using dose optimization techniques as appropriate to the performed exam and include at least one of the following: Automated exposure control, adjustment of the mA and/or kV according to size, and the use of iterative reconstruction technique.  ______________________________________ Electronically signed by: HELEN MEYER M.D. Date:     09/18/2024 Time:    15:47     CT HEAD WO CONTRAST    Result Date: 9/18/2024  EXAM:  CT HEAD WITHOUT CONTRAST.  HISTORY:  Altered mental status.  COMPARISON:  12/31/2023 CT head.  TECHNIQUE: Axial CT imaging of the brain was performed without contrast.  Sagittal and coronal re-formations were obtained.  FINDINGS: The gray-white differentiation and sulcal detail are preserved.  No acute large vessel distribution infarction, intracranial bleed or focal mass.  The

## 2024-10-11 NOTE — CARE COORDINATION
[facility] to [facility] who states they will notify sw at hospital about the delivery of hospital bed to her home. Kiera called Rebeca to notify her that [facility] will be scheduling a deliver with her soon. No other questions or concerns.   Electronically signed by ARLYN RODRIGUEZ on 10/11/2024 at 8:43 AM    All equipment has arrived in pt room and at pt home. Pt is safe to d/c home with drt.   Electronically signed by ARLYN RODRIGUEZ on 10/11/2024 at 1:24 PM

## 2024-10-11 NOTE — CARE COORDINATION
[id number]        [facility]                                    Req/Control # [Problem retrieving Specimen ID]                                   Order Date:  Oct 11, 2024  [id number]                                          Patient Information      Name:  Eze Rosa  :  1950  Age:  73 y.o.   Address:  [address] [place] [address]   Zip:  [address]  PCP: Ross Masterson MD Sex:  M  SSN: [id number]  Home Phone: [phone]  Work Phone:    Patient MRN:  [id number]    Alt Patient ID:  [id number]  PCP Phone: [phone]       Authorizing Provider Information       AUTHORIZING PROVIDER: Jerrell Hills APRN - CNP  Physician ID: [id number]  NPI:  [id number]  Site:   Address: [address] [place] [address] Zip: [place] [address]  Phone: [phone]  Fax: [phone]             EQUIPMENT ORDERED  DME Order for Home Oxygen as OP [[id number]] (ORD   #:   [id number]) Priority  Routine Class  Hospital Performed        Associated Diagnosis:          Comments:   You must complete the order parameters below and add the medical necessity documentation for this DME in a separate note.     Stationary Oxygen Concentrator at 4-5 lpm via Nasal Cannula     Stationary Prescribed at:  Continuous     Portable Gaseous O2 System and contents at 4-5 lpm via Nasal Cannula     Non Applicable     Diagnosis: end stage lung disease  Length of need: Lifetime            Scheduling Instructions:                                 Specimen Source             Collection Date    Collection Time    Order Status    Expected Date                 Electronically Signed By  Jerrell Hills APRN - CNP  NPI:  [id number] Date  Oct 11, 2024  2:14 PM               Responsible Party /Guarantor Information     Guar-ActID   Relationship Account Type Home Phone   EZE ROSA - [phone] [address] [place] [id number] Self P/F [phone]   Employer   Work Phone   RETIRED                  Insurance & Policy [place] Information

## 2024-10-11 NOTE — PROGRESS NOTES
Physical Therapy Reassessment    Eze Rosa  [id number]       10/11/24 1200   Restrictions/Precautions   Restrictions/Precautions Fall Risk;Isolation   General   Diagnosis PNA, SEPSIS, COVID   Subjective   Subjective Pt initially agreed to up in chair but then states \"I really don't want to sit in a chair.\"   Vitals   O2 Device Nasal cannula  (2L)   Gross Assessment   AROM Generally decreased, functional   Strength Generally decreased, functional   Bed mobility   Supine to Sit Minimal assistance   Sit to Supine Stand by assistance   Transfers   Sit to Stand Minimal Assistance   Stand to Sit Minimal Assistance   Comment pt stood ~2-3 times HHA, only 1 tall stand, pt wants to sit quickly due to fatigue and dyspnea, would only take 1 side step   Balance   Sitting - Dynamic Good;-   Standing - Dynamic Fair;-   Short Term Goals   Time Frame for Short Term Goals 14 DAYS   Short Term Goal 1 BED MOB SBA   Short Term Goal 2 TRANSFERS CGA   Short Term Goal 3 AMB 50' RW CGA   Activity Tolerance   Activity Tolerance Treatment limited secondary to decreased cognition;Patient limited by endurance  (mod dyspnea with light activity)   Assessment   Assessment Pt continues to be limited by endurance and motivation. Declines up to chair with encouragment. Will cont to progress as tolerated.   Discharge Recommendations Continue to assess pending progress   Safety Devices   Type of Devices Bed alarm in place;Nurse notified;Heels elevated for pressure relief;Call light within reach   PT Whiteboard Notes   Therapy Whiteboard RE 10/25  PNA, O2       Electronically signed by Nicki Cifuentes PT on 10/11/2024 at 1:15 PM

## 2024-10-11 NOTE — PROGRESS NOTES
1545 PULSE OX RESULTS ON .21 ROOM AIR AT REST 77% SAT ON 4 L/M AT REST 92% SAT PATIENT DOES NOT WALK

## 2024-10-12 ENCOUNTER — APPOINTMENT (OUTPATIENT)
Dept: GENERAL RADIOLOGY | Age: 74
End: 2024-10-12
Payer: MEDICARE

## 2024-10-12 LAB
ANION GAP SERPL CALCULATED.3IONS-SCNC: 7 MMOL/L (ref 7–19)
BASE EXCESS ARTERIAL: 10.5 MMOL/L (ref -2–2)
BASOPHILS # BLD: 0 K/UL (ref 0–0.2)
BASOPHILS NFR BLD: 0.3 % (ref 0–1)
BUN SERPL-MCNC: 8 MG/DL (ref 8–23)
CALCIUM SERPL-MCNC: 8 MG/DL (ref 8.8–10.2)
CARBOXYHEMOGLOBIN ARTERIAL: 1.3 % (ref 0–5)
CHLORIDE SERPL-SCNC: 96 MMOL/L (ref 98–111)
CO2 SERPL-SCNC: 31 MMOL/L (ref 22–29)
CREAT SERPL-MCNC: 0.5 MG/DL (ref 0.7–1.2)
EOSINOPHIL # BLD: 0.1 K/UL (ref 0–0.6)
EOSINOPHIL NFR BLD: 1.6 % (ref 0–5)
ERYTHROCYTE [DISTWIDTH] IN BLOOD BY AUTOMATED COUNT: 13.8 % (ref 11.5–14.5)
GLUCOSE SERPL-MCNC: 120 MG/DL (ref 70–99)
HCO3 ARTERIAL: 35.6 MMOL/L (ref 22–26)
HCT VFR BLD AUTO: 26.6 % (ref 42–52)
HEMOGLOBIN, ART, EXTENDED: 9.4 G/DL (ref 14–18)
HGB BLD-MCNC: 8.8 G/DL (ref 14–18)
IMM GRANULOCYTES # BLD: 0 K/UL
LYMPHOCYTES # BLD: 0.7 K/UL (ref 1.1–4.5)
LYMPHOCYTES NFR BLD: 23.2 % (ref 20–40)
MCH RBC QN AUTO: 30.4 PG (ref 27–31)
MCHC RBC AUTO-ENTMCNC: 33.1 G/DL (ref 33–37)
MCV RBC AUTO: 92 FL (ref 80–94)
METHEMOGLOBIN ARTERIAL: 0.8 %
MODE: ABNORMAL
MONOCYTES # BLD: 0.5 K/UL (ref 0–0.9)
MONOCYTES NFR BLD: 14.5 % (ref 0–10)
NEUTROPHILS # BLD: 1.9 K/UL (ref 1.5–7.5)
NEUTS SEG NFR BLD: 60.1 % (ref 50–65)
O2 CONTENT ARTERIAL: 13.1 ML/DL
O2 SAT, ARTERIAL: 97 %
O2 THERAPY: ABNORMAL
OXYGEN FLOW: 7
PCO2 ARTERIAL: 50 MMHG (ref 35–45)
PH ARTERIAL: 7.46 (ref 7.35–7.45)
PLATELET # BLD AUTO: 151 K/UL (ref 130–400)
PMV BLD AUTO: 10.4 FL (ref 9.4–12.4)
PO2 ARTERIAL: 127 MMHG (ref 80–100)
POTASSIUM BLD-SCNC: 3.6 MMOL/L
POTASSIUM SERPL-SCNC: 3.9 MMOL/L (ref 3.5–5)
RBC # BLD AUTO: 2.89 M/UL (ref 4.7–6.1)
SAMPLE SOURCE: ABNORMAL
SODIUM SERPL-SCNC: 134 MMOL/L (ref 136–145)
SPONT RATE(BPM): 20
WBC # BLD AUTO: 3.1 K/UL (ref 4.8–10.8)

## 2024-10-12 PROCEDURE — 82803 BLOOD GASES ANY COMBINATION: CPT

## 2024-10-12 PROCEDURE — 6370000000 HC RX 637 (ALT 250 FOR IP): Performed by: PHYSICIAN ASSISTANT

## 2024-10-12 PROCEDURE — 6370000000 HC RX 637 (ALT 250 FOR IP): Performed by: INTERNAL MEDICINE

## 2024-10-12 PROCEDURE — 36415 COLL VENOUS BLD VENIPUNCTURE: CPT

## 2024-10-12 PROCEDURE — 6370000000 HC RX 637 (ALT 250 FOR IP)

## 2024-10-12 PROCEDURE — 2140000000 HC CCU INTERMEDIATE R&B

## 2024-10-12 PROCEDURE — 85025 COMPLETE CBC W/AUTO DIFF WBC: CPT

## 2024-10-12 PROCEDURE — 71045 X-RAY EXAM CHEST 1 VIEW: CPT

## 2024-10-12 PROCEDURE — 94761 N-INVAS EAR/PLS OXIMETRY MLT: CPT

## 2024-10-12 PROCEDURE — 2700000000 HC OXYGEN THERAPY PER DAY

## 2024-10-12 PROCEDURE — 80048 BASIC METABOLIC PNL TOTAL CA: CPT

## 2024-10-12 PROCEDURE — 94640 AIRWAY INHALATION TREATMENT: CPT

## 2024-10-12 RX ADMIN — METOPROLOL SUCCINATE 25 MG: 25 TABLET, EXTENDED RELEASE ORAL at 08:49

## 2024-10-12 RX ADMIN — Medication 5 MG: at 19:04

## 2024-10-12 RX ADMIN — GUAIFENESIN 600 MG: 600 TABLET ORAL at 08:49

## 2024-10-12 RX ADMIN — BUDESONIDE AND FORMOTEROL FUMARATE DIHYDRATE 2 PUFF: 160; 4.5 AEROSOL RESPIRATORY (INHALATION) at 06:27

## 2024-10-12 RX ADMIN — METOPROLOL SUCCINATE 25 MG: 25 TABLET, EXTENDED RELEASE ORAL at 22:49

## 2024-10-12 RX ADMIN — Medication 5 MG: at 03:39

## 2024-10-12 RX ADMIN — GUAIFENESIN 600 MG: 600 TABLET ORAL at 22:49

## 2024-10-12 RX ADMIN — Medication 2000 UNITS: at 08:49

## 2024-10-12 RX ADMIN — APIXABAN 5 MG: 5 TABLET, FILM COATED ORAL at 22:49

## 2024-10-12 RX ADMIN — BUDESONIDE AND FORMOTEROL FUMARATE DIHYDRATE 2 PUFF: 160; 4.5 AEROSOL RESPIRATORY (INHALATION) at 19:40

## 2024-10-12 RX ADMIN — FINASTERIDE 5 MG: 5 TABLET, FILM COATED ORAL at 08:49

## 2024-10-12 RX ADMIN — Medication 5 MG: at 11:52

## 2024-10-12 RX ADMIN — APIXABAN 5 MG: 5 TABLET, FILM COATED ORAL at 08:49

## 2024-10-12 RX ADMIN — Medication 0.5 MG: at 19:05

## 2024-10-12 RX ADMIN — GLYCOPYRROLATE 1 MG: 1 TABLET ORAL at 22:50

## 2024-10-12 RX ADMIN — GLYCOPYRROLATE 1 MG: 1 TABLET ORAL at 14:11

## 2024-10-12 RX ADMIN — SUCRALFATE 1 G: 1 TABLET ORAL at 22:49

## 2024-10-12 RX ADMIN — Medication 0.5 MG: at 04:09

## 2024-10-12 RX ADMIN — PANTOPRAZOLE SODIUM 40 MG: 40 TABLET, DELAYED RELEASE ORAL at 22:49

## 2024-10-12 RX ADMIN — PANTOPRAZOLE SODIUM 40 MG: 40 TABLET, DELAYED RELEASE ORAL at 08:49

## 2024-10-12 RX ADMIN — SUCRALFATE 1 G: 1 TABLET ORAL at 11:01

## 2024-10-12 RX ADMIN — SUCRALFATE 1 G: 1 TABLET ORAL at 17:21

## 2024-10-12 RX ADMIN — GLYCOPYRROLATE 1 MG: 1 TABLET ORAL at 08:49

## 2024-10-12 ASSESSMENT — PAIN - FUNCTIONAL ASSESSMENT: PAIN_FUNCTIONAL_ASSESSMENT: ACTIVITIES ARE NOT PREVENTED

## 2024-10-12 ASSESSMENT — PAIN DESCRIPTION - DESCRIPTORS: DESCRIPTORS: ACHING

## 2024-10-12 ASSESSMENT — PAIN DESCRIPTION - LOCATION: LOCATION: GENERALIZED

## 2024-10-12 ASSESSMENT — PAIN SCALES - GENERAL: PAINLEVEL_OUTOF10: 10

## 2024-10-12 NOTE — PROGRESS NOTES
Spoke with patient's daughter, Rebeca, she expresses concern for patient's breathing and taking him home.  She says that patient has a larger concentrator at home for supplemental oxygen, including a hospital bed and wheelchair.

## 2024-10-12 NOTE — PROGRESS NOTES
Patient was prepared for discharge when family arrived to take the patient home. Transitioned the patient to a portable oxygen concentrator, at which point the patient desaturated,with O2 sat dropping and lips turning blue. Patient exhibited signs of panic. Replaced the portable oxygen concentrator with hospital oxygen supply, resulting in O2 saturation returning to baseline. BRENDEN Allan was notified of the situation. This nurse expressed concern that discharging the patient may not be safe at this time, and Jerrell agreed. The decision was made to keep the patient overnight for further monitoring. Electronically signed by Hakan Pereira RN on 10/11/2024 at 7:04 PM

## 2024-10-12 NOTE — PROGRESS NOTES
[facility]      Patient:  Eze Rosa  YOB: 1950  Date of Service: 10/12/2024  MRN: [id number]   Acct: [id number]   Primary Care Physician: Ross Masterson MD  Advance Directive: DNR  Admit Date: 9/18/2024       Hospital Day: 24  Portions of this note have been copied forward, however, changed to reflect the most current clinical status of this patient.    CHIEF COMPLAINT  altered mental status     Subjective: Resting comfortably in bed currently on 5LNC nasal cannula. Oral intake  has improved. Encouraged to slow down when drinking and eating.  Afebrile. No reported agitation.      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  Of note, recently admitted in July, sepsis secondary to UTI and at that time Morataya catheter remains in place, he received IV antibiotic course, he was discharged to [facility] in stable condition.  He returned to [facility] ED on the day of admission due to shortness of air and altered mental status.  Patient was discharged from SNF facility 1 day prior to this admission.  Further ED workup revealed CTA pulmonary moderate to severe pulmonary fibrosis and interval development meant of esophagitis versus mass, chest x-ray bibasilar pneumonia right greater than left, COVID-positive, CT of the head no acute intercranial abnormalities with acute bilateral maxillary sinusitis, lactic acid 6 with repeat 5.8.  CRP 11.55, WBCs 26.3, ABGs metabolic alkalosis.  Patient was initiated on cefepime and vancomycin upon admission.  After admission patient noted to be  appropriate to the performed exam and include at least one of the following: Automated exposure control, adjustment of the mA and/or kV according to size, and the use of iterative reconstruction technique.  ______________________________________ Electronically signed by: LIZABETH SANCHEZ M.D. Date:     09/18/2024 Time:    15:40     XR CHEST PORTABLE    Result Date: 9/18/2024  Small bilateral pleural effusions with bibasilar atelectasis and/or pneumonia, right greater than left.  Chronic bilateral interstitial lung markings.   ______________________________________ Electronically signed by: LIZABETH SANCHEZ M.D. Date:     09/18/2024 Time:    14:25        Assessment/Plan   Principal Problem:    Sepsis due to bibasilar pneumonia /COVID-19   -CTA pulmonary negative for PE   -MRSA detected    -IV antibiotics Cefepime & Vancomycin -completed   -Baricitinib-course completed on 10/2  -Supplemental oxygen wean as tolerated,currently 4L NC               -IS/Acapella-as able    -Blood cultures NGTD     Active Problems:   Idiopathic pulmonary fibrosis    -Chronic follows [facility] pulmonary   -Continue Symbicort  -Monitor for hemoptysis with initiation of anticoagulation       Palliative care patient   -Palliative following    -Hospice following      Paroxysmal atrial fibrillation    -Cardiology signed off    -Continue Toprol XL   -Digoxin 125mcg x1 on 9/19 with conversion back to NSR  -Lopressor 2.5mg x1 on 9/21 with conversion   -[id number] score 2   -Lovenox 1 mg/kg transition to Eliquis prior to DC   -Monitor on telemetry     Mclean's esophagus with dysplasia / GERD (gastroesophageal reflux disease)  / Dysphagia / Severe malnutrition   -GI re-consulted, 9/24 esophagram esophagitis with continued dysphagia, EGD severe chronic diffuse erosive esophagitis, underlying long Mclean's segment, no tumor mass or stricture. PPI, Carafate, aspiration precautions ordered. -Follow up with GI in 1 month with recommendation for repeat EGD

## 2024-10-12 NOTE — PROGRESS NOTES
Called [employer] about concerns with patient's portable concentrator. Per [employer] rep, patient has to breath through nose in order for concentrator to work properly. Mouth breathing will not trigger the conserver to open up to give a puff of oxygen per [employer].  [employer] rep said he could come by and assess equipment.

## 2024-10-12 NOTE — PLAN OF CARE
Problem: Safety - Adult  Goal: Free from fall injury  Outcome: Progressing     Problem: Skin/Tissue Integrity  Goal: Absence of new skin breakdown  Description: 1.  Monitor for areas of redness and/or skin breakdown  2.  Assess vascular access sites hourly  3.  Every 4-6 hours minimum:  Change oxygen saturation probe site  4.  Every 4-6 hours:  If on nasal continuous positive airway pressure, respiratory therapy assess nares and determine need for appliance change or resting period.  Outcome: Progressing     Problem: ABCDS Injury Assessment  Goal: Absence of physical injury  Outcome: Progressing     Problem: Safety - Medical Restraint  Goal: Remains free of injury from restraints (Restraint for Interference with Medical Device)  Description: INTERVENTIONS:  1. Determine that other, less restrictive measures have been tried or would not be effective before applying the restraint  2. Evaluate the patient's condition at the time of restraint application  3. Inform patient/family regarding the reason for restraint  4. Q2H: Monitor safety, psychosocial status, comfort, nutrition and hydration  Outcome: Progressing     Problem: Discharge Planning  Goal: Discharge to home or other facility with appropriate resources  Outcome: Progressing     Problem: Nutrition Deficit:  Goal: Optimize nutritional status  Outcome: Progressing     Problem: Pain  Goal: Verbalizes/displays adequate comfort level or baseline comfort level  Outcome: Progressing     Problem: Neurosensory - Adult  Goal: Achieves stable or improved neurological status  Outcome: Progressing  Flowsheets (Taken 10/12/2024 0842)  Achieves stable or improved neurological status: Assess for and report changes in neurological status     Problem: Respiratory - Adult  Goal: Achieves optimal ventilation and oxygenation  Outcome: Progressing  Flowsheets (Taken 10/12/2024 0842)  Achieves optimal ventilation and oxygenation: Assess for changes in respiratory status

## 2024-10-12 NOTE — PROGRESS NOTES
Patient yelling, \"help,\" into the halls. This nurse went to assess patient. Patient stated he could not breath. Patient's breathing labored on assessment. O2 saturation 88% on 4L. Bumped NC up to 5L and instructed patient to breath in through his nose and out his mouth. O2 sat at 96% with these interventions. Lung sounds diminished bilaterally (no change from previous assessment). Morphine given for shortness of breath (see MAR).

## 2024-10-12 NOTE — PROGRESS NOTES
Physical Therapy  Name: Eze Rosa  MRN:  [id number]  Date of service:  10/12/2024       10/12/24 0942   General   Missed reason Patient declined   Subjective   Subjective Pt refused stating. I'm going home. No I'm going home tomorrow.   General Comment   Comments Offered supine exercises and edge of bed sitting but pt again refused.   Conditions Requiring Skilled Therapeutic Intervention   Assessment Pt declined treatment despite encouragement. Will follow up and progress as tolerated.   PT Plan of Care   Saturday R         Electronically signed by Deborah Ulloa PTA on 10/12/2024 at 9:48 AM

## 2024-10-12 NOTE — CONSULTS
Spoke with Rebeca WALLS and reviewed events with pt SOA. Note both home health and hospice consults entered. I called and spoke with pt daughter, Rebeca. She reports that she is worried about her dad's new shortness of breath and wants to make sure he is stable before discharge. She asks that pt not be discharged over the weekend because her mother is currently at [facility] in ICU and she is with her. She would like to see how patient does over the weekend before deciding home health vs hospice. She is concerned that she will be unable to give him the care he needs at home if he continues to decline. Hospice number provided to dtr Rebeca. Fay alva/Home health updated as well. If pt has uncontrolled symptoms despite oral Ativan/Morphine and would like consideration for inpatient hospice over the weekend, please call hospice at [phone]. Thank you.

## 2024-10-13 LAB
ANION GAP SERPL CALCULATED.3IONS-SCNC: 7 MMOL/L (ref 7–19)
BASOPHILS # BLD: 0 K/UL (ref 0–0.2)
BASOPHILS NFR BLD: 0.2 % (ref 0–1)
BUN SERPL-MCNC: 9 MG/DL (ref 8–23)
CALCIUM SERPL-MCNC: 8.1 MG/DL (ref 8.8–10.2)
CHLORIDE SERPL-SCNC: 96 MMOL/L (ref 98–111)
CO2 SERPL-SCNC: 32 MMOL/L (ref 22–29)
CREAT SERPL-MCNC: 0.5 MG/DL (ref 0.7–1.2)
EOSINOPHIL # BLD: 0.1 K/UL (ref 0–0.6)
EOSINOPHIL NFR BLD: 3.1 % (ref 0–5)
ERYTHROCYTE [DISTWIDTH] IN BLOOD BY AUTOMATED COUNT: 13.8 % (ref 11.5–14.5)
GLUCOSE SERPL-MCNC: 123 MG/DL (ref 70–99)
HCT VFR BLD AUTO: 27.6 % (ref 42–52)
HGB BLD-MCNC: 8.8 G/DL (ref 14–18)
IMM GRANULOCYTES # BLD: 0 K/UL
LYMPHOCYTES # BLD: 0.9 K/UL (ref 1.1–4.5)
LYMPHOCYTES NFR BLD: 20.4 % (ref 20–40)
MCH RBC QN AUTO: 30.7 PG (ref 27–31)
MCHC RBC AUTO-ENTMCNC: 31.9 G/DL (ref 33–37)
MCV RBC AUTO: 96.2 FL (ref 80–94)
MONOCYTES # BLD: 0.7 K/UL (ref 0–0.9)
MONOCYTES NFR BLD: 15.9 % (ref 0–10)
NEUTROPHILS # BLD: 2.5 K/UL (ref 1.5–7.5)
NEUTS SEG NFR BLD: 60.2 % (ref 50–65)
PLATELET # BLD AUTO: 192 K/UL (ref 130–400)
PMV BLD AUTO: 10.7 FL (ref 9.4–12.4)
POTASSIUM SERPL-SCNC: 3.6 MMOL/L (ref 3.5–5)
RBC # BLD AUTO: 2.87 M/UL (ref 4.7–6.1)
SODIUM SERPL-SCNC: 135 MMOL/L (ref 136–145)
WBC # BLD AUTO: 4.2 K/UL (ref 4.8–10.8)

## 2024-10-13 PROCEDURE — 94640 AIRWAY INHALATION TREATMENT: CPT

## 2024-10-13 PROCEDURE — 94761 N-INVAS EAR/PLS OXIMETRY MLT: CPT

## 2024-10-13 PROCEDURE — 2140000000 HC CCU INTERMEDIATE R&B

## 2024-10-13 PROCEDURE — 6370000000 HC RX 637 (ALT 250 FOR IP): Performed by: INTERNAL MEDICINE

## 2024-10-13 PROCEDURE — 6370000000 HC RX 637 (ALT 250 FOR IP): Performed by: PHYSICIAN ASSISTANT

## 2024-10-13 PROCEDURE — 6370000000 HC RX 637 (ALT 250 FOR IP)

## 2024-10-13 PROCEDURE — 36415 COLL VENOUS BLD VENIPUNCTURE: CPT

## 2024-10-13 PROCEDURE — 2700000000 HC OXYGEN THERAPY PER DAY

## 2024-10-13 PROCEDURE — 85025 COMPLETE CBC W/AUTO DIFF WBC: CPT

## 2024-10-13 PROCEDURE — 80048 BASIC METABOLIC PNL TOTAL CA: CPT

## 2024-10-13 RX ORDER — FUROSEMIDE 10 MG/ML
60 INJECTION INTRAMUSCULAR; INTRAVENOUS ONCE
Status: DISCONTINUED | OUTPATIENT
Start: 2024-10-13 | End: 2024-10-18 | Stop reason: HOSPADM

## 2024-10-13 RX ADMIN — GUAIFENESIN 600 MG: 600 TABLET ORAL at 09:59

## 2024-10-13 RX ADMIN — BUDESONIDE AND FORMOTEROL FUMARATE DIHYDRATE 2 PUFF: 160; 4.5 AEROSOL RESPIRATORY (INHALATION) at 06:36

## 2024-10-13 RX ADMIN — Medication 2000 UNITS: at 09:59

## 2024-10-13 RX ADMIN — Medication 5 MG: at 21:39

## 2024-10-13 RX ADMIN — FINASTERIDE 5 MG: 5 TABLET, FILM COATED ORAL at 09:59

## 2024-10-13 RX ADMIN — QUETIAPINE FUMARATE 12.5 MG: 25 TABLET ORAL at 21:38

## 2024-10-13 RX ADMIN — GUAIFENESIN 600 MG: 600 TABLET ORAL at 21:39

## 2024-10-13 RX ADMIN — METOPROLOL SUCCINATE 25 MG: 25 TABLET, EXTENDED RELEASE ORAL at 09:59

## 2024-10-13 RX ADMIN — PANTOPRAZOLE SODIUM 40 MG: 40 TABLET, DELAYED RELEASE ORAL at 21:38

## 2024-10-13 RX ADMIN — APIXABAN 5 MG: 5 TABLET, FILM COATED ORAL at 21:38

## 2024-10-13 RX ADMIN — GLYCOPYRROLATE 1 MG: 1 TABLET ORAL at 21:38

## 2024-10-13 RX ADMIN — APIXABAN 5 MG: 5 TABLET, FILM COATED ORAL at 09:59

## 2024-10-13 RX ADMIN — PANTOPRAZOLE SODIUM 40 MG: 40 TABLET, DELAYED RELEASE ORAL at 09:59

## 2024-10-13 RX ADMIN — Medication 5 MG: at 16:50

## 2024-10-13 RX ADMIN — SUCRALFATE 1 G: 1 TABLET ORAL at 09:59

## 2024-10-13 RX ADMIN — BUDESONIDE AND FORMOTEROL FUMARATE DIHYDRATE 2 PUFF: 160; 4.5 AEROSOL RESPIRATORY (INHALATION) at 18:24

## 2024-10-13 RX ADMIN — GLYCOPYRROLATE 1 MG: 1 TABLET ORAL at 16:51

## 2024-10-13 RX ADMIN — METOPROLOL SUCCINATE 25 MG: 25 TABLET, EXTENDED RELEASE ORAL at 21:38

## 2024-10-13 RX ADMIN — SUCRALFATE 1 G: 1 TABLET ORAL at 21:38

## 2024-10-13 RX ADMIN — SUCRALFATE 1 G: 1 TABLET ORAL at 16:51

## 2024-10-13 RX ADMIN — GLYCOPYRROLATE 1 MG: 1 TABLET ORAL at 09:59

## 2024-10-13 ASSESSMENT — PAIN SCALES - GENERAL: PAINLEVEL_OUTOF10: 6

## 2024-10-13 NOTE — PLAN OF CARE
Problem: Safety - Adult  Goal: Free from fall injury  10/13/2024 1138 by Olena Castaneda RN  Outcome: Progressing  10/13/2024 0459 by Sally Rogers RN  Outcome: Progressing     Problem: Skin/Tissue Integrity  Goal: Absence of new skin breakdown  Description: 1.  Monitor for areas of redness and/or skin breakdown  2.  Assess vascular access sites hourly  3.  Every 4-6 hours minimum:  Change oxygen saturation probe site  4.  Every 4-6 hours:  If on nasal continuous positive airway pressure, respiratory therapy assess nares and determine need for appliance change or resting period.  10/13/2024 1138 by Olena Castaneda RN  Outcome: Progressing  10/13/2024 0459 by Sally Rogers RN  Outcome: Progressing     Problem: ABCDS Injury Assessment  Goal: Absence of physical injury  10/13/2024 1138 by Olena Castaneda RN  Outcome: Progressing  10/13/2024 0459 by Sally Rogers RN  Outcome: Progressing     Problem: Safety - Medical Restraint  Goal: Remains free of injury from restraints (Restraint for Interference with Medical Device)  Description: INTERVENTIONS:  1. Determine that other, less restrictive measures have been tried or would not be effective before applying the restraint  2. Evaluate the patient's condition at the time of restraint application  3. Inform patient/family regarding the reason for restraint  4. Q2H: Monitor safety, psychosocial status, comfort, nutrition and hydration  10/13/2024 1138 by Olena Castaneda RN  Outcome: Progressing  10/13/2024 0459 by Sally Rogers RN  Outcome: Progressing     Problem: Discharge Planning  Goal: Discharge to home or other facility with appropriate resources  10/13/2024 1138 by Olena Castaneda RN  Outcome: Progressing  10/13/2024 0459 by Sally Rogers RN  Outcome: Progressing  Flowsheets (Taken 10/12/2024 2125)  Discharge to home or other facility with appropriate resources: Identify barriers to discharge with patient and caregiver     Problem: Nutrition  10/12/2024 2125)  Patient/family maintains appropriate behavior and adheres to behavioral management agreement, if implemented:   Assess patient/family’s coping skills and  non-compliant behavior (including use of illegal substances)   Notify security of behavior or suspected illegal substances which indicate the need for search of the patient and/or belongings   Encourage verbalization of thoughts and concerns in a socially appropriate manner   Utilize positive, consistent limit setting strategies supporting safety of patient, staff and others   Encourage participation in the decision making process about the behavioral management agreement   Implement a Health Care Agreement if patient meets criteria   If a patient’s behavior jeopardizes the safety of the patient, staff, or others refer to organization policy. If a visitor’s behavior poses a threat to safety refer to organization policy   Initiate consult with , Psychosocial Clinical Nurse Specialist, Spiritual Care as appropriate

## 2024-10-13 NOTE — PROGRESS NOTES
[facility]      Patient:  Eze Rosa  YOB: 1950  Date of Service: 10/13/2024  MRN: [id number]   Acct: [id number]   Primary Care Physician: Ross Masterson MD  Advance Directive: DNR  Admit Date: 9/18/2024       Hospital Day: 25  Portions of this note have been copied forward, however, changed to reflect the most current clinical status of this patient.    CHIEF COMPLAINT  altered mental status     Subjective: Resting comfortably in bed currently on 5LNC nasal cannula. Oral intake  has improved. Encouraged to slow down when drinking and eating.  Afebrile. No reported agitation.      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  Of note, recently admitted in July, sepsis secondary to UTI and at that time Morataya catheter remains in place, he received IV antibiotic course, he was discharged to [facility] in stable condition.  He returned to [facility] ED on the day of admission due to shortness of air and altered mental status.  Patient was discharged from SNF facility 1 day prior to this admission.  Further ED workup revealed CTA pulmonary moderate to severe pulmonary fibrosis and interval development meant of esophagitis versus mass, chest x-ray bibasilar pneumonia right greater than left, COVID-positive, CT of the head no acute intercranial abnormalities with acute bilateral maxillary sinusitis, lactic acid 6 with repeat 5.8.  CRP 11.55, WBCs 26.3, ABGs metabolic alkalosis.  Patient was initiated on cefepime and vancomycin upon admission.  After admission patient noted to be

## 2024-10-13 NOTE — PROGRESS NOTES
Slowly Decreased Fi02 from 7 lpm to 4 lpm (as per home) after ABG. Cont pulsox at bedside still at 100%

## 2024-10-13 NOTE — PLAN OF CARE
Problem: Safety - Adult  Goal: Free from fall injury  Outcome: Progressing     Problem: Skin/Tissue Integrity  Goal: Absence of new skin breakdown  Description: 1.  Monitor for areas of redness and/or skin breakdown  2.  Assess vascular access sites hourly  3.  Every 4-6 hours minimum:  Change oxygen saturation probe site  4.  Every 4-6 hours:  If on nasal continuous positive airway pressure, respiratory therapy assess nares and determine need for appliance change or resting period.  Outcome: Progressing     Problem: ABCDS Injury Assessment  Goal: Absence of physical injury  Outcome: Progressing     Problem: Safety - Medical Restraint  Goal: Remains free of injury from restraints (Restraint for Interference with Medical Device)  Description: INTERVENTIONS:  1. Determine that other, less restrictive measures have been tried or would not be effective before applying the restraint  2. Evaluate the patient's condition at the time of restraint application  3. Inform patient/family regarding the reason for restraint  4. Q2H: Monitor safety, psychosocial status, comfort, nutrition and hydration  Outcome: Progressing     Problem: Discharge Planning  Goal: Discharge to home or other facility with appropriate resources  Outcome: Progressing  Flowsheets (Taken 10/12/2024 2125)  Discharge to home or other facility with appropriate resources: Identify barriers to discharge with patient and caregiver     Problem: Nutrition Deficit:  Goal: Optimize nutritional status  Outcome: Progressing     Problem: Pain  Goal: Verbalizes/displays adequate comfort level or baseline comfort level  Outcome: Progressing     Problem: Neurosensory - Adult  Goal: Achieves stable or improved neurological status  Outcome: Progressing  Flowsheets (Taken 10/12/2024 2125)  Achieves stable or improved neurological status:   Assess for and report changes in neurological status   Initiate measures to prevent increased intracranial pressure   Maintain blood  acceptance of impending death and feel psychological comfort and peace  Description: INTERVENTIONS:  1. Assess patient/family anxiety and grief process related to end of life issues  2. Provide emotional and spiritual support  3. Provide information about the patient's health status with consideration of family and cultural values  4. Communicate willingness to discuss death and facilitate grief process  with patient/family as appropriate  5. Emphasize sustaining relationships within family system and community, or Yarsani/spiritual traditions  6. Initiate Spiritual Care, Psychosocial Clinical Specialist, consult as needed  Outcome: Progressing  Flowsheets (Taken 10/12/2024 2125)  Patient/family communicates acceptance of loss or impending death and feels physical/psychological comfort and peace:   Assess patient/family anxiety and grief process related to end of life issues   Provide emotional and spiritual support   Provide information about the patient’s health status with consideration of family and cultural values   Communicate willingness to discuss death and facilitate grief process  with patient/family as appropriate   Emphasize sustaining relationships within family system and community, or Yarsani/spiritual traditions   Initiate Spiritual Care, Psychosocial Clinical Specialist, consult as needed     Problem: Decision Making  Goal: Pt/Family able to effectively weigh alternatives and participate in decision making related to treatment and care  Description: INTERVENTIONS:  1. Determine when there are differences between patient's view, family's view, and healthcare provider's view of condition  2. Facilitate patient and family articulation of goals for care  3. Help patient and family identify pros/cons of alternative solutions  4. Provide information as requested by patient/family  5. Respect patient/family right to receive or not to receive information  6. Serve as a liaison between patient and family and

## 2024-10-13 NOTE — PROGRESS NOTES
Patient was heard yelling \"help\". This nurse and KAVITA Zavala, went to assess patient. Patient presented with tachypnea. O2 sat was in the mid 70's on 5L NC. Patient appeared to be hyperventilating. This nurse instructed patient to breath in through his nose and out his mouth. Patient kept saying \"I know,\" without any change in his mouth breathing. Patient's hands were trembling on assessment. Morphine and ativan were given (see MAR). Vitals taken: /64 . Respiratory was notified of patient's condition. High flow salter was placed on patient per respiratory. Patient placed on continuous pulse ox. O2 sat 95% on 7L high flow. HR 96 bpm and respiratory effort unlabored.     Dr. Wiseman notified. Order for CXR and Abg obtained.

## 2024-10-13 NOTE — PROGRESS NOTES
While this nurse and the patient care tech was performing pericare to the patient after he had a bowel movement, patients O2 dropped down to 82%. This nurse bumped the O2 up to 6L to get oxygen back up.

## 2024-10-13 NOTE — PROGRESS NOTES
9/24 esophagram esophagitis with continued dysphagia, EGD severe chronic diffuse erosive esophagitis, underlying long Mclean's segment, no tumor mass or stricture. PPI, Carafate, aspiration precautions ordered. -Follow up with GI in 1 month with recommendation for repeat EGD in 2 months  -Palliative care following, DNR status updated and hospice consulted  - following-SNF with skilled care. Hospice will follow  -CTA pulmonary wall thickening of esophagus, esophagitis vs mass   -Honey thickened liquid   -Aspiration precautions    -SLP following    -Dietician following    -Protonix BID      Benign prostatic hyperplasia with urinary retention   -Chronic Morataya exchanged 9/19   -Monitor I&O   -continue Proscar     Hypertension   -Monitor vital signs    -toprol Continued      Anxiety   -Has been calm and cooperative since 9/20   -Seroquel as needed for anxiety/agitation    -Ativan available for anxiety     DVT Prophylaxis: Lovenox     GI prophylaxis:  protonix    Disposition: Plan to discharge home with daughter-case management involved.  Hospice reevaluation, patient does not qualify for inpatient due to insurance issues.    BRENDEN Carlisle - CNP, 10/13/2024 8:52 AM

## 2024-10-13 NOTE — PROGRESS NOTES
Attempted to re-establish IV access. Pt jerked and then proceeded to get upset and begging to not try again at this time. IV was removed a couple of days ago when it was thought the patient was being discharged. Either this nurse or another nurse will attempt to establish IV access when the pt is more calm.

## 2024-10-14 PROCEDURE — 2700000000 HC OXYGEN THERAPY PER DAY

## 2024-10-14 PROCEDURE — 6370000000 HC RX 637 (ALT 250 FOR IP): Performed by: PHYSICIAN ASSISTANT

## 2024-10-14 PROCEDURE — 2140000000 HC CCU INTERMEDIATE R&B

## 2024-10-14 PROCEDURE — 94640 AIRWAY INHALATION TREATMENT: CPT

## 2024-10-14 PROCEDURE — 6370000000 HC RX 637 (ALT 250 FOR IP)

## 2024-10-14 PROCEDURE — 6370000000 HC RX 637 (ALT 250 FOR IP): Performed by: INTERNAL MEDICINE

## 2024-10-14 PROCEDURE — 94761 N-INVAS EAR/PLS OXIMETRY MLT: CPT

## 2024-10-14 PROCEDURE — 99233 SBSQ HOSP IP/OBS HIGH 50: CPT | Performed by: PHYSICIAN ASSISTANT

## 2024-10-14 RX ADMIN — Medication 5 MG: at 20:52

## 2024-10-14 RX ADMIN — QUETIAPINE FUMARATE 12.5 MG: 25 TABLET ORAL at 20:53

## 2024-10-14 RX ADMIN — SUCRALFATE 1 G: 1 TABLET ORAL at 07:50

## 2024-10-14 RX ADMIN — QUETIAPINE FUMARATE 12.5 MG: 25 TABLET ORAL at 12:01

## 2024-10-14 RX ADMIN — Medication 0.5 MG: at 12:43

## 2024-10-14 RX ADMIN — APIXABAN 5 MG: 5 TABLET, FILM COATED ORAL at 07:50

## 2024-10-14 RX ADMIN — BUDESONIDE AND FORMOTEROL FUMARATE DIHYDRATE 2 PUFF: 160; 4.5 AEROSOL RESPIRATORY (INHALATION) at 19:28

## 2024-10-14 RX ADMIN — GLYCOPYRROLATE 1 MG: 1 TABLET ORAL at 20:54

## 2024-10-14 RX ADMIN — Medication 5 MG: at 11:06

## 2024-10-14 RX ADMIN — SUCRALFATE 1 G: 1 TABLET ORAL at 11:13

## 2024-10-14 RX ADMIN — PANTOPRAZOLE SODIUM 40 MG: 40 TABLET, DELAYED RELEASE ORAL at 07:50

## 2024-10-14 RX ADMIN — Medication 5 MG: at 03:48

## 2024-10-14 RX ADMIN — APIXABAN 5 MG: 5 TABLET, FILM COATED ORAL at 20:54

## 2024-10-14 RX ADMIN — SUCRALFATE 1 G: 1 TABLET ORAL at 20:52

## 2024-10-14 RX ADMIN — Medication 5 MG: at 17:15

## 2024-10-14 RX ADMIN — Medication 2000 UNITS: at 07:50

## 2024-10-14 RX ADMIN — GUAIFENESIN 600 MG: 600 TABLET ORAL at 20:53

## 2024-10-14 RX ADMIN — BUDESONIDE AND FORMOTEROL FUMARATE DIHYDRATE 2 PUFF: 160; 4.5 AEROSOL RESPIRATORY (INHALATION) at 08:11

## 2024-10-14 RX ADMIN — SUCRALFATE 1 G: 1 TABLET ORAL at 17:11

## 2024-10-14 RX ADMIN — PANTOPRAZOLE SODIUM 40 MG: 40 TABLET, DELAYED RELEASE ORAL at 20:54

## 2024-10-14 RX ADMIN — GLYCOPYRROLATE 1 MG: 1 TABLET ORAL at 07:50

## 2024-10-14 RX ADMIN — METOPROLOL SUCCINATE 25 MG: 25 TABLET, EXTENDED RELEASE ORAL at 07:50

## 2024-10-14 RX ADMIN — GUAIFENESIN 600 MG: 600 TABLET ORAL at 07:50

## 2024-10-14 RX ADMIN — GLYCOPYRROLATE 1 MG: 1 TABLET ORAL at 14:09

## 2024-10-14 RX ADMIN — FINASTERIDE 5 MG: 5 TABLET, FILM COATED ORAL at 07:50

## 2024-10-14 RX ADMIN — METOPROLOL SUCCINATE 25 MG: 25 TABLET, EXTENDED RELEASE ORAL at 20:54

## 2024-10-14 ASSESSMENT — ENCOUNTER SYMPTOMS
WHEEZING: 0
DIARRHEA: 0
BLOOD IN STOOL: 0
COLOR CHANGE: 0
NAUSEA: 0
COUGH: 0
VOMITING: 0
ABDOMINAL DISTENTION: 0
SHORTNESS OF BREATH: 1
CONSTIPATION: 0
ABDOMINAL PAIN: 0

## 2024-10-14 ASSESSMENT — PAIN SCALES - GENERAL
PAINLEVEL_OUTOF10: 10
PAINLEVEL_OUTOF10: 8
PAINLEVEL_OUTOF10: 8
PAINLEVEL_OUTOF10: 9
PAINLEVEL_OUTOF10: 0

## 2024-10-14 ASSESSMENT — PAIN DESCRIPTION - LOCATION
LOCATION: OTHER (COMMENT)

## 2024-10-14 ASSESSMENT — PAIN SCALES - WONG BAKER: WONGBAKER_NUMERICALRESPONSE: NO HURT

## 2024-10-14 NOTE — PROGRESS NOTES
incubation.     Culture, Blood 2 [[id number]] Collected: 09/18/24 1353   Order Status: Completed Specimen: Blood Updated: 09/23/24 1514    Culture, Blood 2 No growth after 5 days of incubation.     Culture, Beta Strep Confirm Plates [[id number]] (Abnormal) Collected: 09/19/24 1436   Order Status: Completed Specimen: Throat Updated: 09/21/24 1345    Strep A Culture -- Abnormal     No Beta Strep isolated  Moderate growth normal respiratory naun with   Abnormal     Organism Rothia mucilaginosa Abnormal     Strep A Culture --    Moderate growth  No further workup     MRSA DNA Probe, Nasal [[id number]] (Abnormal) Collected: 09/19/24 1436   Order Status: Completed Specimen: Nasal swab from Nares Updated: 09/19/24 2103    MRSA SCREEN RT-PCR DETECTED Abnormal      Respiratory Panel, Molecular, with COVID-19 (Restricted: peds pts or suitable admitted adults) [[id number]] (Abnormal) Collected: 09/18/24 1350   Order Status: Completed Specimen: Nasopharyngeal Updated: 09/18/24 1518    Adenovirus by PCR Not Detected    Bordetella parapertussis by PCR Not Detected    Bordetella pertussis by PCR Not Detected    Chlamydophilia pneumoniae by PCR Not Detected    Coronavirus 229E by PCR Not Detected    Coronavirus HKU1 by PCR Not Detected    Coronavirus NL63 by PCR Not Detected    Coronavirus OC43 by PCR Not Detected    SARS-CoV-2, PCR DETECTED Abnormal     Human Metapneumovirus by PCR Not Detected    Human Rhinovirus/Enterovirus by PCR Not Detected    Influenza A by PCR Not Detected    Influenza B by PCR Not Detected    Mycoplasma pneumoniae by PCR Not Detected    Parainfluenza Virus 1 by PCR Not Detected    Parainfluenza Virus 2 by PCR Not Detected    Parainfluenza Virus 3 by PCR Not Detected    Parainfluenza Virus 4 by PCR Not Detected    Respiratory Syncytial Virus by PCR Not Detected       Assessment/Plan   Principal Problem:    COVID-19  Active Problems:    Idiopathic pulmonary fibrosis (HCC)    Benign prostatic hyperplasia    Much of this encounter note is an electronic transcription/translation of spoken language to printed text. The electronic translation of spoken language may permit erroneous, or at times, nonsensical words or phrases to be inadvertently transcribed; although attempts have made to review the note for such errors, some may still exist.

## 2024-10-14 NOTE — PLAN OF CARE
Problem: Safety - Adult  Goal: Free from fall injury  10/14/2024 0855 by Hannah Lentz RN  Outcome: Progressing  10/14/2024 0242 by Aranza Ventura RN  Outcome: Progressing     Problem: Skin/Tissue Integrity  Goal: Absence of new skin breakdown  Description: 1.  Monitor for areas of redness and/or skin breakdown  2.  Assess vascular access sites hourly  3.  Every 4-6 hours minimum:  Change oxygen saturation probe site  4.  Every 4-6 hours:  If on nasal continuous positive airway pressure, respiratory therapy assess nares and determine need for appliance change or resting period.  10/14/2024 0855 by Hannah Lentz RN  Outcome: Progressing  10/14/2024 0242 by Aranza Ventura RN  Outcome: Progressing     Problem: ABCDS Injury Assessment  Goal: Absence of physical injury  10/14/2024 0855 by Hannah Lentz RN  Outcome: Progressing  10/14/2024 0242 by Aranza Ventura RN  Outcome: Progressing     Problem: Safety - Medical Restraint  Goal: Remains free of injury from restraints (Restraint for Interference with Medical Device)  Description: INTERVENTIONS:  1. Determine that other, less restrictive measures have been tried or would not be effective before applying the restraint  2. Evaluate the patient's condition at the time of restraint application  3. Inform patient/family regarding the reason for restraint  4. Q2H: Monitor safety, psychosocial status, comfort, nutrition and hydration  10/14/2024 0855 by Hannah Lentz RN  Outcome: Progressing  10/14/2024 0242 by Aranza Ventura RN  Outcome: Progressing     Problem: Discharge Planning  Goal: Discharge to home or other facility with appropriate resources  10/14/2024 0855 by Hannah Lentz RN  Outcome: Progressing  Flowsheets (Taken 10/14/2024 0753)  Discharge to home or other facility with appropriate resources: Identify barriers to discharge with patient and caregiver  10/14/2024 0242 by Aranza Ventura RN  Outcome: Progressing  Flowsheets

## 2024-10-14 NOTE — PROGRESS NOTES
Pt had small BM, pt assisted in getting cleaned up & bed linen change. At this time pt c/o generalized pain & aches all over. PRN morphine dose given (see MAR).

## 2024-10-14 NOTE — PROGRESS NOTES
Pt having increased anxiety and agitation at this time, yelling \"I can't breathe, help me.\" PRN seroquel given (see MAR). Ativan dose requested from pharmacy.

## 2024-10-14 NOTE — PROGRESS NOTES
Pt continues to c/o shortness of breath with increased resp rate and noted anxiety. PRN Ativan given for air hunger (see MAR).

## 2024-10-14 NOTE — PROGRESS NOTES
10/14/24 1027   Encounter Summary   Encounter Overview/Reason Spiritual/Emotional Needs   Encounter Code  Assessment by  services   Service Provided For Patient   Referral/Consult From Palliative Care   Complexity of Encounter Moderate   Begin Time 0900   End Time  0915   Total Time Calculated 15 min   Spiritual/Emotional needs   Type Spiritual Support   Grief, Loss, and Adjustments   Type Adjustment to illness   Palliative Care   Type Palliative Care, Follow-up   Assessment/Intervention/Outcome   Assessment Compromised coping;Concerns with suffering   Intervention Active listening;Prayer (assurance of)/[place];Sustaining Presence/Ministry of presence   Outcome Acceptance;Expressed Gratitude;Receptive   Plan and Referrals   Plan/Referrals Continue to visit, (comment);Continue Support (comment)   Does the patient have a [facility] PCP? No         This  visited with pt to follow up with spiritual care and provide support. Pt is a Hospice consult and has been seen by the Hospice chaplain. Pt says he calls himself a Mosque and was raised Christianity. This  provided spiritual care with sustaining presence, support, and prayer. Pt expressed gratitude for spiritual care.      Spiritual Health History and Assessment/Progress Note  [facility]    (P) Spiritual/Emotional Needs,  , (P) Adjustment to illness,      Name: Eze Rosa MRN: [id number]    Age: 73 y.o.     Sex: male   Language: English   Confucianist: None   COVID-19     Date: 10/14/2024            Total Time Calculated: (P) 15 min              Spiritual Assessment continued in [facility] PROGRESSIVE CARE        Referral/Consult From: (P) Palliative Care   Encounter Overview/Reason: (P) Spiritual/Emotional Needs  Service Provided For: (P) Patient    Jessica, Belief, Meaning:   Patient identifies as spiritual and is connected with a jessica tradition or spiritual practice  Family/Friends No family/friends present      Importance and

## 2024-10-14 NOTE — PLAN OF CARE
differences between patient's view, family's view, and healthcare provider's view of condition     Problem: Death & Dying  Goal: Pt/Family communicate acceptance of impending death and feel psychological comfort and peace  Description: INTERVENTIONS:  1. Assess patient/family anxiety and grief process related to end of life issues  2. Provide emotional and spiritual support  3. Provide information about the patient's health status with consideration of family and cultural values  4. Communicate willingness to discuss death and facilitate grief process  with patient/family as appropriate  5. Emphasize sustaining relationships within family system and community, or anila/spiritual traditions  6. Initiate Spiritual Care, Psychosocial Clinical Specialist, consult as needed  Outcome: Progressing  Flowsheets (Taken 10/13/2024 2014)  Patient/family communicates acceptance of loss or impending death and feels physical/psychological comfort and peace: Assess patient/family anxiety and grief process related to end of life issues     Problem: Behavior  Goal: Pt/Family maintain appropriate behavior and adhere to behavioral management agreement, if implemented  Description: INTERVENTIONS:  1. Assess patient/family's coping skills and  non-compliant behavior (including use of illegal substances)  2. Notify security of behavior or suspected illegal substances which indicate the need for search of the family and/or belongings  3. Encourage verbalization of thoughts and concerns in a socially appropriate manner  4. Utilize positive, consistent limit setting strategies supporting safety of patient, staff and others  5. Encourage participation in the decision making process about the behavioral management agreement  6. If a visitor's behavior poses a threat to safety call refer to organization policy.  7. Initiate consult with , Psychosocial CNS, Spiritual Care as appropriate  Outcome: Progressing  Flowsheets (Taken  10/13/2024 2014)  Patient/family maintains appropriate behavior and adheres to behavioral management agreement, if implemented: Assess patient/family’s coping skills and  non-compliant behavior (including use of illegal substances)

## 2024-10-14 NOTE — PROGRESS NOTES
Palliative Care Progress Note  10/14/2024 8:14 AM    Patient:  Eze Rosa  YOB: 1950  Primary Care Physician: Ross Masterson MD  Advance Directive: DNR  Admit Date: 9/18/2024       Hospital Day: 26  Portions of this note have been copied forward, however, changed to reflect the most current clinical status of this patient.    CHIEF COMPLAINT/REASON FOR CONSULTATION Goals of care, family support, Code status, symptom management     SUBJECTIVE:  Mr. Rosa complains of \"hurting everywhere\". He is unable to localize where or describe severity. Would like some pain medication which has since been given.    HPI:  The patient is a 73 y.o. 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  CT abdomen and pelvis reported no acute abnormality, sigmoid diverticulosis without indication of diverticulitis, moderate stool seen in the rectum without focal abnormality, mildly distended gallbladder without stones or focal abnormality, mild distention of the stomach with fluid in the lumen, no indication of gastric outlet obstruction, small bilateral pleural effusions.  CT head reported no acute intracranial process with acute bilateral maxillary sinusitis.  Chest x-ray reported small bilateral pleural effusions with bibasilar atelectasis and/or pneumonia worse on the right side than the left.  He was positive for COVID-19.  WBC 26.3, D-dimer

## 2024-10-15 PROCEDURE — 94761 N-INVAS EAR/PLS OXIMETRY MLT: CPT

## 2024-10-15 PROCEDURE — 6370000000 HC RX 637 (ALT 250 FOR IP)

## 2024-10-15 PROCEDURE — 6370000000 HC RX 637 (ALT 250 FOR IP): Performed by: INTERNAL MEDICINE

## 2024-10-15 PROCEDURE — 2580000003 HC RX 258: Performed by: NURSE PRACTITIONER

## 2024-10-15 PROCEDURE — 2500000003 HC RX 250 WO HCPCS: Performed by: NURSE PRACTITIONER

## 2024-10-15 PROCEDURE — 99222 1ST HOSP IP/OBS MODERATE 55: CPT | Performed by: INTERNAL MEDICINE

## 2024-10-15 PROCEDURE — 6370000000 HC RX 637 (ALT 250 FOR IP): Performed by: PHYSICIAN ASSISTANT

## 2024-10-15 PROCEDURE — 2580000003 HC RX 258: Performed by: INTERNAL MEDICINE

## 2024-10-15 PROCEDURE — 2700000000 HC OXYGEN THERAPY PER DAY

## 2024-10-15 PROCEDURE — 2140000000 HC CCU INTERMEDIATE R&B

## 2024-10-15 PROCEDURE — 99233 SBSQ HOSP IP/OBS HIGH 50: CPT | Performed by: PHYSICIAN ASSISTANT

## 2024-10-15 PROCEDURE — 94640 AIRWAY INHALATION TREATMENT: CPT

## 2024-10-15 RX ORDER — FENTANYL 12.5 UG/1
1 PATCH TRANSDERMAL
Status: DISCONTINUED | OUTPATIENT
Start: 2024-10-15 | End: 2024-10-18 | Stop reason: HOSPADM

## 2024-10-15 RX ORDER — SODIUM CHLORIDE 9 MG/ML
INJECTION, SOLUTION INTRAVENOUS CONTINUOUS
Status: DISCONTINUED | OUTPATIENT
Start: 2024-10-15 | End: 2024-10-16

## 2024-10-15 RX ORDER — DILTIAZEM HYDROCHLORIDE 5 MG/ML
10 INJECTION INTRAVENOUS ONCE
Status: COMPLETED | OUTPATIENT
Start: 2024-10-15 | End: 2024-10-15

## 2024-10-15 RX ADMIN — FINASTERIDE 5 MG: 5 TABLET, FILM COATED ORAL at 08:22

## 2024-10-15 RX ADMIN — SODIUM CHLORIDE, PRESERVATIVE FREE 10 ML: 5 INJECTION INTRAVENOUS at 20:49

## 2024-10-15 RX ADMIN — APIXABAN 5 MG: 5 TABLET, FILM COATED ORAL at 08:22

## 2024-10-15 RX ADMIN — PANTOPRAZOLE SODIUM 40 MG: 40 TABLET, DELAYED RELEASE ORAL at 08:22

## 2024-10-15 RX ADMIN — Medication 5 MG: at 21:27

## 2024-10-15 RX ADMIN — Medication 0.5 MG: at 00:26

## 2024-10-15 RX ADMIN — GLYCOPYRROLATE 1 MG: 1 TABLET ORAL at 08:22

## 2024-10-15 RX ADMIN — PANTOPRAZOLE SODIUM 40 MG: 40 TABLET, DELAYED RELEASE ORAL at 20:49

## 2024-10-15 RX ADMIN — Medication 5 MG: at 03:38

## 2024-10-15 RX ADMIN — BUDESONIDE AND FORMOTEROL FUMARATE DIHYDRATE 2 PUFF: 160; 4.5 AEROSOL RESPIRATORY (INHALATION) at 19:19

## 2024-10-15 RX ADMIN — SUCRALFATE 1 G: 1 TABLET ORAL at 20:49

## 2024-10-15 RX ADMIN — Medication 0.5 MG: at 21:27

## 2024-10-15 RX ADMIN — DILTIAZEM HYDROCHLORIDE 10 MG: 5 INJECTION, SOLUTION INTRAVENOUS at 13:35

## 2024-10-15 RX ADMIN — GLYCOPYRROLATE 1 MG: 1 TABLET ORAL at 20:49

## 2024-10-15 RX ADMIN — Medication 2000 UNITS: at 08:22

## 2024-10-15 RX ADMIN — SUCRALFATE 1 G: 1 TABLET ORAL at 11:27

## 2024-10-15 RX ADMIN — Medication 5 MG: at 11:13

## 2024-10-15 RX ADMIN — GUAIFENESIN 600 MG: 600 TABLET ORAL at 08:22

## 2024-10-15 RX ADMIN — GLYCOPYRROLATE 1 MG: 1 TABLET ORAL at 12:39

## 2024-10-15 RX ADMIN — APIXABAN 5 MG: 5 TABLET, FILM COATED ORAL at 20:49

## 2024-10-15 RX ADMIN — SODIUM CHLORIDE: 9 INJECTION, SOLUTION INTRAVENOUS at 13:34

## 2024-10-15 RX ADMIN — Medication 0.5 MG: at 09:48

## 2024-10-15 RX ADMIN — DILTIAZEM HYDROCHLORIDE 2.5 MG/HR: 5 INJECTION, SOLUTION INTRAVENOUS at 13:36

## 2024-10-15 RX ADMIN — METOPROLOL SUCCINATE 25 MG: 25 TABLET, EXTENDED RELEASE ORAL at 20:49

## 2024-10-15 RX ADMIN — QUETIAPINE FUMARATE 12.5 MG: 25 TABLET ORAL at 12:37

## 2024-10-15 RX ADMIN — Medication 5 MG: at 00:26

## 2024-10-15 RX ADMIN — METOPROLOL SUCCINATE 25 MG: 25 TABLET, EXTENDED RELEASE ORAL at 08:22

## 2024-10-15 RX ADMIN — BUDESONIDE AND FORMOTEROL FUMARATE DIHYDRATE 2 PUFF: 160; 4.5 AEROSOL RESPIRATORY (INHALATION) at 06:36

## 2024-10-15 RX ADMIN — QUETIAPINE FUMARATE 12.5 MG: 25 TABLET ORAL at 20:53

## 2024-10-15 RX ADMIN — GUAIFENESIN 600 MG: 600 TABLET ORAL at 20:49

## 2024-10-15 ASSESSMENT — ENCOUNTER SYMPTOMS
RESPIRATORY NEGATIVE: 1
WHEEZING: 0
EYES NEGATIVE: 1
BLOOD IN STOOL: 0
ABDOMINAL PAIN: 0
COLOR CHANGE: 0
COUGH: 0
DIARRHEA: 0
SHORTNESS OF BREATH: 0
GASTROINTESTINAL NEGATIVE: 1
CONSTIPATION: 0
ABDOMINAL DISTENTION: 0
NAUSEA: 0
BACK PAIN: 1
VOMITING: 0

## 2024-10-15 ASSESSMENT — PAIN - FUNCTIONAL ASSESSMENT: PAIN_FUNCTIONAL_ASSESSMENT: ACTIVITIES ARE NOT PREVENTED

## 2024-10-15 ASSESSMENT — PAIN SCALES - GENERAL
PAINLEVEL_OUTOF10: 10

## 2024-10-15 ASSESSMENT — PAIN DESCRIPTION - LOCATION
LOCATION: GENERALIZED
LOCATION: GENERALIZED

## 2024-10-15 ASSESSMENT — PAIN DESCRIPTION - DESCRIPTORS: DESCRIPTORS: ACHING;DISCOMFORT

## 2024-10-15 NOTE — CONSULTS
[facility]  Cardiology Consult      Requesting MD:  John Rincon MD   Admit Status:         History obtained from:   [] Patient  [] Other (specify):     Patient:  Eze Rosa  [id number]     Chief Complaint:   Chief Complaint   Patient presents with    Shortness of Breath    Emesis         HPI: Mr. Rosa is a 73 y.o. male admitted 9/18/2024 for progressive shortness of breath also altered mental status.  Very poor historian.  Presently DNR status.  No definite cardiac history.  He is on oxygen 2 L at home.  Chest x-ray showed bilateral pleural effusions bibasilar atelectasis and/or pneumonia.  CT of the head no acute findings.  CT and abdomen and pelvis no acute abnormality.  CTA pulmonary no pulmonary embolism.  Stable moderate to severe diffuse pulmonary fibrosis most pronounced mid and lower lung send no honeycombing.  Initial .  Palliative care patient.  The patient himself is not aware of any cardiac issues.  He has developed atrial fibrillation with rapid ventricular sponsor rate.  Initial ECG 9/18/2024 indicated normal sinus rhythm but the next day developed atrial fibrillation with rapid ventricular sponsor rate.  Cardiology consulted.    Review of Systems:  Review of Systems   Constitutional: Negative.  Negative for chills, fever and unexpected weight change.   HENT: Negative.     Eyes: Negative.    Respiratory: Negative.  Negative for shortness of breath.    Cardiovascular: Negative.  Negative for chest pain.   Gastrointestinal: Negative.  Negative for diarrhea, nausea and vomiting.   Endocrine: Negative.    Genitourinary: Negative.    Musculoskeletal: Negative.    Skin: Negative.    Neurological: Negative.    All other systems reviewed and are negative.      Cardiac Specific Data:  Specialty Problems          Cardiology Problems    Hypertension        Paroxysmal atrial fibrillation (HCC)           Past Medical History:  Past Medical History:   Diagnosis Date     mild mucosal thickening of the left ethmoid air cells.  The mastoid air cells are well aerated.       No acute intracranial process.  Acute bilateral maxillary sinusitis.  Mild atrophy and chronic small vessel white matter change.  All CT scans are performed using dose optimization techniques as appropriate to the performed exam and include at least one of the following: Automated exposure control, adjustment of the mA and/or kV according to size, and the use of iterative reconstruction technique.  ______________________________________ Electronically signed by: LIZABETH SANCHEZ M.D. Date:     09/18/2024 Time:    15:40     XR CHEST PORTABLE    Result Date: 9/18/2024  EXAM:  CHEST X-RAY ONE VIEW.  HISTORY:  Short of breath.  COMPARISON:  07/14/2024 chest x-ray.  FINDINGS:  There are patchy opacities present within both lung bases, right greater than left with small bilateral pleural effusions.  Bilateral diffuse interstitial lung markings. The cardiac silhouette is normal.  There is no pulmonary edema or pneumothorax.  No change in the osseous structures.      Small bilateral pleural effusions with bibasilar atelectasis and/or pneumonia, right greater than left.  Chronic bilateral interstitial lung markings.   ______________________________________ Electronically signed by: LIZABETH SANCHEZ M.D. Date:     09/18/2024 Time:    14:25       Assessment:  Admission 9/18/2024 progressive shortness of breath  Altered mental status  Chronic hypoxemia on 2 L of oxygen at home  DNR status  CTA pulmonary no evidence of pulmonary embolism; stable moderate to severe diffuse pulmonary fibrosis most pronounced mid to lower lungs no honeycombing  CT abdomen pelvis no acute abnormalities  CT of the head no acute abnormalities  Gastroesophageal reflux disease  History of pneumonia  Psychiatric disease  Echocardiogram 9/19/2024 normal left ventricular function EF 60 to 65% aortic valve sclerosis mitral and calcification trace MR mild

## 2024-10-15 NOTE — CARE COORDINATION
Kiera spoke Rebeca gonzáles about pt d/c plans. Pt drt has her mother living with her whom is being discharged today 10/15. Pt drt states she would like hospice for Pt so that he is not suffering. Pt drt hopes for a reevaluation for hcc . Pt drt states pt can either stay with her if this is the last resort. Sw will  check private pay options.  Electronically signed by ARLYN RODRIGUEZ on 10/15/2024 at 10:50 AM

## 2024-10-15 NOTE — PROGRESS NOTES
ABG:  Recent Labs     10/12/24  2019   PHART 7.460*   OPQ9LDB 50.0*   PO2ART 127.0*   MNM1VSD 35.6*   BEART 10.5*   HGBAE 9.4*   G0BTYEMZ 97.0   CARBOXHGBART 1.3       RAD:     Echo (TTE) complete (PRN contrast/bubble/strain/3D)    Result Date: 9/19/2024    Left Ventricle: Normal left ventricular systolic function with a visually estimated EF of 60 - 65%. Left ventricle size is normal. Normal wall thickness. Normal wall motion. Normal diastolic function.   Right Ventricle: Normal systolic function. TAPSE is 2.4 cm.   Aorta: Normal sized aortic root and ascending aorta.   Pericardium: No pericardial effusion.   IVC/SVC: IVC was not well visualized. No clinically significant valvular regurgitation or stenosis.       CTA PULMONARY W CONTRAST  Result Date: 9/18/2024     1.  No pulmonary embolus is identified. 2.  Stable moderate to severe diffuse pulmonary fibrosis, most pronounced of the mid and lower lungs, but no honeycombing to suggest UIP. 3.  Stable trace dependent right pleural fluid, with pleural thickening and pleural calcification, consistent with chronicity.  No left pleural calcification.  Findings might be secondary to a prior right hemothorax or pyothorax.  Asbestos exposure cannot be excluded. 4.  Apparent interval development of vague wall thickening of portions of the esophagus.  Differential diagnosis includes esophagitis and mass.  Endoscopy or barium esophagram would better assess. 5.  For findings in the abdomen/pelvis, please refer to separate report.  All CT scans are performed using dose optimization techniques as appropriate to the performed exam and include at least one of the following: Automated exposure control, adjustment of the mA and/or kV according to size, and the use of iterative reconstruction technique.  ______________________________________ Electronically signed by: ZITA MORENO M.D. Date:     09/18/2024 Time:    15:47       CT ABDOMEN PELVIS W IV CONTRAST Additional Contrast?      GI prophylaxis:  Protonix      Discharge planning: Case management assisting with DC planning to SNF with hospice care  vs home with hospice       Further Orders per Clinical course/attending.     Electronically signed by BRENDEN Noriega CNP on 10/15/2024 at 12:44 PM       EMR Dragon/Transcription disclaimer:   Much of this encounter note is an electronic transcription/translation of spoken language to printed text. The electronic translation of spoken language may permit erroneous, or at times, nonsensical words or phrases to be inadvertently transcribed; although attempts have made to review the note for such errors, some may still exist.

## 2024-10-15 NOTE — PLAN OF CARE
Problem: Skin/Tissue Integrity  Goal: Absence of new skin breakdown  10/15/2024 1049 by Patty Leyva RN  Outcome: Progressing  10/15/2024 1049 by Patty Leyva RN  Outcome: Progressing  10/15/2024 0153 by Edelmira Garrison RN  Outcome: Progressing     Problem: Safety - Adult  Goal: Free from fall injury  10/15/2024 1049 by Patty Leyva RN  Outcome: Progressing  10/15/2024 1049 by Patty Leyva RN  Outcome: Progressing  10/15/2024 0153 by Edelmira Garrison RN  Outcome: Progressing     Problem: Skin/Tissue Integrity  Goal: Absence of new skin breakdown  10/15/2024 1049 by Patty Levya RN  Outcome: Progressing  10/15/2024 1049 by Patty Leyva RN  Outcome: Progressing  10/15/2024 0153 by Edelmira Garrison RN  Outcome: Progressing     Problem: ABCDS Injury Assessment  Goal: Absence of physical injury  10/15/2024 1049 by Patty Leyva RN  Outcome: Progressing  10/15/2024 1049 by Patty Leyva RN  Outcome: Progressing  10/15/2024 0153 by Edelmira Garrison RN  Outcome: Progressing     Problem: Safety - Medical Restraint  Goal: Remains free of injury from restraints (Restraint for Interference with Medical Device)  10/15/2024 0153 by Edelmira Garrison RN  Outcome: Completed     Problem: Discharge Planning  Goal: Discharge to home or other facility with appropriate resources  10/15/2024 1049 by Patty Leyva RN  Outcome: Progressing  Flowsheets (Taken 10/15/2024 0800 by Billie Turner, RN)  Discharge to home or other facility with appropriate resources:   Identify barriers to discharge with patient and caregiver   Arrange for needed discharge resources and transportation as appropriate   Identify discharge learning needs (meds, wound care, etc)   Refer to discharge planning if patient needs post-hospital services based on physician order or complex needs related to functional status, cognitive ability or social support system  10/15/2024 1049 by Patty Leyva RN  Outcome: Progressing  Flowsheets (Taken  Billie Turner, RN)  Will report anxiety at manageable levels:   Administer medication as ordered   Teach and rehearse alternative coping skills   Provide emotional support with 1:1 interaction with staff  10/15/2024 1049 by Patty Leyva RN  Outcome: Progressing  Flowsheets (Taken 10/15/2024 0800 by Billie Turner, RN)  Will report anxiety at manageable levels:   Administer medication as ordered   Teach and rehearse alternative coping skills   Provide emotional support with 1:1 interaction with staff  10/15/2024 0153 by Edelmira Garrison, RN  Outcome: Not Progressing

## 2024-10-15 NOTE — PROGRESS NOTES
Physical Therapy  Name: Eze Rosa  MRN:  [id number]  Date of service:  10/15/2024    Nursing declined therapy this date.    Electronically signed by Beatriz Razo PTA on 10/15/2024 at 11:47 AM

## 2024-10-15 NOTE — PLAN OF CARE
Problem: Pain  Goal: Verbalizes/displays adequate comfort level or baseline comfort level  Outcome: Not Progressing     Problem: Musculoskeletal - Adult  Goal: Return mobility to safest level of function  Outcome: Not Progressing     Problem: Anxiety  Goal: Will report anxiety at manageable levels  Description: INTERVENTIONS:  1. Administer medication as ordered  2. Teach and rehearse alternative coping skills  3. Provide emotional support with 1:1 interaction with staff  Outcome: Not Progressing     Problem: Safety - Adult  Goal: Free from fall injury  Outcome: Progressing     Problem: Skin/Tissue Integrity  Goal: Absence of new skin breakdown  Description: 1.  Monitor for areas of redness and/or skin breakdown  2.  Assess vascular access sites hourly  3.  Every 4-6 hours minimum:  Change oxygen saturation probe site  4.  Every 4-6 hours:  If on nasal continuous positive airway pressure, respiratory therapy assess nares and determine need for appliance change or resting period.  Outcome: Progressing     Problem: ABCDS Injury Assessment  Goal: Absence of physical injury  Outcome: Progressing     Problem: Discharge Planning  Goal: Discharge to home or other facility with appropriate resources  Outcome: Progressing     Problem: Nutrition Deficit:  Goal: Optimize nutritional status  Outcome: Progressing     Problem: Neurosensory - Adult  Goal: Achieves stable or improved neurological status  Outcome: Progressing     Problem: Respiratory - Adult  Goal: Achieves optimal ventilation and oxygenation  Outcome: Progressing     Problem: Cardiovascular - Adult  Goal: Maintains optimal cardiac output and hemodynamic stability  Outcome: Progressing     Problem: Skin/Tissue Integrity - Adult  Goal: Skin integrity remains intact  Outcome: Progressing     Problem: Gastrointestinal - Adult  Goal: Minimal or absence of nausea and vomiting  Outcome: Progressing     Problem: Genitourinary - Adult  Goal: Absence of urinary  provider's view of condition  2. Facilitate patient and family articulation of goals for care  3. Help patient and family identify pros/cons of alternative solutions  4. Provide information as requested by patient/family  5. Respect patient/family right to receive or not to receive information  6. Serve as a liaison between patient and family and health care team  7. Initiate Consults from Ethics, Palliative Care or initiate Family Care Conference as is appropriate  Outcome: Progressing     Problem: Behavior  Goal: Pt/Family maintain appropriate behavior and adhere to behavioral management agreement, if implemented  Description: INTERVENTIONS:  1. Assess patient/family's coping skills and  non-compliant behavior (including use of illegal substances)  2. Notify security of behavior or suspected illegal substances which indicate the need for search of the family and/or belongings  3. Encourage verbalization of thoughts and concerns in a socially appropriate manner  4. Utilize positive, consistent limit setting strategies supporting safety of patient, staff and others  5. Encourage participation in the decision making process about the behavioral management agreement  6. If a visitor's behavior poses a threat to safety call refer to organization policy.  7. Initiate consult with , Psychosocial CNS, Spiritual Care as appropriate  Outcome: Progressing

## 2024-10-15 NOTE — PROGRESS NOTES
Palliative Care Progress Note  10/15/2024 10:34 AM    Patient:  Eze Rosa  YOB: 1950  Primary Care Physician: Ross Masterson MD  Advance Directive: DNR  Admit Date: 9/18/2024       Hospital Day: 27  Portions of this note have been copied forward, however, changed to reflect the most current clinical status of this patient.    CHIEF COMPLAINT/REASON FOR CONSULTATION Goals of care, family support, Code status, symptom management     SUBJECTIVE:  Mr. Rosa states he doesn't feel well today. Describes pain \"everywhere\". Has had decreased oral intake/appetite and is no longer asking for sprite.     HPI:  The patient is a 73 y.o. 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  CT abdomen and pelvis reported no acute abnormality, sigmoid diverticulosis without indication of diverticulitis, moderate stool seen in the rectum without focal abnormality, mildly distended gallbladder without stones or focal abnormality, mild distention of the stomach with fluid in the lumen, no indication of gastric outlet obstruction, small bilateral pleural effusions.  CT head reported no acute intracranial process with acute bilateral maxillary sinusitis.  Chest x-ray reported small bilateral pleural effusions with bibasilar atelectasis and/or pneumonia worse on the right side than the left.  He was positive for COVID-19.  WBC 26.3, D-dimer 2.43,  increased pain w/ repositioning. I called and spoke w/ Rebeca and discussed addition of a duragesic patch to help w/ pain control. She is agreeable to this and continues to confirm desire for comfort focused treatment. She remains concerned about her ability to care for him at home as she is caring for an infant as well as her mother who has advanced cancer. She voiced worry that the patient was less responsive to her yesterday and no longer interested in drinking sprite which is very unusual for him. We discussed that I feel he may be starting to progress a little more toward end of life and reviewed normal changes associated w/ the dying process. He does continue to tolerate oral medications. Will trial Duragesic patch. If symptoms continue to progress, may need to be re-evaluated for inpatient hospice in the next few days. D/w Hospice medical director as well. D/w case management.    Recommendations:   Palliative Care-Unfortunately financial and caregiver situations have complicated discharge planning/decision making but the patient and his daughter both confirm w/ this provider that their goal is comfort focused care. Does not qualify for inpatient hospice-will continue to follow for this. Hospice did reach out to his daughter yesterday-she remains concerned that she cannot provide the level of care he needs at home while transitioning to end of life Code status: DNR  Pain-continue SL morphine, add duragesic patch. D/w RN   Anxiety/air hunger/secretions-continue Ativan and seroquel as needed. Continue scheduled Robinul   Acute/chronic hypoxemic respiratory failure suspected 2/2 COVID pneumonia w/ known idiopathic pulmonary fibrosis w/ cachexia -OK to continue current level of support until discharge plan in place, supportive care   Atrial fibrillation RVR-Echo EF 60%, normal diastolic function, Toprol XL, Eliquis recommended per Cardiology   Dysphagia w/ severe protein calorie malnutrition/weight

## 2024-10-15 NOTE — PROGRESS NOTES
The pt is restless and c/o pain.  The nurse is in the process of getting meds for him.  Prayer support provided.  Left message for the dtr offering to provide home hospice info.  Will follow. Call back number provided.

## 2024-10-16 PROCEDURE — 6370000000 HC RX 637 (ALT 250 FOR IP): Performed by: INTERNAL MEDICINE

## 2024-10-16 PROCEDURE — 6370000000 HC RX 637 (ALT 250 FOR IP)

## 2024-10-16 PROCEDURE — 6370000000 HC RX 637 (ALT 250 FOR IP): Performed by: PHYSICIAN ASSISTANT

## 2024-10-16 PROCEDURE — 94760 N-INVAS EAR/PLS OXIMETRY 1: CPT

## 2024-10-16 PROCEDURE — 2140000000 HC CCU INTERMEDIATE R&B

## 2024-10-16 PROCEDURE — 99232 SBSQ HOSP IP/OBS MODERATE 35: CPT | Performed by: INTERNAL MEDICINE

## 2024-10-16 PROCEDURE — 99232 SBSQ HOSP IP/OBS MODERATE 35: CPT | Performed by: PHYSICIAN ASSISTANT

## 2024-10-16 PROCEDURE — 2700000000 HC OXYGEN THERAPY PER DAY

## 2024-10-16 PROCEDURE — 94640 AIRWAY INHALATION TREATMENT: CPT

## 2024-10-16 RX ORDER — DILTIAZEM HYDROCHLORIDE 120 MG/1
120 CAPSULE, COATED, EXTENDED RELEASE ORAL DAILY
Status: DISCONTINUED | OUTPATIENT
Start: 2024-10-16 | End: 2024-10-18 | Stop reason: HOSPADM

## 2024-10-16 RX ADMIN — APIXABAN 5 MG: 5 TABLET, FILM COATED ORAL at 13:08

## 2024-10-16 RX ADMIN — Medication 2000 UNITS: at 13:08

## 2024-10-16 RX ADMIN — Medication 5 MG: at 13:06

## 2024-10-16 RX ADMIN — GUAIFENESIN 600 MG: 600 TABLET ORAL at 21:00

## 2024-10-16 RX ADMIN — BENZONATATE 100 MG: 100 CAPSULE ORAL at 13:14

## 2024-10-16 RX ADMIN — GLYCOPYRROLATE 1 MG: 1 TABLET ORAL at 13:08

## 2024-10-16 RX ADMIN — BUDESONIDE AND FORMOTEROL FUMARATE DIHYDRATE 2 PUFF: 160; 4.5 AEROSOL RESPIRATORY (INHALATION) at 18:39

## 2024-10-16 RX ADMIN — GUAIFENESIN 600 MG: 600 TABLET ORAL at 13:08

## 2024-10-16 RX ADMIN — SUCRALFATE 1 G: 1 TABLET ORAL at 13:08

## 2024-10-16 RX ADMIN — BUDESONIDE AND FORMOTEROL FUMARATE DIHYDRATE 2 PUFF: 160; 4.5 AEROSOL RESPIRATORY (INHALATION) at 06:47

## 2024-10-16 RX ADMIN — SUCRALFATE 1 G: 1 TABLET ORAL at 16:19

## 2024-10-16 RX ADMIN — PANTOPRAZOLE SODIUM 40 MG: 40 TABLET, DELAYED RELEASE ORAL at 13:08

## 2024-10-16 RX ADMIN — GLYCOPYRROLATE 1 MG: 1 TABLET ORAL at 21:00

## 2024-10-16 RX ADMIN — SUCRALFATE 1 G: 1 TABLET ORAL at 21:00

## 2024-10-16 RX ADMIN — FINASTERIDE 5 MG: 5 TABLET, FILM COATED ORAL at 13:14

## 2024-10-16 RX ADMIN — METOPROLOL SUCCINATE 25 MG: 25 TABLET, EXTENDED RELEASE ORAL at 21:00

## 2024-10-16 RX ADMIN — PANTOPRAZOLE SODIUM 40 MG: 40 TABLET, DELAYED RELEASE ORAL at 21:00

## 2024-10-16 RX ADMIN — APIXABAN 5 MG: 5 TABLET, FILM COATED ORAL at 21:00

## 2024-10-16 RX ADMIN — METOPROLOL SUCCINATE 25 MG: 25 TABLET, EXTENDED RELEASE ORAL at 13:08

## 2024-10-16 RX ADMIN — Medication 0.5 MG: at 16:19

## 2024-10-16 RX ADMIN — DILTIAZEM HYDROCHLORIDE 120 MG: 120 CAPSULE, COATED, EXTENDED RELEASE ORAL at 17:59

## 2024-10-16 ASSESSMENT — ENCOUNTER SYMPTOMS
BACK PAIN: 1
BLOOD IN STOOL: 0
SHORTNESS OF BREATH: 0
DIARRHEA: 0
WHEEZING: 0
VOMITING: 0
ABDOMINAL PAIN: 0
ABDOMINAL DISTENTION: 0
NAUSEA: 0
CONSTIPATION: 0
COUGH: 0
COLOR CHANGE: 0

## 2024-10-16 ASSESSMENT — PAIN SCALES - GENERAL: PAINLEVEL_OUTOF10: 10

## 2024-10-16 NOTE — CARE COORDINATION
Kiera called eliecer Jose to discuss d/c plans [phone]. Kiera gave private pay options from following snfs in the local [place], [place] area.   [facility], [facility], [facility] $350-400, and [facility] $190.  The other option would be going home with hospice.   Currently pt has a hospital bed though [facility]. If pt opts for hospice equipment will need to be returned to [facility] then re order though [facility]  PH: [phone]  FA: [phone]  [facility] has the contract with hospice dme. Awaiting returned call from eliecer.    Electronically signed by ARLYN RODRIGUEZ on 10/16/2024 at 9:38 AM

## 2024-10-16 NOTE — PROGRESS NOTES
Cardiology Daily Note Avel Spear MD      Patient:  Eze Rosa  [id number]    Patient Active Problem List    Diagnosis Date Noted    Mclean's esophagus with dysplasia 09/20/2024    GERD (gastroesophageal reflux disease) 09/20/2024    Paroxysmal atrial fibrillation (HCC) 09/20/2024    Dysphagia 09/20/2024    Anxiety 09/20/2024    Severe malnutrition (HCC) 09/20/2024    Pulmonary fibrosis (HCC) 09/20/2024    COVID 09/20/2024    COVID-19 09/18/2024    Hypertension 09/18/2024    Mclean's esophagus 09/18/2024    History of urinary retention 07/20/2024    Palliative care patient 01/08/2024    Dyspnea and respiratory abnormalities 01/08/2024    RSV infection 12/30/2023    Abdominal pain 12/30/2023    Acute on chronic hypoxic respiratory failure 12/29/2023    Tachypnea, tachyypneic to 40 bpm in ED 12/29/2023    Acute respiratory distress, Tachypneic to 40 bpm 12/29/2023    Moderate malnutrition (HCC) 09/05/2023    Encounter for [place] catheter replacement 09/04/2023    Urinary retention 09/04/2023    Idiopathic pulmonary fibrosis (HCC) 09/03/2023    Alcohol abuse 09/03/2023    Benign prostatic hyperplasia with urinary retention 09/03/2023    Hyponatremia 09/03/2023       Admit Date:  9/18/2024    Admission Problem List: Present on Admission:   COVID-19   Benign prostatic hyperplasia with urinary retention   (Resolved) Sepsis (HCC)   Hypertension   Idiopathic pulmonary fibrosis (HCC)   Palliative care patient   Mclean's esophagus with dysplasia   GERD (gastroesophageal reflux disease)   Paroxysmal atrial fibrillation (HCC)   Dysphagia   Anxiety   Severe malnutrition (HCC)      Cardiac Specific Data:  Specialty Problems          Cardiology Problems    Hypertension        Paroxysmal atrial fibrillation (HCC)           Subjective:  Mr. Rosa seen today remains in atrial fibrillation heart rate 10 1-1 03 on IV diltiazem.  Blood pressure 156/90 heart 57.  No reported dyspnea or chest pain.  No other complaints or issues

## 2024-10-16 NOTE — CARE COORDINATION
CM call dtr Rebeca to discuss discharge plans.  Left vm with call back number.  Electronically signed by Mayuri Crocker RN on 10/16/2024 at 1:00 PM

## 2024-10-16 NOTE — PROGRESS NOTES
Nutrition Assessment     Type and Reason for Visit: Reassess    Nutrition Recommendations/Plan:   Continue POC.     Malnutrition Assessment:  Malnutrition Status: Severe malnutrition    Nutrition Assessment:  Pt remains hospitalized, documented intake 0-50% of meals. Per staff, pt doesn't eat every meal. Per Palliative note, significantly decreased appetite. Aware family pursuing hospice.    Estimated Daily Nutrient Needs:  Energy (kcal):  0066-0548 (30-35 kcal/kg) Weight Used for Energy Requirements: Current     Protein (g):  77 (1.5 g/kg) Weight Used for Protein Requirements: Current        Fluid (ml/day):  1086-4416 Method Used for Fluid Requirements: 1 ml/kcal    Nutrition Related Findings:   Diarrhea 10/15. Na 135, creat 0.5, glu 120, 123. Wound Type: Pressure Injury (coccyx/buttocks)    Current Nutrition Therapies:    ADULT DIET; Dysphagia - Minced and Moist; Moderately Thick (Honey)  ADULT ORAL NUTRITION SUPPLEMENT; Breakfast; Fortified Gelatin Oral Supplement  ADULT ORAL NUTRITION SUPPLEMENT; Lunch, Dinner; Frozen Oral Supplement    Anthropometric Measures:  Height: 190.5 cm (6' 3\")  Current Body Wt: 51.1 kg (112 lb 10.5 oz)   BMI: 14.1    Nutrition Diagnosis:   Severe malnutrition related to swallowing difficulty, inadequate protein-energy intake as evidenced by Criteria as identified in malnutrition assessment    Nutrition Interventions:   Food and/or Nutrient Delivery: Continue Current Diet, Continue Oral Nutrition Supplement  Nutrition Education/Counseling: No recommendation at this time  Coordination of Nutrition Care: Continue to monitor while inpatient, Speech Therapy    Goals:  Previous Goal Met: Progress towards Goal(s) Declining  Goals: PO intake 75% or greater, Meet at least 75% of estimated needs    Nutrition Monitoring and Evaluation:   Behavioral-Environmental Outcomes: None Identified  Food/Nutrient Intake Outcomes: Food and Nutrient Intake, Supplement Intake  Physical Signs/Symptoms Outcomes:

## 2024-10-16 NOTE — CARE COORDINATION
HITESH spoke with dtalyssa Jose and she has asked for a referral be sent to [facility] for hospice care self pay.  Referral has been sent.  Awaiting acceptance/denial.    [facility]  Ph [phone]  Fax [phone]  Referral Fax:  [phone]  Pharmacy:  [place], [place]  Electronically signed by Mayuri Crocker RN on 10/16/2024 at 1:44 PM

## 2024-10-16 NOTE — PROGRESS NOTES
Palliative Care Progress Note  10/16/2024 10:14 AM    Patient:  Eze Rosa  YOB: 1950  Primary Care Physician: Ross Masterson MD  Advance Directive: DNR  Admit Date: 9/18/2024       Hospital Day: 28  Portions of this note have been copied forward, however, changed to reflect the most current clinical status of this patient.    CHIEF COMPLAINT/REASON FOR CONSULTATION Goals of care, family support, Code status, symptom management     SUBJECTIVE:  Mr. Rosa appeared somnolent when I entered the room but appeared to be grimacing. Opened eyes w/ verbal stimuli and reports pain, asking for a pain pill. He has no recollection of family visit yesterday. Appetite has significantly declined.     HPI:  The patient is a 73 y.o. 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  CT abdomen and pelvis reported no acute abnormality, sigmoid diverticulosis without indication of diverticulitis, moderate stool seen in the rectum without focal abnormality, mildly distended gallbladder without stones or focal abnormality, mild distention of the stomach with fluid in the lumen, no indication of gastric outlet obstruction, small bilateral pleural effusions.  CT head reported no acute intracranial process with acute bilateral maxillary sinusitis.  Chest x-ray reported small bilateral pleural effusions with bibasilar atelectasis and/or pneumonia worse  workup/treatment review, discussion with medical team, review of current and home medications, and placement of orders/preparation of this note: 45 minutes                                Electronically signed by Nicki Dick PA-C on 10/16/2024 at 10:14 AM    (Please note that portions of this note were completed with a voice recognition program.  Effortswere made to edit the dictations but occasionally words are mis-transcribed.)

## 2024-10-16 NOTE — PROGRESS NOTES
[facility]      Progress Note    Patient:  Eze Rosa  YOB: 1950  Date of Service: 10/16/2024  MRN: [id number]   Acct: [id number]   Primary Care Physician: Ross Masterson MD  Advance Directive: DNR  Admit Date: 9/18/2024       Hospital Day: 28    Portions of this note have been copied forward, however, updated to reflect the most current clinical status of this patient.     CHIEF COMPLAINT AMS    SUBJECTIVE:  Mr. Rosa  was resting in bed this morning. Somnolent this morning. Mumbling at times. Offers no complaints at this time.       CUMULATIVE HOSPITAL COURSE:   The patient is a 73 y.o. male with PMH of PAF, emphysema, chronic hypoxic respiratory failure on 2 L O2 per nasal cannula, chronic right pleural effusion with VATS biopsy, Mclean's esophagus with GERD, BPH with urinary retention, and alcohol use who presented to [facility] ED on 9/18/2024 with complaints of altered mental status and shortness of breath.  Workup indicated CTA pulmonary moderate to severe pulmonary fibrosis and interval development meant of esophagitis versus mass, chest x-ray bibasilar pneumonia right greater than left, COVID-positive, CT of the head no acute intercranial abnormalities with acute bilateral maxillary sinusitis, lactic acid 6 with repeat 5.8. CRP 11.55, WBCs 26.3, ABGs metabolic alkalosis.  Patient was admitted to hospital medicine with concern for sepsis, acute hypoxic respiratory failure secondary to COVID-19 in setting of moderate to severe pulmonary fibrosis. Empiric antibiotics and IV fluids were initiated.  Was noted to be hypotensive with A-fib RVR.  Received additional IV fluids, digoxin and converted back to normal sinus rhythm was placed on Lovenox.  Cardiology was consulted home recommended Toprol-XL 25 mg twice daily and Eliquis 5 mg twice daily with close monitoring for hemoptysis given extensive lung disease.  Patient was seen by speech therapy with recommendations of n.p.o. status.  Was

## 2024-10-16 NOTE — PLAN OF CARE
Problem: Safety - Adult  Goal: Free from fall injury  Outcome: Progressing     Problem: Skin/Tissue Integrity  Goal: Absence of new skin breakdown  Description: 1.  Monitor for areas of redness and/or skin breakdown  2.  Assess vascular access sites hourly  3.  Every 4-6 hours minimum:  Change oxygen saturation probe site  4.  Every 4-6 hours:  If on nasal continuous positive airway pressure, respiratory therapy assess nares and determine need for appliance change or resting period.  Outcome: Progressing     Problem: ABCDS Injury Assessment  Goal: Absence of physical injury  Outcome: Progressing     Problem: Discharge Planning  Goal: Discharge to home or other facility with appropriate resources  Outcome: Progressing     Problem: Nutrition Deficit:  Goal: Optimize nutritional status  Outcome: Progressing     Problem: Pain  Goal: Verbalizes/displays adequate comfort level or baseline comfort level  Outcome: Progressing

## 2024-10-17 PROCEDURE — 6370000000 HC RX 637 (ALT 250 FOR IP): Performed by: INTERNAL MEDICINE

## 2024-10-17 PROCEDURE — 94760 N-INVAS EAR/PLS OXIMETRY 1: CPT

## 2024-10-17 PROCEDURE — 6370000000 HC RX 637 (ALT 250 FOR IP)

## 2024-10-17 PROCEDURE — 2700000000 HC OXYGEN THERAPY PER DAY

## 2024-10-17 PROCEDURE — 94640 AIRWAY INHALATION TREATMENT: CPT

## 2024-10-17 PROCEDURE — 99231 SBSQ HOSP IP/OBS SF/LOW 25: CPT | Performed by: PHYSICIAN ASSISTANT

## 2024-10-17 PROCEDURE — 6370000000 HC RX 637 (ALT 250 FOR IP): Performed by: PHYSICIAN ASSISTANT

## 2024-10-17 PROCEDURE — 2140000000 HC CCU INTERMEDIATE R&B

## 2024-10-17 RX ADMIN — SUCRALFATE 1 G: 1 TABLET ORAL at 17:56

## 2024-10-17 RX ADMIN — Medication 2000 UNITS: at 08:08

## 2024-10-17 RX ADMIN — GUAIFENESIN 600 MG: 600 TABLET ORAL at 20:36

## 2024-10-17 RX ADMIN — GUAIFENESIN 600 MG: 600 TABLET ORAL at 08:08

## 2024-10-17 RX ADMIN — BUDESONIDE AND FORMOTEROL FUMARATE DIHYDRATE 2 PUFF: 160; 4.5 AEROSOL RESPIRATORY (INHALATION) at 07:56

## 2024-10-17 RX ADMIN — DILTIAZEM HYDROCHLORIDE 120 MG: 120 CAPSULE, COATED, EXTENDED RELEASE ORAL at 08:07

## 2024-10-17 RX ADMIN — METOPROLOL SUCCINATE 25 MG: 25 TABLET, EXTENDED RELEASE ORAL at 08:08

## 2024-10-17 RX ADMIN — GLYCOPYRROLATE 1 MG: 1 TABLET ORAL at 13:46

## 2024-10-17 RX ADMIN — PANTOPRAZOLE SODIUM 40 MG: 40 TABLET, DELAYED RELEASE ORAL at 20:36

## 2024-10-17 RX ADMIN — GLYCOPYRROLATE 1 MG: 1 TABLET ORAL at 20:35

## 2024-10-17 RX ADMIN — SUCRALFATE 1 G: 1 TABLET ORAL at 10:57

## 2024-10-17 RX ADMIN — GLYCOPYRROLATE 1 MG: 1 TABLET ORAL at 08:08

## 2024-10-17 RX ADMIN — APIXABAN 5 MG: 5 TABLET, FILM COATED ORAL at 08:09

## 2024-10-17 RX ADMIN — PANTOPRAZOLE SODIUM 40 MG: 40 TABLET, DELAYED RELEASE ORAL at 08:09

## 2024-10-17 RX ADMIN — METOPROLOL SUCCINATE 25 MG: 25 TABLET, EXTENDED RELEASE ORAL at 20:36

## 2024-10-17 RX ADMIN — APIXABAN 5 MG: 5 TABLET, FILM COATED ORAL at 20:36

## 2024-10-17 RX ADMIN — SUCRALFATE 1 G: 1 TABLET ORAL at 20:36

## 2024-10-17 RX ADMIN — BUDESONIDE AND FORMOTEROL FUMARATE DIHYDRATE 2 PUFF: 160; 4.5 AEROSOL RESPIRATORY (INHALATION) at 19:13

## 2024-10-17 RX ADMIN — FINASTERIDE 5 MG: 5 TABLET, FILM COATED ORAL at 08:08

## 2024-10-17 ASSESSMENT — ENCOUNTER SYMPTOMS
COLOR CHANGE: 0
NAUSEA: 0
BACK PAIN: 1
VOMITING: 0
ABDOMINAL DISTENTION: 0
CONSTIPATION: 0
DIARRHEA: 0
ABDOMINAL PAIN: 0
WHEEZING: 0
BLOOD IN STOOL: 0
COUGH: 0
SHORTNESS OF BREATH: 0

## 2024-10-17 NOTE — CARE COORDINATION
Pt can discharge to [facility] w/ hospice tomorrow 10/18/24.  Will need 3 days of comfort meds and Hospice will admit at the facility.  [facility]  Ph [phone]  Fax [phone]  Referral Fax:  [phone]  Pharmacy:  [email or web address] [place], [place]  Electronically signed by Mayuri Crocker RN on 10/17/2024 at 1:08 PM

## 2024-10-17 NOTE — PROGRESS NOTES
hunger/secretions-continue Ativan and seroquel as needed. Continue scheduled Robinul   Acute/chronic hypoxemic respiratory failure suspected 2/2 COVID pneumonia w/ known idiopathic pulmonary fibrosis w/ cachexia -OK to continue current level of support until discharge plan in place, supportive care   Atrial fibrillation RVR-Echo EF 60%, normal diastolic function, Toprol XL, Eliquis recommended per Cardiology   Dysphagia w/ severe protein calorie malnutrition/weight loss-supportive care, allow food for comfort   Severe esophagitis/long segment Mclean's esophagus -PPI, carafate   Afib RVR-back in sinus this morning, continue BB       Thank you for consulting Palliative Care and allowing us to participate in the care of this patient.     Total Time Spent with patient assessment, interview of independent historian/HCS, workup/treatment review, discussion with medical team, review of current and home medications, and placement of orders/preparation of this note: 30 minutes                                Electronically signed by Nicki Dick PA-C on 10/17/2024 at 1:51 PM    (Please note that portions of this note were completed with a voice recognition program.  Effortswere made to edit the dictations but occasionally words are mis-transcribed.)

## 2024-10-17 NOTE — CARE COORDINATION
Left vm for dtr Rebeca that pt has been accepted to [facility] w/ hospice private pay and can discharge there tomorrow 10/18/24. Electronically signed by Mayuri Crocker RN on 10/17/2024 at 1:14 PM

## 2024-10-17 NOTE — PROGRESS NOTES
Hospice is continuing to monitor for placement and will follow for assessment/admission after dc. Please dc the pt with a 3 day supply of medication and care orders. TY!

## 2024-10-17 NOTE — CARE COORDINATION
Received call from pt's daughter and states that Hospice will meet her at [facility] tomorrow at 4 pm to admit pt to hospice. Pt will need to transport by EMS. Electronically signed by Mayuri Crocker RN on 10/17/2024 at 2:31 PM

## 2024-10-17 NOTE — PROGRESS NOTES
[facility]      Progress Note    Patient:  Eze Rosa  YOB: 1950  Date of Service: 10/17/2024  MRN: [id number]   Acct: [id number]   Primary Care Physician: Ross Masterson MD  Advance Directive: DNR  Admit Date: 9/18/2024       Hospital Day: 29    Portions of this note have been copied forward, however, updated to reflect the most current clinical status of this patient.     CHIEF COMPLAINT AMS    SUBJECTIVE:  Mr. Rosa was resting in bed this morning. Reports SOB with coughing spells at times. Reports generalized pain.       CUMULATIVE HOSPITAL COURSE:   The patient is a 73 y.o. male with PMH of PAF, emphysema, chronic hypoxic respiratory failure on 2 L O2 per nasal cannula, chronic right pleural effusion with VATS biopsy, Mclean's esophagus with GERD, BPH with urinary retention, and alcohol use who presented to [facility] ED on 9/18/2024 with complaints of altered mental status and shortness of breath.  Workup indicated CTA pulmonary moderate to severe pulmonary fibrosis and interval development meant of esophagitis versus mass, chest x-ray bibasilar pneumonia right greater than left, COVID-positive, CT of the head no acute intercranial abnormalities with acute bilateral maxillary sinusitis, lactic acid 6 with repeat 5.8. CRP 11.55, WBCs 26.3, ABGs metabolic alkalosis.  Patient was admitted to hospital medicine with concern for sepsis, acute hypoxic respiratory failure secondary to COVID-19 in setting of moderate to severe pulmonary fibrosis. Empiric antibiotics and IV fluids were initiated.  Was noted to be hypotensive with A-fib RVR.  Received additional IV fluids, digoxin and converted back to normal sinus rhythm was placed on Lovenox.  Cardiology was consulted home recommended Toprol-XL 25 mg twice daily and Eliquis 5 mg twice daily with close monitoring for hemoptysis given extensive lung disease.  Patient was seen by speech therapy with recommendations of n.p.o. status.  Was seen by GI

## 2024-10-18 ENCOUNTER — TELEPHONE (OUTPATIENT)
Dept: GASTROENTEROLOGY | Age: 74
End: 2024-10-18

## 2024-10-18 VITALS
OXYGEN SATURATION: 89 % | HEIGHT: 75 IN | DIASTOLIC BLOOD PRESSURE: 73 MMHG | HEART RATE: 103 BPM | WEIGHT: 112.43 LBS | RESPIRATION RATE: 26 BRPM | BODY MASS INDEX: 13.98 KG/M2 | SYSTOLIC BLOOD PRESSURE: 115 MMHG | TEMPERATURE: 98.4 F

## 2024-10-18 PROCEDURE — 94640 AIRWAY INHALATION TREATMENT: CPT

## 2024-10-18 PROCEDURE — 6370000000 HC RX 637 (ALT 250 FOR IP): Performed by: INTERNAL MEDICINE

## 2024-10-18 PROCEDURE — 6370000000 HC RX 637 (ALT 250 FOR IP)

## 2024-10-18 PROCEDURE — 94760 N-INVAS EAR/PLS OXIMETRY 1: CPT

## 2024-10-18 PROCEDURE — 99232 SBSQ HOSP IP/OBS MODERATE 35: CPT | Performed by: PHYSICIAN ASSISTANT

## 2024-10-18 PROCEDURE — 6370000000 HC RX 637 (ALT 250 FOR IP): Performed by: PHYSICIAN ASSISTANT

## 2024-10-18 PROCEDURE — 2700000000 HC OXYGEN THERAPY PER DAY

## 2024-10-18 RX ORDER — DILTIAZEM HYDROCHLORIDE 120 MG/1
120 CAPSULE, COATED, EXTENDED RELEASE ORAL DAILY
Qty: 30 CAPSULE | Refills: 0 | Status: SHIPPED | OUTPATIENT
Start: 2024-10-19

## 2024-10-18 RX ORDER — LORAZEPAM 2 MG/ML
0.5 CONCENTRATE ORAL EVERY 6 HOURS PRN
Qty: 3 ML | Refills: 0 | Status: SHIPPED | OUTPATIENT
Start: 2024-10-18 | End: 2024-10-21

## 2024-10-18 RX ORDER — FENTANYL 12.5 UG/1
1 PATCH TRANSDERMAL
Qty: 1 PATCH | Refills: 0 | Status: SHIPPED | OUTPATIENT
Start: 2024-10-21 | End: 2024-10-24

## 2024-10-18 RX ORDER — MORPHINE SULFATE 20 MG/5ML
5 SOLUTION ORAL EVERY 4 HOURS PRN
Qty: 22.5 ML | Refills: 0 | Status: SHIPPED | OUTPATIENT
Start: 2024-10-18 | End: 2024-10-21

## 2024-10-18 RX ADMIN — Medication 5 MG: at 07:12

## 2024-10-18 RX ADMIN — Medication 2000 UNITS: at 07:46

## 2024-10-18 RX ADMIN — Medication 0.5 MG: at 07:53

## 2024-10-18 RX ADMIN — SUCRALFATE 1 G: 1 TABLET ORAL at 15:38

## 2024-10-18 RX ADMIN — BUDESONIDE AND FORMOTEROL FUMARATE DIHYDRATE 2 PUFF: 160; 4.5 AEROSOL RESPIRATORY (INHALATION) at 06:16

## 2024-10-18 RX ADMIN — FINASTERIDE 5 MG: 5 TABLET, FILM COATED ORAL at 07:46

## 2024-10-18 RX ADMIN — DILTIAZEM HYDROCHLORIDE 120 MG: 120 CAPSULE, COATED, EXTENDED RELEASE ORAL at 07:46

## 2024-10-18 RX ADMIN — APIXABAN 5 MG: 5 TABLET, FILM COATED ORAL at 07:46

## 2024-10-18 RX ADMIN — GUAIFENESIN 600 MG: 600 TABLET ORAL at 07:46

## 2024-10-18 RX ADMIN — PANTOPRAZOLE SODIUM 40 MG: 40 TABLET, DELAYED RELEASE ORAL at 07:46

## 2024-10-18 RX ADMIN — METOPROLOL SUCCINATE 25 MG: 25 TABLET, EXTENDED RELEASE ORAL at 07:46

## 2024-10-18 RX ADMIN — Medication 5 MG: at 12:33

## 2024-10-18 RX ADMIN — GLYCOPYRROLATE 1 MG: 1 TABLET ORAL at 12:33

## 2024-10-18 RX ADMIN — GLYCOPYRROLATE 1 MG: 1 TABLET ORAL at 07:46

## 2024-10-18 RX ADMIN — SUCRALFATE 1 G: 1 TABLET ORAL at 11:42

## 2024-10-18 ASSESSMENT — PAIN SCALES - GENERAL
PAINLEVEL_OUTOF10: 7
PAINLEVEL_OUTOF10: 0
PAINLEVEL_OUTOF10: 8

## 2024-10-18 ASSESSMENT — PAIN DESCRIPTION - DESCRIPTORS: DESCRIPTORS: ACHING

## 2024-10-18 ASSESSMENT — PAIN DESCRIPTION - LOCATION
LOCATION: BACK
LOCATION: BACK;BUTTOCKS

## 2024-10-18 NOTE — DISCHARGE SUMMARY
[facility]    Discharge Summary      Eze Rosa  :  1950  MRN:  [id number]    Admit date:  2024  Discharge date:    10/18/2024    Discharging Physician:  Dr. John Rincon     Advance Directive: DNR    Consults: cardiology, pulmonary/intensive care, GI, and Palliative care    Primary Care Physician:  Ross Masterson MD    Discharge Diagnoses:  Principal Problem:    COVID-19  Active Problems:    Idiopathic pulmonary fibrosis (HCC)    Benign prostatic hyperplasia with urinary retention    Palliative care patient    Hypertension    Mclean's esophagus with dysplasia    GERD (gastroesophageal reflux disease)    Paroxysmal atrial fibrillation (HCC)    Dysphagia    Anxiety    Severe malnutrition (HCC)  Resolved Problems:    Sepsis (HCC)      Portions of this note have been copied forward, however, changed to reflect the most current clinical status of this patient.    Hospital Course:   The patient is a 73 y.o. male with PMH of PAF, emphysema, chronic hypoxic respiratory failure on 2 L O2 per nasal cannula, chronic right pleural effusion with VATS biopsy, Mclean's esophagus with GERD, BPH with urinary retention, and alcohol use who presented to [facility] ED on 2024 with complaints of altered mental status and shortness of breath.  Workup indicated CTA pulmonary moderate to severe pulmonary fibrosis and interval development meant of esophagitis versus mass, chest x-ray bibasilar pneumonia right greater than left, COVID-positive, CT of the head no acute intercranial abnormalities with acute bilateral maxillary sinusitis, lactic acid 6 with repeat 5.8. CRP 11.55, WBCs 26.3, ABGs metabolic alkalosis.  Patient was admitted to hospital medicine with concern for sepsis, acute hypoxic respiratory failure secondary to COVID-19 in setting of moderate to severe pulmonary fibrosis. Empiric antibiotics and IV fluids were initiated.  Was noted to be hypotensive with A-fib RVR.  Received additional IV fluids,

## 2024-10-18 NOTE — PROGRESS NOTES
Palliative Care Progress Note  10/18/2024 7:53 AM    Patient:  Eze Rosa  YOB: 1950  Primary Care Physician: Ross Masterson MD  Advance Directive: DNR  Admit Date: 9/18/2024       Hospital Day: 30  Portions of this note have been copied forward, however, changed to reflect the most current clinical status of this patient.    CHIEF COMPLAINT/REASON FOR CONSULTATION Goals of care, family support, Code status, symptom management     SUBJECTIVE:  Mr. Rosa appears anxious but is cooperative. He is dyspneic.     HPI:  The patient is a 73 y.o. male with PMH pulmonary fibrosis, paroxysmal atrial fibrillation, BPH, previous alcoholism, chronic respiratory failure, Mclean's esophagus dependent on supplemental O2, previous COVID/RSV, chronic lieberman, BPH,  who presented to [facility] ED on 09/18/2024 complaining of shortness of breath, nausea, vomiting, abdominal pain, loose stools with incontinence and confusion.Work up included CTA pulmonary protocol which was negative for pulmonary embolus, revealed stable moderate to severe diffuse pulmonary fibrosis, stable trace dependent right pleural fluid, interval development of vague wall thickening portions of the esophagus.  CT abdomen and pelvis reported no acute abnormality, sigmoid diverticulosis without indication of diverticulitis, moderate stool seen in the rectum without focal abnormality, mildly distended gallbladder without stones or focal abnormality, mild distention of the stomach with fluid in the lumen, no indication of gastric outlet obstruction, small bilateral pleural effusions.  CT head reported no acute intracranial process with acute bilateral maxillary sinusitis.  Chest x-ray reported small bilateral pleural effusions with bibasilar atelectasis and/or pneumonia worse on the right side than the left.  He was positive for COVID-19.  WBC 26.3, D-dimer 2.43, lactic acid 6.3.  He was admitted to hospitalist service with concern for sepsis, acute  LOC  [] 70% Reduced ambulation  Some disease: Can't do normal job or work  Full self-care  Normal/reduced intake  Full LOC  [] 60% Reduced ambulation  Significant disease: Can't do hobbies/housework  Occasional assistance  Normal/reduced intake  LOC full/confusion  [] 50% Mainly sit/lie  Extensive disease: Can't do any work  Considerable assistance  Normal/reduced intake  LOC full/confusion  [] 40% Mainly in bed  Extensive disease  Mainly assistance  Normal/reduced intake  LOC full/confusion  [] 30% Bed Bound  Extensive disease  Total care  Reduced Intake  LOC full/confusion  [x] 20% Bed Bound  Extensive disease  Total care  Minimal intake  Drowsy/coma  [] 10% Bed Bound  Extensive disease  Total care  Mouth care only  Drowsy/coma  [] 0% Death    ECO        Assessment/Plan   Principal Problem:    COVID-19  Active Problems:    Idiopathic pulmonary fibrosis (HCC)    Benign prostatic hyperplasia with urinary retention    Palliative care patient    Hypertension    Mclean's esophagus with dysplasia    GERD (gastroesophageal reflux disease)    Paroxysmal atrial fibrillation (HCC)    Dysphagia    Anxiety    Severe malnutrition (HCC)  Resolved Problems:    Sepsis (HCC)    Visit Summary:  Chart reviewed.  I received a message from nursing staff on seventh floor this morning that Eze was very anxious.  The message went on to state that nursing staff was told they should not be giving him Ativan.  On review of the chart I did not see any changes in orders to sublingual Ativan or communication that he had any adverse effects from this.  He has appeared to tolerate it, as well as sublingual morphine without difficulty and when they have been administered appropriately he has had improvement in his symptoms.  I did request nursing staff to go ahead and continue symptom management as ordered.  Discussed this with hospitalist BRENDEN as well as case management who confirmed that there should be no issue

## 2024-10-18 NOTE — TELEPHONE ENCOUNTER
[facility] called to schedule a 2 month HFU. Patient is being discharged today. Please contact patient with an appointment.

## 2024-10-18 NOTE — PLAN OF CARE
Problem: Safety - Adult  Goal: Free from fall injury  Outcome: Progressing     Problem: Skin/Tissue Integrity  Goal: Absence of new skin breakdown  Description: 1.  Monitor for areas of redness and/or skin breakdown  2.  Assess vascular access sites hourly  3.  Every 4-6 hours minimum:  Change oxygen saturation probe site  4.  Every 4-6 hours:  If on nasal continuous positive airway pressure, respiratory therapy assess nares and determine need for appliance change or resting period.  Outcome: Progressing     Problem: ABCDS Injury Assessment  Goal: Absence of physical injury  Outcome: Progressing     Problem: Discharge Planning  Goal: Discharge to home or other facility with appropriate resources  Outcome: Progressing     Problem: Nutrition Deficit:  Goal: Optimize nutritional status  Outcome: Progressing     Problem: Pain  Goal: Verbalizes/displays adequate comfort level or baseline comfort level  Outcome: Progressing     Problem: Neurosensory - Adult  Goal: Achieves stable or improved neurological status  Outcome: Progressing     Problem: Respiratory - Adult  Goal: Achieves optimal ventilation and oxygenation  Outcome: Progressing     Problem: Cardiovascular - Adult  Goal: Maintains optimal cardiac output and hemodynamic stability  Outcome: Progressing     Problem: Skin/Tissue Integrity - Adult  Goal: Skin integrity remains intact  Outcome: Progressing  Flowsheets (Taken 10/17/2024 0809 by Nicki Nguyen RN)  Skin Integrity Remains Intact: Monitor for areas of redness and/or skin breakdown     Problem: Musculoskeletal - Adult  Goal: Return mobility to safest level of function  Outcome: Progressing     Problem: Gastrointestinal - Adult  Goal: Minimal or absence of nausea and vomiting  Outcome: Progressing     Problem: Genitourinary - Adult  Goal: Absence of urinary retention  Outcome: Progressing     Problem: Infection - Adult  Goal: Absence of infection at discharge  Outcome: Progressing     Problem:  Metabolic/Fluid and Electrolytes - Adult  Goal: Electrolytes maintained within normal limits  Outcome: Progressing     Problem: Hematologic - Adult  Goal: Maintains hematologic stability  Outcome: Progressing     Problem: Anxiety  Goal: Will report anxiety at manageable levels  Description: INTERVENTIONS:  1. Administer medication as ordered  2. Teach and rehearse alternative coping skills  3. Provide emotional support with 1:1 interaction with staff  Outcome: Progressing     Problem: Coping  Goal: Pt/Family able to verbalize concerns and demonstrate effective coping strategies  Description: INTERVENTIONS:  1. Assist patient/family to identify coping skills, available support systems and cultural and spiritual values  2. Provide emotional support, including active listening and acknowledgement of concerns of patient and caregivers  3. Reduce environmental stimuli, as able  4. Instruct patient/family in relaxation techniques, as appropriate  5. Assess for spiritual pain/suffering and initiate Spiritual Care, Psychosocial Clinical Specialist consults as needed  Outcome: Progressing     Problem: Death & Dying  Goal: Pt/Family communicate acceptance of impending death and feel psychological comfort and peace  Description: INTERVENTIONS:  1. Assess patient/family anxiety and grief process related to end of life issues  2. Provide emotional and spiritual support  3. Provide information about the patient's health status with consideration of family and cultural values  4. Communicate willingness to discuss death and facilitate grief process  with patient/family as appropriate  5. Emphasize sustaining relationships within family system and community, or anila/spiritual traditions  6. Initiate Spiritual Care, Psychosocial Clinical Specialist, consult as needed  Outcome: Progressing     Problem: Decision Making  Goal: Pt/Family able to effectively weigh alternatives and participate in decision making related to treatment and

## 2024-10-21 ENCOUNTER — TELEPHONE (OUTPATIENT)
Dept: CARDIOLOGY CLINIC | Age: 74
End: 2024-10-21

## 2025-02-05 NOTE — CARE COORDINATION
Physical Therapy Visit    Visit Type: Daily Treatment Note  Visit: 4  Referring Provider: Max De La Torre PA-C  Medical Diagnosis (from order): M76.02 - Gluteal tendinitis of left buttock     SUBJECTIVE                                                                                                               Patient reports that today his hip is not too bad. He was helping a friend move some wood and was very sore after this but has since improved somewhat. He was doing a lot of lifting and carrying during this activity.       OBJECTIVE                                                                                                                                       Treatment     Therapeutic Exercise  NuStep level 1, seat 10, LE's only for 5 minutes    Right side lying clamshell 2 x 12 left LE with green band around knees    Leg press (double limb, foot plate 8, seat back 3):  - 55 pounds x 12  - 85 pounds x 12  - 105 pounds x 12    Cross over step ups at staircase 2 x 12 left LE - UE support as needed    Lateral stepping with cable machine using 10 pounds x 3 steps out and back for 3 rounds      Manual Therapy   Right side lying soft tissue mobilization and fascial mobilization for left hip, glute, TFL and IT band to patient tolerance, pillow between knees    Skilled input: verbal instruction/cues, tactile instruction/cues, posture correction, demonstration and as detailed above    Writer verbally educated and received verbal consent for hand placement, positioning of patient, and techniques to be performed today from patient for clothing adjustments for techniques, therapist position for techniques and hand placement and palpation for techniques as described above and how they are pertinent to the patient's plan of care.  Home Exercise Program  Access Code: [email or web address]  URL: [email or web address]  Date: 01/17/2025  Prepared by: Ann-Marie Montalvo  - Seated Gluteal Sets  - 1 x  Pt can discharge to [facility] when medically stable and having no watery stools.  [facility]   PH: [phone] F:[phone]  Electronically signed by Mayuri Crocker RN on 1/9/2024 at 8:22 AM     daily - 7 x weekly - 3 sets - 10 reps  - Hooklying Isometric Clamshell  - 1 x daily - 7 x weekly - 3 sets - 10 reps  - Isometric Gluteus Medius at Wall  - 1 x daily - 7 x weekly - 3 sets - 10 reps      ASSESSMENT                                                                                                            Patient tolerated therapy with minimal complaints. No pain with leg press today, good eccentric control and dynamic knee control. Appropriate fatigue at end of session today.   Education:   - Results of above outlined education: Verbalizes understanding and Demonstrates understanding    PLAN                                                                                                                           Suggestions for next session as indicated: Progress per plan of care    Single limb leg press, hip stability progression       Therapy procedure time and total treatment time can be found documented on the Time Entry flowsheet

## 2025-02-18 NOTE — ED NOTES
1 L NS bolus stopped, kg based sepsis bolus ordered by APRN, pt to receive total of 1500 ml NS.    Sibling with flu A

## 2025-06-13 NOTE — PROGRESS NOTES
DVT Prophylaxis Adjustment Policy (DVT Prophylaxis)     This patient is on DVT Prophylaxis medication that requires a dose adjustment      Date Body Weight IBW  Adjusted BW SCr  CrCl Dialysis status   6/13/2025 113 kg (249 lb 1.9 oz) Ideal body weight: 66.2 kg (145 lb 15.1 oz)  Adjusted ideal body weight: 84.9 kg (187 lb 3.4 oz) Serum creatinine: 0.6 mg/dL 06/13/25 0406  Estimated creatinine clearance: 165 mL/min N/a       Pharmacy has dose-adjusted the DVT Prophylaxis regimen to match   the recommendations from the following table        Ordered Medication:Lovenox 40mg daily    Order Changed/converted to: Lovenox 30mg BID      These changes were made per protocol according to the [facility] Pharmacist   Review for Appropriate Use and Automatic Dose Adjustments of   Subcutaneous Anticoagulants Policy     *Please note this dose may need readjusted if patient's condition changes.    Please contact pharmacy with any questions regarding these changes.    George Cortes, PharmD   6/13/2025  5:47 AM    Nutrition Assessment     Type and Reason for Visit: Reassess    Nutrition Recommendations/Plan:   Continue POC.     Malnutrition Assessment:  Malnutrition Status: Severe malnutrition    Nutrition Assessment:  Pt continues w/altered texture and thickened liquids diet. Documented meal intake variable, ONS intake remains adequate. Per RN, pt frequently asks for water and popsicles. Noted current wt down 10# in 1 week. Will continue current diet orders.    Estimated Daily Nutrient Needs:  Energy (kcal):  1555-3021 (30-35 kcal/kg) Weight Used for Energy Requirements: Current     Protein (g):  74 (1.5 g/kg) Weight Used for Protein Requirements: Current        Fluid (ml/day):  4540-1721 Method Used for Fluid Requirements: 1 ml/kcal    Nutrition Related Findings:   BM 10/5. K+ 3.0, creat 0.5, Mg 1.3. Wound Type: Pressure Injury (coccyx/buttocks)    Current Nutrition Therapies:    ADULT DIET; Dysphagia - Minced and Moist; Moderately Thick (Honey)  ADULT ORAL NUTRITION SUPPLEMENT; Breakfast; Fortified Gelatin Oral Supplement  ADULT ORAL NUTRITION SUPPLEMENT; Lunch, Dinner; Frozen Oral Supplement    Anthropometric Measures:  Height: 190.5 cm (6' 3\")  Current Body Wt: 49.5 kg (109 lb 2 oz)   BMI: 13.6    Nutrition Diagnosis:   Severe malnutrition related to swallowing difficulty, inadequate protein-energy intake as evidenced by Criteria as identified in malnutrition assessment    Nutrition Interventions:   Food and/or Nutrient Delivery: Continue Current Diet, Continue Oral Nutrition Supplement  Nutrition Education/Counseling: No recommendation at this time  Coordination of Nutrition Care: Continue to monitor while inpatient, Speech Therapy    Goals:  Previous Goal Met: Progressing toward Goal(s)  Goals: PO intake 75% or greater, Meet at least 75% of estimated needs    Nutrition Monitoring and Evaluation:   Behavioral-Environmental Outcomes: None Identified  Food/Nutrient Intake Outcomes: Food and Nutrient Intake, Supplement

## (undated) DEVICE — MASK VENTILATOR MED AD SUPERNOVA ET

## (undated) DEVICE — ENDO KIT,LOURDES HOSPITAL: Brand: MEDLINE INDUSTRIES, INC.